# Patient Record
Sex: FEMALE | Race: BLACK OR AFRICAN AMERICAN | Employment: FULL TIME | URBAN - METROPOLITAN AREA
[De-identification: names, ages, dates, MRNs, and addresses within clinical notes are randomized per-mention and may not be internally consistent; named-entity substitution may affect disease eponyms.]

---

## 2017-01-16 ENCOUNTER — OFFICE VISIT (OUTPATIENT)
Dept: INTERNAL MEDICINE CLINIC | Age: 57
End: 2017-01-16

## 2017-01-16 ENCOUNTER — DOCUMENTATION ONLY (OUTPATIENT)
Dept: INTERNAL MEDICINE CLINIC | Age: 57
End: 2017-01-16

## 2017-01-16 VITALS
WEIGHT: 161.3 LBS | BODY MASS INDEX: 29.68 KG/M2 | DIASTOLIC BLOOD PRESSURE: 83 MMHG | RESPIRATION RATE: 14 BRPM | OXYGEN SATURATION: 97 % | HEIGHT: 62 IN | TEMPERATURE: 98.7 F | HEART RATE: 91 BPM | SYSTOLIC BLOOD PRESSURE: 140 MMHG

## 2017-01-16 DIAGNOSIS — I21.09: Chronic | ICD-10-CM

## 2017-01-16 DIAGNOSIS — R09.81 NASAL CONGESTION: Primary | ICD-10-CM

## 2017-01-16 DIAGNOSIS — K21.9 GASTROESOPHAGEAL REFLUX DISEASE, ESOPHAGITIS PRESENCE NOT SPECIFIED: ICD-10-CM

## 2017-01-16 DIAGNOSIS — G47.00 INSOMNIA, UNSPECIFIED TYPE: ICD-10-CM

## 2017-01-16 DIAGNOSIS — I51.7 CARDIOMEGALY: ICD-10-CM

## 2017-01-16 DIAGNOSIS — E78.00 HYPERCHOLESTEREMIA: Chronic | ICD-10-CM

## 2017-01-16 DIAGNOSIS — I10 ESSENTIAL HYPERTENSION: Chronic | ICD-10-CM

## 2017-01-16 DIAGNOSIS — R09.81 NASAL CONGESTION: ICD-10-CM

## 2017-01-16 RX ORDER — ZOLPIDEM TARTRATE 10 MG/1
10 TABLET ORAL
Qty: 30 TAB | Refills: 2 | Status: SHIPPED | OUTPATIENT
Start: 2017-01-16 | End: 2017-03-13 | Stop reason: SDUPTHER

## 2017-01-16 NOTE — PROGRESS NOTES
This writer has met with pt during scheduled ov post notice of pt being a high risk patient. Mrs. Jadon Clemens tells this writer she is doing well. She is in the office today with what she feels are allergy symptoms including productive cough. This writer last spoke with pt on 10/20/16 post admission for Syncope. Since that time the pt:  - denies any further syncopal episodes. -denies shortness of breath even with exercise; using inhalers as prescribed  - denies any further testing with other physicians   -last saw Dr. Selvin Orellana, cardiology, Nov 2016 at which time he changed her Bumex to PRN, next ov with him will be in Feb.   -has yet to see referred GI physician. Insurance has changed and needs to ensure he is in her new network, Pt will make the call to find out prior to scheduled Feb apt. Dr Colonel Quigley has created new referrals. (denies, constipation, blood in stool, diarrhea). -continues to check glucose levels at home reports a fasting average of less than 110. This writer will continue to monitor pt's status as she has not identified any need for case management at this time.

## 2017-01-16 NOTE — MR AVS SNAPSHOT
Visit Information Date & Time Provider Department Dept. Phone Encounter #  
 1/16/2017  3:45  [facility] [place], [place] [phone] [id number] Follow-up Instructions Return for 2 weeks review labs . Upcoming Health Maintenance Date Due Hepatitis C Screening 1960 DTaP/Tdap/Td series (1 - Tdap) 4/28/2009 BREAST CANCER SCRN MAMMOGRAM 4/26/2014 EYE EXAM RETINAL OR DILATED Q1 3/1/2015 FOOT EXAM Q1 10/2/2015 PAP AKA CERVICAL CYTOLOGY 3/26/2016 MICROALBUMIN Q1 11/16/2016 HEMOGLOBIN A1C Q6M 2/23/2017 LIPID PANEL Q1 10/19/2017 COLONOSCOPY 1/10/2019 Allergies as of 1/16/2017  Review Complete On: 1/16/2017 By: Betty Ragland LPN Severity Noted Reaction Type Reactions Ace Inhibitors  10/13/2011    Cough ?10/2011 Seafood [Shellfish Containing Products]  11/01/2010    Hives, Shortness of Breath, Nausea and Vomiting Current Immunizations  Reviewed on 10/19/2016 Name Date Influenza Vaccine 12/16/2014  4:31 PM, 1/10/2013 Influenza Vaccine (Quad) PF 10/19/2016  6:19 PM  
 Influenza Vaccine Intradermal PF 11/2/2015 Influenza Vaccine PF 10/7/2013 Influenza Vaccine Split 10/6/2010 Pneumococcal Polysaccharide (PPSV-23) 7/31/2015  4:49 PM  
 TD Vaccine 4/27/2009 Not reviewed this visit You Were Diagnosed With   
  
 Codes Comments Nasal congestion    -  Primary ICD-10-CM: R09.81 ICD-9-CM: 478.19 Essential hypertension     ICD-10-CM: I10 
ICD-9-CM: 401.9 Cardiomegaly     ICD-10-CM: I51.7 ICD-9-CM: 429.3 Insomnia, unspecified type     ICD-10-CM: G47.00 ICD-9-CM: 780.52 Myocardial infarction, anterior, acute, initial episode ([facility] [address]     ICD-10-CM: I21.09 
ICD-9-CM: 410.11 Gastroesophageal reflux disease, esophagitis presence not specified     ICD-10-CM: K21.9 ICD-9-CM: 530.81 Hypercholesteremia     ICD-10-CM: E78.00 ICD-9-CM: 272.0 Vitals BP Pulse Temp Resp Height(growth percentile) Weight(growth percentile) 140/83 (BP 1 Location: Right arm, BP Patient Position: At rest) 91 98.7 °F (37.1 °C) (Oral) 14 5' 2\" (1.575 m) 161 lb 4.8 oz (73.2 kg) LMP SpO2 BMI OB Status Smoking Status (Exact Date) 97% 29.5 kg/m2 Postmenopausal Former Smoker Vitals History BMI and BSA Data Body Mass Index Body Surface Area  
 29.5 kg/m 2 1.79 m 2 Preferred Pharmacy Pharmacy Name Phone [id number] Dimple Brown [address] [place] [address] [phone] Your Updated Medication List  
  
   
This list is accurate as of: 1/16/17  4:31 PM.  Always use your most recent med list.  
  
  
  
  
 albuterol 90 mcg/actuation inhaler Commonly known as:  PROAIR HFA Take 2 puffs by inhalation every six (6) hours as needed for Wheezing. atorvastatin 80 mg tablet Commonly known as:  LIPITOR Take 1 Tab by mouth nightly. bumetanide 0.5 mg tablet Commonly known as:  Garlon Salen Take 1 Tab by mouth two (2) times a day. Increase to 1-2 mg bid prn CHF  Indications: PERIPHERAL EDEMA DUE TO CHRONIC HEART FAILURE  
  
 carvedilol 25 mg tablet Commonly known as:  Gillermina Begun Take 25 mg by mouth two (2) times a day. cetirizine 10 mg tablet Commonly known as:  ZYRTEC Take 10 mg by mouth daily as needed for Allergies. cloNIDine HCl 0.3 mg tablet Commonly known as:  CATAPRES Take 0.3 mg by mouth two (2) times daily as needed. *IF BLOOD PRESSURE IS >170/90*  
  
 cyclobenzaprine 5 mg tablet Commonly known as:  FLEXERIL Take 1 Tab by mouth daily as needed for Muscle Spasm(s). Do not take with the Ambien Dexlansoprazole 60 mg Cpdb Commonly known as:  DEXILANT  
1 PO daily  
  
 diclofenac 1 % Gel Commonly known as:  VOLTAREN  
APPLY 2 G TO AFFECTED AREA FOUR (4) TIMES DAILY. EPINEPHrine 0.3 mg/0.3 mL Cmpk 1 Syringe by IntraMUSCular route once as needed for up to 1 dose. famotidine 40 mg tablet Commonly known as:  PEPCID Take 40 mg by mouth nightly. HYDROcodone-acetaminophen 5-325 mg per tablet Commonly known as:  Lenon Gauze Take 1 Tab by mouth every eight (8) hours as needed for Pain. Max Daily Amount: 3 Tabs. ipratropium 0.06 % nasal spray Commonly known as:  ATROVENT  
2 Sprays by Both Nostrils route four (4) times daily as needed for Rhinitis. Indications: RHINORRHEA * isosorbide mononitrate ER 60 mg CR tablet Commonly known as:  IMDUR  
  
 * isosorbide mononitrate ER 60 mg CR tablet Commonly known as:  IMDUR  
TAKE 1 TAB BY MOUTH EVERY MORNING. losartan 50 mg tablet Commonly known as:  COZAAR Take 1 Tab by mouth every evening. * metFORMIN 500 mg tablet Commonly known as:  GLUCOPHAGE  
TAKE 1 TABLET BY MOUTH TWICE A DAY (WITH MEALS) * metFORMIN 500 mg tablet Commonly known as:  GLUCOPHAGE  
TAKE 1 TABLET BY MOUTH TWICE A DAY (WITH MEALS)  
  
 montelukast 10 mg tablet Commonly known as:  SINGULAIR  
TAKE 1 TAB BY MOUTH DAILY. INDICATIONS: ALLERGIC RHINITIS  
  
 pantoprazole 40 mg tablet Commonly known as:  PROTONIX  
TAKE 1 TAB BY MOUTH DAILY. VITAMIN D 2,000 unit Cap capsule Generic drug:  Cholecalciferol (Vitamin D3) Take 4,000 Units by mouth daily. Takes 2 of the 2,000 unit tabs qd  
  
 zolpidem 10 mg tablet Commonly known as:  AMBIEN Take 1 Tab by mouth nightly as needed for Sleep. Max Daily Amount: 10 mg.  
  
 * Notice: This list has 4 medication(s) that are the same as other medications prescribed for you. Read the directions carefully, and ask your doctor or other care provider to review them with you. Prescriptions Printed Refills  
 zolpidem (AMBIEN) 10 mg tablet 2 Sig: Take 1 Tab by mouth nightly as needed for Sleep. Max Daily Amount: 10 mg.  
 Class: Print Route: Oral  
  
We Performed the Following METABOLIC PANEL, COMPREHENSIVE [51570 CPT(R)] GABRIEL LIPOPROFILE Z145833 CPT(R)] REFERRAL TO CARDIOLOGY [DRE90 Custom] Comments:  
 Please evaluate patient  PKTY REFERRAL TO GASTROENTEROLOGY [IHI90 Custom] Comments:  
 Please evaluate patient for  pkty TSH REFLEX TO T4 [[id number] Custom] Follow-up Instructions Return for 2 weeks review labs . To-Do List   
 01/16/2017 Lab:  HEMOGLOBIN A1C WITH EAG Referral Information Referral ID Referred By Referred To  
  
 [id number] Misha Obrien, [address] [facility] [address] [facility] [address]  [place], [address] [facility] Visits Status Start Date End Date 1 New Request 1/16/17 1/16/18 If your referral has a status of pending review or denied, additional information will be sent to support the outcome of this decision. Referral ID Referred By Referred To  
 [id number] Misha Obrien, [address] [facility] [address] [facility] [address] [place], [address] [facility] Visits Status Start Date End Date 1 New Request 1/16/17 1/16/18 If your referral has a status of pending review or denied, additional information will be sent to support the outcome of this decision. Patient Instructions Call with any concerns Introducing [facility]! Dear Adilene Ball: Thank you for requesting a nDreams account. Our records indicate that you already have an active nDreams account. You can access your account anytime at [email or web address] Did you know that you can access your hospital and ER discharge instructions at any time in nDreams? You can also review all of your test results from your hospital stay or ER visit. Additional Information If you have questions, please visit the Frequently Asked Questions section of the nDreams website at [email or web address] Remember, nDreams is NOT to be used for urgent needs. For medical emergencies, dial 911. Now available from your iPhone and Android! Please provide this summary of care documentation to your next provider. Your primary care clinician is listed as Adarsh Pacheco. If you have any questions after today's visit, please call [phone].

## 2017-01-16 NOTE — PROGRESS NOTES
Chief Complaint   Patient presents with    Insomnia     follow up on [facility] Referral Request     needs referral to cardiologist Dr. Amish Rodríguez       Subjective:   Monique Miner 49 y.o.  female with a  past medical history reviewed see below. comes in today for f/up   Ambien is working  well but when not used she is not sleep she has practiced sleep hygiene   She has had her sleep study already see old notes   Allergies started yesterday using inhaler no otc medications. Exposed to bronchitis from  no fever nochill bu tsome nasal goestion and mild cough     ROS: otherwise feeling generally well. All other systems reviewed and are negative      Current Outpatient Prescriptions   Medication Sig Dispense Refill    zolpidem (AMBIEN) 10 mg tablet Take 1 Tab by mouth nightly as needed for Sleep. Max Daily Amount: 10 mg. 30 Tab 2    bumetanide (BUMEX) 0.5 mg tablet Take 1 Tab by mouth two (2) times a day. Increase to 1-2 mg bid prn CHF  Indications: PERIPHERAL EDEMA DUE TO CHRONIC HEART FAILURE 180 Tab 1    losartan (COZAAR) 50 mg tablet Take 1 Tab by mouth every evening. 30 Tab 1    Dexlansoprazole (DEXILANT) 60 mg CpDB 1 PO daily 30 Cap 1    carvedilol (COREG) 25 mg tablet Take 25 mg by mouth two (2) times a day.  cetirizine (ZYRTEC) 10 mg tablet Take 10 mg by mouth daily as needed for Allergies.  cloNIDine HCl (CATAPRES) 0.3 mg tablet Take 0.3 mg by mouth two (2) times daily as needed. *IF BLOOD PRESSURE IS >170/90*      ipratropium (ATROVENT) 0.06 % nasal spray 2 Sprays by Both Nostrils route four (4) times daily as needed for Rhinitis. Indications: RHINORRHEA      famotidine (PEPCID) 40 mg tablet Take 40 mg by mouth nightly.  metFORMIN (GLUCOPHAGE) 500 mg tablet TAKE 1 TABLET BY MOUTH TWICE A DAY (WITH MEALS) 60 Tab 5    cyclobenzaprine (FLEXERIL) 5 mg tablet Take 1 Tab by mouth daily as needed for Muscle Spasm(s).  Do not take with the Ambien 10 Tab 0    HYDROcodone-acetaminophen (NORCO) 5-325 mg per tablet Take 1 Tab by mouth every eight (8) hours as needed for Pain. Max Daily Amount: 3 Tabs. 14 Tab 0    atorvastatin (LIPITOR) 80 mg tablet Take 1 Tab by mouth nightly. 30 Tab 5    montelukast (SINGULAIR) 10 mg tablet TAKE 1 TAB BY MOUTH DAILY. INDICATIONS: ALLERGIC RHINITIS 30 Tab 11    albuterol (PROAIR HFA) 90 mcg/actuation inhaler Take 2 puffs by inhalation every six (6) hours as needed for Wheezing. 1 Inhaler 1    Cholecalciferol, Vitamin D3, (VITAMIN D) 2,000 unit Cap Take 4,000 Units by mouth daily. Takes 2 of the 2,000 unit tabs qd      metFORMIN (GLUCOPHAGE) 500 mg tablet TAKE 1 TABLET BY MOUTH TWICE A DAY (WITH MEALS) 60 Tab 5    isosorbide mononitrate ER (IMDUR) 60 mg CR tablet TAKE 1 TAB BY MOUTH EVERY MORNING. 60 Tab 1    diclofenac (VOLTAREN) 1 % gel APPLY 2 G TO AFFECTED AREA FOUR (4) TIMES DAILY. 0    isosorbide mononitrate ER (IMDUR) 60 mg CR tablet       pantoprazole (PROTONIX) 40 mg tablet TAKE 1 TAB BY MOUTH DAILY. 30 Tab 0    Epinephrine 0.3 mg/0.3 mL cmpk 1 Syringe by IntraMUSCular route once as needed for up to 1 dose.  1 each 1     Allergies   Allergen Reactions    Ace Inhibitors Cough     ?10/2011    Seafood [Shellfish Containing Products] Hives, Shortness of Breath and Nausea and Vomiting     Past Medical History   Diagnosis Date    Acute Anterior Myocardial Infarction, s/p PTCA w/ stent 10/2010 10/31/2010    Asthma      mild    [facility] 8/24/2012    GERD (gastroesophageal reflux disease)      hiatal hernia    H/O recurrent Sinusitis 3/16/2011    Hiatal Hernia w/ GERD (gastroesophageal reflux disease) 3/16/2011    Hypercholesterolemia     Hypertension 3/16/2011    Ill-defined condition      pacemaker put in last July 2015 because of CHF    Impaired fasting glucose 4/18/2011    Iron deficiency anemia 3/16/2011    Multinodular thyroid 3/16/2011    Palpitations, PVC's 10/13/2011    Perennial allergic rhinitis 3/16/2011    Pityriasis rosea 2012    Post-menopausal      LMP 46y. o, no HRT    Postmenopause, LMP 40 yo, No HRT 3/16/2011    Reflux esophagitis 3/16/2011    Tobacco user     Vitamin D deficiency 10/13/2011     Past Surgical History   Procedure Laterality Date    Ptca w/ stent, initial  10/2010     Dr Darryl Issa Egd  2009     hiatal hernia, esophagitis; Dr Maru Gilliam Hx lithotripsy  2012     failed    Kidney stone (calculi) eval  2012     scope w/ kidney stone extractions, multiple; [facility]    Pr colonoscopy w/biopsy single/multiple  2009     benign polyp, recheck in 5 years, Dr Camden Grey    Endoscopy, colon, diagnostic  2009    Hx gyn       FNA bilateral benign     Pr cardiac surg procedure unlist       cardiac stent      Family History   Problem Relation Age of Onset    Hypertension Mother     High Cholesterol Mother     Thyroid Disease Mother     Heart Disease Mother      chf    Kidney Disease Mother     Headache Mother     Heart Disease Father     Heart Attack Father 36      MI age 36   [facility] High Cholesterol Father     Hypertension Sister     Thyroid Disease Sister     Hypertension Sister    [facility] Arthritis-rheumatoid Sister     Diabetes Sister     Thyroid Disease Sister     Heart Attack Brother      massive MI at age 46, survived    Thyroid Disease Other      niece, Neha Garcia    High Cholesterol Brother      Social History   Substance Use Topics    Smoking status: Former Smoker     Packs/day: 0.25     Years: 30.00     Types: Cigarettes     Quit date: 2013    Smokeless tobacco: Never Used      Comment: had quit 10/2010 then restarted ~ 3/2012;  about 5 cigarettes a day; on and off since age 26 yo x ~ 30 yrs    Alcohol use No      Comment: none          Objective:     Visit Vitals    /83 (BP 1 Location: Right arm, BP Patient Position: At rest)    Pulse 91    Temp 98.7 °F (37.1 °C) (Oral)    Resp 14    Ht 5' 2\" (1.575 m)  Wt 161 lb 4.8 oz (73.2 kg)    LMP  (Exact Date)    SpO2 97%    BMI 29.5 kg/m2     Gen: NAD, pleasant  HEENT: normal appearing head, nares patent, PERRLA, EOMI, oropharynx no erythema, no cervical lymphadenopathy neck supple nasal drainage nasal congestion turbinates boggy clr d/c noted   Cardio: RRR nl S1S2 no murmur  Lungs CTAB no wheeze no rales no rhonchi  ABD Soft non tender non distended + bowel sounds  Extremities: full ROM X 4 no clubbing no cyanosis  Neuro: no gross focal deficits noted, alert and orientated X 3  Psych.: well groomed no outward signs of depression. Assessment/Plan:   [facility] was seen today for insomnia and referral request.    Diagnoses and all orders for this visit:    Nasal congestion  -     HEMOGLOBIN A1C WITH EAG; Future  -     METABOLIC PANEL, COMPREHENSIVE  -     TSH REFLEX TO T4  -     NMR LIPOPROFILE    Essential hypertension  -     HEMOGLOBIN A1C WITH EAG; Future  -     METABOLIC PANEL, COMPREHENSIVE  -     TSH REFLEX TO T4  -     NMR LIPOPROFILE    Cardiomegaly  -     HEMOGLOBIN A1C WITH EAG; Future  -     METABOLIC PANEL, COMPREHENSIVE  -     TSH REFLEX TO T4  -     NMR LIPOPROFILE    Insomnia, unspecified type  -     zolpidem (AMBIEN) 10 mg tablet; Take 1 Tab by mouth nightly as needed for Sleep. Max Daily Amount: 10 mg.  -     HEMOGLOBIN A1C WITH EAG; Future  -     METABOLIC PANEL, COMPREHENSIVE  -     TSH REFLEX TO T4  -     NMR LIPOPROFILE    Acute Anterior Myocardial Infarction, s/p PTCA w/ stent 10/2010  -     REFERRAL TO CARDIOLOGY  -     HEMOGLOBIN A1C WITH EAG; Future  -     METABOLIC PANEL, COMPREHENSIVE  -     TSH REFLEX TO T4  -     NMR LIPOPROFILE    Gastroesophageal reflux disease, esophagitis presence not specified  -     REFERRAL TO GASTROENTEROLOGY  -     HEMOGLOBIN A1C WITH EAG; Future  -     METABOLIC PANEL, COMPREHENSIVE  -     TSH REFLEX TO T4  -     NMR LIPOPROFILE    Hypercholesteremia  -     HEMOGLOBIN A1C WITH EAG;  Future  -     METABOLIC PANEL, COMPREHENSIVE  -     TSH REFLEX TO T4  -     NMR LIPOPROFILE    Other orders  -     CVD REPORT      Follow-up Disposition:  Return for 2 weeks review labs . .  avs printed and given to the pt. .  netti pot recommended to help w/ allergies and avoid all perfumes/colonges   The patient voiced understanding of the above. Medication side effects were reviewed with the patient. Call with any concerns.

## 2017-01-16 NOTE — PROGRESS NOTES
1. Have you been to the ER, urgent care clinic since your last visit? Hospitalized since your last visit? No    2. Have you seen or consulted any other health care providers outside of the [address] [place] [address] since your last visit? Include any pap smears or colon screening.  No     Chief Complaint   Patient presents with    Insomnia     follow up on [facility] Referral Request     needs referral to cardiologist Dr. Akhil Naylor     Not fasting

## 2017-01-19 ENCOUNTER — LAB ONLY (OUTPATIENT)
Dept: INTERNAL MEDICINE CLINIC | Age: 57
End: 2017-01-19

## 2017-01-20 LAB
ALBUMIN SERPL-MCNC: 4.4 G/DL (ref 3.5–5.5)
ALBUMIN/GLOB SERPL: 1.8 {RATIO} (ref 1.1–2.5)
ALP SERPL-CCNC: 106 IU/L (ref 39–117)
ALT SERPL-CCNC: 10 IU/L (ref 0–32)
AST SERPL-CCNC: 19 IU/L (ref 0–40)
BILIRUB SERPL-MCNC: 0.3 MG/DL (ref 0–1.2)
BUN SERPL-MCNC: 16 MG/DL (ref 6–24)
BUN/CREAT SERPL: 19 (ref 9–23)
CALCIUM SERPL-MCNC: 10.1 MG/DL (ref 8.7–10.2)
CHLORIDE SERPL-SCNC: 105 MMOL/L (ref 96–106)
CHOLEST SERPL-MCNC: 132 MG/DL (ref 100–199)
CO2 SERPL-SCNC: 25 MMOL/L (ref 18–29)
CREAT SERPL-MCNC: 0.83 MG/DL (ref 0.57–1)
EST. AVERAGE GLUCOSE BLD GHB EST-MCNC: 146 MG/DL
GLOBULIN SER CALC-MCNC: 2.4 G/DL (ref 1.5–4.5)
GLUCOSE SERPL-MCNC: 109 MG/DL (ref 65–99)
HBA1C MFR BLD: 6.7 % (ref 4.8–5.6)
HDL SERPL-SCNC: 30.4 UMOL/L
HDLC SERPL-MCNC: 47 MG/DL
INTERPRETATION, 910389: NORMAL
LDL SERPL QN: 20.8 NM
LDL SERPL-SCNC: 1041 NMOL/L
LDL SMALL SERPL-SCNC: 632 NMOL/L
LDLC SERPL CALC-MCNC: 69 MG/DL (ref 0–99)
LP-IR SCORE SERPL: 49
POTASSIUM SERPL-SCNC: 4.3 MMOL/L (ref 3.5–5.2)
PROT SERPL-MCNC: 6.8 G/DL (ref 6–8.5)
SODIUM SERPL-SCNC: 144 MMOL/L (ref 134–144)
TRIGL SERPL-MCNC: 80 MG/DL (ref 0–149)
TSH SERPL DL<=0.005 MIU/L-ACNC: 0.67 UIU/ML (ref 0.45–4.5)

## 2017-01-30 ENCOUNTER — OFFICE VISIT (OUTPATIENT)
Dept: INTERNAL MEDICINE CLINIC | Age: 57
End: 2017-01-30

## 2017-01-30 VITALS
SYSTOLIC BLOOD PRESSURE: 159 MMHG | DIASTOLIC BLOOD PRESSURE: 77 MMHG | WEIGHT: 161 LBS | TEMPERATURE: 98.1 F | HEART RATE: 74 BPM | RESPIRATION RATE: 14 BRPM | BODY MASS INDEX: 29.63 KG/M2 | OXYGEN SATURATION: 95 % | HEIGHT: 62 IN

## 2017-01-30 DIAGNOSIS — E11.9 NON-INSULIN DEPENDENT TYPE 2 DIABETES MELLITUS (HCC): ICD-10-CM

## 2017-01-30 DIAGNOSIS — R09.02 HYPOXIA: ICD-10-CM

## 2017-01-30 DIAGNOSIS — Z71.2 ENCOUNTER TO DISCUSS TEST RESULTS: ICD-10-CM

## 2017-01-30 DIAGNOSIS — R06.2 WHEEZING ON AUSCULTATION: ICD-10-CM

## 2017-01-30 DIAGNOSIS — J45.20 MILD INTERMITTENT ASTHMA WITHOUT COMPLICATION: Primary | ICD-10-CM

## 2017-01-30 DIAGNOSIS — I10 BENIGN ESSENTIAL HTN: ICD-10-CM

## 2017-01-30 RX ORDER — IPRATROPIUM BROMIDE AND ALBUTEROL SULFATE 2.5; .5 MG/3ML; MG/3ML
3 SOLUTION RESPIRATORY (INHALATION)
Qty: 3 ML | Refills: 0
Start: 2017-01-30 | End: 2017-01-30

## 2017-01-30 RX ORDER — ALBUTEROL SULFATE 90 UG/1
2 AEROSOL, METERED RESPIRATORY (INHALATION)
Qty: 1 INHALER | Refills: 1 | Status: SHIPPED | OUTPATIENT
Start: 2017-01-30

## 2017-01-30 NOTE — PROGRESS NOTES
1. Have you been to the ER, urgent care clinic since your last visit? Hospitalized since your last visit? No    2. Have you seen or consulted any other health care providers outside of the [facility] since your last visit? Include any pap smears or colon screening.  No   Chief Complaint   Patient presents with    Results     follow up on lab results     Not fasting

## 2017-01-30 NOTE — MR AVS SNAPSHOT
Visit Information Date & Time Provider Department Dept. Phone Encounter #  
 1/30/2017  3:45  [facility], [address] [place] [address] [phone] [id number] Follow-up Instructions Return in about 6 weeks (around 3/13/2017), or if symptoms worsen or fail to improve, for recheck HTN 15 min . Upcoming Health Maintenance Date Due Hepatitis C Screening 1960 DTaP/Tdap/Td series (1 - Tdap) 4/28/2009 BREAST CANCER SCRN MAMMOGRAM 4/26/2014 EYE EXAM RETINAL OR DILATED Q1 3/1/2015 FOOT EXAM Q1 10/2/2015 PAP AKA CERVICAL CYTOLOGY 3/26/2016 MICROALBUMIN Q1 11/16/2016 HEMOGLOBIN A1C Q6M 7/19/2017 LIPID PANEL Q1 1/19/2018 COLONOSCOPY 1/10/2019 Allergies as of 1/30/2017  Review Complete On: 1/30/2017 By: Erika Gabriel LPN Severity Noted Reaction Type Reactions Ace Inhibitors  10/13/2011    Cough ?10/2011 Seafood [Shellfish Containing Products]  11/01/2010    Hives, Shortness of Breath, Nausea and Vomiting Current Immunizations  Reviewed on 10/19/2016 Name Date Influenza Vaccine 12/16/2014  4:31 PM, 1/10/2013 Influenza Vaccine (Quad) PF 10/19/2016  6:19 PM  
 Influenza Vaccine Intradermal PF 11/2/2015 Influenza Vaccine PF 10/7/2013 Influenza Vaccine Split 10/6/2010 Pneumococcal Polysaccharide (PPSV-23) 7/31/2015  4:49 PM  
 TD Vaccine 4/27/2009 Not reviewed this visit You Were Diagnosed With   
  
 Codes Comments Mild intermittent asthma without complication    -  Primary ICD-10-CM: J45.20 ICD-9-CM: 493.90 Wheezing on auscultation     ICD-10-CM: R06.2 ICD-9-CM: 786.07 Non-insulin dependent type 2 diabetes mellitus ([facility]     ICD-10-CM: E11.9 ICD-9-CM: 250.00 Benign essential HTN     ICD-10-CM: I10 
ICD-9-CM: 401.1 Hypoxia     ICD-10-CM: R09.02 
ICD-9-CM: 799.02 Encounter to discuss test results     ICD-10-CM: Z71.89 ICD-9-CM: V65.49 Vitals BP Pulse Temp Resp Height(growth percentile) Weight(growth percentile) 159/77 (BP 1 Location: Left arm, BP Patient Position: At rest) 74 98.1 °F (36.7 °C) (Oral) 14 5' 2\" (1.575 m) 161 lb (73 kg) LMP SpO2 PF BMI OB Status Smoking Status (Exact Date) 95% 400 L/min 29.45 kg/m2 Postmenopausal Former Smoker Vitals History BMI and BSA Data Body Mass Index Body Surface Area  
 29.45 kg/m 2 1.79 m 2 Preferred Pharmacy Pharmacy Name Phone [id number] Amber Evangelista, [address] [facility] [phone] Your Updated Medication List  
  
   
This list is accurate as of: 1/30/17  4:25 PM.  Always use your most recent med list.  
  
  
  
  
 albuterol 90 mcg/actuation inhaler Commonly known as:  PROAIR HFA Take 2 Puffs by inhalation every six (6) hours as needed for Wheezing. albuterol-ipratropium 2.5 mg-0.5 mg/3 ml Nebu Commonly known as:  DUO-NEB  
3 mL by Nebulization route now for 1 dose. atorvastatin 80 mg tablet Commonly known as:  LIPITOR Take 1 Tab by mouth nightly. bumetanide 0.5 mg tablet Commonly known as:  Raquel Oas Take 1 Tab by mouth two (2) times a day. Increase to 1-2 mg bid prn CHF  Indications: PERIPHERAL EDEMA DUE TO CHRONIC HEART FAILURE  
  
 carvedilol 25 mg tablet Commonly known as:  Sharolyn Hunter Take 25 mg by mouth two (2) times a day. cetirizine 10 mg tablet Commonly known as:  ZYRTEC Take 10 mg by mouth daily as needed for Allergies. cloNIDine HCl 0.3 mg tablet Commonly known as:  CATAPRES Take 0.3 mg by mouth two (2) times daily as needed. *IF BLOOD PRESSURE IS >170/90*  
  
 cyclobenzaprine 5 mg tablet Commonly known as:  FLEXERIL Take 1 Tab by mouth daily as needed for Muscle Spasm(s). Do not take with the Ambien Dexlansoprazole 60 mg Cpdb Commonly known as:  DEXILANT  
1 PO daily  
  
 diclofenac 1 % Gel Commonly known as:  VOLTAREN  
 APPLY 2 G TO AFFECTED AREA FOUR (4) TIMES DAILY. EPINEPHrine 0.3 mg/0.3 mL Cmpk 1 Syringe by IntraMUSCular route once as needed for up to 1 dose.  
  
 famotidine 40 mg tablet Commonly known as:  PEPCID Take 40 mg by mouth nightly. HYDROcodone-acetaminophen 5-325 mg per tablet Commonly known as:  Ulises Handler Take 1 Tab by mouth every eight (8) hours as needed for Pain. Max Daily Amount: 3 Tabs. ipratropium 0.06 % nasal spray Commonly known as:  ATROVENT  
2 Sprays by Both Nostrils route four (4) times daily as needed for Rhinitis. Indications: RHINORRHEA * isosorbide mononitrate ER 60 mg CR tablet Commonly known as:  IMDUR  
  
 * isosorbide mononitrate ER 60 mg CR tablet Commonly known as:  IMDUR  
TAKE 1 TAB BY MOUTH EVERY MORNING. losartan 50 mg tablet Commonly known as:  COZAAR Take 1 Tab by mouth every evening. * metFORMIN 500 mg tablet Commonly known as:  GLUCOPHAGE  
TAKE 1 TABLET BY MOUTH TWICE A DAY (WITH MEALS) * metFORMIN 500 mg tablet Commonly known as:  GLUCOPHAGE  
TAKE 1 TABLET BY MOUTH TWICE A DAY (WITH MEALS)  
  
 montelukast 10 mg tablet Commonly known as:  SINGULAIR  
TAKE 1 TAB BY MOUTH DAILY. INDICATIONS: ALLERGIC RHINITIS  
  
 pantoprazole 40 mg tablet Commonly known as:  PROTONIX  
TAKE 1 TAB BY MOUTH DAILY. VITAMIN D 2,000 unit Cap capsule Generic drug:  Cholecalciferol (Vitamin D3) Take 4,000 Units by mouth daily. Takes 2 of the 2,000 unit tabs qd  
  
 zolpidem 10 mg tablet Commonly known as:  AMBIEN Take 1 Tab by mouth nightly as needed for Sleep. Max Daily Amount: 10 mg.  
  
 * Notice: This list has 4 medication(s) that are the same as other medications prescribed for you. Read the directions carefully, and ask your doctor or other care provider to review them with you. Prescriptions Sent to Pharmacy  Refills  
 albuterol (PROAIR HFA) 90 mcg/actuation inhaler 1  
 Sig: Take 2 Puffs by inhalation every six (6) hours as needed for Wheezing. Class: Normal  
 Pharmacy: [id number] #: [phone] Route: Inhalation We Performed the Following ALBUTEROL IPRATROP NON-COMP [id number] [facility]] ND INHAL RX, AIRWAY OBST/DX SPUTUM INDUCT [id number] CPT(R)] Follow-up Instructions Return in about 6 weeks (around 3/13/2017), or if symptoms worsen or fail to improve, for recheck HTN 15 min . To-Do List   
 01/30/2017 Imaging:  XR CHEST PA LAT Introducing [facility] SERVICES! Dear Glenroy Brady: Thank you for requesting a [employer] account. Our records indicate that you already have an active [employer] account. You can access your account anytime at [email or web address] Did you know that you can access your hospital and ER discharge instructions at any time in [employer]? You can also review all of your test results from your hospital stay or ER visit. Additional Information If you have questions, please visit the Frequently Asked Questions section of the [employer] website at [email or web address] Remember, [employer] is NOT to be used for urgent needs. For medical emergencies, dial 911. Now available from your iPhone and Android! Please provide this summary of care documentation to your next provider. Your primary care clinician is listed as Lauren Anton. If you have any questions after today's visit, please call [phone].

## 2017-01-30 NOTE — PROGRESS NOTES
Chief Complaint   Patient presents with    Results     follow up on lab results           Subjective:   Mary Lou Rodríguez 49 y.o.  female with a  past medical history reviewed see below. comes in today for test results and for f/up cough  still wheezing   Using an inhaler per chart review last inhaler was written in  bottle  10/16   ROS: otherwise feeling generally well. All other systems reviewed and are negative      Current Outpatient Prescriptions   Medication Sig Dispense Refill    zolpidem (AMBIEN) 10 mg tablet Take 1 Tab by mouth nightly as needed for Sleep. Max Daily Amount: 10 mg. 30 Tab 2    metFORMIN (GLUCOPHAGE) 500 mg tablet TAKE 1 TABLET BY MOUTH TWICE A DAY (WITH MEALS) 60 Tab 5    isosorbide mononitrate ER (IMDUR) 60 mg CR tablet TAKE 1 TAB BY MOUTH EVERY MORNING. 60 Tab 1    bumetanide (BUMEX) 0.5 mg tablet Take 1 Tab by mouth two (2) times a day. Increase to 1-2 mg bid prn CHF  Indications: PERIPHERAL EDEMA DUE TO CHRONIC HEART FAILURE 180 Tab 1    losartan (COZAAR) 50 mg tablet Take 1 Tab by mouth every evening. 30 Tab 1    diclofenac (VOLTAREN) 1 % gel APPLY 2 G TO AFFECTED AREA FOUR (4) TIMES DAILY. 0    isosorbide mononitrate ER (IMDUR) 60 mg CR tablet       Dexlansoprazole (DEXILANT) 60 mg CpDB 1 PO daily 30 Cap 1    pantoprazole (PROTONIX) 40 mg tablet TAKE 1 TAB BY MOUTH DAILY. 30 Tab 0    carvedilol (COREG) 25 mg tablet Take 25 mg by mouth two (2) times a day.  cetirizine (ZYRTEC) 10 mg tablet Take 10 mg by mouth daily as needed for Allergies.  cloNIDine HCl (CATAPRES) 0.3 mg tablet Take 0.3 mg by mouth two (2) times daily as needed. *IF BLOOD PRESSURE IS >170/90*      ipratropium (ATROVENT) 0.06 % nasal spray 2 Sprays by Both Nostrils route four (4) times daily as needed for Rhinitis. Indications: RHINORRHEA      famotidine (PEPCID) 40 mg tablet Take 40 mg by mouth nightly.       metFORMIN (GLUCOPHAGE) 500 mg tablet TAKE 1 TABLET BY MOUTH TWICE A DAY (WITH MEALS) 60 Tab 5    cyclobenzaprine (FLEXERIL) 5 mg tablet Take 1 Tab by mouth daily as needed for Muscle Spasm(s). Do not take with the Ambien 10 Tab 0    atorvastatin (LIPITOR) 80 mg tablet Take 1 Tab by mouth nightly. 30 Tab 5    montelukast (SINGULAIR) 10 mg tablet TAKE 1 TAB BY MOUTH DAILY. INDICATIONS: ALLERGIC RHINITIS 30 Tab 11    Epinephrine 0.3 mg/0.3 mL cmpk 1 Syringe by IntraMUSCular route once as needed for up to 1 dose. 1 each 1    albuterol (PROAIR HFA) 90 mcg/actuation inhaler Take 2 puffs by inhalation every six (6) hours as needed for Wheezing. 1 Inhaler 1    Cholecalciferol, Vitamin D3, (VITAMIN D) 2,000 unit Cap Take 4,000 Units by mouth daily. Takes 2 of the 2,000 unit tabs qd      HYDROcodone-acetaminophen (NORCO) 5-325 mg per tablet Take 1 Tab by mouth every eight (8) hours as needed for Pain. Max Daily Amount: 3 Tabs. 14 Tab 0     Allergies   Allergen Reactions    Ace Inhibitors Cough     ?10/2011    Seafood [Shellfish Containing Products] Hives, Shortness of Breath and Nausea and Vomiting     Past Medical History   Diagnosis Date    Acute Anterior Myocardial Infarction, s/p PTCA w/ stent 10/2010 10/31/2010    Asthma      mild    [facility], [facility] 8/24/2012    GERD (gastroesophageal reflux disease)      hiatal hernia    H/O recurrent Sinusitis 3/16/2011    Hiatal Hernia w/ GERD (gastroesophageal reflux disease) 3/16/2011    Hypercholesterolemia     Hypertension 3/16/2011    Ill-defined condition      pacemaker put in last July 2015 because of CHF    Impaired fasting glucose 4/18/2011    Iron deficiency anemia 3/16/2011    Multinodular thyroid 3/16/2011    Palpitations, PVC's 10/13/2011    Perennial allergic rhinitis 3/16/2011    Pityriasis rosea 5/30/2012    Post-menopausal      LMP 46y. o, no HRT    Postmenopause, LMP 40 yo, No HRT 3/16/2011    Reflux esophagitis 3/16/2011    Tobacco user     Vitamin D deficiency 10/13/2011     Past Surgical History   Procedure Laterality Date    Ptca w/ stent, initial  10/2010     Dr Narinder Reynolds    Egd  2009     hiatal hernia, esophagitis; Dr Boom Murray Hx lithotripsy  2012     failed    Kidney stone (calculi) eval  2012     scope w/ kidney stone extractions, multiple; Dr Carrie Putnam, [facility]    Pr colonoscopy w/biopsy single/multiple  2009     benign polyp, recheck in 5 years, Dr Boom Murray Endoscopy, colon, diagnostic  2009    Hx gyn       FNA bilateral benign     Pr cardiac surg procedure unlist       cardiac stent      Family History   Problem Relation Age of Onset    Hypertension Mother     High Cholesterol Mother     Thyroid Disease Mother     Heart Disease Mother      chf    Kidney Disease Mother     Headache Mother     Heart Disease Father     Heart Attack Father 36      MI age 36   Velta Ganser High Cholesterol Father     Hypertension Sister     Thyroid Disease Sister     Hypertension Sister    Velta Ganser Arthritis-rheumatoid Sister     Diabetes Sister     Thyroid Disease Sister     Heart Attack Brother      massive MI at age 46, survived    Thyroid Disease Other      niece, Neha [place]    High Cholesterol Brother      Social History   Substance Use Topics    Smoking status: Former Smoker     Packs/day: 0.25     Years: 30.00     Types: Cigarettes     Quit date: 2013    Smokeless tobacco: Never Used      Comment: had quit 10/2010 then restarted ~ 3/2012;  about 5 cigarettes a day; on and off since age 26 yo x ~ 30 yrs    Alcohol use No      Comment: none          Objective:     Visit Vitals    /77 (BP 1 Location: Left arm, BP Patient Position: At rest)    Pulse 74    Temp 98.1 °F (36.7 °C) (Oral)    Resp 14    Ht 5' 2\" (1.575 m)    Wt 161 lb (73 kg)    LMP  (Exact Date)    SpO2 95%    BMI 29.45 kg/m2     Gen: NAD, pleasant  HEENT: normal appearing head, nares patent, PERRLA, EOMI, oropharynx no erythema, no cervical lymphadenopathy neck supple   Cardio: RRR nl S1S2 no murmur  Lungs + wheeze improved no rales no rhonchi  ABD Soft non tender non distended + bowel sounds  Extremities: full ROM X 4 no clubbing no cyanosis  Neuro: no gross focal deficits noted, alert and orientated X 3  Psych.: well groomed no outward signs of depression. Assessment/Plan:   Yanely Arceo was seen today for results. Diagnoses and all orders for this visit:    Mild intermittent asthma without complication  -     albuterol (PROAIR HFA) 90 mcg/actuation inhaler; Take 2 Puffs by inhalation every six (6) hours as needed for Wheezing.  -     ALBUTEROL IPRATROP NON-COMP  -     KS PRESSURIZED/NONPRESSURIZED INHALATION TREATMENT  -     albuterol-ipratropium (DUO-NEB) 2.5 mg-0.5 mg/3 ml nebu; 3 mL by Nebulization route now for 1 dose. -     XR CHEST PA LAT; Future    Wheezing on auscultation    Non-insulin dependent type 2 diabetes mellitus ([address]    Benign essential HTN    Hypoxia  -     XR CHEST PA LAT; Future    Encounter to discuss test results      Follow-up Disposition:  Return in about 6 weeks (around 3/13/2017), or if symptoms worsen or fail to improve, for recheck HTN 15 min . Abel Steiner avs printed and given to the pt. .    The patient voiced understanding of the above. Medication side effects were reviewed with the patient. Call with any concerns.

## 2017-02-02 ENCOUNTER — TELEPHONE (OUTPATIENT)
Dept: INTERNAL MEDICINE CLINIC | Age: 57
End: 2017-02-02

## 2017-02-02 NOTE — TELEPHONE ENCOUNTER
Patient is waiting on referral to see cardiologist. Please send referral information over to [facility] before appointment and contact patient once sent. [facility] Been number for patient is [phone].

## 2017-02-08 ENCOUNTER — HOSPITAL ENCOUNTER (OUTPATIENT)
Dept: GENERAL RADIOLOGY | Age: 57
Discharge: HOME | End: 2017-02-08
Payer: COMMERCIAL

## 2017-02-08 DIAGNOSIS — R09.02 HYPOXIA: ICD-10-CM

## 2017-02-08 DIAGNOSIS — J45.20 MILD INTERMITTENT ASTHMA WITHOUT COMPLICATION: ICD-10-CM

## 2017-02-08 PROCEDURE — 71020 XR CHEST PA LAT: CPT

## 2017-03-13 ENCOUNTER — OFFICE VISIT (OUTPATIENT)
Dept: INTERNAL MEDICINE CLINIC | Age: 57
End: 2017-03-13

## 2017-03-13 VITALS
HEIGHT: 62 IN | DIASTOLIC BLOOD PRESSURE: 72 MMHG | RESPIRATION RATE: 16 BRPM | WEIGHT: 159.7 LBS | OXYGEN SATURATION: 95 % | TEMPERATURE: 98.5 F | SYSTOLIC BLOOD PRESSURE: 129 MMHG | HEART RATE: 73 BPM | BODY MASS INDEX: 29.39 KG/M2

## 2017-03-13 DIAGNOSIS — M25.512 CHRONIC LEFT SHOULDER PAIN: ICD-10-CM

## 2017-03-13 DIAGNOSIS — G89.29 CHRONIC LEFT SHOULDER PAIN: ICD-10-CM

## 2017-03-13 DIAGNOSIS — G47.00 INSOMNIA, UNSPECIFIED TYPE: ICD-10-CM

## 2017-03-13 DIAGNOSIS — I10 HTN (HYPERTENSION), BENIGN: Primary | ICD-10-CM

## 2017-03-13 DIAGNOSIS — R14.2 BELCHINGS: ICD-10-CM

## 2017-03-13 RX ORDER — ZOLPIDEM TARTRATE 10 MG/1
10 TABLET ORAL
Qty: 30 TAB | Refills: 2 | Status: SHIPPED | OUTPATIENT
Start: 2017-03-13 | End: 2017-07-17 | Stop reason: SDUPTHER

## 2017-03-13 RX ORDER — CYCLOBENZAPRINE HCL 5 MG
5 TABLET ORAL
Qty: 10 TAB | Refills: 0 | Status: SHIPPED | OUTPATIENT
Start: 2017-03-13 | End: 2017-04-13 | Stop reason: SDUPTHER

## 2017-03-13 RX ORDER — DICLOFENAC SODIUM 10 MG/G
2 GEL TOPICAL EVERY 6 HOURS
Qty: 100 G | Refills: 0 | Status: SHIPPED | OUTPATIENT
Start: 2017-03-13 | End: 2017-04-13 | Stop reason: SDUPTHER

## 2017-03-13 RX ORDER — HYDROCODONE BITARTRATE AND ACETAMINOPHEN 5; 325 MG/1; MG/1
1 TABLET ORAL
Qty: 14 TAB | Refills: 0 | Status: ON HOLD | OUTPATIENT
Start: 2017-03-13 | End: 2017-04-19

## 2017-03-13 NOTE — PROGRESS NOTES
Chief Complaint   Patient presents with    Hypertension     6 WEEK f/u     Cold Symptoms     Bronchitis           Subjective:   Redatiya Mcbridef 49 y.o.  female with a  past medical history reviewed see below. comes in today c/o: Left shoulder pain started aout three weeks ago and radiates to the neck and worsen as the day goes on no CP th epain is agrragavted with movement and feels sore 8/10 meds tried none    Pain present however > 6 months     ROS: otherwise feeling generally well. All other systems reviewed and are negative      Current Outpatient Prescriptions   Medication Sig Dispense Refill    albuterol (PROAIR HFA) 90 mcg/actuation inhaler Take 2 Puffs by inhalation every six (6) hours as needed for Wheezing. 1 Inhaler 1    zolpidem (AMBIEN) 10 mg tablet Take 1 Tab by mouth nightly as needed for Sleep. Max Daily Amount: 10 mg. 30 Tab 2    bumetanide (BUMEX) 0.5 mg tablet Take 1 Tab by mouth two (2) times a day. Increase to 1-2 mg bid prn CHF  Indications: PERIPHERAL EDEMA DUE TO CHRONIC HEART FAILURE 180 Tab 1    losartan (COZAAR) 50 mg tablet Take 1 Tab by mouth every evening. 30 Tab 1    carvedilol (COREG) 25 mg tablet Take 25 mg by mouth two (2) times a day.  cetirizine (ZYRTEC) 10 mg tablet Take 10 mg by mouth daily as needed for Allergies.  cloNIDine HCl (CATAPRES) 0.3 mg tablet Take 0.3 mg by mouth two (2) times daily as needed. *IF BLOOD PRESSURE IS >170/90*      famotidine (PEPCID) 40 mg tablet Take 40 mg by mouth nightly.  metFORMIN (GLUCOPHAGE) 500 mg tablet TAKE 1 TABLET BY MOUTH TWICE A DAY (WITH MEALS) 60 Tab 5    atorvastatin (LIPITOR) 80 mg tablet Take 1 Tab by mouth nightly. 30 Tab 5    montelukast (SINGULAIR) 10 mg tablet TAKE 1 TAB BY MOUTH DAILY. INDICATIONS: ALLERGIC RHINITIS 30 Tab 11    Cholecalciferol, Vitamin D3, (VITAMIN D) 2,000 unit Cap Take 4,000 Units by mouth daily.  Takes 2 of the 2,000 unit tabs qd      metFORMIN (GLUCOPHAGE) 500 mg tablet TAKE 1 TABLET BY MOUTH TWICE A DAY (WITH MEALS) 60 Tab 5    isosorbide mononitrate ER (IMDUR) 60 mg CR tablet TAKE 1 TAB BY MOUTH EVERY MORNING. 60 Tab 1    diclofenac (VOLTAREN) 1 % gel APPLY 2 G TO AFFECTED AREA FOUR (4) TIMES DAILY. 0    isosorbide mononitrate ER (IMDUR) 60 mg CR tablet       Dexlansoprazole (DEXILANT) 60 mg CpDB 1 PO daily 30 Cap 1    pantoprazole (PROTONIX) 40 mg tablet TAKE 1 TAB BY MOUTH DAILY. 30 Tab 0    ipratropium (ATROVENT) 0.06 % nasal spray 2 Sprays by Both Nostrils route four (4) times daily as needed for Rhinitis. Indications: RHINORRHEA      cyclobenzaprine (FLEXERIL) 5 mg tablet Take 1 Tab by mouth daily as needed for Muscle Spasm(s). Do not take with the Ambien 10 Tab 0    HYDROcodone-acetaminophen (NORCO) 5-325 mg per tablet Take 1 Tab by mouth every eight (8) hours as needed for Pain. Max Daily Amount: 3 Tabs. 14 Tab 0    Epinephrine 0.3 mg/0.3 mL cmpk 1 Syringe by IntraMUSCular route once as needed for up to 1 dose. 1 each 1     Allergies   Allergen Reactions    Ace Inhibitors Cough     ?10/2011    Seafood [Shellfish Containing Products] Hives, Shortness of Breath and Nausea and Vomiting     Past Medical History:   Diagnosis Date    Acute Anterior Myocardial Infarction, s/p PTCA w/ stent 10/2010 10/31/2010    Asthma     mild    [facility], [facility] 8/24/2012    GERD (gastroesophageal reflux disease)     hiatal hernia    H/O recurrent Sinusitis 3/16/2011    Hiatal Hernia w/ GERD (gastroesophageal reflux disease) 3/16/2011    Hypercholesterolemia     Hypertension 3/16/2011    Ill-defined condition     pacemaker put in last July 2015 because of CHF    Impaired fasting glucose 4/18/2011    Iron deficiency anemia 3/16/2011    Multinodular thyroid 3/16/2011    Palpitations, PVC's 10/13/2011    Perennial allergic rhinitis 3/16/2011    Pityriasis rosea 5/30/2012    Post-menopausal     LMP 46y. o, no HRT    Postmenopause, LMP 40 yo, No HRT 3/16/2011    Reflux esophagitis 3/16/2011    Tobacco user     Vitamin D deficiency 10/13/2011     Past Surgical History:   Procedure Laterality Date    CARDIAC SURG PROCEDURE UNLIST      cardiac stent     EGD  2009    hiatal hernia, esophagitis; Dr Marilin Rendon, COLON, DIAGNOSTIC  2009    HX GYN      FNA bilateral benign     HX LITHOTRIPSY  2012    failed    KIDNEY STONE (CALCULI) EVAL  2012    scope w/ kidney stone extractions, multiple; Dr Kiah Camacho, [facility]    [place] COLONOSCOPY W/BIOPSY SINGLE/MULTIPLE  2009    benign polyp, recheck in 5 years, Dr Valarie Sahni    PTCA W/ STENT, INITIAL  10/2010    Dr Ligia Tidwell     Family History   Problem Relation Age of Onset    Hypertension Mother     High Cholesterol Mother     Thyroid Disease Mother     Heart Disease Mother      chf    Kidney Disease Mother     Headache Mother     Heart Disease Father     Heart Attack Father 36      MI age 36   [employer] High Cholesterol Father     Hypertension Sister     Thyroid Disease Sister     Hypertension Sister    [employer] Arthritis-rheumatoid Sister     Diabetes Sister     Thyroid Disease Sister     Heart Attack Brother      massive MI at age 46, survived    Thyroid Disease Other      niece, Neha [place]    High Cholesterol Brother      Social History   Substance Use Topics    Smoking status: Former Smoker     Packs/day: 0.25     Years: 30.00     Types: Cigarettes     Quit date: 2013    Smokeless tobacco: Never Used      Comment: had quit 10/2010 then restarted ~ 3/2012;  about 5 cigarettes a day; on and off since age 28 yo x ~ 30 yrs    Alcohol use No      Comment: none          Objective:     Visit Vitals    /72 (BP 1 Location: Left arm, BP Patient Position: Sitting)    Pulse 73    Temp 98.5 °F (36.9 °C) (Oral)    Resp 16    Ht 5' 2\" (1.575 m)    Wt 159 lb 11.2 oz (72.4 kg)    LMP  (Exact Date)    SpO2 95%    BMI 29.21 kg/m2     Gen: NAD, pleasant  HEENT: normal appearing head, nares patent, PERRLA, EOMI, oropharynx no erythema, no cervical lymphadenopathy neck supple   Cardio: RRR nl S1S2 no murmur  Lungs CTAB no wheeze no rales no rhonchi  ABD Soft non tender non distended + bowel sounds  Extremities: full ROM X 4 no clubbing no cyanosisleft shoulder rom decreased due to poain   Neuro: no gross focal deficits noted, alert and orientated X 3  Psych.: well groomed no outward signs of depression. Assessment/Plan:   Severo Spaniel was seen today for hypertension and cold symptoms. Diagnoses and all orders for this visit:    HTN (hypertension), benign    Chronic left shoulder pain  -     US EXT NONVAS LT LTD; Future    [facility]  -     REFERRAL TO GASTROENTEROLOGY    Insomnia, unspecified type  -     zolpidem (AMBIEN) 10 mg tablet; Take 1 Tab by mouth nightly as needed for Sleep. Max Daily Amount: 10 mg. Other orders  -     diclofenac (VOLTAREN) 1 % gel; Apply 2 g to affected area every six (6) hours. -     cyclobenzaprine (FLEXERIL) 5 mg tablet; Take 1 Tab by mouth daily as needed for Muscle Spasm(s). Do not take with the Ambien  -     HYDROcodone-acetaminophen (NORCO) 5-325 mg per tablet; Take 1 Tab by mouth every eight (8) hours as needed for Pain. Max Daily Amount: 3 Tabs. Follow-up Disposition:  Return in about 1 month (around 4/13/2017) for review U/S recheck left shoulder 15 min . Arnol myers printed and given to the pt. .  Suspect rotator cuff injury   The patient voiced understanding of the above. Medication side effects were reviewed with the patient. Call with any concerns.

## 2017-03-13 NOTE — PATIENT INSTRUCTIONS
Shoulder Stretches: Exercises  Your Care Instructions  Here are some examples of exercises for your shoulder. Start each exercise slowly. Ease off the exercise if you start to have pain. Your doctor or physical therapist will tell you when you can start these exercises and which ones will work best for you. How to do the exercises  Note: These exercises should cause you to feel a gentle stretch, but no pain. Shoulder stretch    1.  a doorway and place one arm against the door frame. Your elbow should be a little higher than your shoulder. 2. Relax your shoulders as you lean forward, allowing your chest and shoulder muscles to stretch. You can also turn your body slightly away from your arm to stretch the muscles even more. 3. Hold for 15 to 30 seconds. 4. Repeat 2 to 4 times with each arm. Shoulder and chest stretch    Shoulder and chest stretch  1. While sitting, relax your upper body so you slump slightly in your chair. 2. As you breathe in, straighten your back and open your arms out to the sides. 3. Gently pull your shoulder blades back and downward. 4. Hold for 15 to 30 seconds as your breathe normally. 5. Repeat 2 to 4 times. Overhead stretch    1. Reach up over your head with both arms. 2. Hold for 15 to 30 seconds. 3. Repeat 2 to 4 times. Follow-up care is a key part of your treatment and safety. Be sure to make and go to all appointments, and call your doctor if you are having problems. It's also a good idea to know your test results and keep a list of the medicines you take. Where can you learn more? Go to http://colin-christiano.info/. Enter S254 in the search box to learn more about \"Shoulder Stretches: Exercises. \"  Current as of: May 23, 2016  Content Version: 11.1  © 7847-4687 [employer]. Care instructions adapted under license by [employer] (which disclaims liability or warranty for this information).  If you have questions about a medical condition or this instruction, always ask your healthcare professional. [place] [address] any warranty or liability for your use of this information. Call [phone] to set up your ultrasound.

## 2017-03-13 NOTE — MR AVS SNAPSHOT
Visit Information Date & Time Provider Department Dept. Phone Encounter #  
 3/13/2017  3:45  [facility] [place] [place] [phone] [id number] Follow-up Instructions Return in about 1 month (around 4/13/2017) for review U/S recheck left shoulder 15 min . Upcoming Health Maintenance Date Due Hepatitis C Screening 1960 DTaP/Tdap/Td series (1 - Tdap) 4/28/2009 BREAST CANCER SCRN MAMMOGRAM 4/26/2014 EYE EXAM RETINAL OR DILATED Q1 3/1/2015 FOOT EXAM Q1 10/2/2015 PAP AKA CERVICAL CYTOLOGY 3/26/2016 MICROALBUMIN Q1 11/16/2016 HEMOGLOBIN A1C Q6M 7/19/2017 LIPID PANEL Q1 1/19/2018 COLONOSCOPY 1/10/2019 Allergies as of 3/13/2017  Review Complete On: 3/13/2017 By: Micheline Adams Severity Noted Reaction Type Reactions Ace Inhibitors  10/13/2011    Cough ?10/2011 Seafood [Shellfish Containing Products]  11/01/2010    Hives, Shortness of Breath, Nausea and Vomiting Current Immunizations  Reviewed on 10/19/2016 Name Date Influenza Vaccine 12/16/2014  4:31 PM, 1/10/2013 Influenza Vaccine (Quad) PF 10/19/2016  6:19 PM  
 Influenza Vaccine Intradermal PF 11/2/2015 Influenza Vaccine PF 10/7/2013 Influenza Vaccine Split 10/6/2010 Pneumococcal Polysaccharide (PPSV-23) 7/31/2015  4:49 PM  
 TD Vaccine 4/27/2009 Not reviewed this visit You Were Diagnosed With   
  
 Codes Comments HTN (hypertension), benign    -  Primary ICD-10-CM: I10 
ICD-9-CM: 997. 1 Chronic left shoulder pain     ICD-10-CM: M25.512, G89.29 ICD-9-CM: 719.41, 338.29 Belchings     ICD-10-CM: R14.2 ICD-9-CM: 787.3 Insomnia, unspecified type     ICD-10-CM: G47.00 ICD-9-CM: 780.52 Vitals BP Pulse Temp Resp Height(growth percentile) Weight(growth percentile)  129/72 (BP 1 Location: Left arm, BP Patient Position: Sitting) 73 98.5 °F (36.9 °C) (Oral) 16 5' 2\" (1.575 m) 159 lb 11.2 oz (72.4 kg) LMP SpO2 BMI OB Status Smoking Status (Exact Date) 95% 29.21 kg/m2 Postmenopausal Former Smoker BMI and BSA Data Body Mass Index Body Surface Area  
 29.21 kg/m 2 1.78 m 2 Preferred Pharmacy Pharmacy Name Phone [id number] Isela Das [facility] [phone] Your Updated Medication List  
  
   
This list is accurate as of: 3/13/17  4:42 PM.  Always use your most recent med list.  
  
  
  
  
 albuterol 90 mcg/actuation inhaler Commonly known as:  PROAIR HFA Take 2 Puffs by inhalation every six (6) hours as needed for Wheezing. atorvastatin 80 mg tablet Commonly known as:  LIPITOR Take 1 Tab by mouth nightly. bumetanide 0.5 mg tablet Commonly known as:  Corinn Kiang Take 1 Tab by mouth two (2) times a day. Increase to 1-2 mg bid prn CHF  Indications: PERIPHERAL EDEMA DUE TO CHRONIC HEART FAILURE  
  
 carvedilol 25 mg tablet Commonly known as:  Flor Buster Take 25 mg by mouth two (2) times a day. cetirizine 10 mg tablet Commonly known as:  ZYRTEC Take 10 mg by mouth daily as needed for Allergies. cloNIDine HCl 0.3 mg tablet Commonly known as:  CATAPRES Take 0.3 mg by mouth two (2) times daily as needed. *IF BLOOD PRESSURE IS >170/90*  
  
 cyclobenzaprine 5 mg tablet Commonly known as:  FLEXERIL Take 1 Tab by mouth daily as needed for Muscle Spasm(s). Do not take with the Ambien Dexlansoprazole 60 mg Cpdb Commonly known as:  DEXILANT  
1 PO daily  
  
 diclofenac 1 % Gel Commonly known as:  VOLTAREN Apply 2 g to affected area every six (6) hours. EPINEPHrine 0.3 mg/0.3 mL Cmpk 1 Syringe by IntraMUSCular route once as needed for up to 1 dose.  
  
 famotidine 40 mg tablet Commonly known as:  PEPCID Take 40 mg by mouth nightly. HYDROcodone-acetaminophen 5-325 mg per tablet Commonly known as:  Bibi Bain  
 Take 1 Tab by mouth every eight (8) hours as needed for Pain. Max Daily Amount: 3 Tabs. ipratropium 0.06 % nasal spray Commonly known as:  ATROVENT  
2 Sprays by Both Nostrils route four (4) times daily as needed for Rhinitis. Indications: RHINORRHEA * isosorbide mononitrate ER 60 mg CR tablet Commonly known as:  IMDUR  
  
 * isosorbide mononitrate ER 60 mg CR tablet Commonly known as:  IMDUR  
TAKE 1 TAB BY MOUTH EVERY MORNING. losartan 50 mg tablet Commonly known as:  COZAAR Take 1 Tab by mouth every evening. * metFORMIN 500 mg tablet Commonly known as:  GLUCOPHAGE  
TAKE 1 TABLET BY MOUTH TWICE A DAY (WITH MEALS) * metFORMIN 500 mg tablet Commonly known as:  GLUCOPHAGE  
TAKE 1 TABLET BY MOUTH TWICE A DAY (WITH MEALS)  
  
 montelukast 10 mg tablet Commonly known as:  SINGULAIR  
TAKE 1 TAB BY MOUTH DAILY. INDICATIONS: ALLERGIC RHINITIS  
  
 pantoprazole 40 mg tablet Commonly known as:  PROTONIX  
TAKE 1 TAB BY MOUTH DAILY. VITAMIN D 2,000 unit Cap capsule Generic drug:  Cholecalciferol (Vitamin D3) Take 4,000 Units by mouth daily. Takes 2 of the 2,000 unit tabs qd  
  
 zolpidem 10 mg tablet Commonly known as:  AMBIEN Take 1 Tab by mouth nightly as needed for Sleep. Max Daily Amount: 10 mg.  
  
 * Notice: This list has 4 medication(s) that are the same as other medications prescribed for you. Read the directions carefully, and ask your doctor or other care provider to review them with you. Prescriptions Printed Refills  
 zolpidem (AMBIEN) 10 mg tablet 2 Sig: Take 1 Tab by mouth nightly as needed for Sleep. Max Daily Amount: 10 mg.  
 Class: Print Route: Oral  
 HYDROcodone-acetaminophen (NORCO) 5-325 mg per tablet 0 Sig: Take 1 Tab by mouth every eight (8) hours as needed for Pain. Max Daily Amount: 3 Tabs. Class: Print Route: Oral  
  
Prescriptions Sent to Pharmacy Refills diclofenac (VOLTAREN) 1 % gel 0 Sig: Apply 2 g to affected area every six (6) hours. Class: Normal  
 Pharmacy: [address] [place], [place] [address] Ph #: [phone] Route: Topical  
 cyclobenzaprine (FLEXERIL) 5 mg tablet 0 Sig: Take 1 Tab by mouth daily as needed for Muscle Spasm(s). Do not take with the Ambien Class: Normal  
 Pharmacy: [address] [place], [place] [address] Ph #: [phone] Route: Oral  
  
We Performed the Following REFERRAL TO GASTROENTEROLOGY [MMQ47 Custom] Comments:  
 Please evaluate patient for Gas symptoms Follow-up Instructions Return in about 1 month (around 4/13/2017) for review U/S recheck left shoulder 15 min . To-Do List   
 03/13/2017 Imaging:  US EXT NONVAS LT LTD Referral Information Referral ID Referred By Referred To  
  
 [id number] AMANDA Castañeda [facility] AT [facility] [address], [facility] [address] Phone: [phone] Fax: [phone] Visits Status Start Date End Date 1 New Request 3/13/17 3/13/18 If your referral has a status of pending review or denied, additional information will be sent to support the outcome of this decision. Patient Instructions Shoulder Stretches: Exercises Your Care Instructions Here are some examples of exercises for your shoulder. Start each exercise slowly. Ease off the exercise if you start to have pain. Your doctor or physical therapist will tell you when you can start these exercises and which ones will work best for you. How to do the exercises Note: These exercises should cause you to feel a gentle stretch, but no pain. Shoulder stretch 1.  a doorway and place one arm against the door frame. Your elbow should be a little higher than your shoulder.  
2. Relax your shoulders as you lean forward, allowing your chest and shoulder muscles to stretch. You can also turn your body slightly away from your arm to stretch the muscles even more. 3. Hold for 15 to 30 seconds. 4. Repeat 2 to 4 times with each arm. Shoulder and chest stretch Shoulder and chest stretch 1. While sitting, relax your upper body so you slump slightly in your chair. 2. As you breathe in, straighten your back and open your arms out to the sides. 3. Gently pull your shoulder blades back and downward. 4. Hold for 15 to 30 seconds as your breathe normally. 5. Repeat 2 to 4 times. Overhead stretch 1. Reach up over your head with both arms. 2. Hold for 15 to 30 seconds. 3. Repeat 2 to 4 times. Follow-up care is a key part of your treatment and safety. Be sure to make and go to all appointments, and call your doctor if you are having problems. It's also a good idea to know your test results and keep a list of the medicines you take. Where can you learn more? Go to http://colin-christiano.info/. Enter S254 in the search box to learn more about \"Shoulder Stretches: Exercises. \" Current as of: May 23, 2016 Content Version: 11.1 © 8916-7986 [employer]. Care instructions adapted under license by [employer] (which disclaims liability or warranty for this information). If you have questions about a medical condition or this instruction, always ask your healthcare professional. [place] [address] any warranty or liability for your use of this information. Call [phone] to set up your ultrasound. Introducing [facility]! Dear Yanely Arceo: Thank you for requesting a [employer] account. Our records indicate that you already have an active [employer] account. You can access your account anytime at [email or web address]. [employer] Did you know that you can access your hospital and ER discharge instructions at any time in [employer]?   You can also review all of your test results from your hospital stay or ER visit. Additional Information If you have questions, please visit the Frequently Asked Questions section of the [employer] website at [email or web address] Remember, [employer] is NOT to be used for urgent needs. For medical emergencies, dial 911. Now available from your iPhone and Android! Please provide this summary of care documentation to your next provider. Your primary care clinician is listed as Arnoldo Garcia. If you have any questions after today's visit, please call [phone].

## 2017-03-13 NOTE — PROGRESS NOTES
Chief Complaint   Patient presents with    Hypertension     6 WEEK f/u     Cold Symptoms     Bronchitis

## 2017-03-20 ENCOUNTER — HOSPITAL ENCOUNTER (OUTPATIENT)
Dept: ULTRASOUND IMAGING | Age: 57
Discharge: HOME | End: 2017-03-20
Attending: FAMILY MEDICINE
Payer: COMMERCIAL

## 2017-03-20 DIAGNOSIS — G89.29 CHRONIC LEFT SHOULDER PAIN: ICD-10-CM

## 2017-03-20 DIAGNOSIS — M25.512 CHRONIC LEFT SHOULDER PAIN: ICD-10-CM

## 2017-03-20 PROCEDURE — 76882 US LMTD JT/FCL EVL NVASC XTR: CPT

## 2017-03-26 RX ORDER — MONTELUKAST SODIUM 10 MG/1
TABLET ORAL
Qty: 30 TAB | Refills: 10 | Status: CANCELLED | OUTPATIENT
Start: 2017-03-26

## 2017-03-30 RX ORDER — CYCLOBENZAPRINE HCL 5 MG
TABLET ORAL
Qty: 10 TAB | Refills: 0 | Status: CANCELLED | OUTPATIENT
Start: 2017-03-30

## 2017-04-01 DIAGNOSIS — I10 ESSENTIAL HYPERTENSION: Chronic | ICD-10-CM

## 2017-04-01 RX ORDER — LOSARTAN POTASSIUM 50 MG/1
TABLET ORAL
Qty: 30 TAB | Refills: 1 | Status: CANCELLED | OUTPATIENT
Start: 2017-04-01

## 2017-04-13 ENCOUNTER — OFFICE VISIT (OUTPATIENT)
Dept: INTERNAL MEDICINE CLINIC | Age: 57
End: 2017-04-13

## 2017-04-13 VITALS
DIASTOLIC BLOOD PRESSURE: 69 MMHG | RESPIRATION RATE: 16 BRPM | OXYGEN SATURATION: 98 % | HEIGHT: 62 IN | BODY MASS INDEX: 29.21 KG/M2 | HEART RATE: 84 BPM | TEMPERATURE: 98.3 F | SYSTOLIC BLOOD PRESSURE: 125 MMHG | WEIGHT: 158.7 LBS

## 2017-04-13 DIAGNOSIS — S46.002S UNSPECIFIED INJURY OF MUSCLE(S) AND TENDON(S) OF THE ROTATOR CUFF OF LEFT SHOULDER, SEQUELA: Primary | ICD-10-CM

## 2017-04-13 DIAGNOSIS — I10 ESSENTIAL HYPERTENSION: Chronic | ICD-10-CM

## 2017-04-13 DIAGNOSIS — M75.92 SUPRASPINATUS TENDONITIS, LEFT: ICD-10-CM

## 2017-04-13 DIAGNOSIS — M25.512 LEFT SHOULDER PAIN, UNSPECIFIED CHRONICITY: ICD-10-CM

## 2017-04-13 DIAGNOSIS — Z91.013 SHELLFISH ALLERGY: ICD-10-CM

## 2017-04-13 RX ORDER — DICLOFENAC SODIUM 10 MG/G
2 GEL TOPICAL EVERY 6 HOURS
Qty: 100 G | Refills: 0 | Status: SHIPPED | OUTPATIENT
Start: 2017-04-13 | End: 2022-08-04

## 2017-04-13 RX ORDER — CYCLOBENZAPRINE HCL 5 MG
5 TABLET ORAL
Qty: 10 TAB | Refills: 0 | Status: SHIPPED | OUTPATIENT
Start: 2017-04-13 | End: 2017-12-01 | Stop reason: ALTCHOICE

## 2017-04-13 RX ORDER — LOSARTAN POTASSIUM 50 MG/1
50 TABLET ORAL EVERY EVENING
Qty: 30 TAB | Refills: 11 | Status: SHIPPED | OUTPATIENT
Start: 2017-04-13 | End: 2017-07-17 | Stop reason: SDUPTHER

## 2017-04-13 RX ORDER — MONTELUKAST SODIUM 10 MG/1
TABLET ORAL
Qty: 30 TAB | Refills: 11 | Status: SHIPPED | OUTPATIENT
Start: 2017-04-13 | End: 2022-08-04 | Stop reason: SDUPTHER

## 2017-04-13 RX ORDER — CARVEDILOL 25 MG/1
25 TABLET ORAL 2 TIMES DAILY
Qty: 60 TAB | Refills: 11 | Status: SHIPPED | OUTPATIENT
Start: 2017-04-13

## 2017-04-13 RX ORDER — EPINEPHRINE 0.3 MG/.3ML
0.3 INJECTION SUBCUTANEOUS
Qty: 2 SYRINGE | Refills: 1 | Status: SHIPPED | OUTPATIENT
Start: 2017-04-13 | End: 2017-12-01

## 2017-04-13 NOTE — PROGRESS NOTES
Chief Complaint   Patient presents with    Shoulder Pain     Lt Shoulder    Head Pain    Medication Refill           Subjective:   Luc De La Fuente 49 y.o.  female with a  past medical history reviewed see below. here for test results of left shoulde rrotator cuff   Still having left shoulder pain and it sill hurts she has been working on th computer and the pain really hurts more its very tight and achy   egd and colonoscopy next week w/ Dr Rosaura Snow. At [address]: otherwise feeling generally well. All other systems reviewed and are negative      Current Outpatient Prescriptions   Medication Sig Dispense Refill    diclofenac (VOLTAREN) 1 % gel Apply 2 g to affected area every six (6) hours. 100 g 0    zolpidem (AMBIEN) 10 mg tablet Take 1 Tab by mouth nightly as needed for Sleep. Max Daily Amount: 10 mg. 30 Tab 2    cyclobenzaprine (FLEXERIL) 5 mg tablet Take 1 Tab by mouth daily as needed for Muscle Spasm(s). Do not take with the Ambien 10 Tab 0    HYDROcodone-acetaminophen (NORCO) 5-325 mg per tablet Take 1 Tab by mouth every eight (8) hours as needed for Pain. Max Daily Amount: 3 Tabs. 14 Tab 0    albuterol (PROAIR HFA) 90 mcg/actuation inhaler Take 2 Puffs by inhalation every six (6) hours as needed for Wheezing. 1 Inhaler 1    metFORMIN (GLUCOPHAGE) 500 mg tablet TAKE 1 TABLET BY MOUTH TWICE A DAY (WITH MEALS) 60 Tab 5    bumetanide (BUMEX) 0.5 mg tablet Take 1 Tab by mouth two (2) times a day. Increase to 1-2 mg bid prn CHF  Indications: PERIPHERAL EDEMA DUE TO CHRONIC HEART FAILURE 180 Tab 1    losartan (COZAAR) 50 mg tablet Take 1 Tab by mouth every evening. 30 Tab 1    carvedilol (COREG) 25 mg tablet Take 25 mg by mouth two (2) times a day.  cetirizine (ZYRTEC) 10 mg tablet Take 10 mg by mouth daily as needed for Allergies.  ipratropium (ATROVENT) 0.06 % nasal spray 2 Sprays by Both Nostrils route four (4) times daily as needed for Rhinitis.  Indications: RHINORRHEA      famotidine (PEPCID) 40 mg tablet Take 40 mg by mouth nightly.  metFORMIN (GLUCOPHAGE) 500 mg tablet TAKE 1 TABLET BY MOUTH TWICE A DAY (WITH MEALS) 60 Tab 5    atorvastatin (LIPITOR) 80 mg tablet Take 1 Tab by mouth nightly. 30 Tab 5    montelukast (SINGULAIR) 10 mg tablet TAKE 1 TAB BY MOUTH DAILY. INDICATIONS: ALLERGIC RHINITIS 30 Tab 11    Cholecalciferol, Vitamin D3, (VITAMIN D) 2,000 unit Cap Take 4,000 Units by mouth daily. Takes 2 of the 2,000 unit tabs qd      isosorbide mononitrate ER (IMDUR) 60 mg CR tablet TAKE 1 TAB BY MOUTH EVERY MORNING. 60 Tab 1    Dexlansoprazole (DEXILANT) 60 mg CpDB 1 PO daily 30 Cap 1    pantoprazole (PROTONIX) 40 mg tablet TAKE 1 TAB BY MOUTH DAILY. 30 Tab 0    cloNIDine HCl (CATAPRES) 0.3 mg tablet Take 0.3 mg by mouth two (2) times daily as needed. *IF BLOOD PRESSURE IS >170/90*      Epinephrine 0.3 mg/0.3 mL cmpk 1 Syringe by IntraMUSCular route once as needed for up to 1 dose. 1 each 1     Allergies   Allergen Reactions    Ace Inhibitors Cough     ?10/2011    Seafood [Shellfish Containing Products] Hives, Shortness of Breath and Nausea and Vomiting     Past Medical History:   Diagnosis Date    Acute Anterior Myocardial Infarction, s/p PTCA w/ stent 10/2010 10/31/2010    Asthma     mild    [facility] 8/24/2012    GERD (gastroesophageal reflux disease)     hiatal hernia    H/O recurrent Sinusitis 3/16/2011    Hiatal Hernia w/ GERD (gastroesophageal reflux disease) 3/16/2011    Hypercholesterolemia     Hypertension 3/16/2011    Ill-defined condition     pacemaker put in last July 2015 because of CHF    Impaired fasting glucose 4/18/2011    Iron deficiency anemia 3/16/2011    Multinodular thyroid 3/16/2011    Palpitations, PVC's 10/13/2011    Perennial allergic rhinitis 3/16/2011    Pityriasis rosea 5/30/2012    Post-menopausal     LMP 46y. o, no HRT    Postmenopause, LMP 40 yo, No HRT 3/16/2011    Reflux esophagitis 3/16/2011  Tobacco user     Vitamin D deficiency 10/13/2011     Past Surgical History:   Procedure Laterality Date    CARDIAC SURG PROCEDURE UNLIST      cardiac stent     EGD  2009    hiatal hernia, esophagitis; Dr Colleen Bowie, COLON, DIAGNOSTIC  2009    HX GYN      FNA bilateral benign     HX LITHOTRIPSY  2012    failed    KIDNEY STONE (CALCULI) EVAL  2012    scope w/ kidney stone extractions, multiple; Dr Tanner Wilson, [facility]    NE COLONOSCOPY W/BIOPSY SINGLE/MULTIPLE  2009    benign polyp, recheck in 5 years, Dr Aren Pedersen    PTCA W/ STENT, INITIAL  10/2010    Dr Tammie Lozoya     Family History   Problem Relation Age of Onset    Hypertension Mother     High Cholesterol Mother     Thyroid Disease Mother     Heart Disease Mother      chf    Kidney Disease Mother     Headache Mother     Heart Disease Father     Heart Attack Father 36      MI age 36   Marilu Reddish High Cholesterol Father     Hypertension Sister     Thyroid Disease Sister     Hypertension Sister    Marilu Reddish Arthritis-rheumatoid Sister     Diabetes Sister     Thyroid Disease Sister     Heart Attack Brother      massive MI at age 46, survived    Thyroid Disease Other      niece, Neha Jose    High Cholesterol Brother      Social History   Substance Use Topics    Smoking status: Former Smoker     Packs/day: 0.25     Years: 30.00     Types: Cigarettes     Quit date: 2013    Smokeless tobacco: Never Used      Comment: had quit 10/2010 then restarted ~ 3/2012;  about 5 cigarettes a day; on and off since age 26 yo x ~ 30 yrs    Alcohol use No      Comment: none          Objective:     Visit Vitals    /69 (BP 1 Location: Left arm, BP Patient Position: Sitting)    Pulse 84    Temp 98.3 °F (36.8 °C) (Oral)    Resp 16    Ht 5' 2\" (1.575 m)    Wt 158 lb 11.2 oz (72 kg)    LMP  (Exact Date)    SpO2 98%    BMI 29.03 kg/m2     Gen: NAD, pleasant  HEENT: normal appearing head, nares patent, PERRLA, EOMI, oropharynx no erythema, no cervical lymphadenopathy neck supple   Cardio: RRR nl S1S2 no murmur  Lungs CTAB no wheeze no rales no rhonchi  ABD Soft non tender non distended + bowel sounds  Extremities: full ROM X 4 no clubbing no cyanosis  Neuro: no gross focal deficits noted, alert and orientated X 3  Psych.: well groomed no outward signs of depression. Assessment/Plan:   Federico Ramirez was seen today for shoulder pain, head pain and medication refill. Diagnoses and all orders for this visit:    Unspecified injury of muscle(s) and tendon(s) of the rotator cuff of left shoulder, sequela  -     REFERRAL TO PHYSICAL THERAPY  -     REFERRAL TO PHYSICAL THERAPY    Shellfish allergy    Supraspinatus tendonitis, left  -     REFERRAL TO PHYSICAL THERAPY    Left shoulder pain, unspecified chronicity  -     REFERRAL TO PHYSICAL THERAPY    Essential hypertension  -     losartan (COZAAR) 50 mg tablet; Take 1 Tab by mouth every evening. Other orders  -     EPINEPHrine (EPIPEN) 0.3 mg/0.3 mL injection; 0.3 mL by IntraMUSCular route once as needed for up to 1 dose. -     montelukast (SINGULAIR) 10 mg tablet; TAKE 1 TAB BY MOUTH DAILY. INDICATIONS: ALLERGIC RHINITIS  -     carvedilol (COREG) 25 mg tablet; Take 1 Tab by mouth two (2) times a day. -     cyclobenzaprine (FLEXERIL) 5 mg tablet; Take 1 Tab by mouth daily as needed for Muscle Spasm(s). Do not take with the Ambien  -     diclofenac (VOLTAREN) 1 % gel; Apply 2 g to affected area every six (6) hours. Follow-up Disposition:  Return in about 3 months (around 7/13/2017) for f/up PT and recheck bp 15 min . Timoteo myers printed and given to the pt. .  Partial thickness tear with tenodoniitis   The patient voiced understanding of the above. Medication side effects were reviewed with the patient. Call with any concerns.

## 2017-04-13 NOTE — MR AVS SNAPSHOT
Visit Information Date & Time Provider Department Dept. Phone Encounter #  
 4/13/2017  3:45  [facility] [place], [facility] [phone] [id number] Follow-up Instructions Return in about 3 months (around 7/13/2017) for f/up PT and recheck bp 15 min . Upcoming Health Maintenance Date Due Hepatitis C Screening 1960 DTaP/Tdap/Td series (1 - Tdap) 4/28/2009 BREAST CANCER SCRN MAMMOGRAM 4/26/2014 EYE EXAM RETINAL OR DILATED Q1 3/1/2015 FOOT EXAM Q1 10/2/2015 PAP AKA CERVICAL CYTOLOGY 3/26/2016 MICROALBUMIN Q1 11/16/2016 HEMOGLOBIN A1C Q6M 7/19/2017 LIPID PANEL Q1 1/19/2018 COLONOSCOPY 1/10/2019 Allergies as of 4/13/2017  Review Complete On: 3/27/2017 By: [address] [facility] [place], MD  
  
 Severity Noted Reaction Type Reactions Ace Inhibitors  10/13/2011    Cough ?10/2011 Seafood [Shellfish Containing Products]  11/01/2010    Hives, Shortness of Breath, Nausea and Vomiting Current Immunizations  Reviewed on 10/19/2016 Name Date Influenza Vaccine 12/16/2014  4:31 PM, 1/10/2013 Influenza Vaccine (Quad) PF 10/19/2016  6:19 PM  
 Influenza Vaccine Intradermal PF 11/2/2015 Influenza Vaccine PF 10/7/2013 Influenza Vaccine Split 10/6/2010 Pneumococcal Polysaccharide (PPSV-23) 7/31/2015  4:49 PM  
 TD Vaccine 4/27/2009 Not reviewed this visit You Were Diagnosed With   
  
 Codes Comments Unspecified injury of muscle(s) and tendon(s) of the rotator cuff of left shoulder, sequela    -  Primary ICD-10-CM: X97.628G ICD-9-CM: 635. 9 Shellfish allergy     ICD-10-CM: Z91.013 
ICD-9-CM: V15.04 Supraspinatus tendonitis, left     ICD-10-CM: M75.92 
ICD-9-CM: 726.10 Left shoulder pain, unspecified chronicity     ICD-10-CM: M25.512 ICD-9-CM: 719.41 Essential hypertension     ICD-10-CM: I10 
ICD-9-CM: 401.9 Vitals BP Pulse Temp Resp Height(growth percentile) Weight(growth percentile) 125/69 (BP 1 Location: Left arm, BP Patient Position: Sitting) 84 98.3 °F (36.8 °C) (Oral) 16 5' 2\" (1.575 m) 158 lb 11.2 oz (72 kg) LMP SpO2 BMI OB Status Smoking Status (Exact Date) 98% 29.03 kg/m2 Postmenopausal Former Smoker Vitals History BMI and BSA Data Body Mass Index Body Surface Area  
 29.03 kg/m 2 1.77 m 2 Preferred Pharmacy Pharmacy Name Phone [id number] Sully Singh [address] [facility] [phone] Your Updated Medication List  
  
   
This list is accurate as of: 4/13/17  4:25 PM.  Always use your most recent med list.  
  
  
  
  
 albuterol 90 mcg/actuation inhaler Commonly known as:  PROAIR HFA Take 2 Puffs by inhalation every six (6) hours as needed for Wheezing. atorvastatin 80 mg tablet Commonly known as:  LIPITOR Take 1 Tab by mouth nightly. bumetanide 0.5 mg tablet Commonly known as:  Alejandro Finders Take 1 Tab by mouth two (2) times a day. Increase to 1-2 mg bid prn CHF  Indications: PERIPHERAL EDEMA DUE TO CHRONIC HEART FAILURE  
  
 carvedilol 25 mg tablet Commonly known as:  Hortencia Spinner Take 1 Tab by mouth two (2) times a day. cetirizine 10 mg tablet Commonly known as:  ZYRTEC Take 10 mg by mouth daily as needed for Allergies. cloNIDine HCl 0.3 mg tablet Commonly known as:  CATAPRES Take 0.3 mg by mouth two (2) times daily as needed. *IF BLOOD PRESSURE IS >170/90*  
  
 cyclobenzaprine 5 mg tablet Commonly known as:  FLEXERIL Take 1 Tab by mouth daily as needed for Muscle Spasm(s). Do not take with the Ambien Dexlansoprazole 60 mg Cpdb Commonly known as:  DEXILANT  
1 PO daily  
  
 diclofenac 1 % Gel Commonly known as:  VOLTAREN Apply 2 g to affected area every six (6) hours. EPINEPHrine 0.3 mg/0.3 mL injection Commonly known as:  Eulogio Bound  
 0.3 mL by IntraMUSCular route once as needed for up to 1 dose.  
  
 famotidine 40 mg tablet Commonly known as:  PEPCID Take 40 mg by mouth nightly. HYDROcodone-acetaminophen 5-325 mg per tablet Commonly known as:  Korina Cruise Take 1 Tab by mouth every eight (8) hours as needed for Pain. Max Daily Amount: 3 Tabs. ipratropium 0.06 % nasal spray Commonly known as:  ATROVENT  
2 Sprays by Both Nostrils route four (4) times daily as needed for Rhinitis. Indications: RHINORRHEA  
  
 losartan 50 mg tablet Commonly known as:  COZAAR Take 1 Tab by mouth every evening. * metFORMIN 500 mg tablet Commonly known as:  GLUCOPHAGE  
TAKE 1 TABLET BY MOUTH TWICE A DAY (WITH MEALS) * metFORMIN 500 mg tablet Commonly known as:  GLUCOPHAGE  
TAKE 1 TABLET BY MOUTH TWICE A DAY (WITH MEALS)  
  
 montelukast 10 mg tablet Commonly known as:  SINGULAIR  
TAKE 1 TAB BY MOUTH DAILY. INDICATIONS: ALLERGIC RHINITIS  
  
 pantoprazole 40 mg tablet Commonly known as:  PROTONIX  
TAKE 1 TAB BY MOUTH DAILY. VITAMIN D 2,000 unit Cap capsule Generic drug:  Cholecalciferol (Vitamin D3) Take 4,000 Units by mouth daily. Takes 2 of the 2,000 unit tabs qd  
  
 zolpidem 10 mg tablet Commonly known as:  AMBIEN Take 1 Tab by mouth nightly as needed for Sleep. Max Daily Amount: 10 mg.  
  
 * Notice: This list has 2 medication(s) that are the same as other medications prescribed for you. Read the directions carefully, and ask your doctor or other care provider to review them with you. Prescriptions Printed Refills EPINEPHrine (EPIPEN) 0.3 mg/0.3 mL injection 1 Si.3 mL by IntraMUSCular route once as needed for up to 1 dose. Class: Print Route: IntraMUSCular Prescriptions Sent to Pharmacy Refills  
 montelukast (SINGULAIR) 10 mg tablet 11 Sig: TAKE 1 TAB BY MOUTH DAILY. INDICATIONS: ALLERGIC RHINITIS  Class: Normal  
 Pharmacy: [address] [place] [address] Ph #: [phone]  
 losartan (COZAAR) 50 mg tablet 11 Sig: Take 1 Tab by mouth every evening. Class: Normal  
 Pharmacy: [address] [place] [address] Ph #: [phone] Route: Oral  
 carvedilol (COREG) 25 mg tablet 11 Sig: Take 1 Tab by mouth two (2) times a day. Class: Normal  
 Pharmacy: [address] [place] [address] Ph #: [phone] Route: Oral  
 cyclobenzaprine (FLEXERIL) 5 mg tablet 0 Sig: Take 1 Tab by mouth daily as needed for Muscle Spasm(s). Do not take with the Ambien Class: Normal  
 Pharmacy: [address] [place] [address] Ph #: [phone] Route: Oral  
 diclofenac (VOLTAREN) 1 % gel 0 Sig: Apply 2 g to affected area every six (6) hours. Class: Normal  
 Pharmacy: [address] [place] [address] Ph #: [phone] Route: Topical  
  
We Performed the Following REFERRAL TO PHYSICAL THERAPY [AFP12 Custom] Comments:  
 Please evaluate patient for left shoulder pain Please schedule and authorize patient for services 1-2 times  Week for 2-12 weeks REFERRAL TO PHYSICAL THERAPY [DPY09 Custom] Comments:  
 Please evaluate patient for left shoulder pain supraspinatus partial thickness tear and tendosynovitis Please schedule and authorize patient for services 1-2 times a week for 2-12 weeks Follow-up Instructions Return in about 3 months (around 7/13/2017) for f/up PT and recheck bp 15 min . Referral Information Referral ID Referred By Referred To  
  
 [id number] Hang Crespo, [facility], PT, DPT   
   [address] [place] [address] H .[address] C/Awais Good Final Phone: [phone] Fax: [phone] Visits Status Start Date End Date 1 New Request 4/13/17 4/13/18 If your referral has a status of pending review or denied, additional information will be sent to support the outcome of this decision. Referral ID Referred By Referred To  
 [id number] Maxine Willis, [address] [facility], PT, DPT  
   [address] [place] [address] H .1 MARILYN/Awais Good Final Phone: [phone] Fax: [phone] Visits Status Start Date End Date 1 New Request 4/13/17 4/13/18 If your referral has a status of pending review or denied, additional information will be sent to support the outcome of this decision. Patient Instructions Call with any concerns Introducing [facility]! Dear Shara Felix: Thank you for requesting a LurnQ account. Our records indicate that you already have an active LurnQ account. You can access your account anytime at [email or web address] Did you know that you can access your hospital and ER discharge instructions at any time in LurnQ? You can also review all of your test results from your hospital stay or ER visit. Additional Information If you have questions, please visit the Frequently Asked Questions section of the LurnQ website at [email or web address] Remember, LurnQ is NOT to be used for urgent needs. For medical emergencies, dial 911. Now available from your iPhone and Android! Please provide this summary of care documentation to your next provider. Your primary care clinician is listed as Homero Coronel. If you have any questions after today's visit, please call [phone].

## 2017-04-13 NOTE — PROGRESS NOTES
Chief Complaint   Patient presents with    Shoulder Pain     Lt Shoulder    Head Pain    Medication Refill

## 2017-04-19 ENCOUNTER — ANESTHESIA EVENT (OUTPATIENT)
Dept: ENDOSCOPY | Age: 57
End: 2017-04-19
Payer: COMMERCIAL

## 2017-04-19 ENCOUNTER — ANESTHESIA (OUTPATIENT)
Dept: ENDOSCOPY | Age: 57
End: 2017-04-19
Payer: COMMERCIAL

## 2017-04-19 ENCOUNTER — HOSPITAL ENCOUNTER (OUTPATIENT)
Age: 57
Setting detail: OUTPATIENT SURGERY
Discharge: HOME | End: 2017-04-19
Attending: INTERNAL MEDICINE | Admitting: INTERNAL MEDICINE
Payer: COMMERCIAL

## 2017-04-19 VITALS
HEIGHT: 62 IN | DIASTOLIC BLOOD PRESSURE: 77 MMHG | TEMPERATURE: 97.9 F | HEART RATE: 68 BPM | SYSTOLIC BLOOD PRESSURE: 147 MMHG | RESPIRATION RATE: 14 BRPM | WEIGHT: 154 LBS | OXYGEN SATURATION: 97 % | BODY MASS INDEX: 28.34 KG/M2

## 2017-04-19 LAB
H PYLORI FROM TISSUE: NEGATIVE
KIT LOT NO., HCLOLOT: NORMAL
NEGATIVE CONTROL: NEGATIVE
POSITIVE CONTROL: POSITIVE

## 2017-04-19 PROCEDURE — 74011000250 HC RX REV CODE- 250

## 2017-04-19 PROCEDURE — 77030027957 HC TBNG IRR ENDOGTR BUSS -B: Performed by: INTERNAL MEDICINE

## 2017-04-19 PROCEDURE — 76040000019: Performed by: INTERNAL MEDICINE

## 2017-04-19 PROCEDURE — 77030009426 HC FCPS BIOP ENDOSC BSC -B: Performed by: INTERNAL MEDICINE

## 2017-04-19 PROCEDURE — 77030013992 HC SNR POLYP ENDOSC BSC -B: Performed by: INTERNAL MEDICINE

## 2017-04-19 PROCEDURE — 74011250636 HC RX REV CODE- 250/636

## 2017-04-19 PROCEDURE — 87077 CULTURE AEROBIC IDENTIFY: CPT | Performed by: INTERNAL MEDICINE

## 2017-04-19 PROCEDURE — 76060000031 HC ANESTHESIA FIRST 0.5 HR: Performed by: INTERNAL MEDICINE

## 2017-04-19 PROCEDURE — 88305 TISSUE EXAM BY PATHOLOGIST: CPT | Performed by: INTERNAL MEDICINE

## 2017-04-19 RX ORDER — PROPOFOL 10 MG/ML
INJECTION, EMULSION INTRAVENOUS AS NEEDED
Status: DISCONTINUED | OUTPATIENT
Start: 2017-04-19 | End: 2017-04-19 | Stop reason: HOSPADM

## 2017-04-19 RX ORDER — SODIUM CHLORIDE 9 MG/ML
50 INJECTION, SOLUTION INTRAVENOUS CONTINUOUS
Status: DISCONTINUED | OUTPATIENT
Start: 2017-04-19 | End: 2017-04-19 | Stop reason: HOSPADM

## 2017-04-19 RX ORDER — SODIUM CHLORIDE 0.9 % (FLUSH) 0.9 %
5-10 SYRINGE (ML) INJECTION EVERY 8 HOURS
Status: DISCONTINUED | OUTPATIENT
Start: 2017-04-19 | End: 2017-04-19 | Stop reason: HOSPADM

## 2017-04-19 RX ORDER — EPINEPHRINE 0.1 MG/ML
1 INJECTION INTRACARDIAC; INTRAVENOUS
Status: DISCONTINUED | OUTPATIENT
Start: 2017-04-19 | End: 2017-04-19 | Stop reason: HOSPADM

## 2017-04-19 RX ORDER — SODIUM CHLORIDE 0.9 % (FLUSH) 0.9 %
5-10 SYRINGE (ML) INJECTION AS NEEDED
Status: DISCONTINUED | OUTPATIENT
Start: 2017-04-19 | End: 2017-04-19 | Stop reason: HOSPADM

## 2017-04-19 RX ORDER — FENTANYL CITRATE 50 UG/ML
100 INJECTION, SOLUTION INTRAMUSCULAR; INTRAVENOUS
Status: DISCONTINUED | OUTPATIENT
Start: 2017-04-19 | End: 2017-04-19 | Stop reason: HOSPADM

## 2017-04-19 RX ORDER — NALOXONE HYDROCHLORIDE 0.4 MG/ML
0.4 INJECTION, SOLUTION INTRAMUSCULAR; INTRAVENOUS; SUBCUTANEOUS
Status: DISCONTINUED | OUTPATIENT
Start: 2017-04-19 | End: 2017-04-19 | Stop reason: HOSPADM

## 2017-04-19 RX ORDER — SODIUM CHLORIDE 9 MG/ML
INJECTION, SOLUTION INTRAVENOUS
Status: DISCONTINUED | OUTPATIENT
Start: 2017-04-19 | End: 2017-04-19 | Stop reason: HOSPADM

## 2017-04-19 RX ORDER — ATROPINE SULFATE 0.1 MG/ML
0.5 INJECTION INTRAVENOUS
Status: DISCONTINUED | OUTPATIENT
Start: 2017-04-19 | End: 2017-04-19 | Stop reason: HOSPADM

## 2017-04-19 RX ORDER — FLUMAZENIL 0.1 MG/ML
0.2 INJECTION INTRAVENOUS
Status: DISCONTINUED | OUTPATIENT
Start: 2017-04-19 | End: 2017-04-19 | Stop reason: HOSPADM

## 2017-04-19 RX ORDER — DEXTROMETHORPHAN/PSEUDOEPHED 2.5-7.5/.8
1.2 DROPS ORAL
Status: DISCONTINUED | OUTPATIENT
Start: 2017-04-19 | End: 2017-04-19 | Stop reason: HOSPADM

## 2017-04-19 RX ORDER — LIDOCAINE HYDROCHLORIDE 20 MG/ML
INJECTION, SOLUTION EPIDURAL; INFILTRATION; INTRACAUDAL; PERINEURAL AS NEEDED
Status: DISCONTINUED | OUTPATIENT
Start: 2017-04-19 | End: 2017-04-19 | Stop reason: HOSPADM

## 2017-04-19 RX ORDER — MIDAZOLAM HYDROCHLORIDE 1 MG/ML
.25-5 INJECTION, SOLUTION INTRAMUSCULAR; INTRAVENOUS
Status: DISCONTINUED | OUTPATIENT
Start: 2017-04-19 | End: 2017-04-19 | Stop reason: HOSPADM

## 2017-04-19 RX ADMIN — PROPOFOL 100 MG: 10 INJECTION, EMULSION INTRAVENOUS at 09:25

## 2017-04-19 RX ADMIN — SODIUM CHLORIDE: 9 INJECTION, SOLUTION INTRAVENOUS at 09:10

## 2017-04-19 RX ADMIN — PROPOFOL 50 MG: 10 INJECTION, EMULSION INTRAVENOUS at 09:32

## 2017-04-19 RX ADMIN — PROPOFOL 100 MG: 10 INJECTION, EMULSION INTRAVENOUS at 09:15

## 2017-04-19 RX ADMIN — LIDOCAINE HYDROCHLORIDE 25 MG: 20 INJECTION, SOLUTION EPIDURAL; INFILTRATION; INTRACAUDAL; PERINEURAL at 09:15

## 2017-04-19 RX ADMIN — PROPOFOL 100 MG: 10 INJECTION, EMULSION INTRAVENOUS at 09:22

## 2017-04-19 NOTE — H&P
[address]  [address], [address] [facility]                 Sandra Squires is a  49 y.o.  female who presents with reflux and for screening colonoscopy. .        Past Medical History:   Diagnosis Date    Acute Anterior Myocardial Infarction, s/p PTCA w/ stent 10/2010 10/31/2010    Asthma     mild    Diabetes mellitus, type 2 ([facility] 8/24/2012    GERD (gastroesophageal reflux disease)     hiatal hernia    H/O recurrent Sinusitis 3/16/2011    [facility]     Hiatal Hernia w/ GERD (gastroesophageal reflux disease) 3/16/2011    Hypercholesterolemia     Hypertension 3/16/2011    Ill-defined condition     pacemaker put in last July 2015 because of CHF    Impaired fasting glucose 4/18/2011    Iron deficiency anemia 3/16/2011    Multinodular thyroid 3/16/2011    Palpitations, PVC's 10/13/2011    Perennial allergic rhinitis 3/16/2011    Pityriasis rosea 5/30/2012    Post-menopausal     LMP 46y. o, no HRT    Postmenopause, LMP 38 yo, No HRT 3/16/2011    Reflux esophagitis 3/16/2011    Tobacco user     Vitamin D deficiency 10/13/2011     Past Surgical History:   Procedure Laterality Date    CARDIAC SURG PROCEDURE UNLIST      cardiac stent     EGD  4/2009    hiatal hernia, esophagitis; Dr Kemp Pae, COLON, DIAGNOSTIC  4/2009    HX GYN      FNA bilateral benign     HX LITHOTRIPSY  4/2012    failed    KIDNEY STONE (CALCULI) EVAL  5/2012    scope w/ kidney stone extractions, multiple; Dr Cristi Brock, [facility]    ND COLONOSCOPY W/BIOPSY SINGLE/MULTIPLE  4/2009    benign polyp, recheck in 5 years, Dr Saritha Singh, INITIAL  10/2010    Dr Thomas Zhou     Allergies   Allergen Reactions    Ace Inhibitors Cough     ?10/2011    Seafood [Shellfish Containing Products] Hives, Shortness of Breath and Nausea and Vomiting     Current Facility-Administered Medications   Medication Dose Route Frequency Provider Last Rate Last Dose    0.9% sodium chloride infusion  50 mL/hr IntraVENous CONTINUOUS Miles Pino MD        sodium chloride (NS) flush 5-10 mL  5-10 mL IntraVENous Q8H Miles Pino MD        sodium chloride (NS) flush 5-10 mL  5-10 mL IntraVENous PRN Miles Pino MD        midazolam (VERSED) injection 0.25-5 mg  0.25-5 mg IntraVENous Gelacio Pino MD        fentaNYL citrate (PF) injection 100 mcg  100 mcg IntraVENous Gelacio Pino MD        naloxone [facility]) injection 0.4 mg  0.4 mg IntraVENous Gelacio Pino MD        flumazenil (ROMAZICON) 0.1 mg/mL injection 0.2 mg  0.2 mg IntraVENous Multiple Miles Pino MD        [facility]) 26MU/4.5GW oral drops 80 mg  1.2 mL Oral Gelacio Pino MD        atropine injection 0.5 mg  0.5 mg IntraVENous ONCE PRN Miles Pino MD        EPINEPHrine (ADRENALIN) 0.1 mg/mL syringe 1 mg  1 mg Endoscopically ONCE PRN Miles Pino MD           Visit Vitals    BP (!) 157/108    Pulse 68    Temp 98.1 °F (36.7 °C)    Resp 24    Ht 5' 2\" (1.575 m)    Wt 69.9 kg (154 lb)    SpO2 99%    BMI 28.17 kg/m2           PHYSICAL EXAM:  General: WD, WN. Alert, cooperative, no acute distress    HEENT: NC, Atraumatic. PERRLA, EOMI. Anicteric sclerae. Mallampati score 2  Lungs:  CTA Bilaterally. No Wheezing/Rhonchi/Rales. Heart:  Regular  rhythm,  No murmur (), No Rubs, No Gallops  Abdomen: Soft, Non distended, Non tender.  +Bowel sounds, no HSM  Extremities: No c/c/e  Neurologic:  CN 2-12 gi, Alert and oriented X 3. No acute neurological distress   Psych:   Good insight. Not anxious nor agitated. Plan:   Endoscopic procedure with MACPantera Pino MD  4/19/2017  9:14 AM

## 2017-04-19 NOTE — ANESTHESIA PREPROCEDURE EVALUATION
Anesthetic History               Review of Systems / Medical History  Patient summary reviewed, nursing notes reviewed and pertinent labs reviewed    Pulmonary            Asthma        Neuro/Psych              Cardiovascular    Hypertension      CHF: NYHA Classification I    CAD and cardiac stents    Exercise tolerance: >4 METS  Comments: AICD   2016--Left ventricle: The ventricle was mildly dilated. Systolic function was  moderately reduced by visual assessment. Ejection fraction was estimated  in the range of 35 % to 40 %. There was moderate diffuse hypokinesis. Doppler parameters were consistent with abnormal left ventricular  relaxation (grade 1 diastolic dysfunction).       Stent 2010       GI/Hepatic/Renal     GERD          Comments: Acid reflux/ screening Endo/Other    Diabetes  Hypothyroidism       Other Findings            Physical Exam    Airway  Mallampati: I  TM Distance: > 6 cm  Neck ROM: normal range of motion   Mouth opening: Normal     Cardiovascular    Rhythm: regular  Rate: normal         Dental         Pulmonary  Breath sounds clear to auscultation               Abdominal  Abdominal exam normal       Other Findings            Anesthetic Plan    ASA: 3  Anesthesia type: MAC          Induction: Intravenous  Anesthetic plan and risks discussed with: Patient

## 2017-04-19 NOTE — PROGRESS NOTES
Assumed care of the patient at the time of this note from Raoul Bose CRNA. Patient transported to recovery by Endoscopy Procedure RN. SBAR report given to recovery RN.

## 2017-04-19 NOTE — DISCHARGE INSTRUCTIONS
[address] [facility] [address], [address] [facility]                 Lety Arroyo  [id number]  1960    COLON / EGD DISCHARGE INSTRUCTIONS  Discomfort:  Sore throat- throat lozenges or warm salt water gargle  Redness at IV site- apply warm compress to area; if redness or soreness persist- contact your physician  There may be a slight amount of blood passed from the rectum  Gaseous discomfort- walking, belching will help relieve any discomfort  You may not operate a vehicle for 12 hours  You may not engage in an occupation involving machinery or appliances for rest of today  You may not drink alcoholic beverages for at least 12 hours  Avoid making any critical decisions for at least 24 hour  DIET:   High fiber diet. - however -  remember your colon is empty and a heavy meal will produce gas. Avoid these foods:  vegetables, fried / greasy foods, carbonated drinks for today     ACTIVITY:  You may  resume your normal daily activities it is recommended that you spend the remainder of the day resting -  avoid any strenuous activity. CALL M.D. ANY SIGN OF:   Increasing pain, nausea, vomiting  Abdominal distension (swelling)  New increased bleeding (oral or rectal)  Fever (chills)  Pain in chest area  Bloody discharge from nose or mouth  Shortness of breath    Follow-up Instructions:   Call Dr. Pamela Santillan for any questions or problems. Telephone # [phone]  Biopsy results will be available in  5 to 7 days              Should have a repeat colonoscopy in 10 years      Impression:  1. Mild Gastritis  2. Mild Esophageal Reflux  3.  Rectal polyps x 2        Lety Arroyo  [id number]  1960        DISCHARGE SUMMARY from Nurse    The following personal items collected during your admission are returned to you:   Dental Appliance: Dental Appliances: None  Vision: Visual Aid: None  Hearing Aid:    Jewelry:    Clothing:    Other Valuables:    Valuables sent to safe: PATIENT INSTRUCTIONS:

## 2017-04-19 NOTE — PROCEDURES
[address] [facility], [address] [facility]                  :  Nicole Velasquez MD    Referring Provider: [address] [place] [address] Salima Marie MD    Sedation:  MAC anesthesia Propofol      Prior to the procedure its objectives, risks, consequences and alternatives were discussed with the patient who then elected to proceed. The patient had the opportunity to ask questions and those questions were answered. A physical exam was performed. The heart, lungs, and mental status were examined prior to the procedure and found to be satisfactory for conscious sedation and for the procedure. Conscious sedation was initiated by the physician. Continuous pulse oximetry and blood pressure monitoring were used throughout the procedure. After appropriate pharyngeal anesthesia, the endoscope was passed into the esophagus without difficulty. The proximal esophagus is normal. In the distal esophagus there is grade 1 reflux esophagitis. The scope was then passed into the stomach without difficulty. The fundus is normal.  In the body and antrum there is mild gastritis. The pylorus, bulb and postbulbar area are unremarkable. A biopsy was obtained for Pyloritek from the gastric antrum and body. On slow withdrawal of the scope, retroflexion confirmed a normal cardia and fundus. She tolerated the procedure without complications and will be discharged later today. Specimen:  Biopsy for Pyloritek was obtained    Complications: None. EBL:  None.     Nicole Velasquez MD  4/19/2017 9:39 AM

## 2017-04-19 NOTE — ANESTHESIA POSTPROCEDURE EVALUATION
Post-Anesthesia Evaluation and Assessment    Patient: Leonora Flores MRN: [id number]  SSN: [id number]    YOB: 1960  Age: 49 y.o. Sex: female       Cardiovascular Function/Vital Signs  Visit Vitals    /76    Pulse 78    Temp 36.6 °C (97.9 °F)    Resp 30    Ht 5' 2\" (1.575 m)    Wt 69.9 kg (154 lb)    SpO2 97%    BMI 28.17 kg/m2       Patient is status post MAC anesthesia for Procedure(s):  ESOPHAGOGASTRODUODENOSCOPY (EGD)  COLONOSCOPY  ESOPHAGOGASTRODUODENAL (EGD) BIOPSY  ENDOSCOPIC POLYPECTOMY. Nausea/Vomiting: None    Postoperative hydration reviewed and adequate. Pain:  Pain Scale 1: Numeric (0 - 10) (04/19/17 0949)  Pain Intensity 1: 0 (04/19/17 0949)   Managed    Neurological Status: At baseline    Mental Status and Level of Consciousness: Arousable    Pulmonary Status:   O2 Device: Room air (04/19/17 0949)   Adequate oxygenation and airway patent    Complications related to anesthesia: None    Post-anesthesia assessment completed.  No concerns    Signed By: Valeriy Anderson MD     April 19, 2017

## 2017-04-19 NOTE — IP AVS SNAPSHOT
[address] 
[phone] Patient: Qasim Herron MRN: [id number] :1960 You are allergic to the following Allergen Reactions Ace Inhibitors Cough ?10/2011 Seafood (Shellfish Containing Products) Hives Shortness of Breath Nausea and Vomiting Recent Documentation Height Weight BMI OB Status Smoking Status 1.575 m 69.9 kg 28.17 kg/m2 Postmenopausal Former Smoker Emergency Contacts Name Discharge Info Relation Home Work Mobile [facility] DISCHARGE CAREGIVER [3] Spouse [3] [phone] About your hospitalization You were admitted on:  2017 You last received care in the:  [facility] ENDOSCOPY You were discharged on:  2017 Unit phone number:  [phone] Why you were hospitalized Your primary diagnosis was:  Not on File Providers Seen During Your Hospitalizations Provider Role Specialty Primary office phone Mani Chavira MD Attending Provider Gastroenterology [phone] Your Primary Care Physician (PCP) Primary Care Physician Office Phone Office Fax Martha Palacio [phone] Follow-up Information None Current Discharge Medication List  
  
CONTINUE these medications which have NOT CHANGED Dose & Instructions Dispensing Information Comments Morning Noon Evening Bedtime  
 albuterol 90 mcg/actuation inhaler Commonly known as:  PROAIR HFA Your last dose was: Your next dose is:    
   
   
 Dose:  2 Puff Take 2 Puffs by inhalation every six (6) hours as needed for Wheezing. Quantity:  1 Inhaler Refills:  1  
     
   
   
   
  
 atorvastatin 80 mg tablet Commonly known as:  LIPITOR Your last dose was: Your next dose is:    
   
   
 Dose:  80 mg Take 1 Tab by mouth nightly. Quantity:  30 Tab Refills:  5  
     
   
   
   
  
 bumetanide 0.5 mg tablet Commonly known as:  Roseann Preciado Your last dose was: Your next dose is:    
   
   
 Dose:  0.5 mg Take 1 Tab by mouth two (2) times a day. Increase to 1-2 mg bid prn CHF  Indications: PERIPHERAL EDEMA DUE TO CHRONIC HEART FAILURE Quantity:  180 Tab Refills:  1  
     
   
   
   
  
 carvedilol 25 mg tablet Commonly known as:  Juan Romero Your last dose was: Your next dose is:    
   
   
 Dose:  25 mg Take 1 Tab by mouth two (2) times a day. Quantity:  60 Tab Refills:  11  
     
   
   
   
  
 cetirizine 10 mg tablet Commonly known as:  ZYRTEC Your last dose was: Your next dose is:    
   
   
 Dose:  10 mg Take 10 mg by mouth daily as needed for Allergies. Refills:  0  
     
   
   
   
  
 cloNIDine HCl 0.3 mg tablet Commonly known as:  CATAPRES Your last dose was: Your next dose is:    
   
   
 Dose:  0.3 mg Take 0.3 mg by mouth two (2) times daily as needed. *IF BLOOD PRESSURE IS >170/90* Refills:  0  
     
   
   
   
  
 cyclobenzaprine 5 mg tablet Commonly known as:  FLEXERIL Your last dose was: Your next dose is:    
   
   
 Dose:  5 mg Take 1 Tab by mouth daily as needed for Muscle Spasm(s). Do not take with the Ambien Quantity:  10 Tab Refills:  0  
     
   
   
   
  
 diclofenac 1 % Gel Commonly known as:  VOLTAREN Your last dose was: Your next dose is:    
   
   
 Dose:  2 g Apply 2 g to affected area every six (6) hours. Quantity:  100 g Refills:  0 EPINEPHrine 0.3 mg/0.3 mL injection Commonly known as:  Елена Zavaleta Your last dose was: Your next dose is:    
   
   
 Dose:  0.3 mg  
0.3 mL by IntraMUSCular route once as needed for up to 1 dose. Quantity:  2 Syringe Refills:  1  
     
   
   
   
  
 famotidine 40 mg tablet Commonly known as:  PEPCID  
 Your last dose was: Your next dose is:    
   
   
 Dose:  40 mg Take 40 mg by mouth nightly. Refills:  0  
     
   
   
   
  
 ipratropium 0.06 % nasal spray Commonly known as:  ATROVENT Your last dose was: Your next dose is:    
   
   
 Dose:  2 Spray 2 Sprays by Both Nostrils route four (4) times daily as needed for Rhinitis. Indications: RHINORRHEA Refills:  0  
     
   
   
   
  
 losartan 50 mg tablet Commonly known as:  COZAAR Your last dose was: Your next dose is:    
   
   
 Dose:  50 mg Take 1 Tab by mouth every evening. Quantity:  30 Tab Refills:  11  
     
   
   
   
  
 * metFORMIN 500 mg tablet Commonly known as:  GLUCOPHAGE Your last dose was: Your next dose is: TAKE 1 TABLET BY MOUTH TWICE A DAY (WITH MEALS) Quantity:  60 Tab Refills:  5  
     
   
   
   
  
 * metFORMIN 500 mg tablet Commonly known as:  GLUCOPHAGE Your last dose was: Your next dose is: TAKE 1 TABLET BY MOUTH TWICE A DAY (WITH MEALS) Quantity:  60 Tab Refills:  5  
     
   
   
   
  
 montelukast 10 mg tablet Commonly known as:  SINGULAIR Your last dose was: Your next dose is: TAKE 1 TAB BY MOUTH DAILY. INDICATIONS: ALLERGIC RHINITIS Quantity:  30 Tab Refills:  11 VITAMIN D 2,000 unit Cap capsule Generic drug:  Cholecalciferol (Vitamin D3) Your last dose was: Your next dose is:    
   
   
 Dose:  4000 Units Take 4,000 Units by mouth daily. Takes 2 of the 2,000 unit tabs qd Refills:  0  
     
   
   
   
  
 zolpidem 10 mg tablet Commonly known as:  AMBIEN Your last dose was: Your next dose is:    
   
   
 Dose:  10 mg Take 1 Tab by mouth nightly as needed for Sleep. Max Daily Amount: 10 mg.  
 Quantity:  30 Tab Refills:  2 * Notice: This list has 2 medication(s) that are the same as other medications prescribed for you. Read the directions carefully, and ask your doctor or other care provider to review them with you. Discharge Instructions [address] 
[address] [place] [id number] 
1960 COLON / EGD DISCHARGE INSTRUCTIONS Discomfort: 
Sore throat- throat lozenges or warm salt water gargle Redness at IV site- apply warm compress to area; if redness or soreness persist- contact your physician There may be a slight amount of blood passed from the rectum Gaseous discomfort- walking, belching will help relieve any discomfort You may not operate a vehicle for 12 hours You may not engage in an occupation involving machinery or appliances for rest of today You may not drink alcoholic beverages for at least 12 hours Avoid making any critical decisions for at least 24 hour DIET: 
 High fiber diet.  however -  remember your colon is empty and a heavy meal will produce gas. Avoid these foods:  vegetables, fried / greasy foods, carbonated drinks for today ACTIVITY: 
You may  resume your normal daily activities it is recommended that you spend the remainder of the day resting -  avoid any strenuous activity. CALL M.D. ANY SIGN OF: Increasing pain, nausea, vomiting Abdominal distension (swelling) New increased bleeding (oral or rectal) Fever (chills) Pain in chest area Bloody discharge from nose or mouth Shortness of breath Follow-up Instructions: 
 Call Dr. Claudetta Flax for any questions or problems. Telephone # [phone] Biopsy results will be available in  5 to 7 days Should have a repeat colonoscopy in 10 years Impression:  1. Mild Gastritis 2. Mild Esophageal Reflux 3. Rectal polyps x 2 [employer] [id number] 
1960 DISCHARGE SUMMARY from Nurse The following personal items collected during your admission are returned to you:  
Dental Appliance: Dental Appliances: None Vision: Visual Aid: None Hearing Aid:   
Jewelry:   
Clothing:   
Other Valuables:   
Valuables sent to safe:   
 
PATIENT INSTRUCTIONS: 
 
 
 
 
 
Discharge Orders None Introducing [facility]! Dear Eder Fink: Thank you for requesting a [email or web address] account. Our records indicate that you already have an active [email or web address] account. You can access your account anytime at [email or web address] Did you know that you can access your hospital and ER discharge instructions at any time in [email or web address]? You can also review all of your test results from your hospital stay or ER visit. Additional Information If you have questions, please visit the Frequently Asked Questions section of the [email or web address] website at [email or web address] Remember, [email or web address] is NOT to be used for urgent needs. For medical emergencies, dial 911. Now available from your iPhone and Android! General Information Please provide this summary of care documentation to your next provider. Patient Signature:  ____________________________________________________________ Date:  ____________________________________________________________  
  
[facility] Provider Signature:  ____________________________________________________________ Date:  ____________________________________________________________

## 2017-04-19 NOTE — ROUTINE PROCESS
Es Moore  1960  [id number]    Situation:  Verbal report received from: Mack Ziamalka  Procedure: Procedure(s):  ESOPHAGOGASTRODUODENOSCOPY (EGD)  COLONOSCOPY  ESOPHAGOGASTRODUODENAL (EGD) BIOPSY  ENDOSCOPIC POLYPECTOMY    Background:    Preoperative diagnosis: ACID REFLUX, SCRENNING  Postoperative diagnosis: 1. Mild Gastritis  2. Mild Esophageal Reflux  3. Rectal polyps x 2    :  Dr. Meme Reyes  Assistant(s): Endoscopy Technician-1: Les May  Endoscopy RN-1: Katalina Moore RN    Specimens:   ID Type Source Tests Collected by Time Destination   1 : Rectum Polyps x 2 Preservative   Suzetteyong Rodriguez MD 4/19/2017 3290 Pathology     H. Pylori  yes    Assessment:  Intra-procedure medications   Anesthesia gave intra-procedure sedation and medications, see anesthesia flow sheet yes    Intravenous fluids: NS@ KVO     Vital signs stable     Abdominal assessment: round and soft     Recommendation:  Discharge patient per MD order.   Family or Friend Spouse  Permission to share finding with family or friend yes

## 2017-04-19 NOTE — PROCEDURES
[address]              :  Maria De Jesus Zuniga MD    Referring Provider: Nicolasa Jones MD    Sedation:  MAC anesthesia Propofol        Prior to the procedure its objectives, risks, consequences and alternatives were discussed with the patient who then elected to proceed. The patient had the opportunity to ask questions and those questions were answered. A physical exam was performed. The heart, lungs, and mental status were examined prior to the procedure and found to be satisfactory for conscious sedation and for the procedure. Conscious sedation was initiated by the physician. Continuous pulse oximetry and blood pressure monitoring were used throughout the procedure. After appropriate analgesia and rectal exam, the colonoscope was passed into the anus and passed to the cecum without difficulty. The prep was fair. On slow withdrawal of the scope, the cecum, ascending colon, hepatic flexure, transverse colon, splenic flexure, descending colon, sigmoid were normal. In the rectum, there were 2 small polyps removed and retrieved with the cold snare. Retroflexion exam of the rectum was normal. She tolerated the procedure without complication and I recommend a repeat colonoscopy in 5 years. Specimen Rectal polyp x 2    Complications: None. EBL:  None.     Maria De Jesus Zuniga MD  4/19/2017  9:41 AM

## 2017-05-02 ENCOUNTER — HOSPITAL ENCOUNTER (OUTPATIENT)
Dept: PHYSICAL THERAPY | Age: 57
Discharge: HOME | End: 2017-05-02
Payer: COMMERCIAL

## 2017-05-02 PROCEDURE — G8984 CARRY CURRENT STATUS: HCPCS | Performed by: PHYSICAL THERAPIST

## 2017-05-02 PROCEDURE — 97110 THERAPEUTIC EXERCISES: CPT | Performed by: PHYSICAL THERAPIST

## 2017-05-02 PROCEDURE — 97161 PT EVAL LOW COMPLEX 20 MIN: CPT | Performed by: PHYSICAL THERAPIST

## 2017-05-02 PROCEDURE — 97140 MANUAL THERAPY 1/> REGIONS: CPT | Performed by: PHYSICAL THERAPIST

## 2017-05-02 PROCEDURE — G8985 CARRY GOAL STATUS: HCPCS | Performed by: PHYSICAL THERAPIST

## 2017-05-02 NOTE — PROGRESS NOTES
[facility] Physical Therapy  [address] [address], [address]  Phone: [phone]  Fax: [phone]      Plan of Care/Statement of Necessity for Physical Therapy Services  2-15    Patient name: Cameron Gil  : 1960  Provider#: [id number]  [facility]      Medical/Treatment Diagnosis: Left shoulder pain [M25.512]     Prior Hospitalization: see medical history     Comorbidities: Heart Disease, Diabetes, Pacemaker, HTN, Asthma  Prior Level of Function: working full time, exercising with tape   Medications: Verified on Patient Summary List  Start of Care:17      Onset Date: 2017  The [address] [place] [address] and following information is based on the information from the initial evaluation. Assessment/ key information: The pt is a a 49 y.o. Female referred for the evaluation and treatment of left shoulder rotator cuff partial thickness tear by Dr. Clayton Mendoza. The pt presents with s/s consistent with referring dx with impairments including decreased strength in her left shoulder, decreased ROM, poor postural control with forward head and rounded shoulder and pain and tightness to palpation along UT, levator scapulae,  and parascapular musculature. The pt would benefit from skilled physical therapy in order to address these impairments and to return her to maximal level of function pain free.         Evaluation Complexity History HIGH Complexity :3+ comorbidities / personal factors will impact the outcome/ POC ; Examination HIGH Complexity : 4+ Standardized tests and measures addressing body structure, function, activity limitation and / or participation in recreation  ;Presentation LOW Complexity : Stable, uncomplicated  ;Clinical Decision Making MEDIUM Complexity : FOTO score of 26-74  Overall Complexity Rating: LOW     Problem List: pain affecting function, decrease ROM, decrease strength, edema affecting function, decrease ADL/ functional abilitiies and decrease activity tolerance   Treatment Plan may include any combination of the following: Therapeutic exercise, Neuromuscular re-education, Physical agent/modality, Gait/balance training, Manual therapy and Patient education  Patient / Family readiness to learn indicated by: asking questions, trying to perform skills and interest  Persons(s) to be included in education: patient (P)  Barriers to Learning/Limitations: None  Patient Goal (s): To get rid of pain and muscle tension  Patient Self Reported Health Status: fair  Rehabilitation Potential: good    Short Term Goals: To be accomplished in 3-4 weeks: 1. Pt will be independent with HEP  2. Patient will have full passive shoulder ROM   Long Term Goals: To be accomplished in 6-8 weeks:  1.)Pt will be able to Reach above shoulder level without increase of pain  2) Pt will be able to work on the computer without increased shoulder pain. 3) Pt will have increased FOTO score to 72. Frequency / Duration: Patient to be seen 2 times per week for 6 weeks. Patient/ Caregiver education and instruction: activity modification and exercises    [x]  Plan of care has been reviewed with CALEB    G-Margaret (GP)    Carry   Current  CK= 40-59%    Goal  CJ= 20-39%      The severity rating is based on clinical judgment and the FOTO Score  And clinical judgement. Certification Period: 5/2/17 - 6/2/17  Michelle Mitchell 5/2/2017 4:20 PM    ________________________________________________________________________    I certify that the above Therapy Services are being furnished while the patient is under my care. I agree with the treatment plan and certify that this therapy is necessary.     [de-identified] Signature:____________________  Date:____________Time: _________

## 2017-05-05 ENCOUNTER — APPOINTMENT (OUTPATIENT)
Dept: PHYSICAL THERAPY | Age: 57
End: 2017-05-05
Payer: COMMERCIAL

## 2017-05-09 ENCOUNTER — HOSPITAL ENCOUNTER (OUTPATIENT)
Dept: PHYSICAL THERAPY | Age: 57
Discharge: HOME | End: 2017-05-09
Payer: COMMERCIAL

## 2017-05-09 PROCEDURE — 97140 MANUAL THERAPY 1/> REGIONS: CPT

## 2017-05-09 PROCEDURE — 97110 THERAPEUTIC EXERCISES: CPT

## 2017-05-09 RX ORDER — LOSARTAN POTASSIUM 100 MG/1
TABLET ORAL
Qty: 30 TAB | Refills: 5 | OUTPATIENT
Start: 2017-05-09

## 2017-05-09 RX ORDER — DEXLANSOPRAZOLE 60 MG/1
CAPSULE, DELAYED RELEASE ORAL
Qty: 30 CAP | Refills: 1 | OUTPATIENT
Start: 2017-05-09

## 2017-05-09 NOTE — PROGRESS NOTES
PT DAILY TREATMENT NOTE - [facility] 2-15    Patient Name: Melissa Sepulveda  Date:2017  : 1960  [x]  Patient  Verified  Payor: Narinder Cooley / Plan: Ricardo Valadez LEAP HPN ABILIO / Product Type: ABILIO /    In time:2:30P  Out time:3:30P  Total Treatment Time (min): 60  Total Timed Codes (min): 60  1:1 Treatment Time ( only): --   Visit #: 2     Treatment Area: Left shoulder pain [M25.512]    SUBJECTIVE  Pain Level (0-10 scale): 5/10  Any medication changes, allergies to medications, adverse drug reactions, diagnosis change, or new procedure performed?: [x] No    [] Yes (see summary sheet for update)  Subjective functional status/changes:   [] No changes reported  \"I was sore after I left last time. It mostly bothers me at the end of the day after being at my desk all day. \"    OBJECTIVE    35 min Therapeutic Exercise:  [x] See flow sheet :   Rationale: increase ROM, increase strength and improve coordination to improve the patients ability to increase cervical stability with activity    25 min Manual Therapy: STM L UT, LS cervical paraspinals, SOR on L side. Rationale: decrease pain, increase ROM, increase tissue extensibility and decrease trigger points to improve the patients ability to increase cervical mobility    With   [] TE   [] TA   [] neuro   [] other: Patient Education: [x] Review HEP    [] Progressed/Changed HEP based on:   [] positioning   [] body mechanics   [] transfers   [] heat/ice application    [] other:      Other Objective/Functional Measures: --     Pain Level (0-10 scale) post treatment: 4/10    ASSESSMENT/Changes in Function:   Pt reported a decrease in pain following manual therapy. Pt stated she she felt sore following the added strengthening exercises but no increase in pain. Instructed pt on ergonomic desk set up and hand out provided. Noted pt's report of decrease in pt's uncontrollable burping during manual today. Continue to progress as tolerated.    Patient will continue to benefit from skilled PT services to modify and progress therapeutic interventions, address functional mobility deficits, address ROM deficits, address strength deficits, analyze and address soft tissue restrictions and analyze and cue movement patterns to attain remaining goals. [x]  See Plan of Care  []  See progress note/recertification  []  See Discharge Summary         Progress towards goals / Updated goals:  Short Term Goals: To be accomplished in 3-4 weeks: 1. Pt will be independent with HEP  2. Patient will have full passive shoulder ROM   Long Term Goals: To be accomplished in 6-8 weeks:  1.)Pt will be able to Reach above shoulder level without increase of pain  2) Pt will be able to work on the computer without increased shoulder pain. 3) Pt will have increased FOTO score to 72.      PLAN  [x]  Upgrade activities as tolerated     [x]  Continue plan of care  []  Update interventions per flow sheet       []  Discharge due to:_  []  Other:_      Juvenal Shetty, PTA 5/9/2017  2:26 PM

## 2017-05-12 ENCOUNTER — HOSPITAL ENCOUNTER (OUTPATIENT)
Dept: PHYSICAL THERAPY | Age: 57
Discharge: HOME | End: 2017-05-12
Payer: COMMERCIAL

## 2017-05-12 PROCEDURE — 97140 MANUAL THERAPY 1/> REGIONS: CPT | Performed by: PHYSICAL THERAPIST

## 2017-05-12 PROCEDURE — 97110 THERAPEUTIC EXERCISES: CPT | Performed by: PHYSICAL THERAPIST

## 2017-05-12 NOTE — PROGRESS NOTES
PT DAILY TREATMENT NOTE - [facility] 2-15    Patient Name: Joana Barrera  Date:2017  : 1960  [x]  Patient  Verified  Payor: Gus Like / Plan: Hector Blanks LEAP HPN ABILIO / Product Type: ABILIO /    In time: 9:30 Out time:10:30  Total Treatment Time (min): 60  Total Timed Codes (min): 60  1:1 Treatment Time ( [address] only): -  Visit #:3    Treatment Area: Left shoulder pain [M25.512]    SUBJECTIVE  Pain Level (0-10 scale):2/10  Any medication changes, allergies to medications, adverse drug reactions, diagnosis change, or new procedure performed?: [x] No    [] Yes (see summary sheet for update)  Subjective functional status/changes:   [] No changes reported  Doing better, but have been on prednisone bc of respiratory infection    OBJECTIVE    Modality rationale: decrease inflammation, decrease pain and increase tissue extensibility to improve the patients ability    Min Type Additional Details    [] Estim: []Att   []Unatt        []TENS instruct                  []IFC  []Premod   []NMES                     []Other:  []w/US   []w/ice   []w/heat  Position:  Location:    []  Traction: [] Cervical       []Lumbar                       [] Prone          []Supine                       []Intermittent   []Continuous Lbs:  [] before manual  [] after manual  []w/heat    []  Ultrasound: []Continuous   [] Pulsed at:                           []1MHz   []3MHz Location:  W/cm2:    [] Paraffin         Location:   []w/heat   10 []  Ice     [x]  Heat  []  Ice massage Position:supine  Location:left shoulder    []  Laser  []  Other: Position:  Location:      []  Vasopneumatic Device Pressure:       [] lo [] med [] hi   Temperature:      [x] Skin assessment post-treatment:  [x]intact []redness- no adverse reaction    []redness - adverse reaction:   OBJECTIVE     30 min Therapeutic Exercise: [x] See flow sheet :   Rationale: increase ROM, increase strength and improve coordination to improve the patients ability to increase cervical stability with activity     20 min Manual Therapy: STM L UT, LS cervical paraspinals, SOR on L side. Rationale: decrease pain, increase ROM, increase tissue extensibility and decrease trigger points to improve the patients ability to increase cervical mobility     With  [] TE  [] TA  [] neuro  [] other: Patient Education: [x] Review HEP   [] Progressed/Changed HEP based on:   [] positioning [] body mechanics [] transfers [] heat/ice application   [] other:       Other Objective/Functional Measures: --      Pain Level (0-10 scale) post treatment: 4/10     ASSESSMENT/Changes in Function:   Pt reported a her shoulder is feeling better, but she is on prednisone for a  Respiratory infection. Improved turogr along UT. Patient will continue to benefit from skilled PT services to modify and progress therapeutic interventions, address functional mobility deficits, address ROM deficits, address strength deficits, analyze and address soft tissue restrictions and analyze and cue movement patterns to attain remaining goals.      [x] See Plan of Care  [] See progress note/recertification  [] See Discharge Summary      Progress towards goals / Updated goals:    Short Term Goals: To be accomplished in 3-4 weeks: 1. Pt will be independent with HEP  2. Patient will have full passive shoulder ROM     Long Term Goals:  To be accomplished in 6-8 weeks:  1.)Pt will be able to Reach above shoulder level without increase of pain  2) Pt will be able to work on the computer without increased shoulder pain  3) Pt will have increased FOTO score to 72.      PLAN  [x] Upgrade activities as tolerated [x] Continue plan of care  [] Update interventions per flow sheet   [] Discharge due to:_  [] Other:_     Akila Burgess 5/12/2017  9:34 AM

## 2017-05-21 DIAGNOSIS — I10 ESSENTIAL HYPERTENSION: Chronic | ICD-10-CM

## 2017-05-30 ENCOUNTER — HOSPITAL ENCOUNTER (OUTPATIENT)
Dept: PHYSICAL THERAPY | Age: 57
Discharge: HOME | End: 2017-05-30
Payer: COMMERCIAL

## 2017-05-30 PROCEDURE — 97110 THERAPEUTIC EXERCISES: CPT | Performed by: PHYSICAL THERAPIST

## 2017-05-30 PROCEDURE — 97140 MANUAL THERAPY 1/> REGIONS: CPT | Performed by: PHYSICAL THERAPIST

## 2017-05-30 NOTE — PROGRESS NOTES
PT DAILY TREATMENT NOTE 2-15    Patient Name: Hargis Harada  Date:2017  : 1960  [x]  Patient  Verified  [facility]or: [facility] / [address]: [address] / Product Type: ABILIO /    In time: 12:30p Out time:1:30p  Total Treatment Time (min):60  Visit #: 4    Treatment Area: Left shoulder pain [M25.512]    SUBJECTIVE  Pain Level (0-10 scale):0/10  Any medication changes, allergies to medications, adverse drug reactions, diagnosis change, or new procedure performed?: [x] No    [] Yes (see summary sheet for update)  Subjective functional status/changes:   [x] No changes reported  Doing better, only pain is in her shoulder at night sometimes. Her neck and UT is improving. Trying to get up throughout the day more and take postural breaks/walking breaks. OBJECTIVE           Modality rationale: decrease inflammation, decrease pain and increase tissue extensibility to improve the patients ability    Min Type Additional Details     [] Estim: []Att []Unatt []TENS instruct  []IFC []Premod []NMES   []Other:  []w/US []w/ice []w/heat  Position:  Location:     [] Traction: [] Cervical []Lumbar  [] Prone []Supine  []Intermittent []Continuous Lbs:  [] before manual  [] after manual  []w/heat     [] Ultrasound: []Continuous [] Pulsed at:  []1MHz []3MHz Location:  W/cm2:     [] Paraffin      Location:   []w/heat   10 [] Ice [x] Heat  [] Ice massage Position:supine  Location:left shoulder     [] Laser  [] Other: Position:  Location:     [] Vasopneumatic Device Pressure: [] lo [] med [] hi   Temperature:       [x] Skin assessment post-treatment: [x]intact []redness- no adverse reaction  []redness - adverse reaction:   OBJECTIVE      30 min Therapeutic Exercise: [x] See flow sheet :   Rationale: increase ROM, increase strength and improve coordination to improve the patients ability to increase cervical stability with activity      20 min Manual Therapy: STM L UT, LS cervical paraspinals, SOR on L side. Rationale: decrease pain, increase ROM, increase tissue extensibility and decrease trigger points to improve the patients ability to increase cervical mobility      With  [] TE  [] TA  [] neuro  [] other: Patient Education: [x] Review HEP   [] Progressed/Changed HEP based on:   [] positioning [] body mechanics [] transfers [] heat/ice application   [] other:        Other Objective/Functional Measures: --       Pain Level (0-10 scale) post treatment: 0/10      ASSESSMENT/Changes in Function:   Pt reported a her shoulder is doing better with work related activities having less agggrevation. She still notes increased pain and tightness at the end of the day. Tolerated exercises well and noted improvement with ROM of shoulder today. Patient will continue to benefit from skilled PT services to modify and progress therapeutic interventions, address functional mobility deficits, address ROM deficits, address strength deficits, analyze and address soft tissue restrictions and analyze and cue movement patterns to attain remaining goals.      [x] See Plan of Care  [] See progress note/recertification  [] See Discharge Summary      Progress towards goals / Updated goals:     Short Term Goals: To be accomplished in 3-4 weeks: 1. Pt will be independent with HEP  2. Patient will have full passive shoulder ROM      Long Term Goals:  To be accomplished in 6-8 weeks:  1.)Pt will be able to Reach above shoulder level without increase of pain  2) Pt will be able to work on the computer without increased shoulder pain  3) Pt will have increased FOTO score to 72.       PLAN  [x] Upgrade activities as tolerated [x] Continue plan of care  [] Update interventions per flow sheet   [] Discharge due to:_  [] Other:_     Gabrielle Sanders 5/30/2017  12:35 PM

## 2017-05-31 RX ORDER — BUMETANIDE 0.5 MG/1
TABLET ORAL
Qty: 180 TAB | Refills: 1 | Status: SHIPPED | OUTPATIENT
Start: 2017-05-31 | End: 2017-07-17 | Stop reason: SDUPTHER

## 2017-05-31 RX ORDER — LOSARTAN POTASSIUM 100 MG/1
TABLET ORAL
Qty: 30 TAB | Refills: 5 | OUTPATIENT
Start: 2017-05-31

## 2017-06-02 ENCOUNTER — HOSPITAL ENCOUNTER (OUTPATIENT)
Dept: PHYSICAL THERAPY | Age: 57
Discharge: HOME | End: 2017-06-02
Payer: COMMERCIAL

## 2017-06-02 DIAGNOSIS — I10 ESSENTIAL HYPERTENSION: Chronic | ICD-10-CM

## 2017-06-02 PROCEDURE — 97140 MANUAL THERAPY 1/> REGIONS: CPT

## 2017-06-02 PROCEDURE — 97110 THERAPEUTIC EXERCISES: CPT

## 2017-06-02 NOTE — PROGRESS NOTES
PT DAILY TREATMENT NOTE - [facility] 2-15    Patient Name: Elissa Dominguez  Date:2017  : 1960  [x]  Patient  Verified  Payor: Luis Reyna / Plan: [facility] ABILIO / Product Type: ABILIO /    In time:12:00P  Out time:12:40P  Total Treatment Time (min): 40  Total Timed Codes (min): 40  1:1 Treatment Time ( [address] only): --   Visit #: 5     Treatment Area: Left shoulder pain [M25.512]    SUBJECTIVE  Pain Level (0-10 scale): 0/10  Any medication changes, allergies to medications, adverse drug reactions, diagnosis change, or new procedure performed?: [x] No    [] Yes (see summary sheet for update)  Subjective functional status/changes:   [] No changes reported  \"I have had some tightness and pain in my neck especially as the day comes to an end. \"    OBJECTIVE    25 min Therapeutic Exercise:  [x] See flow sheet :   Rationale: increase ROM, increase strength and improve coordination to improve the patients ability to increase cervical stability with activity    15 min Manual Therapy: STM L UT, LS cervical paraspinals, SOR on L side. Rationale: decrease pain, increase ROM, increase tissue extensibility and decrease trigger points to improve the patients ability to increase cervical mobility          With   [] TE   [] TA   [] neuro   [] other: Patient Education: [x] Review HEP    [] Progressed/Changed HEP based on:   [] positioning   [] body mechanics   [] transfers   [] heat/ice application    [] other:      Other Objective/Functional Measures: --     Pain Level (0-10 scale) post treatment: 0/10    ASSESSMENT/Changes in Function:   Pt tolerated increase in scapular strengthening without increase in pain or discomfort.    Patient will continue to benefit from skilled PT services to modify and progress therapeutic interventions, address functional mobility deficits, address ROM deficits, address strength deficits, analyze and address soft tissue restrictions, analyze and cue movement patterns and analyze and modify body mechanics/ergonomics to attain remaining goals. [x]  See Plan of Care  []  See progress note/recertification  []  See Discharge Summary         Progress towards goals / Updated goals:  Short Term Goals: To be accomplished in 3-4 weeks: 1. Pt will be independent with HEP  2. Patient will have full passive shoulder ROM       Long Term Goals: To be accomplished in 6-8 weeks:  1.)Pt will be able to Reach above shoulder level without increase of pain  2) Pt will be able to work on the computer without increased shoulder pain  3) Pt will have increased FOTO score to 72.      PLAN  [x]  Upgrade activities as tolerated     [x]  Continue plan of care  []  Update interventions per flow sheet       []  Discharge due to:_  []  Other:_      Obie Rivas PTA 6/2/2017  12:06 PM

## 2017-06-07 ENCOUNTER — HOSPITAL ENCOUNTER (OUTPATIENT)
Dept: PHYSICAL THERAPY | Age: 57
Discharge: HOME | End: 2017-06-07
Payer: COMMERCIAL

## 2017-06-09 RX ORDER — METFORMIN HYDROCHLORIDE 500 MG/1
TABLET ORAL
Qty: 60 TAB | Refills: 4 | Status: SHIPPED | OUTPATIENT
Start: 2017-06-09 | End: 2017-08-22 | Stop reason: DRUGHIGH

## 2017-07-17 ENCOUNTER — OFFICE VISIT (OUTPATIENT)
Dept: INTERNAL MEDICINE CLINIC | Age: 57
End: 2017-07-17

## 2017-07-17 VITALS
RESPIRATION RATE: 17 BRPM | WEIGHT: 160.1 LBS | HEIGHT: 62 IN | SYSTOLIC BLOOD PRESSURE: 136 MMHG | HEART RATE: 72 BPM | DIASTOLIC BLOOD PRESSURE: 76 MMHG | OXYGEN SATURATION: 98 % | TEMPERATURE: 98.2 F | BODY MASS INDEX: 29.46 KG/M2

## 2017-07-17 DIAGNOSIS — I10 ESSENTIAL HYPERTENSION: Chronic | ICD-10-CM

## 2017-07-17 DIAGNOSIS — J32.4 CHRONIC PANSINUSITIS: Primary | ICD-10-CM

## 2017-07-17 DIAGNOSIS — G47.00 INSOMNIA, UNSPECIFIED TYPE: ICD-10-CM

## 2017-07-17 DIAGNOSIS — E11.9 ENCOUNTER FOR DIABETIC FOOT EXAM (HCC): ICD-10-CM

## 2017-07-17 DIAGNOSIS — M79.671 RIGHT FOOT PAIN: ICD-10-CM

## 2017-07-17 DIAGNOSIS — Z76.0 MEDICATION REFILL: ICD-10-CM

## 2017-07-17 DIAGNOSIS — E11.9 NON-INSULIN DEPENDENT TYPE 2 DIABETES MELLITUS (HCC): ICD-10-CM

## 2017-07-17 LAB — MICROALBUMIN UR TEST STR-MCNC: 10 MG/L

## 2017-07-17 RX ORDER — AMLODIPINE BESYLATE 5 MG/1
5 TABLET ORAL DAILY
Status: CANCELLED | OUTPATIENT
Start: 2017-07-17

## 2017-07-17 RX ORDER — BUMETANIDE 0.5 MG/1
TABLET ORAL
Qty: 180 TAB | Refills: 1 | Status: SHIPPED | OUTPATIENT
Start: 2017-07-17 | End: 2022-05-04

## 2017-07-17 RX ORDER — DOXYCYCLINE HYCLATE 100 MG
100 TABLET ORAL 2 TIMES DAILY
Qty: 56 TAB | Refills: 0 | Status: SHIPPED | OUTPATIENT
Start: 2017-07-17 | End: 2017-08-14

## 2017-07-17 RX ORDER — ZOLPIDEM TARTRATE 10 MG/1
10 TABLET ORAL
Qty: 30 TAB | Refills: 0 | Status: SHIPPED | OUTPATIENT
Start: 2017-07-17 | End: 2017-08-22 | Stop reason: SDUPTHER

## 2017-07-17 RX ORDER — FLUCONAZOLE 150 MG/1
150 TABLET ORAL DAILY
Qty: 1 TAB | Refills: 0 | Status: SHIPPED | OUTPATIENT
Start: 2017-07-17 | End: 2017-07-18

## 2017-07-17 RX ORDER — LOSARTAN POTASSIUM 100 MG/1
100 TABLET ORAL EVERY EVENING
Qty: 90 TAB | Refills: 3 | Status: ON HOLD | OUTPATIENT
Start: 2017-07-17 | End: 2020-11-09

## 2017-07-17 NOTE — MR AVS SNAPSHOT
Visit Information Date & Time Provider Department Dept. Phone Encounter #  
 7/17/2017  3:15  [facility] [place], [place] [phone] [id number] Follow-up Instructions Return in about 1 month (around 8/17/2017) for f/up on referral to ent and recheck sinus infection 15 min . Upcoming Health Maintenance Date Due Hepatitis C Screening 1960 DTaP/Tdap/Td series (1 - Tdap) 4/28/2009 EYE EXAM RETINAL OR DILATED Q1 3/1/2015 FOOT EXAM Q1 10/2/2015 PAP AKA CERVICAL CYTOLOGY 3/26/2016 MICROALBUMIN Q1 11/16/2016 HEMOGLOBIN A1C Q6M 7/19/2017 INFLUENZA AGE 9 TO ADULT 8/1/2017 LIPID PANEL Q1 1/19/2018 BREAST CANCER SCRN MAMMOGRAM 5/31/2019 COLONOSCOPY 4/19/2027 Allergies as of 7/17/2017  Review Complete On: 7/17/2017 By: Daquan Youssef Severity Noted Reaction Type Reactions Ace Inhibitors  10/13/2011    Cough ?10/2011 Seafood [Shellfish Containing Products]  11/01/2010    Hives, Shortness of Breath, Nausea and Vomiting Current Immunizations  Reviewed on 10/19/2016 Name Date Influenza Vaccine 12/16/2014  4:31 PM, 1/10/2013 Influenza Vaccine (Quad) PF 10/19/2016  6:19 PM  
 Influenza Vaccine Intradermal PF 11/2/2015 Influenza Vaccine PF 10/7/2013 Influenza Vaccine Split 10/6/2010 Pneumococcal Polysaccharide (PPSV-23) 7/31/2015  4:49 PM  
 TD Vaccine 4/27/2009 Not reviewed this visit You Were Diagnosed With   
  
 Codes Comments Chronic pansinusitis    -  Primary ICD-10-CM: J32.4 ICD-9-CM: 473.8 Essential hypertension     ICD-10-CM: I10 
ICD-9-CM: 401.9 Insomnia, unspecified type     ICD-10-CM: G47.00 ICD-9-CM: 780.52 Medication refill     ICD-10-CM: Z76.0 ICD-9-CM: V68.1 Right foot pain     ICD-10-CM: M79.671 ICD-9-CM: 729.5 [facility]     ICD-10-CM: E11.9 ICD-9-CM: 250.00 Non-insulin dependent type 2 diabetes mellitus ([facility] 75.)     ICD-10-CM: E11.9 ICD-9-CM: 250.00 Vitals BP Pulse Temp Resp Height(growth percentile) Weight(growth percentile) 136/76 (BP 1 Location: Right arm, BP Patient Position: Sitting) 72 98.2 °F (36.8 °C) (Oral) 17 5' 2\" (1.575 m) 160 lb 1.6 oz (72.6 kg) LMP SpO2 BMI OB Status Smoking Status (Exact Date) 98% 29.28 kg/m2 Postmenopausal Former Smoker BMI and BSA Data Body Mass Index Body Surface Area  
 29.28 kg/m 2 1.78 m 2 Preferred Pharmacy Pharmacy Name Phone [facility][id number] [place] [address] [phone] Your Updated Medication List  
  
   
This list is accurate as of: 7/17/17  4:52 PM.  Always use your most recent med list.  
  
  
  
  
 albuterol 90 mcg/actuation inhaler Commonly known as:  PROAIR HFA Take 2 Puffs by inhalation every six (6) hours as needed for Wheezing. atorvastatin 80 mg tablet Commonly known as:  LIPITOR Take 1 Tab by mouth nightly. bumetanide 0.5 mg tablet Commonly known as:  Shirlean Carolina TAKE 1 TABLET BY MOUTH TWICE A DAY AS NEEDED  
  
 carvedilol 25 mg tablet Commonly known as:  Myron Sly Take 1 Tab by mouth two (2) times a day. cetirizine 10 mg tablet Commonly known as:  ZYRTEC Take 10 mg by mouth daily as needed for Allergies. cloNIDine HCl 0.3 mg tablet Commonly known as:  CATAPRES Take 0.3 mg by mouth two (2) times daily as needed. *IF BLOOD PRESSURE IS >170/90*  
  
 cyclobenzaprine 5 mg tablet Commonly known as:  FLEXERIL Take 1 Tab by mouth daily as needed for Muscle Spasm(s). Do not take with the Ambien  
  
 diclofenac 1 % Gel Commonly known as:  VOLTAREN Apply 2 g to affected area every six (6) hours. doxycycline 100 mg tablet Commonly known as:  VIBRA-TABS Take 1 Tab by mouth two (2) times a day for 28 days. EPINEPHrine 0.3 mg/0.3 mL injection Commonly known as:  Dale Donovan  
 0.3 mL by IntraMUSCular route once as needed for up to 1 dose.  
  
 famotidine 40 mg tablet Commonly known as:  PEPCID Take 40 mg by mouth nightly. fluconazole 150 mg tablet Commonly known as:  DIFLUCAN Take 1 Tab by mouth daily for 1 day. FDA advises cautious prescribing of oral fluconazole in pregnancy. ipratropium 0.06 % nasal spray Commonly known as:  ATROVENT  
2 Sprays by Both Nostrils route four (4) times daily as needed for Rhinitis. Indications: RHINORRHEA  
  
 losartan 100 mg tablet Commonly known as:  COZAAR Take 1 Tab by mouth every evening. * metFORMIN 500 mg tablet Commonly known as:  GLUCOPHAGE  
TAKE 1 TABLET BY MOUTH TWICE A DAY (WITH MEALS) * metFORMIN 500 mg tablet Commonly known as:  GLUCOPHAGE  
TAKE 1 TABLET BY MOUTH TWICE A DAY (WITH MEALS)  
  
 montelukast 10 mg tablet Commonly known as:  SINGULAIR  
TAKE 1 TAB BY MOUTH DAILY. INDICATIONS: ALLERGIC RHINITIS  
  
 PROBIOTIC 15 billion cell Cap Generic drug:  L. acidophilus-L. rhamnosus Take  by mouth. VITAMIN D 2,000 unit Cap capsule Generic drug:  Cholecalciferol (Vitamin D3) Take 4,000 Units by mouth daily. Takes 2 of the 2,000 unit tabs qd  
  
 zolpidem 10 mg tablet Commonly known as:  AMBIEN Take 1 Tab by mouth nightly as needed for Sleep. Max Daily Amount: 10 mg.  
  
 * Notice: This list has 2 medication(s) that are the same as other medications prescribed for you. Read the directions carefully, and ask your doctor or other care provider to review them with you. Prescriptions Printed Refills  
 zolpidem (AMBIEN) 10 mg tablet 0 Sig: Take 1 Tab by mouth nightly as needed for Sleep. Max Daily Amount: 10 mg.  
 Class: Print Route: Oral  
  
Prescriptions Sent to Pharmacy Refills  
 bumetanide (BUMEX) 0.5 mg tablet 1 Sig: TAKE 1 TABLET BY MOUTH TWICE A DAY AS NEEDED  Class: Normal  
 Pharmacy: [address] [place] [address] Ph #: [phone]  
 losartan (COZAAR) 100 mg tablet 3 Sig: Take 1 Tab by mouth every evening. Class: Normal  
 Pharmacy: [address] [place] [address] Ph #: [phone] Route: Oral  
 doxycycline (VIBRA-TABS) 100 mg tablet 0 Sig: Take 1 Tab by mouth two (2) times a day for 28 days. Class: Normal  
 Pharmacy: [address] [place] [address] Ph #: [phone] Route: Oral  
 fluconazole (DIFLUCAN) 150 mg tablet 0 Sig: Take 1 Tab by mouth daily for 1 day. FDA advises cautious prescribing of oral fluconazole in pregnancy. Class: Normal  
 Pharmacy: [address] [place] [address] Ph #: [phone] Route: Oral  
  
We Performed the Following AMB POC URINE, MICROALBUMIN, SEMIQUANT (1 RESULT) [12440 CPT(R)]  DIABETES FOOT EXAM [HM7 Custom] REFERRAL TO ENT-OTOLARYNGOLOGY [TGW05 Custom] Comments:  
 Please evaluate patient for chroonic/freq sinus infections REFERRAL TO OPHTHALMOLOGY [REF57 Custom] Comments:  
 Please evaluate patient for *diabetic eye exam  
 REFERRAL TO PODIATRY [REF90 Custom] Comments:  
 Please evaluate patient for foot pain and diabetic foot exam . Follow-up Instructions Return in about 1 month (around 8/17/2017) for f/up on referral to ent and recheck sinus infection 15 min . Referral Information Referral ID Referred By Referred To  
  
 [id number] Christina Singleton, [address] [facility] [address] [address] Elena Craig Visits Status Start Date End Date 1 New Request 7/17/17 7/17/18 If your referral has a status of pending review or denied, additional information will be sent to support the outcome of this decision. Referral ID Referred By Referred To [id number] [address] [place], [address] Phone: [phone] Fax: [phone] Visits Status Start Date End Date 1 New Request 7/17/17 7/17/18 If your referral has a status of pending review or denied, additional information will be sent to support the outcome of this decision. Referral ID Referred By Referred To  
 [id number] Tawny Castle, [address], [address] Visits Status Start Date End Date 1 New Request 7/17/17 7/17/18 If your referral has a status of pending review or denied, additional information will be sent to support the outcome of this decision. Patient Instructions pLease do not use Dr Mcmanus Xavi look at [employer]. [employer] or [employer]. com Check bs in the am and 2 hrs after  (do not test 4 times a day) eating bring in a blood sugar log to the next appt. fasting blood sugar first thing in the am __________   __________  ___________ 
 
2 hrs after break fast _______     ___________    ___________ 
 
2 hrs after lunch _______  ________  __________ 
 
2 hrs after dinner ________  _________ _________ Random one around 10 pm  _________ ___________    _______ Diabetes Foot Health: Care Instructions Your Care Instructions When you have diabetes, your feet need extra care and attention. Diabetes can damage the nerve endings and blood vessels in your feet, making you less likely to notice when your feet are injured. Diabetes also limits your body's ability to fight infection and get blood to areas that need it. If you get a minor foot injury, it could become an ulcer or a serious infection. With good foot care, you can prevent most of these problems. Caring for your feet can be quick and easy. Most of the care can be done when you are bathing or getting ready for bed. Follow-up care is a key part of your treatment and safety. Be sure to make and go to all appointments, and call your doctor if you are having problems. Its also a good idea to know your test results and keep a list of the medicines you take. How can you care for yourself at home? · Keep your blood sugar close to normal by watching what and how much you eat, monitoring blood sugar, taking medicines if prescribed, and getting regular exercise. · Do not smoke. Smoking affects blood flow and can make foot problems worse. If you need help quitting, talk to your doctor about stop-smoking programs and medicines. These can increase your chances of quitting for good. · Eat a diet that is low in fats. High fat intake can cause fat to build up in your blood vessels and decrease blood flow. · Inspect your feet daily for blisters, cuts, cracks, or sores. If you cannot see well, use a mirror or have someone help you. · Take care of your feet: 
[facility] your feet every day. Use warm (not hot) water. Check the water temperature with your wrists or other part of your body, not your feet. ¨ Dry your feet well. Pat them dry. Do not rub the skin on your feet too hard. Dry well between your toes. If the skin on your feet stays moist, bacteria or a fungus can grow, which can lead to infection. ¨ Keep your skin soft. Use moisturizing skin cream to keep the skin on your feet soft and prevent calluses and cracks. But do not put the cream between your toes, and stop using any cream that causes a rash. ¨ Clean underneath your toenails carefully. Do not use a sharp object to clean underneath your toenails. Use the blunt end of a nail file or other rounded tool. ¨ Trim and file your toenails straight across to prevent ingrown toenails. Use a nail clipper, not scissors. Use an emery board to smooth the edges. · Change socks daily.  Socks without seams are best, because seams often rub the feet. You can find socks for people with diabetes from specialty catalogs. · Look inside your shoes every day for things like gravel or torn linings, which could cause blisters or sores. · Buy shoes that fit well: 
¨ Look for shoes that have plenty of space around the toes. This helps prevent bunions and blisters. ¨ Try on shoes while wearing the kind of socks you will usually wear with the shoes. ¨ Avoid plastic shoes. They may rub your feet and cause blisters. Good shoes should be made of materials that are flexible and breathable, such as leather or cloth. ¨ Break in new shoes slowly by wearing them for no more than an hour a day for several days. Take extra time to check your feet for red areas, blisters, or other problems after you wear new shoes. · Do not go barefoot. Do not wear sandals, and do not wear shoes with very thin soles. Thin soles are easy to puncture. They also do not protect your feet from hot pavement or cold weather. · Have your doctor check your feet during each visit. If you have a foot problem, see your doctor. Do not try to treat an early foot problem at home. Home remedies or treatments that you can buy without a prescription (such as corn removers) can be harmful. · Always get early treatment for foot problems. A minor irritation can lead to a major problem if not properly cared for early. When should you call for help? Call your doctor now or seek immediate medical care if: 
· You have a foot sore, an ulcer or break in the skin that is not healing after 4 days, bleeding corns or calluses, or an ingrown toenail. · You have blue or black areas, which can mean bruising or blood flow problems. · You have peeling skin or tiny blisters between your toes or cracking or oozing of the skin. · You have a fever for more than 24 hours and a foot sore. · You have new numbness or tingling in your feet that does not go away after you move your feet or change positions. · You have unexplained or unusual swelling of the foot or ankle. Watch closely for changes in your health, and be sure to contact your doctor if: 
· You cannot do proper foot care. Where can you learn more? Go to http://colin-christiano.info/. Enter A739 in the search box to learn more about \"Diabetes Foot Health: Care Instructions. \" Current as of: March 13, 2017 Content Version: 11.3 © 9630-4808 [employer]. Care instructions adapted under license by [employer] (which disclaims liability or warranty for this information). If you have questions about a medical condition or this instruction, always ask your healthcare professional. [address] any warranty or liability for your use of this information. Introducing [facility]! Dear Brit Acosta: Thank you for requesting a [employer] account. Our records indicate that you already have an active [employer] account. You can access your account anytime at [email or web address] Did you know that you can access your hospital and ER discharge instructions at any time in [employer]? You can also review all of your test results from your hospital stay or ER visit. Additional Information If you have questions, please visit the Frequently Asked Questions section of the [employer] website at [email or web address] Remember, [employer] is NOT to be used for urgent needs. For medical emergencies, dial 911. Now available from your iPhone and Android! Please provide this summary of care documentation to your next provider. Your primary care clinician is listed as Leola Maya. If you have any questions after today's visit, please call [phone].

## 2017-07-17 NOTE — PROGRESS NOTES
Chief Complaint   Patient presents with    Cold Symptoms     (RM 5)    Hypertension    Medication Refill     100mg Losartan           Subjective:   Bryan Hernandez 49 y.o.  female with a  past medical history reviewed see below. comes in today for recheck of htn and c/o cold symptoms   She went to PT and feels much better. And has finsihed PT Using bp meds as directed no side effects Two sinus infection since last ov she went to pt first in may and had another one in June and another now in July started to have pressure in face and check and temples and yesterday a cough restarted and has been having  some PND she has been using her nasal spray she was given a rx for flonase but it did not work as well as the atrovent nasal She notes that sometimes when she cleans and after her  exterminated she felt worse. she was treated with amoxicillin and the second time she was treated with ceftin and it did not really help   She notes the metformin \"tears her stomach\" and its worse when she eats starches and worse when eatinga lot of fruit   She has been checking her blood sugar and fbs around 110 and highest was 115  2 hr ppbs none          ROS: otherwise feeling generally well. All other systems reviewed and are negative      Current Outpatient Prescriptions   Medication Sig Dispense Refill    bumetanide (BUMEX) 0.5 mg tablet TAKE 1 TABLET BY MOUTH TWICE A DAY AS NEEDED 180 Tab 1    zolpidem (AMBIEN) 10 mg tablet Take 1 Tab by mouth nightly as needed for Sleep. Max Daily Amount: 10 mg. 30 Tab 0    losartan (COZAAR) 100 mg tablet Take 1 Tab by mouth every evening. 90 Tab 3    doxycycline (VIBRA-TABS) 100 mg tablet Take 1 Tab by mouth two (2) times a day for 28 days. 56 Tab 0    L. acidophilus-L. rhamnosus (PROBIOTIC) 15 billion cell cap Take  by mouth.       metFORMIN (GLUCOPHAGE) 500 mg tablet TAKE 1 TABLET BY MOUTH TWICE A DAY (WITH MEALS) 60 Tab 4    montelukast (SINGULAIR) 10 mg tablet TAKE 1 TAB BY MOUTH DAILY. INDICATIONS: ALLERGIC RHINITIS 30 Tab 11    carvedilol (COREG) 25 mg tablet Take 1 Tab by mouth two (2) times a day. 60 Tab 11    diclofenac (VOLTAREN) 1 % gel Apply 2 g to affected area every six (6) hours. 100 g 0    albuterol (PROAIR HFA) 90 mcg/actuation inhaler Take 2 Puffs by inhalation every six (6) hours as needed for Wheezing. 1 Inhaler 1    cetirizine (ZYRTEC) 10 mg tablet Take 10 mg by mouth daily as needed for Allergies.  famotidine (PEPCID) 40 mg tablet Take 40 mg by mouth nightly.  metFORMIN (GLUCOPHAGE) 500 mg tablet TAKE 1 TABLET BY MOUTH TWICE A DAY (WITH MEALS) 60 Tab 5    atorvastatin (LIPITOR) 80 mg tablet Take 1 Tab by mouth nightly. 30 Tab 5    Cholecalciferol, Vitamin D3, (VITAMIN D) 2,000 unit Cap Take 4,000 Units by mouth daily. Takes 2 of the 2,000 unit tabs qd      sucralfate (CARAFATE) 1 gram tablet Take 1 Tab by mouth four (4) times daily. 120 Tab 1    EPINEPHrine (EPIPEN) 0.3 mg/0.3 mL injection 0.3 mL by IntraMUSCular route once as needed for up to 1 dose. 2 Syringe 1    cyclobenzaprine (FLEXERIL) 5 mg tablet Take 1 Tab by mouth daily as needed for Muscle Spasm(s). Do not take with the Ambien 10 Tab 0    cloNIDine HCl (CATAPRES) 0.3 mg tablet Take 0.3 mg by mouth two (2) times daily as needed. *IF BLOOD PRESSURE IS >170/90*      ipratropium (ATROVENT) 0.06 % nasal spray 2 Sprays by Both Nostrils route four (4) times daily as needed for Rhinitis.  Indications: RHINORRHEA       Allergies   Allergen Reactions    Ace Inhibitors Cough     ?10/2011    Seafood [Shellfish Containing Products] Hives, Shortness of Breath and Nausea and Vomiting     Past Medical History:   Diagnosis Date    Acute Anterior Myocardial Infarction, s/p PTCA w/ stent 10/2010 10/31/2010    Asthma     mild    Diabetes mellitus, type 2 ([facility] [address] 8/24/2012    GERD (gastroesophageal reflux disease)     hiatal hernia    H/O recurrent Sinusitis 3/16/2011    Heart failure ([facility] [address]     Hiatal Hernia w/ GERD (gastroesophageal reflux disease) 3/16/2011    Hypercholesterolemia     Hypertension 3/16/2011    Ill-defined condition     pacemaker put in last 2015 because of CHF    Impaired fasting glucose 2011    Iron deficiency anemia 3/16/2011    Multinodular thyroid 3/16/2011    Palpitations, PVC's 10/13/2011    Perennial allergic rhinitis 3/16/2011    Pityriasis rosea 2012    Post-menopausal     LMP 46y. o, no HRT    Postmenopause, LMP 40 yo, No HRT 3/16/2011    Reflux esophagitis 3/16/2011    Tobacco user     Vitamin D deficiency 10/13/2011     Past Surgical History:   Procedure Laterality Date    CARDIAC SURG PROCEDURE UNLIST      cardiac stent     COLONOSCOPY N/A 2017    COLONOSCOPY performed by Zan Caballero MD at [address] EGD  2009    hiatal hernia, esophagitis; Dr Melissa Chambers, COLON, DIAGNOSTIC  2009    HX GYN      FNA bilateral benign     HX LITHOTRIPSY  2012    failed    KIDNEY STONE (CALCULI) EVAL  2012    scope w/ kidney stone extractions, multiple; Dr Sukh Hu, [facility]    [place] COLONOSCOPY W/BIOPSY SINGLE/MULTIPLE  2009    benign polyp, recheck in 5 years, Dr Loida Tobin PTCA W/ STENT, INITIAL  10/2010    Dr Benito Martin     Family History   Problem Relation Age of Onset    Hypertension Mother     High Cholesterol Mother     Thyroid Disease Mother     Heart Disease Mother      chf    Kidney Disease Mother     Headache Mother     Heart Disease Father     Heart Attack Father 36      MI age 45    High Cholesterol Father     Hypertension Sister     Thyroid Disease Sister     Hypertension Sister    Petra He Arthritis-rheumatoid Sister     Diabetes Sister     Thyroid Disease Sister     Heart Attack Brother      massive MI at age 46, survived    Thyroid Disease Other      niece, Neha Garcia    High Cholesterol Brother      Social History   Substance Use Topics    Smoking status: Former Smoker     Packs/day: 0.25     Years: 30.00 Types: Cigarettes     Quit date: 7/24/2013    Smokeless tobacco: Never Used      Comment: had quit 10/2010 then restarted ~ 3/2012;  about 5 cigarettes a day; on and off since age 28 yo x ~ 30 yrs    Alcohol use No      Comment: none          Objective:     Visit Vitals    /76 (BP 1 Location: Right arm, BP Patient Position: Sitting)    Pulse 72    Temp 98.2 °F (36.8 °C) (Oral)    Resp 17    Ht 5' 2\" (1.575 m)    Wt 160 lb 1.6 oz (72.6 kg)    LMP  (Exact Date)    SpO2 98%    BMI 29.28 kg/m2     Gen: NAD, pleasant  HEENT: normal appearing head, nares patent turbinates boggy, PERRLA, EOMI, oropharynx no erythema, no cervical lymphadenopathy neck supple +[pansinusitis  Tenderness with exam    Cardio: RRR nl S1S2 no murmur  Lungs CTAB no wheeze no rales no rhonchi  ABD Soft non tender non distended + bowel sounds  Extremities: full ROM X 4 no clubbing no cyanosis  Neuro: no gross focal deficits noted, alert and orientated X 3  Psych.: well groomed no outward signs of depression. Right foot pain small mass under skin no erythema snd  rom full   diabetic   Foot exam mild vibratory sensation decreased    Right breast mole: > 6 mm no abnl masses no nipple retraction lpn presnet for exam   Assessment/Plan:   Diagnoses and all orders for this visit:    1. Chronic pansinusitis  -     REFERRAL TO ENT-OTOLARYNGOLOGY    2. Essential hypertension  -     bumetanide (BUMEX) 0.5 mg tablet; TAKE 1 TABLET BY MOUTH TWICE A DAY AS NEEDED  -     zolpidem (AMBIEN) 10 mg tablet; Take 1 Tab by mouth nightly as needed for Sleep. Max Daily Amount: 10 mg.  -     losartan (COZAAR) 100 mg tablet; Take 1 Tab by mouth every evening. 3. Insomnia, unspecified type  -     bumetanide (BUMEX) 0.5 mg tablet; TAKE 1 TABLET BY MOUTH TWICE A DAY AS NEEDED  -     zolpidem (AMBIEN) 10 mg tablet; Take 1 Tab by mouth nightly as needed for Sleep. Max Daily Amount: 10 mg.    4. Medication refill    5.  Right foot pain  -     REFERRAL TO PODIATRY    6. Encounter for diabetic foot exam ([facility] [address]    7. Non-insulin dependent type 2 diabetes mellitus (HCC)  -     AMB POC URINE, MICROALBUMIN, SEMIQUANT (1 RESULT)  -      DIABETES FOOT EXAM  -     REFERRAL TO OPHTHALMOLOGY    Other orders  -     doxycycline (VIBRA-TABS) 100 mg tablet; Take 1 Tab by mouth two (2) times a day for 28 days. -     fluconazole (DIFLUCAN) 150 mg tablet; Take 1 Tab by mouth daily for 1 day. FDA advises cautious prescribing of oral fluconazole in pregnancy. Follow-up Disposition:  Return in about 1 month (around 8/17/2017) for f/up on referral to ent and recheck sinus infection 15 min . Germán Hidalgo avs printed and given to the pt. .  Advised pt to see her cardiologist if she is going to stop the coreg advised to not stop this medication and wear a mask when cleaning    The patient voiced understanding of the above. Medication side effects were reviewed with the patient. Call with any concerns.

## 2017-07-17 NOTE — PATIENT INSTRUCTIONS
pLease do not use Dr Pro Number look at [employer]. org or [employer]. com       Check bs in the am and 2 hrs after  (do not test 4 times a day) eating bring in a blood sugar log to the next appt. fasting blood sugar first thing in the am __________   __________  ___________    2 hrs after break fast _______     ___________    ___________    2 hrs after lunch _______  ________  __________    2 hrs after dinner ________  _________ _________    Random one around 10 pm  _________ ___________    _______             Diabetes Foot Health: Care Instructions  Your Care Instructions    When you have diabetes, your feet need extra care and attention. Diabetes can damage the nerve endings and blood vessels in your feet, making you less likely to notice when your feet are injured. Diabetes also limits your body's ability to fight infection and get blood to areas that need it. If you get a minor foot injury, it could become an ulcer or a serious infection. With good foot care, you can prevent most of these problems. Caring for your feet can be quick and easy. Most of the care can be done when you are bathing or getting ready for bed. Follow-up care is a key part of your treatment and safety. Be sure to make and go to all appointments, and call your doctor if you are having problems. Its also a good idea to know your test results and keep a list of the medicines you take. How can you care for yourself at home? · Keep your blood sugar close to normal by watching what and how much you eat, monitoring blood sugar, taking medicines if prescribed, and getting regular exercise. · Do not smoke. Smoking affects blood flow and can make foot problems worse. If you need help quitting, talk to your doctor about stop-smoking programs and medicines. These can increase your chances of quitting for good. · Eat a diet that is low in fats. High fat intake can cause fat to build up in your blood vessels and decrease blood flow.   · Inspect your feet daily for blisters, cuts, cracks, or sores. If you cannot see well, use a mirror or have someone help you. · Take care of your feet:  [facility] your feet every day. Use warm (not hot) water. Check the water temperature with your wrists or other part of your body, not your feet. ¨ Dry your feet well. Pat them dry. Do not rub the skin on your feet too hard. Dry well between your toes. If the skin on your feet stays moist, bacteria or a fungus can grow, which can lead to infection. ¨ Keep your skin soft. Use moisturizing skin cream to keep the skin on your feet soft and prevent calluses and cracks. But do not put the cream between your toes, and stop using any cream that causes a rash. ¨ Clean underneath your toenails carefully. Do not use a sharp object to clean underneath your toenails. Use the blunt end of a nail file or other rounded tool. ¨ Trim and file your toenails straight across to prevent ingrown toenails. Use a nail clipper, not scissors. Use an emery board to smooth the edges. · Change socks daily. Socks without seams are best, because seams often rub the feet. You can find socks for people with diabetes from specialty catalogs. · Look inside your shoes every day for things like gravel or torn linings, which could cause blisters or sores. · Buy shoes that fit well:  ¨ Look for shoes that have plenty of space around the toes. This helps prevent bunions and blisters. ¨ Try on shoes while wearing the kind of socks you will usually wear with the shoes. ¨ Avoid plastic shoes. They may rub your feet and cause blisters. Good shoes should be made of materials that are flexible and breathable, such as leather or cloth. ¨ Break in new shoes slowly by wearing them for no more than an hour a day for several days. Take extra time to check your feet for red areas, blisters, or other problems after you wear new shoes. · Do not go barefoot.  Do not wear sandals, and do not wear shoes with very thin soles. Thin soles are easy to puncture. They also do not protect your feet from hot pavement or cold weather. · Have your doctor check your feet during each visit. If you have a foot problem, see your doctor. Do not try to treat an early foot problem at home. Home remedies or treatments that you can buy without a prescription (such as corn removers) can be harmful. · Always get early treatment for foot problems. A minor irritation can lead to a major problem if not properly cared for early. When should you call for help? Call your doctor now or seek immediate medical care if:  · You have a foot sore, an ulcer or break in the skin that is not healing after 4 days, bleeding corns or calluses, or an ingrown toenail. · You have blue or black areas, which can mean bruising or blood flow problems. · You have peeling skin or tiny blisters between your toes or cracking or oozing of the skin. · You have a fever for more than 24 hours and a foot sore. · You have new numbness or tingling in your feet that does not go away after you move your feet or change positions. · You have unexplained or unusual swelling of the foot or ankle. Watch closely for changes in your health, and be sure to contact your doctor if:  · You cannot do proper foot care. Where can you learn more? Go to http://colin-christiano.info/. Enter A739 in the search box to learn more about \"Diabetes Foot Health: Care Instructions. \"  Current as of: March 13, 2017  Content Version: 11.3  © 8238-1037 [employer]. Care instructions adapted under license by [employer] (which disclaims liability or warranty for this information). If you have questions about a medical condition or this instruction, always ask your healthcare professional. [place] [address] any warranty or liability for your use of this information.

## 2017-07-18 ENCOUNTER — TELEPHONE (OUTPATIENT)
Dept: INTERNAL MEDICINE CLINIC | Age: 57
End: 2017-07-18

## 2017-07-18 NOTE — TELEPHONE ENCOUNTER
Call from [facility] pharmacy to verify Rx for Ambien for pt. Rx sent to pharmacy but no signature. Confirmed Rx.

## 2017-07-28 ENCOUNTER — HOSPITAL ENCOUNTER (OUTPATIENT)
Age: 57
Setting detail: OBSERVATION
Discharge: HOME | End: 2017-07-29
Attending: EMERGENCY MEDICINE | Admitting: INTERNAL MEDICINE
Payer: COMMERCIAL

## 2017-07-28 ENCOUNTER — APPOINTMENT (OUTPATIENT)
Dept: GENERAL RADIOLOGY | Age: 57
End: 2017-07-28
Attending: EMERGENCY MEDICINE
Payer: COMMERCIAL

## 2017-07-28 DIAGNOSIS — R07.89 OTHER CHEST PAIN: Primary | ICD-10-CM

## 2017-07-28 LAB
BASOPHILS # BLD AUTO: 0 K/UL (ref 0–0.1)
BASOPHILS # BLD: 0 % (ref 0–1)
EOSINOPHIL # BLD: 0.1 K/UL (ref 0–0.4)
EOSINOPHIL NFR BLD: 2 % (ref 0–7)
ERYTHROCYTE [DISTWIDTH] IN BLOOD BY AUTOMATED COUNT: 15.3 % (ref 11.5–14.5)
HCT VFR BLD AUTO: 35.2 % (ref 35–47)
HGB BLD-MCNC: 10.9 G/DL (ref 11.5–16)
LYMPHOCYTES # BLD AUTO: 39 % (ref 12–49)
LYMPHOCYTES # BLD: 3.6 K/UL (ref 0.8–3.5)
MCH RBC QN AUTO: 27.5 PG (ref 26–34)
MCHC RBC AUTO-ENTMCNC: 31 G/DL (ref 30–36.5)
MCV RBC AUTO: 88.7 FL (ref 80–99)
MONOCYTES # BLD: 0.6 K/UL (ref 0–1)
MONOCYTES NFR BLD AUTO: 6 % (ref 5–13)
NEUTS SEG # BLD: 4.9 K/UL (ref 1.8–8)
NEUTS SEG NFR BLD AUTO: 53 % (ref 32–75)
PLATELET # BLD AUTO: 259 K/UL (ref 150–400)
RBC # BLD AUTO: 3.97 M/UL (ref 3.8–5.2)
WBC # BLD AUTO: 9.3 K/UL (ref 3.6–11)

## 2017-07-28 PROCEDURE — 93005 ELECTROCARDIOGRAM TRACING: CPT

## 2017-07-28 PROCEDURE — 85025 COMPLETE CBC W/AUTO DIFF WBC: CPT | Performed by: EMERGENCY MEDICINE

## 2017-07-28 PROCEDURE — 84484 ASSAY OF TROPONIN QUANT: CPT | Performed by: EMERGENCY MEDICINE

## 2017-07-28 PROCEDURE — 36415 COLL VENOUS BLD VENIPUNCTURE: CPT | Performed by: EMERGENCY MEDICINE

## 2017-07-28 PROCEDURE — 71020 XR CHEST PA LAT: CPT

## 2017-07-28 PROCEDURE — 99284 EMERGENCY DEPT VISIT MOD MDM: CPT

## 2017-07-28 PROCEDURE — 80053 COMPREHEN METABOLIC PANEL: CPT | Performed by: EMERGENCY MEDICINE

## 2017-07-28 NOTE — IP AVS SNAPSHOT
[address] [place] [address] 
[phone] Patient: Ana Lilia Smith MRN: [id number] :1960 Current Discharge Medication List  
  
START taking these medications Dose & Instructions Dispensing Information Comments Morning Noon Evening Bedtime  
 sucralfate 1 gram tablet Commonly known as:  Marcell Still Your last dose was: Your next dose is:    
   
   
 Dose:  1 g Take 1 Tab by mouth four (4) times daily. Quantity:  120 Tab Refills:  1 CONTINUE these medications which have NOT CHANGED Dose & Instructions Dispensing Information Comments Morning Noon Evening Bedtime  
 albuterol 90 mcg/actuation inhaler Commonly known as:  PROAIR HFA Your last dose was: Your next dose is:    
   
   
 Dose:  2 Puff Take 2 Puffs by inhalation every six (6) hours as needed for Wheezing. Quantity:  1 Inhaler Refills:  1  
     
   
   
   
  
 atorvastatin 80 mg tablet Commonly known as:  LIPITOR Your last dose was: Your next dose is:    
   
   
 Dose:  80 mg Take 1 Tab by mouth nightly. Quantity:  30 Tab Refills:  5  
     
   
   
   
  
 bumetanide 0.5 mg tablet Commonly known as:  Ira Naylor Your last dose was: Your next dose is: TAKE 1 TABLET BY MOUTH TWICE A DAY AS NEEDED Quantity:  180 Tab Refills:  1  
     
   
   
   
  
 carvedilol 25 mg tablet Commonly known as:  Minnie Parrot Your last dose was: Your next dose is:    
   
   
 Dose:  25 mg Take 1 Tab by mouth two (2) times a day. Quantity:  60 Tab Refills:  11  
     
   
   
   
  
 cetirizine 10 mg tablet Commonly known as:  ZYRTEC Your last dose was: Your next dose is:    
   
   
 Dose:  10 mg Take 10 mg by mouth daily as needed for Allergies. Refills:  0  
     
   
   
   
  
 cloNIDine HCl 0.3 mg tablet Commonly known as:  CATAPRES Your last dose was: Your next dose is:    
   
   
 Dose:  0.3 mg Take 0.3 mg by mouth two (2) times daily as needed. *IF BLOOD PRESSURE IS >170/90* Refills:  0  
     
   
   
   
  
 cyclobenzaprine 5 mg tablet Commonly known as:  FLEXERIL Your last dose was: Your next dose is:    
   
   
 Dose:  5 mg Take 1 Tab by mouth daily as needed for Muscle Spasm(s). Do not take with the Ambien Quantity:  10 Tab Refills:  0  
     
   
   
   
  
 diclofenac 1 % Gel Commonly known as:  VOLTAREN Your last dose was: Your next dose is:    
   
   
 Dose:  2 g Apply 2 g to affected area every six (6) hours. Quantity:  100 g Refills:  0  
     
   
   
   
  
 doxycycline 100 mg tablet Commonly known as:  VIBRA-TABS Your last dose was: Your next dose is:    
   
   
 Dose:  100 mg Take 1 Tab by mouth two (2) times a day for 28 days. Quantity:  56 Tab Refills:  0 EPINEPHrine 0.3 mg/0.3 mL injection Commonly known as:  Crow Brennen Your last dose was: Your next dose is:    
   
   
 Dose:  0.3 mg  
0.3 mL by IntraMUSCular route once as needed for up to 1 dose. Quantity:  2 Syringe Refills:  1  
     
   
   
   
  
 famotidine 40 mg tablet Commonly known as:  PEPCID Your last dose was: Your next dose is:    
   
   
 Dose:  40 mg Take 40 mg by mouth nightly. Refills:  0  
     
   
   
   
  
 ipratropium 0.06 % nasal spray Commonly known as:  ATROVENT Your last dose was: Your next dose is:    
   
   
 Dose:  2 Spray 2 Sprays by Both Nostrils route four (4) times daily as needed for Rhinitis. Indications: RHINORRHEA Refills:  0  
     
   
   
   
  
 losartan 100 mg tablet Commonly known as:  COZAAR Your last dose was: Your next dose is:    
   
   
 Dose:  100 mg Take 1 Tab by mouth every evening. Quantity:  90 Tab Refills:  3 Please cancel all 50 mg -rx changed * metFORMIN 500 mg tablet Commonly known as:  GLUCOPHAGE Your last dose was: Your next dose is: TAKE 1 TABLET BY MOUTH TWICE A DAY (WITH MEALS) Quantity:  60 Tab Refills:  5  
     
   
   
   
  
 * metFORMIN 500 mg tablet Commonly known as:  GLUCOPHAGE Your last dose was: Your next dose is: TAKE 1 TABLET BY MOUTH TWICE A DAY (WITH MEALS) Quantity:  60 Tab Refills:  4  
     
   
   
   
  
 montelukast 10 mg tablet Commonly known as:  SINGULAIR Your last dose was: Your next dose is: TAKE 1 TAB BY MOUTH DAILY. INDICATIONS: ALLERGIC RHINITIS Quantity:  30 Tab Refills:  11 PROBIOTIC 15 billion cell Cap Generic drug:  L. acidophilus-L. rhamnosus Your last dose was: Your next dose is: Take  by mouth. Refills:  0  
     
   
   
   
  
 VITAMIN D 2,000 unit Cap capsule Generic drug:  Cholecalciferol (Vitamin D3) Your last dose was: Your next dose is:    
   
   
 Dose:  4000 Units Take 4,000 Units by mouth daily. Takes 2 of the 2,000 unit tabs qd Refills:  0  
     
   
   
   
  
 zolpidem 10 mg tablet Commonly known as:  AMBIEN Your last dose was: Your next dose is:    
   
   
 Dose:  10 mg Take 1 Tab by mouth nightly as needed for Sleep. Max Daily Amount: 10 mg.  
 Quantity:  30 Tab Refills:  0  
     
   
   
   
  
 * Notice: This list has 2 medication(s) that are the same as other medications prescribed for you. Read the directions carefully, and ask your doctor or other care provider to review them with you. Where to Get Your Medications These medications were sent to [address] [place] [address], [address] [place] [address] Phone:  [phone] sucralfate 1 gram tablet

## 2017-07-28 NOTE — IP AVS SNAPSHOT
[address] 
[phone] Patient: Bonny Mendez MRN: [id number] :1960 You are allergic to the following Allergen Reactions Ace Inhibitors Cough ?10/2011 Seafood (Shellfish Containing Products) Hives Shortness of Breath Nausea and Vomiting Recent Documentation Height Weight BMI OB Status Smoking Status 1.575 m 72.2 kg 29.11 kg/m2 Postmenopausal Former Smoker Emergency Contacts Name Discharge Info Relation Home Work Mobile [facility] DISCHARGE CAREGIVER [3] Spouse [3] [phone] About your hospitalization You were admitted on:  2017 You last received care in the:  [facility] 4 CV SERVICES UNIT You were discharged on:  2017 Unit phone number:  [phone] Why you were hospitalized Your primary diagnosis was:  Chest Pain Providers Seen During Your Hospitalizations Provider Role Specialty Primary office phone Nitin Posey MD Attending Provider Emergency Medicine [phone] Dayday Wells MD Attending Provider Internal Medicine [phone] Obdulio Bacon MD Attending Provider Internal Medicine [phone] Your Primary Care Physician (PCP) Primary Care Physician Office Phone Office Fax Martha Miller [phone] Follow-up Information Follow up With Details Comments Contact Info [address] [place], [address] [facility] 57 
[phone] Your Appointments 2017  3:00 PM EDT ROUTINE CARE with [address] [place], [place]  
1404 [facility] ([address]) [facility],[address] Floor [place] [id number] [phone] Current Discharge Medication List  
  
START taking these medications Dose & Instructions Dispensing Information Comments Morning Noon Evening Bedtime  
 sucralfate 1 gram tablet Commonly known as:  Graciela Carrel Your last dose was: Your next dose is:    
   
   
 Dose:  1 g Take 1 Tab by mouth four (4) times daily. Quantity:  120 Tab Refills:  1 CONTINUE these medications which have NOT CHANGED Dose & Instructions Dispensing Information Comments Morning Noon Evening Bedtime  
 albuterol 90 mcg/actuation inhaler Commonly known as:  PROAIR HFA Your last dose was: Your next dose is:    
   
   
 Dose:  2 Puff Take 2 Puffs by inhalation every six (6) hours as needed for Wheezing. Quantity:  1 Inhaler Refills:  1  
     
   
   
   
  
 atorvastatin 80 mg tablet Commonly known as:  LIPITOR Your last dose was: Your next dose is:    
   
   
 Dose:  80 mg Take 1 Tab by mouth nightly. Quantity:  30 Tab Refills:  5  
     
   
   
   
  
 bumetanide 0.5 mg tablet Commonly known as:  Madison Hubbard Your last dose was: Your next dose is: TAKE 1 TABLET BY MOUTH TWICE A DAY AS NEEDED Quantity:  180 Tab Refills:  1  
     
   
   
   
  
 carvedilol 25 mg tablet Commonly known as:  Maria De Jesusmahamed Townsend Your last dose was: Your next dose is:    
   
   
 Dose:  25 mg Take 1 Tab by mouth two (2) times a day. Quantity:  60 Tab Refills:  11  
     
   
   
   
  
 cetirizine 10 mg tablet Commonly known as:  ZYRTEC Your last dose was: Your next dose is:    
   
   
 Dose:  10 mg Take 10 mg by mouth daily as needed for Allergies. Refills:  0  
     
   
   
   
  
 cloNIDine HCl 0.3 mg tablet Commonly known as:  CATAPRES Your last dose was: Your next dose is:    
   
   
 Dose:  0.3 mg Take 0.3 mg by mouth two (2) times daily as needed. *IF BLOOD PRESSURE IS >170/90* Refills:  0 cyclobenzaprine 5 mg tablet Commonly known as:  FLEXERIL Your last dose was: Your next dose is:    
   
   
 Dose:  5 mg Take 1 Tab by mouth daily as needed for Muscle Spasm(s). Do not take with the Ambien Quantity:  10 Tab Refills:  0  
     
   
   
   
  
 diclofenac 1 % Gel Commonly known as:  VOLTAREN Your last dose was: Your next dose is:    
   
   
 Dose:  2 g Apply 2 g to affected area every six (6) hours. Quantity:  100 g Refills:  0  
     
   
   
   
  
 doxycycline 100 mg tablet Commonly known as:  VIBRA-TABS Your last dose was: Your next dose is:    
   
   
 Dose:  100 mg Take 1 Tab by mouth two (2) times a day for 28 days. Quantity:  56 Tab Refills:  0 EPINEPHrine 0.3 mg/0.3 mL injection Commonly known as:  Pinky Angela Your last dose was: Your next dose is:    
   
   
 Dose:  0.3 mg  
0.3 mL by IntraMUSCular route once as needed for up to 1 dose. Quantity:  2 Syringe Refills:  1  
     
   
   
   
  
 famotidine 40 mg tablet Commonly known as:  PEPCID Your last dose was: Your next dose is:    
   
   
 Dose:  40 mg Take 40 mg by mouth nightly. Refills:  0  
     
   
   
   
  
 ipratropium 0.06 % nasal spray Commonly known as:  ATROVENT Your last dose was: Your next dose is:    
   
   
 Dose:  2 Spray 2 Sprays by Both Nostrils route four (4) times daily as needed for Rhinitis. Indications: RHINORRHEA Refills:  0  
     
   
   
   
  
 losartan 100 mg tablet Commonly known as:  COZAAR Your last dose was: Your next dose is:    
   
   
 Dose:  100 mg Take 1 Tab by mouth every evening. Quantity:  90 Tab Refills:  3 Please cancel all 50 mg -rx changed * metFORMIN 500 mg tablet Commonly known as:  GLUCOPHAGE Your last dose was: Your next dose is: TAKE 1 TABLET BY MOUTH TWICE A DAY (WITH MEALS) Quantity:  60 Tab Refills:  5  
     
   
   
   
  
 * metFORMIN 500 mg tablet Commonly known as:  GLUCOPHAGE Your last dose was: Your next dose is: TAKE 1 TABLET BY MOUTH TWICE A DAY (WITH MEALS) Quantity:  60 Tab Refills:  4  
     
   
   
   
  
 montelukast 10 mg tablet Commonly known as:  SINGULAIR Your last dose was: Your next dose is: TAKE 1 TAB BY MOUTH DAILY. INDICATIONS: ALLERGIC RHINITIS Quantity:  30 Tab Refills:  11 PROBIOTIC 15 billion cell Cap Generic drug:  L. acidophilus-L. rhamnosus Your last dose was: Your next dose is: Take  by mouth. Refills:  0  
     
   
   
   
  
 VITAMIN D 2,000 unit Cap capsule Generic drug:  Cholecalciferol (Vitamin D3) Your last dose was: Your next dose is:    
   
   
 Dose:  4000 Units Take 4,000 Units by mouth daily. Takes 2 of the 2,000 unit tabs qd Refills:  0  
     
   
   
   
  
 zolpidem 10 mg tablet Commonly known as:  AMBIEN Your last dose was: Your next dose is:    
   
   
 Dose:  10 mg Take 1 Tab by mouth nightly as needed for Sleep. Max Daily Amount: 10 mg.  
 Quantity:  30 Tab Refills:  0  
     
   
   
   
  
 * Notice: This list has 2 medication(s) that are the same as other medications prescribed for you. Read the directions carefully, and ask your doctor or other care provider to review them with you. Where to Get Your Medications These medications were sent to [address] [place] [address] Phone:  [phone]  
  sucralfate 1 gram tablet Discharge Instructions Chest Pain: Care Instructions Your Care Instructions There are many things that can cause chest pain.  Some are not serious and will get better on their own in a few days. But some kinds of chest pain need more testing and treatment. Your doctor may have recommended a follow-up visit in the next 8 to 12 hours. If you are not getting better, you may need more tests or treatment. Even though your doctor has released you, you still need to watch for any problems. The doctor carefully checked you, but sometimes problems can develop later. If you have new symptoms or if your symptoms do not get better, get medical care right away. If you have worse or different chest pain or pressure that lasts more than 5 minutes or you passed out (lost consciousness), call 911 or seek other emergency help right away. A medical visit is only one step in your treatment. Even if you feel better, you still need to do what your doctor recommends, such as going to all suggested follow-up appointments and taking medicines exactly as directed. This will help you recover and help prevent future problems. How can you care for yourself at home? · Rest until you feel better. · Take your medicine exactly as prescribed. Call your doctor if you think you are having a problem with your medicine. · Do not drive after taking a prescription pain medicine. When should you call for help? Call 911 if: 
· You passed out (lost consciousness). · You have severe difficulty breathing. · You have symptoms of a heart attack. These may include: ¨ Chest pain or pressure, or a strange feeling in your chest. 
¨ Sweating. ¨ Shortness of breath. ¨ Nausea or vomiting. ¨ Pain, pressure, or a strange feeling in your back, neck, jaw, or upper belly or in one or both shoulders or arms. ¨ Lightheadedness or sudden weakness. ¨ A fast or irregular heartbeat. After you call 911, the  may tell you to chew 1 adult-strength or 2 to 4 low-dose aspirin. Wait for an ambulance. Do not try to drive yourself. Call your doctor today if: 
· You have any trouble breathing. · Your chest pain gets worse. · You are dizzy or lightheaded, or you feel like you may faint. · You are not getting better as expected. · You are having new or different chest pain. Where can you learn more? Go to http://colin-christiano.info/. Enter A120 in the search box to learn more about \"Chest Pain: Care Instructions. \" Current as of: March 20, 2017 Content Version: 11.3 © 2213-9614 [employer]. Care instructions adapted under license by [employer] (which disclaims liability or warranty for this information). If you have questions about a medical condition or this instruction, always ask your healthcare professional. [address] any warranty or liability for your use of this information. Musculoskeletal Chest Pain: Care Instructions Your Care Instructions Chest pain is not always a sign that something is wrong with your heart or that you have another serious problem. The doctor thinks your chest pain is caused by strained muscles or ligaments, inflamed chest cartilage, or another problem in your chest, rather than by your heart. You may need more tests to find the cause of your chest pain. Follow-up care is a key part of your treatment and safety. Be sure to make and go to all appointments, and call your doctor if you are having problems. Its also a good idea to know your test results and keep a list of the medicines you take. How can you care for yourself at home? · Take pain medicines exactly as directed. ¨ If the doctor gave you a prescription medicine for pain, take it as prescribed. ¨ If you are not taking a prescription pain medicine, ask your doctor if you can take an over-the-counter medicine. · Rest and protect the sore area. · Stop, change, or take a break from any activity that may be causing your pain or soreness. · Put ice or a cold pack on the sore area for 10 to 20 minutes at a time. Try to do this every 1 to 2 hours for the next 3 days (when you are awake) or until the swelling goes down. Put a thin cloth between the ice and your skin. · After 2 or 3 days, apply a heating pad set on low or a warm cloth to the area that hurts. Some doctors suggest that you go back and forth between hot and cold. · Do not wrap or tape your ribs for support. This may cause you to take smaller breaths, which could increase your risk of lung problems. · Mentholated creams such as Bengay or Icy Hot may soothe sore muscles. Follow the instructions on the package. · Follow your doctor's instructions for exercising. · Gentle stretching and massage may help you get better faster. Stretch slowly to the point just before pain begins, and hold the stretch for at least 15 to 30 seconds. Do this 3 or 4 times a day. Stretch just after you have applied heat. · As your pain gets better, slowly return to your normal activities. Any increased pain may be a sign that you need to rest a while longer. When should you call for help? Call 911 anytime you think you may need emergency care. For example, call if: 
· You have chest pain or pressure. This may occur with: ¨ Sweating. ¨ Shortness of breath. ¨ Nausea or vomiting. ¨ Pain that spreads from the chest to the neck, jaw, or one or both shoulders or arms. ¨ Dizziness or lightheadedness. ¨ A fast or uneven pulse. After calling 911, chew 1 adult-strength aspirin. Wait for an ambulance. Do not try to drive yourself. · You have sudden chest pain and shortness of breath, or you cough up blood. Call your doctor now or seek immediate medical care if: 
· You have any trouble breathing. · Your chest pain gets worse. · Your chest pain occurs consistently with exercise and is relieved by rest. 
Watch closely for changes in your health, and be sure to contact your doctor if: 
· Your chest pain does not get better after 1 week. Where can you learn more? Go to http://colin-christiano.info/. Enter V293 in the search box to learn more about \"Musculoskeletal Chest Pain: Care Instructions. \" Current as of: March 20, 2017 Content Version: 11.3 © 3733-9988 [employer]. Care instructions adapted under license by [employer] (which disclaims liability or warranty for this information). If you have questions about a medical condition or this instruction, always ask your healthcare professional. [place] [address] any warranty or liability for your use of this information. Discharge Instructions PATIENT ID: Martínez Churchill MRN: [id number] YOB: 1960 DATE OF ADMISSION: 7/28/2017 11:17 PM   
DATE OF DISCHARGE: 7/29/2017 PRIMARY CARE PROVIDER: Ulisses Mina MD  
 
ATTENDING PHYSICIAN: Diane Melara MD 
DISCHARGING PROVIDER: Galo Landin NP To contact this individual call [phone] and ask the  to page. If unavailable ask to be transferred the Adult Hospitalist Department. DISCHARGE DIAGNOSES chest pain CONSULTATIONS: IP CONSULT TO CARDIOLOGY PROCEDURES/SURGERIES: * No surgery found * PENDING TEST RESULTS:  
At the time of discharge the following test results are still pending: none FOLLOW UP APPOINTMENTS:  
  
 
ADDITIONAL CARE RECOMMENDATIONS:  
Please take medications as directed Please follow up with your DIET: {diet:78986} Oral Nutritional Supplements: {RDSUPPLEMENTLIST:20094} {RDSUPPFREQUENCY:20080} ACTIVITY: {discharge activity:38029} WOUND CARE: *** EQUIPMENT needed: *** DISCHARGE MEDICATIONS: 
 See Medication Reconciliation Form · It is important that you take the medication exactly as they are prescribed. · Keep your medication in the bottles provided by the pharmacist and keep a list of the medication names, dosages, and times to be taken in your wallet. · Do not take other medications without consulting your doctor. NOTIFY YOUR PHYSICIAN FOR ANY OF THE FOLLOWING:  
Fever over 101 degrees for 24 hours. Chest pain, shortness of breath, fever, chills, nausea, vomiting, diarrhea, change in mentation, falling, weakness, bleeding. Severe pain or pain not relieved by medications. Or, any other signs or symptoms that you may have questions about. DISPOSITION: 
  Home With: 
 OT  PT  [place]  RN  
  
 SNF/Inpatient Rehab/LTAC Independent/assisted living Hospice Other: CDMP Checked:  
Yes x PROBLEM LIST Updated: 
Yes x Signed:  
Andrew Eldridge NP 
7/29/2017 
8:56 PM 
 
 
Discharge Orders None Introducing hospitals & HEALTH SERVICES! Dear Christina Day: Thank you for requesting a cinvolve account. Our records indicate that you already have an active cinvolve account. You can access your account anytime at [email or web address] Did you know that you can access your hospital and ER discharge instructions at any time in cinvolve? You can also review all of your test results from your hospital stay or ER visit. Additional Information If you have questions, please visit the Frequently Asked Questions section of the cinvolve website at [email or web address] Remember, cinvolve is NOT to be used for urgent needs. For medical emergencies, dial 911. Now available from your iPhone and Android! General Information Please provide this summary of care documentation to your next provider. Patient Signature:  ____________________________________________________________ Date:  ____________________________________________________________  
  
Becka Castro Provider Signature:  ____________________________________________________________ Date:  ____________________________________________________________

## 2017-07-28 NOTE — IP AVS SNAPSHOT
Summary of Care Report The Summary of Care report has been created to help improve care coordination. Users with access to [employer] or [address] (Web-based application) may access additional patient information including the Discharge Summary. If you are not currently a [address] user and need more information, please call the number listed below in the [place] [address] section and ask to be connected with Medical Records. Facility Information Name Address Phone [address] [place] [id number] [phone] Patient Information Patient Name Sex MITCHELL Quintero ([id number]) Female 1960 Discharge Information Admitting Provider Service Area Unit Pramod Yin MD / [id number] [facility] Unit / [phone] Discharge Provider Discharge Date/Time Discharge Disposition Destination (none) 2017 (Pending) AHR (none) Patient Language Language ENGLISH [13] Hospital Problems as of 2017  Reviewed: 2017  2:32 AM by Pramod Yin MD  
  
  
  
 Class Noted - Resolved Last Modified POA Active Problems * (Principal)Chest pain  2017 - Present 2017 by Pramod Yin MD Yes Entered by Pramod Yin MD  
  
Non-Hospital Problems as of 2017  Reviewed: 2017  2:32 AM by Pramod Yin MD  
  
  
  
 Class Noted - Resolved Last Modified Active Problems Acute Anterior Myocardial Infarction, s/p PTCA w/ stent 10/2010 (Chronic)  10/31/2010 - Present 10/29/2013 by Shauna Fatima Entered by Oli Jones MD  
  Overview Signed 3/16/2011 10:23 AM by MD Dr Vandana Jones at [facility] Hypercholesteremia (Chronic)  10/31/2010 - Present 10/29/2013 by Shauna Fatima   Entered by Oli Jones MD  
 Asthma, Mild, Intermittent (Chronic)  10/31/2010 - Present 3/16/2011 by Apoorva Rojo MD  
  Entered by Eugene Howard MD  
  Postmenopause, LMP 38 yo, No HRT (Chronic)  3/16/2011 - Present 10/29/2013 by Stephan Castro Entered by Apoorva Rojo MD  
  Reflux esophagitis (Chronic)  3/16/2011 - Present 10/29/2013 by Stephan Castro Entered by Apoorva Rojo MD  
  Overview Signed 3/16/2011 10:25 AM by Apoorva Rojo MD  
   EGD 4/2009; Dr Anastasia Monroy Hiatal Hernia w/ GERD (gastroesophageal reflux disease) (Chronic)  3/16/2011 - Present 10/29/2013 by Stephan Castro Entered by Apoorva Rojo MD  
  Iron deficiency anemia (Chronic)  3/16/2011 - Present 7/26/2015 by Barby Mcbride MD  
  Entered by Apoorva Rojo MD  
  Overview Signed 3/16/2011 10:33 AM by Apoorva Rojo MD  
   Since childhood and adulthood;on iron since ~1999 Perennial allergic rhinitis (Chronic)  3/16/2011 - Present 10/29/2013 by Stephan Castro Entered by Apoorva Rojo MD  
  H/O recurrent Sinusitis  3/16/2011 - Present 3/16/2011 by Apoorva Rojo MD  
  Entered by Apoorva Rojo MD  
  Multinodular thyroid (Chronic)  3/16/2011 - Present 10/29/2013 by Stephan Castro Entered by Apoorva Rojo MD  
  Overview Addendum 10/13/2011  2:47 PM by Apoorva Rojo MD  
   Prev Endo Dr Po Kan 5/2009, now sees his partner, Dr Dockery Later; small nodules, f/u scans qyr only for now Palpitations, PVC's  10/13/2011 - Present 10/13/2011 by Apoorva Rojo MD  
  Entered by Apoorva Rojo MD  
  Overview Signed 10/13/2011  2:40 PM by Apoorva Rojo MD  
   2011, Cardio Dr Roxana Crespo   Pityriasis rosea  5/30/2012 - Present 5/30/2012 by Apoorva Rojo MD  
  Entered by Apoorva Rojo MD  
  Overview Signed 5/30/2012 12:34 PM by Apoorva Rojo MD  
 ~2003, Derm Dr Teodora Sepulveda Nephrolithiasis (Chronic)  1/10/2013 - Present 10/29/2013 by Ted Terrell Entered by Lexy Rodriguez MD  
  Overview Signed 1/10/2013  3:35 PM by eLxy Rodriguez MD  
   Urethral stents placed left Non-insulin dependent type 2 diabetes mellitus ([facility] [address]  1/10/2013 - Present 1/10/2013 by Lexy Rodriguez MD  
  Entered by Lexy Rodriguez MD  
  Asthma exacerbation  7/26/2015 - Present 7/29/2015 by Lexy Rodriguez MD  
  Entered by Derrick Rivera MD  
  Cardiomegaly  7/26/2015 - Present 7/29/2015 by Lexy Rodriguez MD  
  Entered by Lexy Rodriguez MD  
  Pulmonary edema  7/26/2015 - Present 7/29/2015 by Lexy Rodriguez MD  
  Entered by Lexy Rodriguez MD  
  Acute exacerbation of CHF (congestive heart failure) ([facility] [address]  7/26/2015 - Present 7/29/2015 by Lexy Rodriguez MD  
  Entered by Lexy Rodriguez MD  
  Pleural effusion, right  7/26/2015 - Present 7/29/2015 by Lexy Rodriguez MD  
  Entered by Lexy Rodriguez MD  
  Hypokalemia  7/29/2015 - Present 7/29/2015 by Lexy Rodriguez MD  
  Entered by Lexy Rodriguez MD  
  Essential hypertension (Chronic)  12/22/2015 - Present 12/22/2015 by Lexy Rodriguez MD  
  Entered by Lexy Rodriguez MD  
  Advance care planning  12/22/2015 - Present 12/22/2015 by Lexy Rodriguez MD  
  Entered by Lexy Rodriguez MD  
  Overview Signed 12/22/2015  6:20 PM by Lexy Rodriguez MD  
   A.D. Completed form in chart Mild intermittent asthma without complication  7/47/4370 - Present 1/30/2017 by Lexy Rodriguez MD  
  Entered by Lexy Rodriguez MD  
  
You are allergic to the following Allergen Reactions Ace Inhibitors Cough ?10/2011 Seafood (Shellfish Containing Products) Hives Shortness of Breath Nausea and Vomiting Current Discharge Medication List  
  
 START taking these medications Dose & Instructions Dispensing Information Comments  
 sucralfate 1 gram tablet Commonly known as:  Yakelin Muir Dose:  1 g Take 1 Tab by mouth four (4) times daily. Quantity:  120 Tab Refills:  1 CONTINUE these medications which have NOT CHANGED Dose & Instructions Dispensing Information Comments  
 albuterol 90 mcg/actuation inhaler Commonly known as:  PROAIR HFA Dose:  2 Puff Take 2 Puffs by inhalation every six (6) hours as needed for Wheezing. Quantity:  1 Inhaler Refills:  1  
   
 atorvastatin 80 mg tablet Commonly known as:  LIPITOR Dose:  80 mg Take 1 Tab by mouth nightly. Quantity:  30 Tab Refills:  5  
   
 bumetanide 0.5 mg tablet Commonly known as:  Bruce Hurmargret TAKE 1 TABLET BY MOUTH TWICE A DAY AS NEEDED Quantity:  180 Tab Refills:  1  
   
 carvedilol 25 mg tablet Commonly known as:  Rafy Hamper Dose:  25 mg Take 1 Tab by mouth two (2) times a day. Quantity:  60 Tab Refills:  11  
   
 cetirizine 10 mg tablet Commonly known as:  ZYRTEC Dose:  10 mg Take 10 mg by mouth daily as needed for Allergies. Refills:  0  
   
 cloNIDine HCl 0.3 mg tablet Commonly known as:  CATAPRES Dose:  0.3 mg Take 0.3 mg by mouth two (2) times daily as needed. *IF BLOOD PRESSURE IS >170/90* Refills:  0  
   
 cyclobenzaprine 5 mg tablet Commonly known as:  FLEXERIL Dose:  5 mg Take 1 Tab by mouth daily as needed for Muscle Spasm(s). Do not take with the Ambien Quantity:  10 Tab Refills:  0  
   
 diclofenac 1 % Gel Commonly known as:  VOLTAREN Dose:  2 g Apply 2 g to affected area every six (6) hours. Quantity:  100 g Refills:  0  
   
 doxycycline 100 mg tablet Commonly known as:  VIBRA-TABS Dose:  100 mg Take 1 Tab by mouth two (2) times a day for 28 days. Quantity:  56 Tab Refills:  0 EPINEPHrine 0.3 mg/0.3 mL injection Commonly known as:  Marleny Valencia  
 Dose:  0.3 mg  
0.3 mL by IntraMUSCular route once as needed for up to 1 dose. Quantity:  2 Syringe Refills:  1  
   
 famotidine 40 mg tablet Commonly known as:  PEPCID Dose:  40 mg Take 40 mg by mouth nightly. Refills:  0  
   
 ipratropium 0.06 % nasal spray Commonly known as:  ATROVENT Dose:  2 Spray 2 Sprays by Both Nostrils route four (4) times daily as needed for Rhinitis. Indications: RHINORRHEA Refills:  0  
   
 losartan 100 mg tablet Commonly known as:  COZAAR Dose:  100 mg Take 1 Tab by mouth every evening. Quantity:  90 Tab Refills:  3 Please cancel all 50 mg -rx changed * metFORMIN 500 mg tablet Commonly known as:  GLUCOPHAGE  
 TAKE 1 TABLET BY MOUTH TWICE A DAY (WITH MEALS) Quantity:  60 Tab Refills:  5  
   
 * metFORMIN 500 mg tablet Commonly known as:  GLUCOPHAGE  
 TAKE 1 TABLET BY MOUTH TWICE A DAY (WITH MEALS) Quantity:  60 Tab Refills:  4  
   
 montelukast 10 mg tablet Commonly known as:  SINGULAIR  
 TAKE 1 TAB BY MOUTH DAILY. INDICATIONS: ALLERGIC RHINITIS Quantity:  30 Tab Refills:  11 PROBIOTIC 15 billion cell Cap Generic drug:  L. acidophilus-L. rhamnosus Take  by mouth. Refills:  0  
   
 VITAMIN D 2,000 unit Cap capsule Generic drug:  Cholecalciferol (Vitamin D3) Dose:  4000 Units Take 4,000 Units by mouth daily. Takes 2 of the 2,000 unit tabs qd Refills:  0  
   
 zolpidem 10 mg tablet Commonly known as:  AMBIEN Dose:  10 mg Take 1 Tab by mouth nightly as needed for Sleep. Max Daily Amount: 10 mg.  
 Quantity:  30 Tab Refills:  0  
   
 * Notice: This list has 2 medication(s) that are the same as other medications prescribed for you. Read the directions carefully, and ask your doctor or other care provider to review them with you. Current Immunizations Name Date Influenza Vaccine 12/16/2014, 1/10/2013 Influenza Vaccine (Quad) PF 10/19/2016 Influenza Vaccine Intradermal PF 11/2/2015 Influenza Vaccine PF 10/7/2013 Influenza Vaccine Split 10/6/2010 Pneumococcal Polysaccharide (PPSV-23) 7/31/2015 TD Vaccine 4/27/2009 Follow-up Information Follow up With Details Comments Contact [address] [place], [address] 
[phone] Discharge Instructions Chest Pain: Care Instructions Your Care Instructions There are many things that can cause chest pain. Some are not serious and will get better on their own in a few days. But some kinds of chest pain need more testing and treatment. Your doctor may have recommended a follow-up visit in the next 8 to 12 hours. If you are not getting better, you may need more tests or treatment. Even though your doctor has released you, you still need to watch for any problems. The doctor carefully checked you, but sometimes problems can develop later. If you have new symptoms or if your symptoms do not get better, get medical care right away. If you have worse or different chest pain or pressure that lasts more than 5 minutes or you passed out (lost consciousness), call 911 or seek other emergency help right away. A medical visit is only one step in your treatment. Even if you feel better, you still need to do what your doctor recommends, such as going to all suggested follow-up appointments and taking medicines exactly as directed. This will help you recover and help prevent future problems. How can you care for yourself at home? · Rest until you feel better. · Take your medicine exactly as prescribed. Call your doctor if you think you are having a problem with your medicine. · Do not drive after taking a prescription pain medicine. When should you call for help? Call 911 if: 
· You passed out (lost consciousness). · You have severe difficulty breathing. · You have symptoms of a heart attack. These may include: ¨ Chest pain or pressure, or a strange feeling in your chest. 
¨ Sweating. ¨ Shortness of breath. ¨ Nausea or vomiting. ¨ Pain, pressure, or a strange feeling in your back, neck, jaw, or upper belly or in one or both shoulders or arms. ¨ Lightheadedness or sudden weakness. ¨ A fast or irregular heartbeat. After you call 911, the  may tell you to chew 1 adult-strength or 2 to 4 low-dose aspirin. Wait for an ambulance. Do not try to drive yourself. Call your doctor today if: 
· You have any trouble breathing. · Your chest pain gets worse. · You are dizzy or lightheaded, or you feel like you may faint. · You are not getting better as expected. · You are having new or different chest pain. Where can you learn more? Go to http://colin-christiano.info/. Enter A120 in the search box to learn more about \"Chest Pain: Care Instructions. \" Current as of: March 20, 2017 Content Version: 11.3 © 8897-0712 [employer]. Care instructions adapted under license by [employer] (which disclaims liability or warranty for this information). If you have questions about a medical condition or this instruction, always ask your healthcare professional. [address] any warranty or liability for your use of this information. Musculoskeletal Chest Pain: Care Instructions Your Care Instructions Chest pain is not always a sign that something is wrong with your heart or that you have another serious problem. The doctor thinks your chest pain is caused by strained muscles or ligaments, inflamed chest cartilage, or another problem in your chest, rather than by your heart. You may need more tests to find the cause of your chest pain. Follow-up care is a key part of your treatment and safety. Be sure to make and go to all appointments, and call your doctor if you are having problems.  Its also a good idea to know your test results and keep a list of the medicines you take. How can you care for yourself at home? · Take pain medicines exactly as directed. ¨ If the doctor gave you a prescription medicine for pain, take it as prescribed. ¨ If you are not taking a prescription pain medicine, ask your doctor if you can take an over-the-counter medicine. · Rest and protect the sore area. · Stop, change, or take a break from any activity that may be causing your pain or soreness. · Put ice or a cold pack on the sore area for 10 to 20 minutes at a time. Try to do this every 1 to 2 hours for the next 3 days (when you are awake) or until the swelling goes down. Put a thin cloth between the ice and your skin. · After 2 or 3 days, apply a heating pad set on low or a warm cloth to the area that hurts. Some doctors suggest that you go back and forth between hot and cold. · Do not wrap or tape your ribs for support. This may cause you to take smaller breaths, which could increase your risk of lung problems. · Mentholated creams such as Bengay or Icy Hot may soothe sore muscles. Follow the instructions on the package. · Follow your doctor's instructions for exercising. · Gentle stretching and massage may help you get better faster. Stretch slowly to the point just before pain begins, and hold the stretch for at least 15 to 30 seconds. Do this 3 or 4 times a day. Stretch just after you have applied heat. · As your pain gets better, slowly return to your normal activities. Any increased pain may be a sign that you need to rest a while longer. When should you call for help? Call 911 anytime you think you may need emergency care. For example, call if: 
· You have chest pain or pressure. This may occur with: ¨ Sweating. ¨ Shortness of breath. ¨ Nausea or vomiting. ¨ Pain that spreads from the chest to the neck, jaw, or one or both shoulders or arms. ¨ Dizziness or lightheadedness. ¨ A fast or uneven pulse. After calling 911, chew 1 adult-strength aspirin. Wait for an ambulance. Do not try to drive yourself. · You have sudden chest pain and shortness of breath, or you cough up blood. Call your doctor now or seek immediate medical care if: 
· You have any trouble breathing. · Your chest pain gets worse. · Your chest pain occurs consistently with exercise and is relieved by rest. 
Watch closely for changes in your health, and be sure to contact your doctor if: 
· Your chest pain does not get better after 1 week. Where can you learn more? Go to http://colin-christiano.info/. Enter V293 in the search box to learn more about \"Musculoskeletal Chest Pain: Care Instructions. \" Current as of: March 20, 2017 Content Version: 11.3 © 4140-2721 [employer]. Care instructions adapted under license by [employer] (which disclaims liability or warranty for this information). If you have questions about a medical condition or this instruction, always ask your healthcare professional. [place] [address] any warranty or liability for your use of this information. Discharge Instructions PATIENT ID: Otoniel Shelton MRN: [id number] YOB: 1960 DATE OF ADMISSION: 7/28/2017 11:17 PM   
DATE OF DISCHARGE: 7/29/2017 PRIMARY CARE PROVIDER: Cameron Marc MD  
 
ATTENDING PHYSICIAN: Giorgi Skelton MD 
DISCHARGING PROVIDER: Eloisa Hammond NP To contact this individual call [phone] and ask the  to page. If unavailable ask to be transferred the Adult Hospitalist Department. DISCHARGE DIAGNOSES *** 
 
CONSULTATIONS: IP CONSULT TO CARDIOLOGY PROCEDURES/SURGERIES: * No surgery found * PENDING TEST RESULTS:  
At the time of discharge the following test results are still pending: *** FOLLOW UP APPOINTMENTS:  
  
 
ADDITIONAL CARE RECOMMENDATIONS: *** DIET: {diet:88622} Oral Nutritional Supplements: {RDSUPPLEMENTLIST:45761} {RDSUPPFREQUENCY:27960} ACTIVITY: {discharge activity:84751} WOUND CARE: *** EQUIPMENT needed: *** DISCHARGE MEDICATIONS: 
 See Medication Reconciliation Form · It is important that you take the medication exactly as they are prescribed. · Keep your medication in the bottles provided by the pharmacist and keep a list of the medication names, dosages, and times to be taken in your wallet. · Do not take other medications without consulting your doctor. NOTIFY YOUR PHYSICIAN FOR ANY OF THE FOLLOWING:  
Fever over 101 degrees for 24 hours. Chest pain, shortness of breath, fever, chills, nausea, vomiting, diarrhea, change in mentation, falling, weakness, bleeding. Severe pain or pain not relieved by medications. Or, any other signs or symptoms that you may have questions about. DISPOSITION: 
  Home With: 
 OT  PT  [facility]  RN  
  
 SNF/Inpatient Rehab/LTAC Independent/assisted living Hospice Other: CDMP Checked:  
Yes x PROBLEM LIST Updated: 
Yes x Signed:  
Eva Serra NP 
7/29/2017 
8:56 PM 
 
 
Chart Review Routing History Recipient Method Report Sent By Yordy Blake MD  
Phone: [phone] In [employer] Routed Notes Shelley Harrell MD [[id number]] 7/26/2015  2:41 AM 07/26/2015 Jeffry Blake MD  
Phone: [phone] In [employer] Routed Notes Shelley Harrell MD [[id number]] 7/26/2015  2:49 AM 07/26/2015 Jeffry Blake MD  
Phone: [phone] In [facility] IP Auto Routed Notes MD Milagro Giron 12/11/2015 12:18 PM 12/11/2015 Carrillo Herrera MD  
Phone: [phone] In [employer] Routed [employer] [[id number]] 5/6/2016  5:29 AM 05/06/2016 Jeffry Blake MD  
Phone: [phone] In [employer] Routed [employer] [[id number]] 5/6/2016  5:29 AM 05/06/2016  Jeffry Blake MD  
 Phone: [phone] In Basket IP Auto Routed Trans MD Marielena Ross 10/18/2016  4:58 PM 10/18/2016 Luci Watson MD  
Phone: [phone] In Basket IP Auto Routed Notes MD Marielena Ross 10/19/2016  5:53 PM 10/19/2016 Luci Watson MD  
Phone: [phone] In Basket IP Auto Routed Notes Mitra Bah MD [[id number]] 7/29/2017  2:31 AM 07/29/2017 Luci Watson MD  
Phone: [phone] In Basket IP Auto Routed Elsa Stacy MD [[id number]] 7/29/2017  9:20 PM 07/29/2017

## 2017-07-29 VITALS
WEIGHT: 159.17 LBS | HEIGHT: 62 IN | SYSTOLIC BLOOD PRESSURE: 135 MMHG | TEMPERATURE: 98.5 F | HEART RATE: 70 BPM | BODY MASS INDEX: 29.29 KG/M2 | OXYGEN SATURATION: 96 % | RESPIRATION RATE: 16 BRPM | DIASTOLIC BLOOD PRESSURE: 60 MMHG

## 2017-07-29 PROBLEM — R07.9 CHEST PAIN: Status: ACTIVE | Noted: 2017-07-29

## 2017-07-29 LAB
ALBUMIN SERPL BCP-MCNC: 3.6 G/DL (ref 3.5–5)
ALBUMIN SERPL BCP-MCNC: 3.7 G/DL (ref 3.5–5)
ALBUMIN/GLOB SERPL: 0.9 {RATIO} (ref 1.1–2.2)
ALBUMIN/GLOB SERPL: 1.1 {RATIO} (ref 1.1–2.2)
ALP SERPL-CCNC: 109 U/L (ref 45–117)
ALP SERPL-CCNC: 99 U/L (ref 45–117)
ALT SERPL-CCNC: 18 U/L (ref 12–78)
ALT SERPL-CCNC: 19 U/L (ref 12–78)
AMPHET UR QL SCN: NEGATIVE
AMYLASE SERPL-CCNC: 90 U/L (ref 25–115)
ANION GAP BLD CALC-SCNC: 8 MMOL/L (ref 5–15)
ANION GAP BLD CALC-SCNC: 9 MMOL/L (ref 5–15)
AST SERPL W P-5'-P-CCNC: 17 U/L (ref 15–37)
AST SERPL W P-5'-P-CCNC: 19 U/L (ref 15–37)
BARBITURATES UR QL SCN: NEGATIVE
BASOPHILS # BLD AUTO: 0 K/UL (ref 0–0.1)
BASOPHILS # BLD: 0 % (ref 0–1)
BENZODIAZ UR QL: NEGATIVE
BILIRUB SERPL-MCNC: 0.4 MG/DL (ref 0.2–1)
BILIRUB SERPL-MCNC: 0.5 MG/DL (ref 0.2–1)
BNP SERPL-MCNC: 23 PG/ML (ref 0–100)
BUN SERPL-MCNC: 19 MG/DL (ref 6–20)
BUN SERPL-MCNC: 21 MG/DL (ref 6–20)
BUN/CREAT SERPL: 15 (ref 12–20)
BUN/CREAT SERPL: 19 (ref 12–20)
CALCIUM SERPL-MCNC: 9.3 MG/DL (ref 8.5–10.1)
CALCIUM SERPL-MCNC: 9.6 MG/DL (ref 8.5–10.1)
CANNABINOIDS UR QL SCN: NEGATIVE
CHLORIDE SERPL-SCNC: 107 MMOL/L (ref 97–108)
CHLORIDE SERPL-SCNC: 109 MMOL/L (ref 97–108)
CHOLEST SERPL-MCNC: 109 MG/DL
CK MB CFR SERPL CALC: NORMAL % (ref 0–2.5)
CK MB SERPL-MCNC: <1 NG/ML (ref 5–25)
CK SERPL-CCNC: 132 U/L (ref 26–192)
CO2 SERPL-SCNC: 24 MMOL/L (ref 21–32)
CO2 SERPL-SCNC: 25 MMOL/L (ref 21–32)
COCAINE UR QL SCN: NEGATIVE
CREAT SERPL-MCNC: 1.11 MG/DL (ref 0.55–1.02)
CREAT SERPL-MCNC: 1.3 MG/DL (ref 0.55–1.02)
D DIMER PPP FEU-MCNC: 0.6 MG/L FEU (ref 0–0.65)
DRUG SCRN COMMENT,DRGCM: NORMAL
EOSINOPHIL # BLD: 0.1 K/UL (ref 0–0.4)
EOSINOPHIL NFR BLD: 2 % (ref 0–7)
ERYTHROCYTE [DISTWIDTH] IN BLOOD BY AUTOMATED COUNT: 15.3 % (ref 11.5–14.5)
EST. AVERAGE GLUCOSE BLD GHB EST-MCNC: 157 MG/DL
GLOBULIN SER CALC-MCNC: 3.3 G/DL (ref 2–4)
GLOBULIN SER CALC-MCNC: 3.9 G/DL (ref 2–4)
GLUCOSE BLD STRIP.AUTO-MCNC: 102 MG/DL (ref 65–100)
GLUCOSE BLD STRIP.AUTO-MCNC: 106 MG/DL (ref 65–100)
GLUCOSE BLD STRIP.AUTO-MCNC: 147 MG/DL (ref 65–100)
GLUCOSE SERPL-MCNC: 138 MG/DL (ref 65–100)
GLUCOSE SERPL-MCNC: 92 MG/DL (ref 65–100)
HBA1C MFR BLD: 7.1 % (ref 4.2–6.3)
HCT VFR BLD AUTO: 32.9 % (ref 35–47)
HDLC SERPL-MCNC: 48 MG/DL
HDLC SERPL: 2.3 {RATIO} (ref 0–5)
HGB BLD-MCNC: 10.3 G/DL (ref 11.5–16)
LDLC SERPL CALC-MCNC: 46.6 MG/DL (ref 0–100)
LIPASE SERPL-CCNC: 110 U/L (ref 73–393)
LIPID PROFILE,FLP: NORMAL
LYMPHOCYTES # BLD AUTO: 45 % (ref 12–49)
LYMPHOCYTES # BLD: 4 K/UL (ref 0.8–3.5)
MAGNESIUM SERPL-MCNC: 1.6 MG/DL (ref 1.6–2.4)
MCH RBC QN AUTO: 27.4 PG (ref 26–34)
MCHC RBC AUTO-ENTMCNC: 31.3 G/DL (ref 30–36.5)
MCV RBC AUTO: 87.5 FL (ref 80–99)
METHADONE UR QL: NEGATIVE
MONOCYTES # BLD: 0.6 K/UL (ref 0–1)
MONOCYTES NFR BLD AUTO: 7 % (ref 5–13)
NEUTS SEG # BLD: 4 K/UL (ref 1.8–8)
NEUTS SEG NFR BLD AUTO: 46 % (ref 32–75)
OPIATES UR QL: NEGATIVE
PCP UR QL: NEGATIVE
PHOSPHATE SERPL-MCNC: 3.7 MG/DL (ref 2.6–4.7)
PLATELET # BLD AUTO: 226 K/UL (ref 150–400)
POTASSIUM SERPL-SCNC: 3 MMOL/L (ref 3.5–5.1)
POTASSIUM SERPL-SCNC: 3.1 MMOL/L (ref 3.5–5.1)
PROT SERPL-MCNC: 6.9 G/DL (ref 6.4–8.2)
PROT SERPL-MCNC: 7.6 G/DL (ref 6.4–8.2)
RBC # BLD AUTO: 3.76 M/UL (ref 3.8–5.2)
SERVICE CMNT-IMP: ABNORMAL
SODIUM SERPL-SCNC: 140 MMOL/L (ref 136–145)
SODIUM SERPL-SCNC: 142 MMOL/L (ref 136–145)
TRIGL SERPL-MCNC: 72 MG/DL (ref ?–150)
TROPONIN I SERPL-MCNC: <0.04 NG/ML
TSH SERPL DL<=0.05 MIU/L-ACNC: 1.14 UIU/ML (ref 0.36–3.74)
VLDLC SERPL CALC-MCNC: 14.4 MG/DL
WBC # BLD AUTO: 8.8 K/UL (ref 3.6–11)

## 2017-07-29 PROCEDURE — 83880 ASSAY OF NATRIURETIC PEPTIDE: CPT | Performed by: INTERNAL MEDICINE

## 2017-07-29 PROCEDURE — 80061 LIPID PANEL: CPT | Performed by: INTERNAL MEDICINE

## 2017-07-29 PROCEDURE — 96372 THER/PROPH/DIAG INJ SC/IM: CPT

## 2017-07-29 PROCEDURE — 85379 FIBRIN DEGRADATION QUANT: CPT | Performed by: INTERNAL MEDICINE

## 2017-07-29 PROCEDURE — 74011250636 HC RX REV CODE- 250/636: Performed by: INTERNAL MEDICINE

## 2017-07-29 PROCEDURE — 83690 ASSAY OF LIPASE: CPT | Performed by: INTERNAL MEDICINE

## 2017-07-29 PROCEDURE — 74011250637 HC RX REV CODE- 250/637: Performed by: INTERNAL MEDICINE

## 2017-07-29 PROCEDURE — 84100 ASSAY OF PHOSPHORUS: CPT | Performed by: INTERNAL MEDICINE

## 2017-07-29 PROCEDURE — 99218 HC RM OBSERVATION: CPT

## 2017-07-29 PROCEDURE — 85025 COMPLETE CBC W/AUTO DIFF WBC: CPT | Performed by: INTERNAL MEDICINE

## 2017-07-29 PROCEDURE — 84443 ASSAY THYROID STIM HORMONE: CPT | Performed by: INTERNAL MEDICINE

## 2017-07-29 PROCEDURE — 74011250637 HC RX REV CODE- 250/637: Performed by: EMERGENCY MEDICINE

## 2017-07-29 PROCEDURE — 84484 ASSAY OF TROPONIN QUANT: CPT | Performed by: INTERNAL MEDICINE

## 2017-07-29 PROCEDURE — 93005 ELECTROCARDIOGRAM TRACING: CPT

## 2017-07-29 PROCEDURE — 82150 ASSAY OF AMYLASE: CPT | Performed by: INTERNAL MEDICINE

## 2017-07-29 PROCEDURE — 93306 TTE W/DOPPLER COMPLETE: CPT

## 2017-07-29 PROCEDURE — 83036 HEMOGLOBIN GLYCOSYLATED A1C: CPT | Performed by: INTERNAL MEDICINE

## 2017-07-29 PROCEDURE — 82962 GLUCOSE BLOOD TEST: CPT

## 2017-07-29 PROCEDURE — 80307 DRUG TEST PRSMV CHEM ANLYZR: CPT | Performed by: INTERNAL MEDICINE

## 2017-07-29 PROCEDURE — 82550 ASSAY OF CK (CPK): CPT | Performed by: INTERNAL MEDICINE

## 2017-07-29 PROCEDURE — 83735 ASSAY OF MAGNESIUM: CPT | Performed by: INTERNAL MEDICINE

## 2017-07-29 PROCEDURE — 80053 COMPREHEN METABOLIC PANEL: CPT | Performed by: INTERNAL MEDICINE

## 2017-07-29 PROCEDURE — 36415 COLL VENOUS BLD VENIPUNCTURE: CPT | Performed by: INTERNAL MEDICINE

## 2017-07-29 RX ORDER — ZOLPIDEM TARTRATE 5 MG/1
5 TABLET ORAL
Status: DISCONTINUED | OUTPATIENT
Start: 2017-07-29 | End: 2017-07-30 | Stop reason: HOSPADM

## 2017-07-29 RX ORDER — FAMOTIDINE 20 MG/1
40 TABLET, FILM COATED ORAL
Status: DISCONTINUED | OUTPATIENT
Start: 2017-07-29 | End: 2017-07-29

## 2017-07-29 RX ORDER — MAGNESIUM SULFATE 100 %
4 CRYSTALS MISCELLANEOUS AS NEEDED
Status: DISCONTINUED | OUTPATIENT
Start: 2017-07-29 | End: 2017-07-30 | Stop reason: HOSPADM

## 2017-07-29 RX ORDER — ENOXAPARIN SODIUM 100 MG/ML
40 INJECTION SUBCUTANEOUS EVERY 24 HOURS
Status: DISCONTINUED | OUTPATIENT
Start: 2017-07-29 | End: 2017-07-30 | Stop reason: HOSPADM

## 2017-07-29 RX ORDER — CETIRIZINE HCL 10 MG
10 TABLET ORAL
Status: DISCONTINUED | OUTPATIENT
Start: 2017-07-29 | End: 2017-07-30 | Stop reason: HOSPADM

## 2017-07-29 RX ORDER — SODIUM CHLORIDE 0.9 % (FLUSH) 0.9 %
5-10 SYRINGE (ML) INJECTION AS NEEDED
Status: DISCONTINUED | OUTPATIENT
Start: 2017-07-29 | End: 2017-07-30 | Stop reason: HOSPADM

## 2017-07-29 RX ORDER — BUMETANIDE 1 MG/1
0.5 TABLET ORAL
Status: DISCONTINUED | OUTPATIENT
Start: 2017-07-29 | End: 2017-07-30 | Stop reason: HOSPADM

## 2017-07-29 RX ORDER — MORPHINE SULFATE 2 MG/ML
2 INJECTION, SOLUTION INTRAMUSCULAR; INTRAVENOUS
Status: DISCONTINUED | OUTPATIENT
Start: 2017-07-29 | End: 2017-07-30 | Stop reason: HOSPADM

## 2017-07-29 RX ORDER — ATORVASTATIN CALCIUM 40 MG/1
80 TABLET, FILM COATED ORAL
Status: DISCONTINUED | OUTPATIENT
Start: 2017-07-29 | End: 2017-07-30 | Stop reason: HOSPADM

## 2017-07-29 RX ORDER — POTASSIUM CHLORIDE 750 MG/1
40 TABLET, FILM COATED, EXTENDED RELEASE ORAL EVERY 6 HOURS
Status: COMPLETED | OUTPATIENT
Start: 2017-07-29 | End: 2017-07-29

## 2017-07-29 RX ORDER — DOXYCYCLINE HYCLATE 100 MG
100 TABLET ORAL 2 TIMES DAILY
Status: DISCONTINUED | OUTPATIENT
Start: 2017-07-29 | End: 2017-07-30 | Stop reason: HOSPADM

## 2017-07-29 RX ORDER — INSULIN LISPRO 100 [IU]/ML
INJECTION, SOLUTION INTRAVENOUS; SUBCUTANEOUS
Status: DISCONTINUED | OUTPATIENT
Start: 2017-07-29 | End: 2017-07-30 | Stop reason: HOSPADM

## 2017-07-29 RX ORDER — ALBUTEROL SULFATE 0.83 MG/ML
2.5 SOLUTION RESPIRATORY (INHALATION)
Status: DISCONTINUED | OUTPATIENT
Start: 2017-07-29 | End: 2017-07-30 | Stop reason: HOSPADM

## 2017-07-29 RX ORDER — EPINEPHRINE 1 MG/ML
0.3 INJECTION, SOLUTION, CONCENTRATE INTRAVENOUS AS NEEDED
Status: DISCONTINUED | OUTPATIENT
Start: 2017-07-29 | End: 2017-07-30 | Stop reason: HOSPADM

## 2017-07-29 RX ORDER — ACETAMINOPHEN 325 MG/1
650 TABLET ORAL
Status: DISCONTINUED | OUTPATIENT
Start: 2017-07-29 | End: 2017-07-30 | Stop reason: HOSPADM

## 2017-07-29 RX ORDER — DOCUSATE SODIUM 100 MG/1
100 CAPSULE, LIQUID FILLED ORAL 2 TIMES DAILY
Status: DISCONTINUED | OUTPATIENT
Start: 2017-07-29 | End: 2017-07-30 | Stop reason: HOSPADM

## 2017-07-29 RX ORDER — POTASSIUM CHLORIDE 750 MG/1
40 TABLET, FILM COATED, EXTENDED RELEASE ORAL
Status: COMPLETED | OUTPATIENT
Start: 2017-07-29 | End: 2017-07-29

## 2017-07-29 RX ORDER — SUCRALFATE 1 G/1
1 TABLET ORAL 4 TIMES DAILY
Qty: 120 TAB | Refills: 1 | OUTPATIENT
Start: 2017-07-29 | End: 2020-10-27

## 2017-07-29 RX ORDER — MONTELUKAST SODIUM 10 MG/1
10 TABLET ORAL
Status: DISCONTINUED | OUTPATIENT
Start: 2017-07-29 | End: 2017-07-30 | Stop reason: HOSPADM

## 2017-07-29 RX ORDER — PANTOPRAZOLE SODIUM 40 MG/1
40 TABLET, DELAYED RELEASE ORAL
Status: DISCONTINUED | OUTPATIENT
Start: 2017-07-29 | End: 2017-07-30 | Stop reason: HOSPADM

## 2017-07-29 RX ORDER — HYDROCODONE BITARTRATE AND ACETAMINOPHEN 5; 325 MG/1; MG/1
1 TABLET ORAL
Status: DISCONTINUED | OUTPATIENT
Start: 2017-07-29 | End: 2017-07-30 | Stop reason: HOSPADM

## 2017-07-29 RX ORDER — IPRATROPIUM BROMIDE 42 UG/1
2 SPRAY, METERED NASAL
Status: DISCONTINUED | OUTPATIENT
Start: 2017-07-29 | End: 2017-07-30 | Stop reason: HOSPADM

## 2017-07-29 RX ORDER — LOSARTAN POTASSIUM 50 MG/1
100 TABLET ORAL EVERY EVENING
Status: DISCONTINUED | OUTPATIENT
Start: 2017-07-29 | End: 2017-07-30 | Stop reason: HOSPADM

## 2017-07-29 RX ORDER — ONDANSETRON 2 MG/ML
4 INJECTION INTRAMUSCULAR; INTRAVENOUS
Status: DISCONTINUED | OUTPATIENT
Start: 2017-07-29 | End: 2017-07-30 | Stop reason: HOSPADM

## 2017-07-29 RX ORDER — CYCLOBENZAPRINE HCL 10 MG
5 TABLET ORAL
Status: DISCONTINUED | OUTPATIENT
Start: 2017-07-29 | End: 2017-07-30 | Stop reason: HOSPADM

## 2017-07-29 RX ORDER — SODIUM CHLORIDE 0.9 % (FLUSH) 0.9 %
5-10 SYRINGE (ML) INJECTION EVERY 8 HOURS
Status: DISCONTINUED | OUTPATIENT
Start: 2017-07-29 | End: 2017-07-30 | Stop reason: HOSPADM

## 2017-07-29 RX ORDER — CARVEDILOL 12.5 MG/1
25 TABLET ORAL 2 TIMES DAILY
Status: DISCONTINUED | OUTPATIENT
Start: 2017-07-29 | End: 2017-07-30 | Stop reason: HOSPADM

## 2017-07-29 RX ORDER — SUCRALFATE 1 G/10ML
1 SUSPENSION ORAL
Status: DISCONTINUED | OUTPATIENT
Start: 2017-07-29 | End: 2017-07-30 | Stop reason: HOSPADM

## 2017-07-29 RX ADMIN — POTASSIUM CHLORIDE 40 MEQ: 750 TABLET, FILM COATED, EXTENDED RELEASE ORAL at 17:07

## 2017-07-29 RX ADMIN — DOXYCYCLINE HYCLATE 100 MG: 100 TABLET ORAL at 21:00

## 2017-07-29 RX ADMIN — POTASSIUM CHLORIDE 40 MEQ: 750 TABLET, FILM COATED, EXTENDED RELEASE ORAL at 12:09

## 2017-07-29 RX ADMIN — SUCRALFATE 1 G: 1 SUSPENSION ORAL at 09:52

## 2017-07-29 RX ADMIN — ENOXAPARIN SODIUM 40 MG: 40 INJECTION SUBCUTANEOUS at 04:49

## 2017-07-29 RX ADMIN — CARVEDILOL 25 MG: 12.5 TABLET, FILM COATED ORAL at 08:48

## 2017-07-29 RX ADMIN — POTASSIUM CHLORIDE 40 MEQ: 750 TABLET, FILM COATED, EXTENDED RELEASE ORAL at 04:46

## 2017-07-29 RX ADMIN — SUCRALFATE 1 G: 1 SUSPENSION ORAL at 12:09

## 2017-07-29 RX ADMIN — SUCRALFATE 1 G: 1 SUSPENSION ORAL at 21:00

## 2017-07-29 RX ADMIN — Medication 10 ML: at 04:51

## 2017-07-29 RX ADMIN — ATORVASTATIN CALCIUM 80 MG: 40 TABLET, FILM COATED ORAL at 21:00

## 2017-07-29 RX ADMIN — Medication 10 ML: at 14:51

## 2017-07-29 RX ADMIN — DOXYCYCLINE HYCLATE 100 MG: 100 TABLET ORAL at 08:48

## 2017-07-29 RX ADMIN — Medication 1 CAPSULE: at 08:48

## 2017-07-29 RX ADMIN — NITROGLYCERIN 0.5 INCH: 20 OINTMENT TOPICAL at 00:58

## 2017-07-29 RX ADMIN — LOSARTAN POTASSIUM 100 MG: 50 TABLET ORAL at 17:07

## 2017-07-29 RX ADMIN — SUCRALFATE 1 G: 1 SUSPENSION ORAL at 17:07

## 2017-07-29 RX ADMIN — PANTOPRAZOLE SODIUM 40 MG: 40 TABLET, DELAYED RELEASE ORAL at 17:07

## 2017-07-29 RX ADMIN — MONTELUKAST SODIUM 10 MG: 10 TABLET, FILM COATED ORAL at 09:52

## 2017-07-29 RX ADMIN — CARVEDILOL 25 MG: 12.5 TABLET, FILM COATED ORAL at 17:07

## 2017-07-29 NOTE — PROGRESS NOTES
1525: Bedside shift change report given to Vikas Singer (oncoming nurse) by Pamela Baez (offgoing nurse). Report included the following information SBAR, Kardex, ED Summary, Procedure Summary, Intake/Output, MAR, Recent Results, Med Rec Status and Cardiac Rhythm NSR.     1930: Bedside shift change report given to [id number] (oncoming nurse) by Qiun Slade (offgoing nurse). Report included the following information SBAR, Kardex, ED Summary, Procedure Summary, Intake/Output, MAR, Recent Results, Med Rec Status and Cardiac Rhythm NSR. Jaciel Edmondson

## 2017-07-29 NOTE — CONSULTS
Consult Note    Date of  Admission: 7/28/2017 11:17 PM     Letty Grayson is a 49 y.o. female admitted for Chest pain. Consult requested by Miles Fink MD    Assessment  · Atypical chest pain at rest. Cardiac enzymes are normal and EKG's are nondiagnostic  · Chronic systolic HF, s/p ICD 7421: clinically class II NYHA; never has had any ICD shocks; most recent EF 35-40%  · Long history of GERD  · CAD: LAD PCI 2010; cath 2015 LAD remained widely patent  · Review of echo done here in Oct 2016 showed a small pericardial effusion: no friction rub on today's exam and her cardiac silhouette is unremarkable    Recommendations  1. Empiric treatment of GERD with carafate  2. Echo to assess LV function and pericardium  3. If symptoms improve with reflux meds the patient can be discharged and can follow up with Dr Yesica Prieto   4.  If the patient's symptoms don't improve then would keep over the weekend for stress testing  Thank you for this referral  Maikel Catalan MD  Subjective:  Chest pain on and off since yesterday, at rest with very brief duration (seconds)    Patient Active Problem List    Diagnosis Date Noted    Chest pain 07/29/2017    Mild intermittent asthma without complication 48/04/9395    Essential hypertension 12/22/2015    Advance care planning 12/22/2015    Hypokalemia 07/29/2015    Asthma exacerbation 07/26/2015    Cardiomegaly 07/26/2015    Pulmonary edema 07/26/2015    Acute exacerbation of CHF (congestive heart failure) ([facility] [address] 07/26/2015    Pleural effusion, right 07/26/2015    Nephrolithiasis 01/10/2013    Non-insulin dependent type 2 diabetes mellitus ([facility] [address] 01/10/2013    Pityriasis rosea 05/30/2012    Palpitations, PVC's 10/13/2011    Postmenopause, LMP 40 yo, No HRT 03/16/2011    Reflux esophagitis 03/16/2011    Hiatal Hernia w/ GERD (gastroesophageal reflux disease) 03/16/2011    Iron deficiency anemia 03/16/2011    Perennial allergic rhinitis 03/16/2011    H/O recurrent Sinusitis 03/16/2011    Multinodular thyroid 03/16/2011    Acute Anterior Myocardial Infarction, s/p PTCA w/ stent 10/2010 10/31/2010    Hypercholesteremia 10/31/2010    Asthma, Mild, Intermittent 10/31/2010      Past Medical History:   Diagnosis Date    Acute Anterior Myocardial Infarction, s/p PTCA w/ stent 10/2010 10/31/2010    Asthma     mild    Diabetes mellitus, type 2 ([facility] [address] 8/24/2012    GERD (gastroesophageal reflux disease)     hiatal hernia    H/O recurrent Sinusitis 3/16/2011    Heart failure ([facility] [address]     Hiatal Hernia w/ GERD (gastroesophageal reflux disease) 3/16/2011    Hypercholesterolemia     Hypertension 3/16/2011    Ill-defined condition     pacemaker put in last July 2015 because of CHF    Impaired fasting glucose 4/18/2011    Iron deficiency anemia 3/16/2011    Multinodular thyroid 3/16/2011    Palpitations, PVC's 10/13/2011    Perennial allergic rhinitis 3/16/2011    Pityriasis rosea 5/30/2012    Post-menopausal     LMP 46y. o, no HRT    Postmenopause, LMP 40 yo, No HRT 3/16/2011    Reflux esophagitis 3/16/2011    Tobacco user     Vitamin D deficiency 10/13/2011      Past Surgical History:   Procedure Laterality Date    CARDIAC SURG PROCEDURE UNLIST      cardiac stent     COLONOSCOPY N/A 4/19/2017    COL[facility] at [facility]    EGD  4/2009    hiatal hernia, esophagitis; Dr Selina Mitchell, COLON, DIAGNOSTIC  4/2009    HX GYN      FNA bilateral benign     HX LITHOTRIPSY  4/2012    failed    KIDNEY STONE ([place]) EVAL  5/2012    scope w/ kidney stone extractions, multiple; Dr Robbi Lesch, [facility]    [place] COLONOSCOPY W/BIOPSY SINGLE/MULTIPLE  4/2009    benign polyp, recheck in 5 years, Dr Francine Chahal, INITIAL  10/2010    Dr Nicky Lam     Allergies   Allergen Reactions    Ace Inhibitors Cough     ?10/2011    Seafood [Shellfish Containing Products] Hives, Shortness of Breath and Nausea and Vomiting             Review of Symptoms:  Constitutional: negative for fevers, chills, sweats, fatigue, malaise and weight loss  Cardiac: stable dyspnea on exertion, chest pain described as fleeting in duration (seconds) with some radiation to her back, orthopnea, ; pertinent negatives - palpitations, irregular heart beats, near-syncope, syncope, fatigue, claudication, tachypnea, dizziness  Respiratory: positive for asthma, negative for cough, hemoptysis or pleurisy/chest pain  Gastrointestinal: positive for reflux symptoms, negative for dysphagia, odynophagia, melena, abdominal pain and jaundice  Musculoskeletal:negative for myalgias, arthralgias, stiff joints, neck pain, back pain and muscle weakness  Neurological: negative for headaches, dizziness, vertigo, seizures, speech problems, paresthesia, coordination problems, gait problems, tremor and weakness  Other systems reviewed and negative except as above. Physical Exam    Visit Vitals    /74    Pulse 76    Temp 98.4 °F (36.9 °C)    Resp 20    Ht 5' 2\" (1.575 m)    Wt 72.2 kg (159 lb 2.8 oz)    LMP  (Exact Date)    SpO2 100%    BMI 29.11 kg/m2     Neck: supple, symmetrical, trachea midline, no adenopathy, thyroid: not enlarged, symmetric, no tenderness/mass/nodules, no carotid bruit and no JVD  Heart: regular rate and rhythm, S1, S2 normal, S4 present  Lungs: clear to auscultation bilaterally, normal percussion bilaterally  Abdomen: soft, non-tender.  Bowel sounds normal. No masses,  no organomegaly  Extremities: extremities normal, atraumatic, no cyanosis or edema  Pulses: 2+ and symmetric  Neurologic: Grossly normal    Cardiographics    Telemetry: normal sinus rhythm  ECG: unchanged from previous tracings, normal sinus rhythm, lateral T wave inversion similar to several prior EKGs dating back to July 26/2015 in   Echocardiogram: Not done    Recent radiology, intake/output and wt reviewed    Lab Results  Component Value Date/Time   WBC 8.8 07/29/2017 04:57 AM   HGB 10.3 07/29/2017 04:57 AM   Hemoglobin (POC) 13.6 10/31/2010 10:36 AM   HCT 32.9 07/29/2017 04:57 AM   Hematocrit (POC) 40 10/31/2010 10:36 AM   PLATELET 905 55/10/3897 04:57 AM   MCV 87.5 07/29/2017 04:57 AM     Lab Results   Component Value Date/Time     07/29/2017 04:57 AM    CK - MB <1.0 07/29/2017 04:57 AM    CK-MB Index Cannot be calulated 07/29/2017 04:57 AM    Troponin-I, Qt. <0.04 07/29/2017 04:57 AM    B-type Natriuretic Peptide 69.6 08/29/2016 04:01 PM      Lab Results   Component Value Date/Time    B-type Natriuretic Peptide 69.6 08/29/2016 04:01 PM    B-type Natriuretic Peptide 67.7 02/09/2016 12:47 PM    B-type Natriuretic Peptide 384.0 12/08/2015 11:08 AM    B-type Natriuretic Peptide 210.5 11/16/2015 01:58 PM    B-type Natriuretic Peptide 322.9 10/06/2015 03:08 PM    NT pro-BNP 96313 07/26/2015 04:09 AM      Lab Results   Component Value Date/Time    Sodium 142 07/29/2017 04:57 AM    Potassium 3.0 07/29/2017 04:57 AM    Chloride 109 07/29/2017 04:57 AM    CO2 25 07/29/2017 04:57 AM    Anion gap 8 07/29/2017 04:57 AM    Glucose 92 07/29/2017 04:57 AM    BUN 21 07/29/2017 04:57 AM    Creatinine 1.11 07/29/2017 04:57 AM    BUN/Creatinine ratio 19 07/29/2017 04:57 AM    GFR est AA >60 07/29/2017 04:57 AM    GFR est non-AA 51 07/29/2017 04:57 AM    Calcium 9.3 07/29/2017 04:57 AM    Bilirubin, total 0.5 07/29/2017 04:57 AM    ALT (SGPT) 18 07/29/2017 04:57 AM    AST (SGOT) 17 07/29/2017 04:57 AM    Alk.  phosphatase 99 07/29/2017 04:57 AM    Protein, total 6.9 07/29/2017 04:57 AM    Albumin 3.6 07/29/2017 04:57 AM    Globulin 3.3 07/29/2017 04:57 AM    A-G Ratio 1.1 07/29/2017 04:57 AM

## 2017-07-29 NOTE — ROUTINE PROCESS
TRANSFER - OUT REPORT:    Verbal report given to Farhana Balderrama RN on [employer]  being transferred to [address] for routine progression of care       Report consisted of patients Situation, Background, Assessment and   Recommendations(SBAR). Information from the following report(s) SBAR, ED Summary, [facility] and Recent Results was reviewed with the receiving nurse. Lines:   Peripheral IV 04/19/17 (Active)       Peripheral IV 07/28/17 Right Wrist (Active)   Site Assessment Clean, dry, & intact 7/28/2017 11:33 PM   Phlebitis Assessment 0 7/28/2017 11:33 PM   Infiltration Assessment 0 7/28/2017 11:33 PM   Dressing Status Clean, dry, & intact 7/28/2017 11:33 PM   Dressing Type Tape;Topical skin adhesive;Transparent 7/28/2017 11:33 PM   Hub Color/Line Status Pink;Flushed; Infiltrated 7/28/2017 11:33 PM   Action Taken Blood drawn 7/28/2017 11:33 PM        Opportunity for questions and clarification was provided.       Patient transported with:   [facility]

## 2017-07-29 NOTE — PROGRESS NOTES
Mrs Danisha Becker is feeling better this evening. The Carafate slurry  helped a lot. Her echo today shows an EF of 45% and a small trivial pericardial effusion both have improved since her 10/18/2016 echo. The patient can be discharged. She already has a follow up appointment with Dr Sanju Valverde. Please give her a prescription for Carafate slurry. Thank you.

## 2017-07-29 NOTE — H&P
[address]   [place], [address]   HISTORY AND PHYSICAL       Name:  Lakeshia Gilman   MR#:  [id number]   :  1960   Account #:  [de-identified]        Date of Adm:  2017       PRIMARY CARE PHYSICIAN: Dr. Maricarmen Weinstein. SOURCE OF INFORMATION: The patient. CHIEF COMPLAINT: Chest pain. HISTORY OF PRESENT ILLNESS: This is a 17-year-old woman with   a past medical history significant for coronary artery disease, status   post stent placement, congestive heart failure, asthma, hypertension,   type 2 diabetes, dyslipidemia, was in her usual state of health until a   few days ago when the patient started experiencing chest pain. The   chest pain is located at the center of the chest with radiation to the   neck. The patient described the chest pain as reflux, but the symptoms   are similar to when she was diagnosed with myocardial infarction in   . The pain is 7/10 in severity with no known aggravating   or relieving factors. The chest pain is associated with shortness of   breath, nausea, no diaphoresis. The patient was brought to the   emergency room for further evaluation. Also for about a month, the   patient has been having symptoms of upper respiratory tract infection   for which the patient has been treated with antibiotics. She is presently   on doxycycline for the suspected upper respiratory tract infection. When the patient arrived at the emergency room, her first set of   troponins were negative, but the patient has established coronary   artery disease. She was referred to the hospitalist service for   evaluation for admission. The patient is due to be seen by her   cardiologist in a couple of days. The patient has no fever, no rigors,   and no chills.     PAST MEDICAL HISTORY: Coronary artery disease, status post stent   placement, congestive heart failure, asthma, hypertension, type 2   diabetes, dyslipidemia, status post pacemaker insertion. ALLERGIES:     1. ACE INHIBITOR. 2. SEAFOOD. MEDICATIONS:     1. Albuterol 90 mcg, 2 puffs by inhalation every 6 hours as needed for   wheezing. 2. Lipitor 80 mg daily. 3. Bumex 0.5 mg twice daily as needed. 4. Coreg 25 mg twice daily. 5. Zyrtec 10 mg daily as needed. 6. Clonidine 0.3 mg twice daily as needed. 7. Flexeril 5 mg daily as needed for muscle spasm. 8. Doxycycline 100 mg twice daily. 9. Pepcid 40 mg daily. 10. Losartan 100 mg daily in the evening. 11. Glucophage 500 mg twice daily. 12. Singular 10 mg daily. 13. Ambien 10 mg daily as needed for sleep. FAMILY HISTORY: This was reviewed. Her mother had hypertension,   dyslipidemia, thyroid disease and heart disease. Her father had heart   disease and dyslipidemia. PAST SURGICAL HISTORY: This is significant for lithotripsy and   cardiac stent placement. SOCIAL HISTORY: The patient quit tobacco abuse in July 2013. No   history of alcohol abuse. REVIEW OF SYSTEMS   HEAD, EYES, EARS, NOSE AND THROAT: No headache, no   dizziness, no blurring of vision, no photophobia. RESPIRATORY SYSTEM: This is positive for shortness of breath and   cough. No hemoptysis. CARDIOVASCULAR SYSTEM: This is positive for chest pain, no   orthopnea, no palpitation. GASTROINTESTINAL: This is positive for nausea and vomiting. No   diarrhea, no constipation. GENITOURINARY: No dysuria, no urgency, and no frequency. All   other systems are reviewed and they are negative. PHYSICAL EXAMINATION:    GENERAL APPEARANCE: The patient appeared ill, in moderate   distress. VITAL SIGNS: On arrival at the emergency room, temperature 98.6,   pulse 88, respiratory rate 16. Blood pressure 146/90, oxygen   saturation 98% on room air. HEAD: Normocephalic, atraumatic. EYES: Normal eye movement. No redness, no drainage, no discharge. EARS: Normal external ears with no obvious drainage. No deformity,   no drainage.      MOUTH AND THROAT: No visible oral lesions. NECK: Supple. No JVD. No thyromegaly. CHEST: Clear breath sounds. No wheezing, no crackles. HEART: Normal S1 and S2, regular. No clinically appreciable murmur. ABDOMEN: Soft, nontender, normal bowel sounds. CENTRAL NERVOUS SYSTEM: Alert, oriented x3. No gross focal   neurological deficits. EXTREMITIES: No edema. Pulses 2+ bilaterally. MUSCULOSKELETAL: No obvious joint deformity or swelling. SKIN: No active skin lesions seen in the exposed parts of the body. PSYCHIATRIC: Normal mood and affect. LYMPHATIC: No cervical cardiopathy. DIAGNOSTIC DATA: CHEST X-RAY: No acute pathology. EKG shows   normal sinus rhythm, left ventricular hypertrophy and a nonspecific ST   and T-wave abnormality. LABORATORY DATA: Chemistry: Sodium 140, potassium 3.1,   chloride 107, CO2 24, glucose 138, BUN 19, creatinine 1.30, calcium   9.6, bilirubin total 0.4, ALT 19, AST 19, alkaline phosphatase 109, total   protein 7.6, albumin level 3.7, globulin at 3.9. Cardiac profile: Troponin   less than 0.04. Hematology: WBC 9.3, hemoglobin 10.9, hematocrit   35.2, platelet at 382. ASSESSMENT:   1. Chest pain. 2. Hypertension. 3. Dyslipidemia. 4. Type 2 diabetes. 5. Asthma. 6. Status post pacemaker insertion. 7. Hypokalemia. PLAN:   1. Chest pain. We will place the patient on observation. We will obtain   serial cardiac markers to rule out acute myocardial infarction. We will   continue with cardiac medications in this patient with established   coronary artery disease with status post stent placement. Cardiology   will be consulted to assist in further evaluation and treatment of chest   pain. The patient will also be evaluated for other atypical causes of her   chest pain by checking amylase and lipase level. Also check D-dimer   to evaluate the patient for thromboembolism as a possible cause of her   chest pain. 2. Hypertension.  We will resume home medications and monitor the   patient's blood pressure closely. 3. Dyslipidemia. We will continue with the preadmission medication. We will check lipid panel. 4. Type 2 diabetes. We will place the patient on sliding scale with   insulin coverage. We will check hemoglobin A1c level. We will hold oral   agent at the time of discharge from the hospital.   5. Asthma. We will continue with preadmission medication. 6. Status post pacemaker insertion. We will monitor. The patient may   require interrogation by the cardiologist.   7. Hypokalemia. We will replace potassium. We will repeat potassium   level. We will also check magnesium level. 8. We will place the patient on Lovenox for DVT prophylaxis. CODE STATUS: FULL CODE.               MD HARRY Alejo / Mariam Burkitt   D:  07/29/2017   02:30   T:  07/29/2017   09:32   Job #:  [id number]

## 2017-07-29 NOTE — ED PROVIDER NOTES
Patient is a 49 y.o. female presenting with shortness of breath. Shortness of Breath   The problem has not changed since onset. Associated symptoms include chest pain, vomiting and leg swelling. Pertinent negatives include no fever, no headaches, no abdominal pain and no rash. 49year old female with cad- stent in 2011, CHF in 2015, asthma, htn, dm, presents with shortness of breath for the past few days, worse today. She reports \"acid reflux\" symptoms since last night. States when she had the MI in 2011, symptoms were similar in that she thought it was reflux. She has been hiavng some associated diaphoresis and SOB with the burnign pain. Pain only lasts a few seconds but has been pretty steady all day. Feels like her ankles'/feet are getting tight. Has been having sinus congestion for a month, is on her third round of antibiotics. Cough is non productive. No fevers. Past Medical History:   Diagnosis Date    Acute Anterior Myocardial Infarction, s/p PTCA w/ stent 10/2010 10/31/2010    Asthma     mild    Diabetes mellitus, type 2 ([facility] [address] 8/24/2012    GERD (gastroesophageal reflux disease)     hiatal hernia    H/O recurrent Sinusitis 3/16/2011    Heart failure ([facility] [address]     Hiatal Hernia w/ GERD (gastroesophageal reflux disease) 3/16/2011    Hypercholesterolemia     Hypertension 3/16/2011    Ill-defined condition     pacemaker put in last July 2015 because of CHF    Impaired fasting glucose 4/18/2011    Iron deficiency anemia 3/16/2011    Multinodular thyroid 3/16/2011    Palpitations, PVC's 10/13/2011    Perennial allergic rhinitis 3/16/2011    Pityriasis rosea 5/30/2012    Post-menopausal     LMP 46y. o, no HRT    Postmenopause, LMP 40 yo, No HRT 3/16/2011    Reflux esophagitis 3/16/2011    Tobacco user     Vitamin D deficiency 10/13/2011       Past Surgical History:   Procedure Laterality Date    CARDIAC SURG PROCEDURE UNLIST      cardiac stent     COLONOSCOPY N/A 4/19/2017 COLONOSCOPY performed by Holly Raines MD at [address] EGD  2009    hiatal hernia, esophagitis; Dr Abundio De La Cruz, COLON, DIAGNOSTIC  2009    HX GYN      FNA bilateral benign     HX LITHOTRIPSY  2012    failed   [address] ([place]) EVAL  2012    scope w/ kidney stone extractions, multiple; Dr Brando Anders, [facility]    [place] COLONOSCOPY W/BIOPSY SINGLE/MULTIPLE  2009    benign polyp, recheck in 5 years, Dr Anthony Maxwell    PTCA W/ STENT, INITIAL  10/2010    Dr Lola Mckeon         Family History:   Problem Relation Age of Onset    Hypertension Mother     High Cholesterol Mother     Thyroid Disease Mother     Heart Disease Mother      chf    Kidney Disease Mother     Headache Mother     Heart Disease Father     Heart Attack Father 36      MI age 36   [facility] High Cholesterol Father     Hypertension Sister     Thyroid Disease Sister     Hypertension Sister     Arthritis-rheumatoid Sister     Diabetes Sister     Thyroid Disease Sister     Heart Attack Brother      massive MI at age 46, survived    Thyroid Disease Other      niece, Neha Garcia    High Cholesterol Brother        Social History     Social History    Marital status:      Spouse name: vijay shea to give infor     Number of children: 0    Years of education: N/A     Occupational History    ; 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           Self Employed     Social History Main Topics    Smoking status: Former Smoker     Packs/day: 0.25     Years: 30.00     Types: Cigarettes     Quit date: 2013    Smokeless tobacco: Never Used      Comment: had quit 10/2010 then restarted ~ 3/2012;  about 5 cigarettes a day; on and off since age 26 yo x ~ 30 yrs    Alcohol use No      Comment: none    Drug use: No    Sexual activity: Yes     Partners: Male     Birth control/ protection: None      Comment:  [place] [employer]      Other Topics Concern    Caffeine Concern No     down to 1 Coke a day    Weight Concern Yes     stable now, had gained some wt over the past yr; was in low 150's previously; wants to lose 10 lbs    Special Diet Yes     heart healthy diet (white meats, salads, veggies, fruits)    Exercise Yes     exercise walking tape x 45 minutes qod     Social History Narrative    New to [facility] as of 4/2009, referred by moose Barton; pervious PCP Dr Alberto Burgos: Ace inhibitors and Seafood [shellfish containing products]    Review of Systems   Constitutional: Negative for fever. HENT: Positive for congestion and sinus pressure. Eyes: Negative for visual disturbance. Respiratory: Positive for shortness of breath. Cardiovascular: Positive for chest pain and leg swelling. Negative for palpitations. Gastrointestinal: Positive for nausea and vomiting. Negative for abdominal pain. Endocrine: Negative for polyuria. Genitourinary: Negative for dysuria. Musculoskeletal: Negative for gait problem. Skin: Negative for rash. Neurological: Negative for headaches. Psychiatric/Behavioral: Negative for dysphoric mood. Vitals:    07/28/17 2315   BP: 146/90   Pulse: 88   Resp: 16   Temp: 98.6 °F (37 °C)   SpO2: 98%   Weight: 71.8 kg (158 lb 3.2 oz)   Height: 5' 2\" (1.575 m)            Physical Exam   Constitutional: She is oriented to person, place, and time. She appears well-developed and well-nourished. No distress. HENT:   Head: Normocephalic and atraumatic. Mouth/Throat: Oropharynx is clear and moist. No oropharyngeal exudate. Eyes: Conjunctivae and EOM are normal. Pupils are equal, round, and reactive to light. Right eye exhibits no discharge. Left eye exhibits no discharge. No scleral icterus. Neck: Normal range of motion. Neck supple. No JVD present. Cardiovascular: Normal rate, regular rhythm, normal heart sounds and intact distal pulses. Exam reveals no gallop and no friction rub. No murmur heard.   Pulmonary/Chest: Effort normal and breath sounds normal. No stridor. No respiratory distress. She has no wheezes. She has no rales. She exhibits no tenderness. Abdominal: Soft. Bowel sounds are normal. She exhibits no distension and no mass. There is no tenderness. There is no rebound and no guarding. Musculoskeletal: Normal range of motion. She exhibits no edema or tenderness. Neurological: She is alert and oriented to person, place, and time. She has normal reflexes. No cranial nerve deficit. She exhibits normal muscle tone. Coordination normal.   Skin: Skin is warm and dry. No rash noted. No erythema. Psychiatric: She has a normal mood and affect. Her behavior is normal. Judgment and thought content normal.        [facility]  ED Course       Procedures    ED EKG interpretation:  Rhythm: normal sinus rhythm; and regular . Rate (approx.): 85; Axis: left axis deviation; P wave: normal; QRS interval: normal ; ST/T wave: non-specific changes; This EKG was interpreted by Bertram Hay MD,ED Provider. Given symptoms similar to previous MI, no stress test/eval since 2015, will admit for further work up.

## 2017-07-29 NOTE — ED TRIAGE NOTES
Patient arriving c/o increased chest pain which she is unsure if it is related to her CHF or the congestion in her chest. +productive cough. Patient reports it is hard to take a deep breath. 98% on Room Air.

## 2017-07-29 NOTE — PROGRESS NOTES
Admitted by Dr Sydnie Kellogg this am     Seen and examined patient , cards on board   Needs stress test on Monday or outpatient and course of chest pain today will decide

## 2017-07-29 NOTE — PROGRESS NOTES
Pt reported during shift change having intermittent mid-sternal chest pain that radiates to neck. No complaints at this time but rates it 8/10 when it occurs. Cardiology consult called and EKG done.

## 2017-07-30 NOTE — PROGRESS NOTES
2000: Spoke with Dr. Leslye shea for patient to be discharge tonight. Will need to paged hospitalist as well. 2015; Spoke with Eva Serra NP regarding patient being able to be discharge tonight. Will review chart and place discharge orders in.  2219: Patient sign paper work and place in patient's chart. Unable to get patient to sign via e-sign. I have reviewed discharge instructions with the patient. The patient verbalized understanding.

## 2017-07-30 NOTE — DISCHARGE SUMMARY
Discharge Summary       PATIENT ID: Tawana Soulier  MRN: [id number]   YOB: 1960    DATE OF ADMISSION: 7/28/2017 11:17 PM    DATE OF DISCHARGE: 7/29/2017    PRIMARY CARE PROVIDER: [address] [place], MD     ATTENDING PHYSICIAN:  Dr. Richrd Olszewski  DISCHARGING PROVIDER: Eileen Heredia NP    To contact this individual call [phone] and ask the  to page. If unavailable ask to be transferred the Adult Hospitalist Department. CONSULTATIONS: IP CONSULT TO CARDIOLOGY    PROCEDURES/SURGERIES: * No surgery found *    [id number] [facility] COURSE:   This is a 53-year-old woman with a past medical history significant for coronary artery disease, status post stent placement, congestive heart failure, asthma, hypertension,   type 2 diabetes, dyslipidemia, was in her usual state of health until a few days ago when the patient started experiencing chest pain. The chest pain is located at the center of the chest with radiation to the neck. The patient described the chest pain as reflux, but the symptoms   are similar to when she was diagnosed with myocardial infarction in 2011. The pain is 7/10 in severity with no known aggravating or relieving factors. The chest pain is associated with shortness of breath, nausea, no diaphoresis. The patient was brought to the   emergency room for further evaluation. Also for about a month, the patient has been having symptoms of upper respiratory tract infection for which the patient has been treated with antibiotics. She is presently on doxycycline for the suspected upper respiratory tract infection. When the patient arrived at the emergency room, her first set of troponins were negative, but the patient has established coronary artery disease. She was referred to the hospitalist service for evaluation for admission. The patient is due to be seen by her cardiologist in a couple of days.  The patient has no fever, no rigors, and no chills.           DISCHARGE DIAGNOSES / PLAN:      . Chest pain. We will place the patient on observation. We will obtain   serial cardiac markers to rule out acute myocardial infarction. We will   continue with cardiac medications in this patient with established   coronary artery disease with status post stent placement.   -Cardiology was  consulted to assist in further evaluation and treatment of chest   pain. - recommended discharge after negative testing follow up out patient  2. Hypertension. -stable  We will resume home medications   3. Dyslipidemia chronic  continue with the preadmission medication. We will check lipid panel no change   4. Type 2 diabetes. Chronic stable . - check hemoglobin A1c level. 7.1 7/28/17  - resume home medications  5. Asthma.-stable  -continue with preadmission medication. 6. Status post pacemaker insertion. stable  - interrogation by the cardiologist as outpatinet  7. Hypokalemia. Stable   - replaced potassium. l. PENDING TEST RESULTS:   At the time of discharge the following test results are still pending: none    FOLLOW UP APPOINTMENTS:    Follow-up Information     Follow up With Details Comments Contact [address] MD Frank   [address] [place] [address]      Carol Gan MD In 1 week  [address] [place] [address]  [phone]             ADDITIONAL CARE RECOMMENDATIONS: none    DIET: Cardiac Diet and Diabetic Diet  Oral Nutritional Supplements: No Oral Supplement prescribednone    ACTIVITY: Activity as tolerated    WOUND CARE: none    EQUIPMENT needed: none      DISCHARGE MEDICATIONS:  Current Discharge Medication List      START taking these medications    Details   sucralfate (CARAFATE) 1 gram tablet Take 1 Tab by mouth four (4) times daily.   Qty: 120 Tab, Refills: 1         CONTINUE these medications which have NOT CHANGED    Details   atorvastatin (LIPITOR) 80 mg tablet Take 1 Tab by mouth nightly. Qty: 30 Tab, Refills: 5    Associated Diagnoses: Hypercholesteremia      bumetanide (BUMEX) 0.5 mg tablet TAKE 1 TABLET BY MOUTH TWICE A DAY AS NEEDED  Qty: 180 Tab, Refills: 1    Associated Diagnoses: Essential hypertension; Insomnia, unspecified type      zolpidem (AMBIEN) 10 mg tablet Take 1 Tab by mouth nightly as needed for Sleep. Max Daily Amount: 10 mg.  Qty: 30 Tab, Refills: 0    Associated Diagnoses: Insomnia, unspecified type; Essential hypertension      losartan (COZAAR) 100 mg tablet Take 1 Tab by mouth every evening. Qty: 90 Tab, Refills: 3    Comments: Please cancel all 50 mg -rx changed  Associated Diagnoses: Essential hypertension      doxycycline (VIBRA-TABS) 100 mg tablet Take 1 Tab by mouth two (2) times a day for 28 days. Qty: 56 Tab, Refills: 0      L. acidophilus-L. rhamnosus (PROBIOTIC) 15 billion cell cap Take  by mouth. !! metFORMIN (GLUCOPHAGE) 500 mg tablet TAKE 1 TABLET BY MOUTH TWICE A DAY (WITH MEALS)  Qty: 60 Tab, Refills: 4      EPINEPHrine (EPIPEN) 0.3 mg/0.3 mL injection 0.3 mL by IntraMUSCular route once as needed for up to 1 dose. Qty: 2 Syringe, Refills: 1      montelukast (SINGULAIR) 10 mg tablet TAKE 1 TAB BY MOUTH DAILY. INDICATIONS: ALLERGIC RHINITIS  Qty: 30 Tab, Refills: 11      carvedilol (COREG) 25 mg tablet Take 1 Tab by mouth two (2) times a day. Qty: 60 Tab, Refills: 11      cyclobenzaprine (FLEXERIL) 5 mg tablet Take 1 Tab by mouth daily as needed for Muscle Spasm(s). Do not take with the Ambien  Qty: 10 Tab, Refills: 0      diclofenac (VOLTAREN) 1 % gel Apply 2 g to affected area every six (6) hours. Qty: 100 g, Refills: 0      albuterol (PROAIR HFA) 90 mcg/actuation inhaler Take 2 Puffs by inhalation every six (6) hours as needed for Wheezing. Qty: 1 Inhaler, Refills: 1    Associated Diagnoses: Mild intermittent asthma without complication      cetirizine (ZYRTEC) 10 mg tablet Take 10 mg by mouth daily as needed for Allergies.       cloNIDine HCl (CATAPRES) 0.3 mg tablet Take 0.3 mg by mouth two (2) times daily as needed. *IF BLOOD PRESSURE IS >170/90*      ipratropium (ATROVENT) 0.06 % nasal spray 2 Sprays by Both Nostrils route four (4) times daily as needed for Rhinitis. Indications: RHINORRHEA      famotidine (PEPCID) 40 mg tablet Take 40 mg by mouth nightly. !! metFORMIN (GLUCOPHAGE) 500 mg tablet TAKE 1 TABLET BY MOUTH TWICE A DAY (WITH MEALS)  Qty: 60 Tab, Refills: 5      Cholecalciferol, Vitamin D3, (VITAMIN D) 2,000 unit Cap Take 4,000 Units by mouth daily. Takes 2 of the 2,000 unit tabs qd       !! - Potential duplicate medications found. Please discuss with provider. NOTIFY YOUR PHYSICIAN FOR ANY OF THE FOLLOWING:   Fever over 101 degrees for 24 hours. Chest pain, shortness of breath, fever, chills, nausea, vomiting, diarrhea, change in mentation, falling, weakness, bleeding. Severe pain or pain not relieved by medications. Or, any other signs or symptoms that you may have questions about. DISPOSITION:   x Home With:   OT  PT  HH  RN       Long term SNF/Inpatient Rehab    Independent/assisted living    Hospice    Other:       PATIENT CONDITION AT DISCHARGE:     Functional status    Poor     Deconditioned    x Independent      Cognition   x  Lucid     Forgetful     Dementia      Catheters/lines (plus indication)    Loza     PICC     PEG    x None      Code status    x Full code     DNR      PHYSICAL EXAMINATION AT DISCHARGE:   Refer to Progress Note  Blood pressure 135/60, pulse 70, temperature 98.5 °F (36.9 °C), resp. rate 16, height 5' 2\" (1.575 m), weight 72.2 kg (159 lb 2.8 oz), SpO2 96 %.           CHRONIC MEDICAL DIAGNOSES:  Problem List as of 7/29/2017  Date Reviewed: 7/29/2017          Codes Class Noted - Resolved    * (Principal)Chest pain ICD-10-CM: R07.9  ICD-9-CM: 786.50  7/29/2017 - Present        Mild intermittent asthma without complication HDV-95-ON: H55.22  ICD-9-CM: 493.90  1/30/2017 - Present Essential hypertension (Chronic) ICD-10-CM: I10  ICD-9-CM: 401.9  12/22/2015 - Present        Advance care planning ICD-10-CM: Z71.89  ICD-9-CM: V65.49  12/22/2015 - Present    Overview Signed 12/22/2015  6:20 PM by Shankar Hicks MD     A.D.  Completed form in chart             Hypokalemia ICD-10-CM: E87.6  ICD-9-CM: 276.8  7/29/2015 - Present        Asthma exacerbation ICD-10-CM: J45.901  ICD-9-CM: 493.92  7/26/2015 - Present        Cardiomegaly ICD-10-CM: I51.7  ICD-9-CM: 429.3  7/26/2015 - Present        Pulmonary edema ICD-10-CM: J81.1  ICD-9-CM: 271  7/26/2015 - Present        Acute exacerbation of CHF (congestive heart failure) (HCC) ICD-10-CM: I50.9  ICD-9-CM: 428.0  7/26/2015 - Present        Pleural effusion, right ICD-10-CM: J90  ICD-9-CM: 511.9  7/26/2015 - Present        Nephrolithiasis (Chronic) ICD-10-CM: N20.0  ICD-9-CM: 592.0  1/10/2013 - Present    Overview Signed 1/10/2013  3:35 PM by Shankar Hicks MD     Urethral stents placed left              Non-insulin dependent type 2 diabetes mellitus ([facility] ICD-10-CM: E11.9  ICD-9-CM: 250.00  1/10/2013 - Present        Pityriasis rosea ICD-10-CM: L42  ICD-9-CM: 696.3  5/30/2012 - Present    Overview Signed 5/30/2012 12:34 PM by Simón Cox MD     ~2003, Derm Dr David Workman             Palpitations, PVC's ICD-10-CM: R00.2  ICD-9-CM: 785.1  10/13/2011 - Present    Overview Signed 10/13/2011  2:40 PM by Simón Cox MD     2011, Cardio Dr Milagros Ryan, LMP 38 yo, No HRT (Chronic) ICD-10-CM: Z78.0  ICD-9-CM: V49.81  3/16/2011 - Present        Reflux esophagitis (Chronic) ICD-10-CM: K21.0  ICD-9-CM: 530.11  3/16/2011 - Present    Overview Signed 3/16/2011 10:25 AM by Simón Cox MD     EGD 4/2009; Dr Cesar Enriquez             Hiatal Hernia w/ GERD (gastroesophageal reflux disease) (Chronic) ICD-10-CM: K21.9  ICD-9-CM: 530.81  3/16/2011 - Present        Iron deficiency anemia (Chronic) ICD-10-CM: D50.9  ICD-9-CM: 280.9  3/16/2011 - Present    Overview Signed 3/16/2011 10:33 AM by Raoul Jon MD     Since childhood and adulthood;on iron since ~1999             Perennial allergic rhinitis (Chronic) ICD-10-CM: J30.89  ICD-9-CM: 477.8  3/16/2011 - Present        H/O recurrent Sinusitis ICD-10-CM: J32.9  ICD-9-CM: 473.9  3/16/2011 - Present        Multinodular thyroid (Chronic) ICD-10-CM: W99.5  ICD-9-CM: 241.1  3/16/2011 - Present    Overview Addendum 10/13/2011  2:47 PM by Raoul Jon MD     Prev Endo Dr Jovi Rico 5/2009, now sees his partner, Dr Joe Mensah; small nodules, f/u scans qyr only for now             Acute Anterior Myocardial Infarction, s/p PTCA w/ stent 10/2010 (Chronic) ICD-10-CM: I21.09  ICD-9-CM: 410.11  10/31/2010 - Present    Overview Signed 3/16/2011 10:23 AM by MD Dr Raoul Jacobs at [facility]             Hypercholesteremia (Chronic) ICD-10-CM: E78.00  ICD-9-CM: 272.0  10/31/2010 - Present        Asthma, Mild, Intermittent (Chronic) ICD-10-CM: J45.909  ICD-9-CM: 493.90  10/31/2010 - Present        RESOLVED: Tobacco abuse ICD-10-CM: Z72.0  ICD-9-CM: 305.1  1/10/2013 - 10/7/2013        RESOLVED: Diabetes mellitus, type 2 ([address] (Chronic) ICD-10-CM: E11.9  ICD-9-CM: 250.00  8/24/2012 - 11/6/2014        RESOLVED: Vitamin D deficiency ICD-10-CM: E55.9  ICD-9-CM: 268.9  10/13/2011 - 11/6/2014    Overview Addendum 8/21/2012 11:30 AM by Raoul Jon MD     Per Endo summer 2011, 4,000 units daily, NOT rx (coronary calcium precipitation)             RESOLVED: Type II or unspecified type diabetes mellitus without mention of complication, not stated as uncontrolled ICD-10-CM: E11.9  ICD-9-CM: 250.00  4/18/2011 - 10/29/2013    Overview Signed 4/18/2011 10:25 PM by Raoul Jon MD     Mild [id number]             RESOLVED: Hypertension (Chronic) ICD-10-CM: I10  ICD-9-CM: 401.9  3/16/2011 - 12/22/2015              Greater than 30 minutes were spent with the patient on counseling and coordination of care    Signed:   Shweta Reynolds NP  7/29/2017  10:29 PM

## 2017-07-30 NOTE — PROGRESS NOTES
Problem: Unstable angina/NSTEMI: Day of Admission/Day 1  Goal: Nutrition/Diet  Outcome: Resolved/Met Date Met:  07/29/17  Patient prescribe antiacids and carafarte   Goal: Psychosocial  Outcome: Resolved/Met Date Met:  07/29/17  Patient is calm and cooperative with nursing staff. Goal: *Hemodynamically stable  Outcome: Progressing Towards Goal  Vital signs are stable and most recent HGB of 10.3  Goal: *Optimal pain control at patients stated goal  Outcome: Resolved/Met Date Met:  07/29/17  Patient denies any pain at this time. Goal: *Lungs clear or at baseline  Outcome: Resolved/Met Date Met:  07/29/17  Patient lungs are clear and patient on room air with SpO2 of 96%.

## 2017-07-30 NOTE — DISCHARGE INSTRUCTIONS
Chest Pain: Care Instructions  Your Care Instructions  There are many things that can cause chest pain. Some are not serious and will get better on their own in a few days. But some kinds of chest pain need more testing and treatment. Your doctor may have recommended a follow-up visit in the next 8 to 12 hours. If you are not getting better, you may need more tests or treatment. Even though your doctor has released you, you still need to watch for any problems. The doctor carefully checked you, but sometimes problems can develop later. If you have new symptoms or if your symptoms do not get better, get medical care right away. If you have worse or different chest pain or pressure that lasts more than 5 minutes or you passed out (lost consciousness), call 911 or seek other emergency help right away. A medical visit is only one step in your treatment. Even if you feel better, you still need to do what your doctor recommends, such as going to all suggested follow-up appointments and taking medicines exactly as directed. This will help you recover and help prevent future problems. How can you care for yourself at home? · Rest until you feel better. · Take your medicine exactly as prescribed. Call your doctor if you think you are having a problem with your medicine. · Do not drive after taking a prescription pain medicine. When should you call for help? Call 911 if:  · You passed out (lost consciousness). · You have severe difficulty breathing. · You have symptoms of a heart attack. These may include:  ¨ Chest pain or pressure, or a strange feeling in your chest.  ¨ Sweating. ¨ Shortness of breath. ¨ Nausea or vomiting. ¨ Pain, pressure, or a strange feeling in your back, neck, jaw, or upper belly or in one or both shoulders or arms. ¨ Lightheadedness or sudden weakness. ¨ A fast or irregular heartbeat.   After you call 911, the  may tell you to chew 1 adult-strength or 2 to 4 low-dose aspirin. Wait for an ambulance. Do not try to drive yourself. Call your doctor today if:  · You have any trouble breathing. · Your chest pain gets worse. · You are dizzy or lightheaded, or you feel like you may faint. · You are not getting better as expected. · You are having new or different chest pain. Where can you learn more? Go to http://colin-christiano.info/. Enter A120 in the search box to learn more about \"Chest Pain: Care Instructions. \"  Current as of: March 20, 2017  Content Version: 11.3  © 1449-8639 [employer]. Care instructions adapted under license by [employer] (which disclaims liability or warranty for this information). If you have questions about a medical condition or this instruction, always ask your healthcare professional. [address] any warranty or liability for your use of this information. Musculoskeletal Chest Pain: Care Instructions  Your Care Instructions  Chest pain is not always a sign that something is wrong with your heart or that you have another serious problem. The doctor thinks your chest pain is caused by strained muscles or ligaments, inflamed chest cartilage, or another problem in your chest, rather than by your heart. You may need more tests to find the cause of your chest pain. Follow-up care is a key part of your treatment and safety. Be sure to make and go to all appointments, and call your doctor if you are having problems. Its also a good idea to know your test results and keep a list of the medicines you take. How can you care for yourself at home? · Take pain medicines exactly as directed. ¨ If the doctor gave you a prescription medicine for pain, take it as prescribed. ¨ If you are not taking a prescription pain medicine, ask your doctor if you can take an over-the-counter medicine. · Rest and protect the sore area.   · Stop, change, or take a break from any activity that may be causing your pain or soreness. · Put ice or a cold pack on the sore area for 10 to 20 minutes at a time. Try to do this every 1 to 2 hours for the next 3 days (when you are awake) or until the swelling goes down. Put a thin cloth between the ice and your skin. · After 2 or 3 days, apply a heating pad set on low or a warm cloth to the area that hurts. Some doctors suggest that you go back and forth between hot and cold. · Do not wrap or tape your ribs for support. This may cause you to take smaller breaths, which could increase your risk of lung problems. · Mentholated creams such as Bengay or Icy Hot may soothe sore muscles. Follow the instructions on the package. · Follow your doctor's instructions for exercising. · Gentle stretching and massage may help you get better faster. Stretch slowly to the point just before pain begins, and hold the stretch for at least 15 to 30 seconds. Do this 3 or 4 times a day. Stretch just after you have applied heat. · As your pain gets better, slowly return to your normal activities. Any increased pain may be a sign that you need to rest a while longer. When should you call for help? Call 911 anytime you think you may need emergency care. For example, call if:  · You have chest pain or pressure. This may occur with:  ¨ Sweating. ¨ Shortness of breath. ¨ Nausea or vomiting. ¨ Pain that spreads from the chest to the neck, jaw, or one or both shoulders or arms. ¨ Dizziness or lightheadedness. ¨ A fast or uneven pulse. After calling 911, chew 1 adult-strength aspirin. Wait for an ambulance. Do not try to drive yourself. · You have sudden chest pain and shortness of breath, or you cough up blood. Call your doctor now or seek immediate medical care if:  · You have any trouble breathing. · Your chest pain gets worse.   · Your chest pain occurs consistently with exercise and is relieved by rest.  Watch closely for changes in your health, and be sure to contact your doctor if:  · Your chest pain does not get better after 1 week. Where can you learn more? Go to http://colin-christiano.info/. Enter V293 in the search box to learn more about \"Musculoskeletal Chest Pain: Care Instructions. \"  Current as of: March 20, 2017  Content Version: 11.3  © 4374-5722 [employer]. Care instructions adapted under license by [employer] (which disclaims liability or warranty for this information). If you have questions about a medical condition or this instruction, always ask your healthcare professional. [place] [address] any warranty or liability for your use of this information. Discharge Instructions       PATIENT ID: Tricia Jolly  MRN: [id number]   YOB: 1960    DATE OF ADMISSION: 7/28/2017 11:17 PM    DATE OF DISCHARGE: 7/29/2017    PRIMARY CARE PROVIDER: Romain Rodríguez MD     ATTENDING PHYSICIAN: John Mena MD  DISCHARGING PROVIDER: Brendon Dent NP    To contact this individual call [phone] and ask the  to page. If unavailable ask to be transferred the Adult Hospitalist Department. DISCHARGE DIAGNOSES chest pain     CONSULTATIONS: IP CONSULT TO CARDIOLOGY    PROCEDURES/SURGERIES: * No surgery found *    PENDING TEST RESULTS:   At the time of discharge the following test results are still pending: none    FOLLOW UP APPOINTMENTS:        ADDITIONAL CARE RECOMMENDATIONS:   Please take medications as directed  Please follow up with your     DIET: Cardiac Diet  Oral Nutritional Supplements: No Oral Supplement prescribed none     ACTIVITY: Activity as tolerated    WOUND CARE: none    EQUIPMENT needed: none      DISCHARGE MEDICATIONS:   See Medication Reconciliation Form    · It is important that you take the medication exactly as they are prescribed.    · Keep your medication in the bottles provided by the pharmacist and keep a list of the medication names, dosages, and times to be taken in your wallet. · Do not take other medications without consulting your doctor. NOTIFY YOUR PHYSICIAN FOR ANY OF THE FOLLOWING:   Fever over 101 degrees for 24 hours. Chest pain, shortness of breath, fever, chills, nausea, vomiting, diarrhea, change in mentation, falling, weakness, bleeding. Severe pain or pain not relieved by medications. Or, any other signs or symptoms that you may have questions about.       DISPOSITION:  x  Home With:   OT  PT  HH  RN       SNF/Inpatient Rehab/LTAC    Independent/assisted living    Hospice    Other:     CDMP Checked:   Yes x     PROBLEM LIST Updated:  Yes x       Signed:   Esperanza Arvizu NP  7/29/2017  8:56 PM

## 2017-07-31 LAB
ATRIAL RATE: 70 BPM
ATRIAL RATE: 85 BPM
CALCULATED P AXIS, ECG09: 13 DEGREES
CALCULATED P AXIS, ECG09: 47 DEGREES
CALCULATED R AXIS, ECG10: -14 DEGREES
CALCULATED R AXIS, ECG10: 5 DEGREES
CALCULATED T AXIS, ECG11: 60 DEGREES
CALCULATED T AXIS, ECG11: 86 DEGREES
DIAGNOSIS, 93000: NORMAL
DIAGNOSIS, 93000: NORMAL
P-R INTERVAL, ECG05: 174 MS
P-R INTERVAL, ECG05: 176 MS
Q-T INTERVAL, ECG07: 372 MS
Q-T INTERVAL, ECG07: 416 MS
QRS DURATION, ECG06: 80 MS
QRS DURATION, ECG06: 84 MS
QTC CALCULATION (BEZET), ECG08: 442 MS
QTC CALCULATION (BEZET), ECG08: 449 MS
VENTRICULAR RATE, ECG03: 70 BPM
VENTRICULAR RATE, ECG03: 85 BPM

## 2017-08-03 RX ORDER — PANTOPRAZOLE SODIUM 40 MG/1
40 TABLET, DELAYED RELEASE ORAL DAILY
COMMUNITY
End: 2017-08-03 | Stop reason: SDUPTHER

## 2017-08-03 NOTE — TELEPHONE ENCOUNTER
----- Message from [facility] sent at 8/2/2017  8:14 PM EDT -----  Regarding: Prescription Question  Contact: [phone]  Dr. Adam Mcfarland,    The pharmacist is trying to get an renewal for the pantoprazole sod dr 40 mg tab. It has been removed from the list as I have taken it in awhile. I am having a terrible acid reflux expisode. I had switched to dexilant but the insurance will not authorize. I need your office to contact the pharmacy, [address], to renew the prescription. Thanks.     [facility]

## 2017-08-14 RX ORDER — PANTOPRAZOLE SODIUM 40 MG/1
40 TABLET, DELAYED RELEASE ORAL DAILY
Qty: 30 TAB | Refills: 3 | Status: SHIPPED | OUTPATIENT
Start: 2017-08-14 | End: 2022-05-04

## 2017-08-22 ENCOUNTER — OFFICE VISIT (OUTPATIENT)
Dept: INTERNAL MEDICINE CLINIC | Age: 57
End: 2017-08-22

## 2017-08-22 VITALS
HEART RATE: 77 BPM | RESPIRATION RATE: 16 BRPM | BODY MASS INDEX: 29.08 KG/M2 | OXYGEN SATURATION: 97 % | TEMPERATURE: 97.8 F | HEIGHT: 62 IN | WEIGHT: 158 LBS | DIASTOLIC BLOOD PRESSURE: 60 MMHG | SYSTOLIC BLOOD PRESSURE: 111 MMHG

## 2017-08-22 DIAGNOSIS — J32.9 RECURRENT SINUSITIS: ICD-10-CM

## 2017-08-22 DIAGNOSIS — G47.00 INSOMNIA, UNSPECIFIED TYPE: ICD-10-CM

## 2017-08-22 DIAGNOSIS — I10 ESSENTIAL HYPERTENSION: Chronic | ICD-10-CM

## 2017-08-22 DIAGNOSIS — K21.9 CHRONIC GERD: Primary | ICD-10-CM

## 2017-08-22 RX ORDER — METFORMIN HYDROCHLORIDE 500 MG/1
TABLET ORAL
Qty: 60 TAB | Refills: 0
Start: 2017-08-22 | End: 2022-08-04

## 2017-08-22 RX ORDER — ZOLPIDEM TARTRATE 10 MG/1
10 TABLET ORAL
Qty: 30 TAB | Refills: 0 | Status: SHIPPED | OUTPATIENT
Start: 2017-08-22 | End: 2022-06-09 | Stop reason: SDUPTHER

## 2017-08-22 NOTE — PROGRESS NOTES
Chief Complaint   Patient presents with    Sinus Infection           Subjective:   Minor Gallina 49 y.o.  female with a  past medical history reviewed see below. comes in today for f/up   Admitted for observation for chest pain felt like a MI she was told it was gerd and was started on Protonix as well as sucralfate and pain is better. Notes from [facility] reviewed labs as well neg cardiac work up she has been to [facility] [address] earlier this yr as well. Sinus surgery sept 5th breathing has been fine   She has stopped the metformin with the Abx Augmentin from chart review. bs have been good fbs 89 90 75 and 2 hr ppbs 95 129 108 120 86   ROS: otherwise feeling generally well. All other systems reviewed and are negative      Current Outpatient Prescriptions   Medication Sig Dispense Refill    zolpidem (AMBIEN) 10 mg tablet Take 1 Tab by mouth nightly as needed for Sleep. Max Daily Amount: 10 mg. 30 Tab 0    metFORMIN (GLUCOPHAGE) 500 mg tablet Decrease to 250mg BID and monitor blood sugars 60 Tab 0    pantoprazole (PROTONIX) 40 mg tablet Take 1 Tab by mouth daily. 30 Tab 3    sucralfate (CARAFATE) 1 gram tablet Take 1 Tab by mouth four (4) times daily. 120 Tab 1    bumetanide (BUMEX) 0.5 mg tablet TAKE 1 TABLET BY MOUTH TWICE A DAY AS NEEDED 180 Tab 1    losartan (COZAAR) 100 mg tablet Take 1 Tab by mouth every evening. 90 Tab 3    EPINEPHrine (EPIPEN) 0.3 mg/0.3 mL injection 0.3 mL by IntraMUSCular route once as needed for up to 1 dose. 2 Syringe 1    montelukast (SINGULAIR) 10 mg tablet TAKE 1 TAB BY MOUTH DAILY. INDICATIONS: ALLERGIC RHINITIS 30 Tab 11    carvedilol (COREG) 25 mg tablet Take 1 Tab by mouth two (2) times a day. 60 Tab 11    diclofenac (VOLTAREN) 1 % gel Apply 2 g to affected area every six (6) hours. 100 g 0    albuterol (PROAIR HFA) 90 mcg/actuation inhaler Take 2 Puffs by inhalation every six (6) hours as needed for Wheezing.  1 Inhaler 1    cetirizine (ZYRTEC) 10 mg tablet Take 10 mg by mouth daily as needed for Allergies.  cloNIDine HCl (CATAPRES) 0.3 mg tablet Take 0.3 mg by mouth two (2) times daily as needed. *IF BLOOD PRESSURE IS >170/90*      ipratropium (ATROVENT) 0.06 % nasal spray 2 Sprays by Both Nostrils route four (4) times daily as needed for Rhinitis. Indications: RHINORRHEA      atorvastatin (LIPITOR) 80 mg tablet Take 1 Tab by mouth nightly. 30 Tab 5    Cholecalciferol, Vitamin D3, (VITAMIN D) 2,000 unit Cap Take 4,000 Units by mouth daily. Takes 2 of the 2,000 unit tabs qd      L. acidophilus-L. rhamnosus (PROBIOTIC) 15 billion cell cap Take  by mouth.  cyclobenzaprine (FLEXERIL) 5 mg tablet Take 1 Tab by mouth daily as needed for Muscle Spasm(s). Do not take with the Ambien 10 Tab 0    famotidine (PEPCID) 40 mg tablet Take 40 mg by mouth nightly. Allergies   Allergen Reactions    Ace Inhibitors Cough     ?10/2011    Seafood [Shellfish Containing Products] Hives, Shortness of Breath and Nausea and Vomiting     Past Medical History:   Diagnosis Date    Acute Anterior Myocardial Infarction, s/p PTCA w/ stent 10/2010 10/31/2010    Asthma     mild    Diabetes mellitus, type 2 ([facility] [address] 8/24/2012    GERD (gastroesophageal reflux disease)     hiatal hernia    H/O recurrent Sinusitis 3/16/2011    Heart failure ([facility] [address]     Hiatal Hernia w/ GERD (gastroesophageal reflux disease) 3/16/2011    Hypercholesterolemia     Hypertension 3/16/2011    Ill-defined condition     pacemaker put in last July 2015 because of CHF    Impaired fasting glucose 4/18/2011    Iron deficiency anemia 3/16/2011    Multinodular thyroid 3/16/2011    Palpitations, PVC's 10/13/2011    Perennial allergic rhinitis 3/16/2011    Pityriasis rosea 5/30/2012    Post-menopausal     LMP 46y. o, no HRT    Postmenopause, LMP 40 yo, No HRT 3/16/2011    Reflux esophagitis 3/16/2011    Tobacco user     Vitamin D deficiency 10/13/2011     Past Surgical History:   Procedure Laterality Date    CARDIAC SURG PROCEDURE UNLIST      cardiac stent     COLONOSCOPY N/A 2017    COLONOSCOPY performed by Sena Agudelo MD at [address] EGD  2009    hiatal hernia, esophagitis; Dr Selina Mitchell, COLON, DIAGNOSTIC  2009    HX GYN      FNA bilateral benign     HX LITHOTRIPSY  2012    failed   [address] ([place]) EVAL  2012    scope w/ kidney stone extractions, multiple; Dr Robbi Lesch, [facility]    ND COLONOSCOPY W/BIOPSY SINGLE/MULTIPLE  2009    benign polyp, recheck in 5 years, Dr Hanh Watson    PTCA W/ STENT, INITIAL  10/2010    Dr Nicky Lam     Family History   Problem Relation Age of Onset    Hypertension Mother     High Cholesterol Mother     Thyroid Disease Mother     Heart Disease Mother      chf    Kidney Disease Mother     Headache Mother     Heart Disease Father     Heart Attack Father 36      MI age 36   Elyssa Federico High Cholesterol Father     Hypertension Sister     Thyroid Disease Sister     Hypertension Sister    Elyssa Hayden Arthritis-rheumatoid Sister     Diabetes Sister     Thyroid Disease Sister     Heart Attack Brother      massive MI at age 46, survived    Thyroid Disease Other      niece, Neha Millheim    High Cholesterol Brother      Social History   Substance Use Topics    Smoking status: Former Smoker     Packs/day: 0.25     Years: 30.00     Types: Cigarettes     Quit date: 2013    Smokeless tobacco: Never Used      Comment: had quit 10/2010 then restarted ~ 3/2012;  about 5 cigarettes a day; on and off since age 28 yo x ~ 30 yrs    Alcohol use No      Comment: none          Objective:     Visit Vitals    /60 (BP 1 Location: Left arm, BP Patient Position: Sitting)    Pulse 77    Temp 97.8 °F (36.6 °C) (Oral)    Resp 16    Ht 5' 2\" (1.575 m)    Wt 158 lb (71.7 kg)    LMP  (Exact Date)    SpO2 97%    BMI 28.9 kg/m2     Gen: NAD, pleasant  HEENT: normal appearing head, nares patent, PERRLA, EOMI, oropharynx no erythema, no cervical lymphadenopathy neck supple   Cardio: RRR nl S1S2 no murmur  Lungs CTAB no wheeze no rales no rhonchi  ABD Soft non tender non distended + bowel sounds  Extremities: full ROM X 4 no clubbing no cyanosis  Neuro: no gross focal deficits noted, alert and orientated X 3  Psych.: well groomed no outward signs of depression. Assessment/Plan:   Diagnoses and all orders for this visit:    1. Chronic GERD  -     H PYLORI AG, STOOL    2. Insomnia, unspecified type  -     zolpidem (AMBIEN) 10 mg tablet; Take 1 Tab by mouth nightly as needed for Sleep. Max Daily Amount: 10 mg.    3. Essential hypertension  -     zolpidem (AMBIEN) 10 mg tablet; Take 1 Tab by mouth nightly as needed for Sleep. Max Daily Amount: 10 mg.    4. Recurrent sinusitis  -     REFERRAL TO ENT-OTOLARYNGOLOGY    Other orders  -     metFORMIN (GLUCOPHAGE) 500 mg tablet; Decrease to 250mg BID and monitor blood sugars      Follow-up Disposition:  Return if symptoms worsen or fail to improve. Nasima myers printed and given to the pt. .  Will work up for h pylori,([facility] doctor ) continue netti pot and continue all abx decrease metformin to 500mg daily (250 bid) and continue to monitor bs's   The patient voiced understanding of the above. Medication side effects were reviewed with the patient. Call with any concerns.

## 2017-08-22 NOTE — PROGRESS NOTES
Chief Complaint   Patient presents with    Sinus Infection     1. Have you been to the ER, urgent care clinic since your last visit? Hospitalized since your last visit? Yes When: 7/28/17    2. Have you seen or consulted any other health care providers outside of the [address] [place] [address] since your last visit? Include any pap smears or colon screening.  No

## 2017-08-22 NOTE — MR AVS SNAPSHOT
Visit Information Date & Time Provider Department Dept. Phone Encounter #  
 8/22/2017  3:00  [facility], [address]  [place] [id number] Follow-up Instructions Return if symptoms worsen or fail to improve. Upcoming Health Maintenance Date Due Hepatitis C Screening 1960 DTaP/Tdap/Td series (1 - Tdap) 4/28/2009 EYE EXAM RETINAL OR DILATED Q1 3/1/2015 PAP AKA CERVICAL CYTOLOGY 3/26/2016 MICROALBUMIN Q1 11/16/2016 INFLUENZA AGE 9 TO ADULT 8/1/2017 HEMOGLOBIN A1C Q6M 1/29/2018 FOOT EXAM Q1 7/17/2018 LIPID PANEL Q1 7/29/2018 BREAST CANCER SCRN MAMMOGRAM 5/31/2019 COLONOSCOPY 4/19/2027 Allergies as of 8/22/2017  Review Complete On: 8/22/2017 By: Diony Pemberton Severity Noted Reaction Type Reactions Ace Inhibitors  10/13/2011    Cough ?10/2011 Seafood [Shellfish Containing Products]  11/01/2010    Hives, Shortness of Breath, Nausea and Vomiting Current Immunizations  Reviewed on 10/19/2016 Name Date Influenza Vaccine 12/16/2014  4:31 PM, 1/10/2013 Influenza Vaccine (Quad) PF 10/19/2016  6:19 PM  
 Influenza Vaccine Intradermal PF 11/2/2015 Influenza Vaccine PF 10/7/2013 Influenza Vaccine Split 10/6/2010 Pneumococcal Polysaccharide (PPSV-23) 7/31/2015  4:49 PM  
 TD Vaccine 4/27/2009 Not reviewed this visit You Were Diagnosed With   
  
 Codes Comments Chronic GERD    -  Primary ICD-10-CM: K21.9 ICD-9-CM: 530.81 Insomnia, unspecified type     ICD-10-CM: G47.00 ICD-9-CM: 780.52 Essential hypertension     ICD-10-CM: I10 
ICD-9-CM: 401.9 Recurrent sinusitis     ICD-10-CM: J32.9 ICD-9-CM: 473.9 Vitals BP Pulse Temp Resp Height(growth percentile) Weight(growth percentile) 111/60 (BP 1 Location: Left arm, BP Patient Position: Sitting) 77 97.8 °F (36.6 °C) (Oral) 16 5' 2\" (1.575 m) 158 lb (71.7 kg) LMP SpO2 BMI OB Status Smoking Status (Exact Date) 97% 28.9 kg/m2 Postmenopausal Former Smoker Vitals History BMI and BSA Data Body Mass Index Body Surface Area  
 28.9 kg/m 2 1.77 m 2 Preferred Pharmacy Pharmacy Name Phone [id number] Nehal Arias, [address] [facility] [phone] Your Updated Medication List  
  
   
This list is accurate as of: 8/22/17  3:36 PM.  Always use your most recent med list.  
  
  
  
  
 albuterol 90 mcg/actuation inhaler Commonly known as:  PROAIR HFA Take 2 Puffs by inhalation every six (6) hours as needed for Wheezing. atorvastatin 80 mg tablet Commonly known as:  LIPITOR Take 1 Tab by mouth nightly. bumetanide 0.5 mg tablet Commonly known as:  Janet Noun TAKE 1 TABLET BY MOUTH TWICE A DAY AS NEEDED  
  
 carvedilol 25 mg tablet Commonly known as:  Pickering German Take 1 Tab by mouth two (2) times a day. cetirizine 10 mg tablet Commonly known as:  ZYRTEC Take 10 mg by mouth daily as needed for Allergies. cloNIDine HCl 0.3 mg tablet Commonly known as:  CATAPRES Take 0.3 mg by mouth two (2) times daily as needed. *IF BLOOD PRESSURE IS >170/90*  
  
 cyclobenzaprine 5 mg tablet Commonly known as:  FLEXERIL Take 1 Tab by mouth daily as needed for Muscle Spasm(s). Do not take with the Ambien  
  
 diclofenac 1 % Gel Commonly known as:  VOLTAREN Apply 2 g to affected area every six (6) hours. EPINEPHrine 0.3 mg/0.3 mL injection Commonly known as:  EPIPEN  
0.3 mL by IntraMUSCular route once as needed for up to 1 dose.  
  
 famotidine 40 mg tablet Commonly known as:  PEPCID Take 40 mg by mouth nightly. ipratropium 0.06 % nasal spray Commonly known as:  ATROVENT  
2 Sprays by Both Nostrils route four (4) times daily as needed for Rhinitis. Indications: RHINORRHEA  
  
 losartan 100 mg tablet Commonly known as:  COZAAR Take 1 Tab by mouth every evening. metFORMIN 500 mg tablet Commonly known as:  GLUCOPHAGE Decrease to 250mg BID and monitor blood sugars  
  
 montelukast 10 mg tablet Commonly known as:  SINGULAIR  
TAKE 1 TAB BY MOUTH DAILY. INDICATIONS: ALLERGIC RHINITIS  
  
 pantoprazole 40 mg tablet Commonly known as:  PROTONIX Take 1 Tab by mouth daily. PROBIOTIC 15 billion cell Cap Generic drug:  L. acidophilus-L. rhamnosus Take  by mouth.  
  
 sucralfate 1 gram tablet Commonly known as:  Donzell Hanly Take 1 Tab by mouth four (4) times daily. VITAMIN D 2,000 unit Cap capsule Generic drug:  Cholecalciferol (Vitamin D3) Take 4,000 Units by mouth daily. Takes 2 of the 2,000 unit tabs qd  
  
 zolpidem 10 mg tablet Commonly known as:  AMBIEN Take 1 Tab by mouth nightly as needed for Sleep. Max Daily Amount: 10 mg.  
  
  
  
  
Prescriptions Printed Refills  
 zolpidem (AMBIEN) 10 mg tablet 0 Sig: Take 1 Tab by mouth nightly as needed for Sleep. Max Daily Amount: 10 mg.  
 Class: Print Route: Oral  
  
We Performed the Following [place], [place] [[address] CPT(R)] REFERRAL TO ENT-OTOLARYNGOLOGY [JTK43 Custom] Comments:  
 Please evaluate patient for *surgery and follow up - please authorize 99 visit and good for one yr. Follow-up Instructions Return if symptoms worsen or fail to improve. Referral Information Referral ID Referred By Referred To  
  
 [id number] Any Jolley, [address] [facility] [address] [place] Visits Status Start Date End Date 1 New Request 8/22/17 8/22/18 If your referral has a status of pending review or denied, additional information will be sent to support the outcome of this decision. Introducing [facility]! Dear Claritza Kraus: Thank you for requesting a [employer] account. Our records indicate that you already have an active [employer] account.   You can access your account anytime at [email or web address] Did you know that you can access your hospital and ER discharge instructions at any time in [employer]? You can also review all of your test results from your hospital stay or ER visit. Additional Information If you have questions, please visit the Frequently Asked Questions section of the [employer] website at [email or web address] Remember, [employer] is NOT to be used for urgent needs. For medical emergencies, dial 911. Now available from your iPhone and Android! Please provide this summary of care documentation to your next provider. Your primary care clinician is listed as Calixto Mendez. If you have any questions after today's visit, please call [phone].

## 2017-08-30 LAB — H PYLORI AG STL QL IA: NEGATIVE

## 2017-10-31 RX ORDER — AMOXICILLIN AND CLAVULANATE POTASSIUM 875; 125 MG/1; MG/1
TABLET, FILM COATED ORAL
Refills: 0 | Status: ON HOLD | COMMUNITY
Start: 2017-08-03 | End: 2020-11-09

## 2017-12-01 ENCOUNTER — OFFICE VISIT (OUTPATIENT)
Dept: FAMILY MEDICINE CLINIC | Age: 57
End: 2017-12-01

## 2017-12-01 VITALS
SYSTOLIC BLOOD PRESSURE: 143 MMHG | BODY MASS INDEX: 30.29 KG/M2 | OXYGEN SATURATION: 97 % | TEMPERATURE: 97.2 F | HEART RATE: 84 BPM | HEIGHT: 62 IN | DIASTOLIC BLOOD PRESSURE: 73 MMHG | RESPIRATION RATE: 19 BRPM | WEIGHT: 164.6 LBS

## 2017-12-01 DIAGNOSIS — J45.909 ASTHMA, UNSPECIFIED ASTHMA SEVERITY, UNSPECIFIED WHETHER COMPLICATED, UNSPECIFIED WHETHER PERSISTENT: Chronic | ICD-10-CM

## 2017-12-01 DIAGNOSIS — Z00.00 WELL ADULT EXAM: ICD-10-CM

## 2017-12-01 DIAGNOSIS — I21.09: Chronic | ICD-10-CM

## 2017-12-01 DIAGNOSIS — Z23 ENCOUNTER FOR IMMUNIZATION: Primary | ICD-10-CM

## 2017-12-01 DIAGNOSIS — M54.31 SCIATICA OF RIGHT SIDE WITHOUT BACK PAIN: ICD-10-CM

## 2017-12-01 DIAGNOSIS — I50.9 CONGESTIVE HEART FAILURE, UNSPECIFIED CONGESTIVE HEART FAILURE CHRONICITY, UNSPECIFIED CONGESTIVE HEART FAILURE TYPE: ICD-10-CM

## 2017-12-01 DIAGNOSIS — K21.00 GASTROESOPHAGEAL REFLUX DISEASE WITH ESOPHAGITIS: ICD-10-CM

## 2017-12-01 RX ORDER — CYCLOBENZAPRINE HCL 5 MG
5 TABLET ORAL
Qty: 20 TAB | Refills: 0 | Status: ON HOLD | OUTPATIENT
Start: 2017-12-01 | End: 2020-11-09

## 2017-12-01 RX ORDER — EPINEPHRINE 0.3 MG/.3ML
0.3 INJECTION SUBCUTANEOUS
Qty: 2 SYRINGE | Refills: 1 | Status: SHIPPED | OUTPATIENT
Start: 2017-12-01

## 2017-12-01 NOTE — PROGRESS NOTES
S: Jennifer Ramos is a 56 y.o. female who presents for     Assessment/Plan:    1. Well adult exam  -discussed healthy diet and daily exercise  -labs UTD  -Tdap and Prevnar 13 today  -rx for Shingrex given    3. Sciatica of right side without back pain  -flexeril 5 mg prn  - referral Dr. Janeth Cohen for cortisone injection    4. Asthma,   Sarcoidosis of skin (no respiratory sx)  -well controlled on current therapy: prn albuterol and singular daily    5. Gastroesophageal reflux disease with esophagitis  -well controlled on current therapy - sulcrafate qid    6. Acute Anterior Myocardial Infarction, s/p PTCA w/ stent 10/2010  Congestive heart failure, unspecified congestive heart failure chronicity, unspecified congestive heart failure type ([facility] [address]  -followed by Dr. Shanika Ochoa, cardiology - does lab work   -well controlled on current therapy: Coreg 25mg bid; lipitor 80mg, bumex 0.5mg prn, clonidine 0.3mg prn     RTC 1 year     HPI:  MI 2010 - stent placed  2015 - ICD placed   Shanika Ochoa cardiology - 7/2017 - EF 42%  HTN  Clonidine 0.3mg prn - hasn't needed for a year   bumex 0.5mg prn - last 3-4 weeks   Coreg 25mg bid   lipitor 80mg    Respiratory  Albuterol  Singular  atrovent - doesn't use it  abx for sinus infection - 2 more days left    GI - GERD  July 2017 - had a ulcerated esophagus found during ED   sulcrafate 4x day     DM  Metformin 500mg - stomach issues so can't do 2g    Social history:   Nutrition: overall healthy diet; fruit and veggies irritated  Breakfast: 1/2 orange, 1/2 banana 2 crackers with cheese, water or coke  Lunch: skips or on the go   Dinner: chicken, beef, pork , veggies   Drink: water - 32-64 oz - balanced due to CHF  Physical: 30 min walking tape  Social: live    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  Patient is postmenopausal.  38 yo when she went through menopause. Review of Systems:  - Constitutional Symptoms: no fevers, chills, weight loss  - Eyes: no blurry vision or double vision  - Cardiovascular: no chest pain or palpitations  - Respiratory: no cough or shortness of breath (only if URI, climbing stairs)   - Gastrointestinal: no dysphagia or abdominal pain  - Musculoskeletal: no joint pains or weakness  - Integumentary: no rashes  - : no vaginal discharge, itching, dyspareunia, or recent changes in cycle. No excessive cramping, bloating, moodiness or breast tenderness.  - Neurological: no numbness, tingling, or headaches  - Psychiatric: no depression or anxiety  - Endocrine:  no heat or cold intolerance, no polyuria or polydipsia    PHQ over the last two weeks 12/1/2017   Little interest or pleasure in doing things Not at all   Feeling down, depressed or hopeless Not at all   Total Score PHQ 2 0       I reviewed the following:  Past Medical History:   Diagnosis Date    Acute Anterior Myocardial Infarction, s/p PTCA w/ stent 10/2010 10/31/2010    Asthma     mild    Diabetes mellitus, type 2 ([facility] [address] 8/24/2012    GERD (gastroesophageal reflux disease)     hiatal hernia    H/O recurrent Sinusitis 3/16/2011    Heart failure ([facility] [address]     Hiatal Hernia w/ GERD (gastroesophageal reflux disease) 3/16/2011    Hypercholesterolemia     Hypertension 3/16/2011    Ill-defined condition     pacemaker put in last July 2015 because of CHF    Impaired fasting glucose 4/18/2011    Iron deficiency anemia 3/16/2011    Multinodular thyroid 3/16/2011    Palpitations, PVC's 10/13/2011    Perennial allergic rhinitis 3/16/2011    Pityriasis rosea 5/30/2012    Post-menopausal     LMP 46y. o, no HRT    Postmenopause, LMP 38 yo, No HRT 3/16/2011    Reflux esophagitis 3/16/2011    Tobacco user     Vitamin D deficiency 10/13/2011       Current Outpatient Prescriptions   Medication Sig Dispense Refill    amoxicillin-clavulanate (AUGMENTIN) 875-125 mg per tablet TAKE 1 TABLET BY MOUTH TWICE A DAY  0    zolpidem (AMBIEN) 10 mg tablet Take 1 Tab by mouth nightly as needed for Sleep. Max Daily Amount: 10 mg. 30 Tab 0    metFORMIN (GLUCOPHAGE) 500 mg tablet Decrease to 250mg BID and monitor blood sugars 60 Tab 0    pantoprazole (PROTONIX) 40 mg tablet Take 1 Tab by mouth daily. 30 Tab 3    sucralfate (CARAFATE) 1 gram tablet Take 1 Tab by mouth four (4) times daily. 120 Tab 1    bumetanide (BUMEX) 0.5 mg tablet TAKE 1 TABLET BY MOUTH TWICE A DAY AS NEEDED 180 Tab 1    losartan (COZAAR) 100 mg tablet Take 1 Tab by mouth every evening. 90 Tab 3    montelukast (SINGULAIR) 10 mg tablet TAKE 1 TAB BY MOUTH DAILY. INDICATIONS: ALLERGIC RHINITIS 30 Tab 11    carvedilol (COREG) 25 mg tablet Take 1 Tab by mouth two (2) times a day. 60 Tab 11    diclofenac (VOLTAREN) 1 % gel Apply 2 g to affected area every six (6) hours. 100 g 0    albuterol (PROAIR HFA) 90 mcg/actuation inhaler Take 2 Puffs by inhalation every six (6) hours as needed for Wheezing. 1 Inhaler 1    cetirizine (ZYRTEC) 10 mg tablet Take 10 mg by mouth daily as needed for Allergies.  cloNIDine HCl (CATAPRES) 0.3 mg tablet Take 0.3 mg by mouth two (2) times daily as needed. *IF BLOOD PRESSURE IS >170/90*      ipratropium (ATROVENT) 0.06 % nasal spray 2 Sprays by Both Nostrils route four (4) times daily as needed for Rhinitis. Indications: RHINORRHEA      famotidine (PEPCID) 40 mg tablet Take 40 mg by mouth nightly.  atorvastatin (LIPITOR) 80 mg tablet Take 1 Tab by mouth nightly. 30 Tab 5    Cholecalciferol, Vitamin D3, (VITAMIN D) 2,000 unit Cap Take 4,000 Units by mouth daily. Takes 2 of the 2,000 unit tabs qd      L. acidophilus-L. rhamnosus (PROBIOTIC) 15 billion cell cap Take  by mouth.  EPINEPHrine (EPIPEN) 0.3 mg/0.3 mL injection 0.3 mL by IntraMUSCular route once as needed for up to 1 dose.  2 Syringe 1    cyclobenzaprine (FLEXERIL) 5 mg tablet Take 1 Tab by mouth daily as needed for Muscle Spasm(s). Do not take with the Ambien 10 Tab 0       Allergies   Allergen Reactions    Ace Inhibitors Cough     ?10/2011    Seafood [Shellfish Containing Products] Hives, Shortness of Breath and Nausea and Vomiting      Health Maintenance:  Mammogram: 2017  PAP: 2017   Colonoscopy: 2017 - 5-10y recall   Eye exam: 10/2017  ACP:   Tdap: today   Flu: UTD  Shingles: discussed - rx given  Pneumo 23:   Prevnar 13: today     O: VS:   Visit Vitals    /73 (BP 1 Location: Right arm, BP Patient Position: Sitting)    Pulse 84    Temp 97.2 °F (36.2 °C) (Oral)    Resp 19    Ht 5' 2\" (1.575 m)    Wt 164 lb 9.6 oz (74.7 kg)    LMP  (Exact Date)    SpO2 97%    BMI 30.11 kg/m2     Data Reviewed:  17  TC: 109; T; HDL: 48; LDL: 46  TSH: 1.14  A1c: 7.1%    GENERAL: Redge Riff is in no acute distress. Non-toxic. Well nourished. Well developed. Appropriately groomed. HEAD:  Normocephalic. Atraumatic. Non tender sinuses x 4. EYE: PERRLA. EOMs intact. Sclera anicteric without injection. No drainage or discharge. EARS: Hearing intact bilaterally. External ear canals normal without evidence of blood or swelling. Bilateral TM's intact, pearly grey with landmarks visible. No erythema or effusion. NOSE: Patent. Nasal turbinates pink. No polyps noted. No erythema. No discharge. MOUTH: mucous membranes pink and moist. Posterior pharynx normal with cobblestone appearance. No erythema, white exudate or obstruction. NECK: supple. Midline trachea. No carotid bruits noted bilaterally. No thyromegaly noted. RESP: Breath sounds are symmetrical bilaterally. Unlabored without SOB. Speaking in full sentences. Clear to auscultation bilaterally anteriorly and posteriorly. No wheezes. No rales or rhonchi. CV: normal rate. Regular rhythm. S1, S2 audible. No murmur noted. No rubs, clicks or gallops noted. ABDOMEN: Flat without bulges or pulsations. Soft and nondistended. No tenderness on palpation. No masses or organomegaly. No rebound, rigidity or guarding. Bowel sounds normal x 4 quadrants. BACK: No visible deformities or curvature. Full ROM. No pain on palpation of the spinous processes in the cervical, thoracic, lumbar, sacral regions. No CVA tenderness. : deferred  NEURO:  awake, alert and oriented to person, place, and time and event. Cranial nerves II through XII intact. Clear speech. Muscle strength is +5/5 x 4 extremities. Sensation is intact to light touch bilaterally. Steady gait. MUSC:  Intact x 4 extremities. Full ROM x 4 extremities. Pain with movement of right leg. Right LE: no edema, erythema noted. Cap refill 2+  HEME/LYMPH: peripheral pulses palpable 2+ x 4 extremities. No peripheral edema is noted. No cervical adenopathy noted. SKIN: Skin is warm and dry. Turgor is normal. No petechiae, no purpura, no rash. No cyanosis. No mottling, jaundice or pallor. PSYCH: appropriate behavior, dress and thought processes. Good eye contact. Clear and coherent speech. Full affect. Good insight.   ______________________________________________________________________  Patient education was done. Advised on nutrition, physical activity, weight management, tobacco, alcohol and safety. Counseling included discussion of diagnosis, differentials, treatment options, prescribed treatment, warning signs and follow up. Medication risks/benefits,interactions and alternatives discussed with patient.      Patient verbalized understanding and agreed to plan of care. Patient was given an after visit summary which included current diagnoses, medications and vital signs. Follow up as directed.

## 2017-12-01 NOTE — ACP (ADVANCE CARE PLANNING)
Patient states the 2015 [facility] [address] [facility] is current. No need for revisions.

## 2017-12-01 NOTE — MR AVS SNAPSHOT
Visit Information Date & Time Provider Department Dept. Phone Encounter #  
 12/1/2017  2:00 PM Greer Duane, NP [facility] [phone] [id number] Upcoming Health Maintenance Date Due Hepatitis C Screening 1960 DTaP/Tdap/Td series (1 - Tdap) 4/28/2009 EYE EXAM RETINAL OR DILATED Q1 3/1/2015 PAP AKA CERVICAL CYTOLOGY 3/26/2016 Influenza Age 5 to Adult 8/1/2017 HEMOGLOBIN A1C Q6M 1/29/2018 FOOT EXAM Q1 7/17/2018 MICROALBUMIN Q1 7/17/2018 LIPID PANEL Q1 7/29/2018 BREAST CANCER SCRN MAMMOGRAM 5/31/2019 COLONOSCOPY 4/19/2027 Allergies as of 12/1/2017  Review Complete On: 12/1/2017 By: Greer Duane, NP Severity Noted Reaction Type Reactions Ace Inhibitors  10/13/2011    Cough ?10/2011 Seafood [Shellfish Containing Products]  11/01/2010    Hives, Shortness of Breath, Nausea and Vomiting Current Immunizations  Reviewed on 10/19/2016 Name Date Influenza Vaccine 12/16/2014  4:31 PM, 1/10/2013 Influenza Vaccine (Quad) PF 10/19/2016  6:19 PM  
 Influenza Vaccine Intradermal PF 11/2/2015 Influenza Vaccine PF 10/7/2013 Influenza Vaccine Split 10/6/2010 Pneumococcal Conjugate (PCV-13)  Incomplete Pneumococcal Polysaccharide (PPSV-23) 7/31/2015  4:49 PM  
 TD Vaccine 4/27/2009 Tdap  Incomplete Not reviewed this visit You Were Diagnosed With   
  
 Codes Comments Encounter for immunization    -  Primary ICD-10-CM: X42 ICD-9-CM: V03.89 Well adult exam     ICD-10-CM: Z00.00 ICD-9-CM: V70.0 Sciatica of right side without back pain     ICD-10-CM: M54.31 
ICD-9-CM: 724.3 Asthma, unspecified asthma severity, unspecified whether complicated, unspecified whether persistent     ICD-10-CM: J45.909 ICD-9-CM: 493.90 Gastroesophageal reflux disease with esophagitis     ICD-10-CM: K21.0 ICD-9-CM: 530.11  Myocardial infarction, anterior, acute, initial episode ([facility] [address] ICD-10-CM: I21.09 
ICD-9-CM: 410.11 Congestive heart failure, unspecified congestive heart failure chronicity, unspecified congestive heart failure type ([facility].)     ICD-10-CM: I50.9 ICD-9-CM: 428.0 Vitals BP Pulse Temp Resp Height(growth percentile) Weight(growth percentile) 143/73 (BP 1 Location: Right arm, BP Patient Position: Sitting) 84 97.2 °F (36.2 °C) (Oral) 19 5' 2\" (1.575 m) 164 lb 9.6 oz (74.7 kg) LMP SpO2 BMI OB Status Smoking Status (Exact Date) 97% 30.11 kg/m2 Postmenopausal Former Smoker BMI and BSA Data Body Mass Index Body Surface Area  
 30.11 kg/m 2 1.81 m 2 Preferred Pharmacy Pharmacy Name Phone [id number] Luci Newman [address] [place] [address] [phone] Your Updated Medication List  
  
   
This list is accurate as of: 12/1/17  2:55 PM.  Always use your most recent med list.  
  
  
  
  
 albuterol 90 mcg/actuation inhaler Commonly known as:  PROAIR HFA Take 2 Puffs by inhalation every six (6) hours as needed for Wheezing. amoxicillin-clavulanate 875-125 mg per tablet Commonly known as:  AUGMENTIN  
TAKE 1 TABLET BY MOUTH TWICE A DAY  
  
 atorvastatin 80 mg tablet Commonly known as:  LIPITOR Take 1 Tab by mouth nightly. bumetanide 0.5 mg tablet Commonly known as:  Bora Lainey TAKE 1 TABLET BY MOUTH TWICE A DAY AS NEEDED  
  
 carvedilol 25 mg tablet Commonly known as:  Kathleene Nancy Take 1 Tab by mouth two (2) times a day. cetirizine 10 mg tablet Commonly known as:  ZYRTEC Take 10 mg by mouth daily as needed for Allergies. cloNIDine HCl 0.3 mg tablet Commonly known as:  CATAPRES Take 0.3 mg by mouth two (2) times daily as needed. *IF BLOOD PRESSURE IS >170/90*  
  
 cyclobenzaprine 5 mg tablet Commonly known as:  FLEXERIL Take 1 Tab by mouth daily as needed for Muscle Spasm(s). Do not take with the Ambien  
  
 diclofenac 1 % Gel Commonly known as:  VOLTAREN  
 Apply 2 g to affected area every six (6) hours. famotidine 40 mg tablet Commonly known as:  PEPCID Take 40 mg by mouth nightly. ipratropium 0.06 % nasal spray Commonly known as:  ATROVENT  
2 Sprays by Both Nostrils route four (4) times daily as needed for Rhinitis. Indications: RHINORRHEA  
  
 losartan 100 mg tablet Commonly known as:  COZAAR Take 1 Tab by mouth every evening. metFORMIN 500 mg tablet Commonly known as:  GLUCOPHAGE Decrease to 250mg BID and monitor blood sugars  
  
 montelukast 10 mg tablet Commonly known as:  SINGULAIR  
TAKE 1 TAB BY MOUTH DAILY. INDICATIONS: ALLERGIC RHINITIS  
  
 pantoprazole 40 mg tablet Commonly known as:  PROTONIX Take 1 Tab by mouth daily. sucralfate 1 gram tablet Commonly known as:  Alphia Guadeloupe Take 1 Tab by mouth four (4) times daily. varicella zoster vaccine live 19,400 unit/0.65 mL Susr injection Commonly known as:  ZOSTAVAX  
1 Vial by SubCUTAneous route once for 1 dose. VITAMIN D 2,000 unit Cap capsule Generic drug:  Cholecalciferol (Vitamin D3) Take 4,000 Units by mouth daily. Takes 2 of the 2,000 unit tabs qd  
  
 zolpidem 10 mg tablet Commonly known as:  AMBIEN Take 1 Tab by mouth nightly as needed for Sleep. Max Daily Amount: 10 mg.  
  
  
  
  
Prescriptions Printed Refills  
 varicella zoster vaccine live (ZOSTAVAX) 19,400 unit/0.65 mL susr injection 0 Si Vial by SubCUTAneous route once for 1 dose. Class: Print Route: SubCUTAneous Prescriptions Sent to Pharmacy Refills  
 cyclobenzaprine (FLEXERIL) 5 mg tablet 0 Sig: Take 1 Tab by mouth daily as needed for Muscle Spasm(s). Do not take with the Ambien Class: Normal  
 Pharmacy: [address] [place] [address] #: [phone] Route: Oral  
  
We Performed the Following PNEUMOCOCCAL CONJ VACCINE 13 VALENT IM [id number] CPT(R)] OH IMMUNIZ ADMIN,1 SINGLE/COMB VAC/TOXOID [id number] CPT(R)] REFERRAL TO SPORTS [facility] [JHU037 Custom] TETANUS, DIPHTHERIA TOXOIDS AND ACELLULAR PERTUSSIS VACCINE (TDAP), IN INDIVIDS. >=7, IM [id number] CPT(R)] Referral Information Referral ID Referred By Referred To  
  
 [id number] [facility] [place] [facility]   
   [address] [place] [address], [facility] S Ekaterina Burdick Phone: [phone] Visits Status Start Date End Date 1 New Request 12/1/17 12/1/18 If your referral has a status of pending review or denied, additional information will be sent to support the outcome of this decision. Patient Instructions 1) 1. For sciatica - intermittent application of ice (frozen peas make good ice packs) and moist heat (no dry heat packs) to affected area 10 min on, 10 min off. Can take 20 minute warm, epsom salt bath ([employer] carries a good one called Dr. Tereza Negron for about $6), if you are safe getting in and out of tub. 
 
2. Relative rest.  Avoid strenuous lifting, pushing, pulling. Avoid twisting and bending. 3. Back stretching and strengthening exercises. Please do the following exercises at least twice a day: 
 
Windmill stretch: Lie flat on back, arms extended from shoulders, bend one leg at knee, put bent knee over body, look opposite direction. Hold stretch for 10 seconds. Repeat on opposite side. Do this in morning - either while still in bed or when you first get up, on floor and again at night, before going to sleep. Lie on the back with both feet flat on the floor and both knees bent. Pull the right knee up to the chest, grasp the knee with the left hand and pull it towards the left shoulder and hold the stretch. Repeat for each side. Lie on the back with both feet flat on the floor and both knees bent. Rest the ankle of the right leg over the knee of the left leg. Pull the left thigh toward the chest and hold the stretch. Repeat for each side. 4. Flexeril 5 mg (a muscle relaxer) has been prescribed. Please take 1 tab up to every 6 hours for pain or once a day before bedtime. This medication may cause drowsiness. 2) Healthy Weight Body Mass Index is a noninvasive way to screen for weight and body fat. This is a mathematical calculation based on your height and weight. A healthy BMI is between 20% -24.9%. Your BMI is 30%. There is a relationship between high BMI and various healthy problems, such as osteoarthritis, muscle pain, increased risk of cancer, diabetes, heart disease, stroke, hypertension, high cholesterol, sleep apnea, breathing problems, depression, which is why weight loss is so important. In terms of weight loss, a 5-10% reduction in body weight over 3-6 months is a reasonable goal.  There would likely be improvements in risk for disease such as diabetes and heart disease, with this amount of weight loss. In order to lose weight, try reducing your daily intake of calories by decreasing portion size of food and increase exercise to help reduce weight. Eat 3-5 small meals per day instead of 3 large meals. Choose lean meats for protein source which include chicken, pork, and turkey. The recommended serving size for protein is a 2-3 oz serving (the size of your fist), and 1-1.5 oz of carbohydrate per meal (about 1 cup). Increase servings of fruits and vegetables. Limit processed carbohydrates, (i.e. most breads, crackers, pasta, chips, rice, breaded or battered food, etc). If you choose to eat carbohydrates, whole wheat, (instead of white) is a healthier option for bread, rice, and crackers. Avoid fried foods. Limit sugar and do not drink alcohol, juice, sodas or sweet teas. Drink plenty of water (at least 64 oz/day). Daily exercise will also help with weight loss and overall health.   A minimum of 150 minutes a week of moderate exercise is recommended (30 minutes per day). Make exercise a routine part of your day - for example, park in spaces far away from [facility], take stairs instead of elevator, if sitting for long periods, get up from chair and walk every hour. Recruit a friend or relative to exercise with you and keep you on schedule. It is much easier to exercise with a ehsan who will make sure you work out each day! Meal planning smart phone applications like [facility] [employer] can help plan healthy meals. Get 7-8 hours of sleep each night. You may wish to consider seeing the nutritionist at [facility] ([phone]), [facility] ([phone]) or [facility] ([phone]). For reliable dietary information, go to www. EATRIGHT.org. 
 
Free smart phone application to help manage weight loss: MyFitnessPal = tracks food intake, exercise and weight. Daily nutritional summary. Meal  3) Tdap and Prevnar 13 today; Rx for Shingrix given Vaccine Information Statement Pneumococcal Conjugate Vaccine (PCV13): What You Need to Know Many Vaccine Information Statements are available in Stateless and other languages. See www.immunize.org/vis. Hojas de información Sobre Vacunas están disponibles en español y en muchos otros idiomas. Visite www.immunize.org/vis. 1. Why get vaccinated? Vaccination can protect both children and adults from pneumococcal disease. Pneumococcal disease is caused by bacteria that can spread from person to person through close contact. It can cause ear infections, and it can also lead to more serious infections of the: 
 Lungs (pneumonia),  Blood (bacteremia), and 
 Covering of the brain and spinal cord (meningitis). Pneumococcal pneumonia is most common among adults. Pneumococcal meningitis can cause deafness and brain damage, and it kills about 1 child in 10 who get it.  
 
Anyone can get pneumococcal disease, but children under 3years of age and adults 72 years and older, people with certain medical conditions, and cigarette smokers are at the highest risk. Before there was a vaccine, the [facility] saw: 
 more than 700 cases of meningitis, 
 about 13,000 blood infections, 
 about 5 million ear infections, and 
 about 200 deaths 
in children under 5 each year from pneumococcal disease. Since vaccine became available, severe pneumococcal disease in these children has fallen by 88%. About 18,000 older adults die of pneumococcal disease each year in the [place]. Treatment of pneumococcal infections with penicillin and other drugs is not as effective as it used to be, because some strains of the disease have become resistant to these drugs. This makes prevention of the disease, through vaccination, even more important. 2. PCV13 vaccine Pneumococcal conjugate vaccine (called PCV13) protects against 13 types of pneumococcal bacteria. PCV13 is routinely given to children at 2, 4, 6, and 1515 months of age. It is also recommended for children and adults 3to 59years of age with certain health conditions, and for all adults 72years of age and older. Your doctor can give you details. 3. Some people should not get this vaccine Anyone who has ever had a life-threatening allergic reaction to a dose of this vaccine, to an earlier pneumococcal vaccine called PCV7, or to any vaccine containing diphtheria toxoid (for example, DTaP), should not get PCV13. Anyone with a severe allergy to any component of PCV13 should not get the vaccine. Tell your doctor if the person being vaccinated has any severe allergies. If the person scheduled for vaccination is not feeling well, your healthcare provider might decide to reschedule the shot on another day. 4. Risks of a vaccine reaction With any medicine, including vaccines, there is a chance of reactions.  These are usually mild and go away on their own, but serious reactions are also possible. Problems reported following PCV13 varied by age and dose in the series. The most common problems reported among children were:  About half became drowsy after the shot, had a temporary loss of appetite, or had redness or tenderness where the shot was given.  About 1 out of 3 had swelling where the shot was given.  About 1 out of 3 had a mild fever, and about 1 in 20 had a fever over 102.2°F. 
 Up to about 8 out of 10 became fussy or irritable. Adults have reported pain, redness, and swelling where the shot was given; also mild fever, fatigue, headache, chills, or muscle pain. Melissa Olmstead children who get PCV13 along with inactivated flu vaccine at the same time may be at increased risk for seizures caused by fever. Ask your doctor for more information. Problems that could happen after any vaccine:  People sometimes faint after a medical procedure, including vaccination. Sitting or lying down for about 15 minutes can help prevent fainting, and injuries caused by a fall. Tell your doctor if you feel dizzy, or have vision changes or ringing in the ears.  Some older children and adults get severe pain in the shoulder and have difficulty moving the arm where a shot was given. This happens very rarely.  Any medication can cause a severe allergic reaction. Such reactions from a vaccine are very rare, estimated at about 1 in a million doses, and would happen within a few minutes to a few hours after the vaccination. As with any medicine, there is a very small chance of a vaccine causing a serious injury or death. The safety of vaccines is always being monitored. For more information, visit: www.cdc.gov/vaccinesafety/  
 
5. What if there is a serious reaction? What should I look for?  Look for anything that concerns you, such as signs of a severe allergic reaction, very high fever, or unusual behavior. Signs of a severe allergic reaction can include hives, swelling of the face and throat, difficulty breathing, a fast heartbeat, dizziness, and weakness  usually within a few minutes to a few hours after the vaccination. What should I do?  If you think it is a severe allergic reaction or other emergency that cant wait, call 9-1-1 or get the person to the nearest hospital. Otherwise, call your doctor. Reactions should be reported to the Vaccine Adverse Event Reporting System (VAERS). Your doctor should file this report, or you can do it yourself through the VAERS web site at www.vaers. [facility].gov, or by calling 0-935.724.1902. VAERS does not give medical advice. 6. The National Vaccine Injury Compensation Program 
 
The [facility] Vaccine Injury Compensation Program (VICP) is a federal program that was created to compensate people who may have been injured by certain vaccines. Persons who believe they may have been injured by a vaccine can learn about the program and about filing a claim by calling 6-864.507.8582 or visiting the [employer] website at www.[facility].gov/vaccinecompensation. There is a time limit to file a claim for compensation. 7. How can I learn more?  Ask your healthcare provider. He or she can give you the vaccine package insert or suggest other sources of information.  Call your local or state health department.  Contact the Centers for Disease Control and Prevention (CDC): 
- Call 3-616.452.4364 (1-800-CDC-INFO) or 
- Visit CDCs website at www.cdc.gov/vaccines Vaccine Information Statement PCV13 Vaccine 11/5/2015  
42 U. Ledell Flaming [id number] Department of Health and kalidea Centers for Disease Control and Prevention Office Use Only Vaccine Information Statement Tdap (Tetanus, Diphtheria, Pertussis) Vaccine: What You Need to Know Many Vaccine Information Statements are available in Hebrew and other languages. See www.immunize.org/vis. Hojas de Información Sobre Vacunas están disponibles en español y en muchos otros idiomas. Visite [employer].si 1. Why get vaccinated? Tetanus, diphtheria, and pertussis are very serious diseases. Tdap vaccine can protect us from these diseases. And, Tdap vaccine given to pregnant women can protect  babies against pertussis. TETANUS (Lockjaw) is rare in the [facility] today. It causes painful muscle tightening and stiffness, usually all over the body. ? It can lead to tightening of muscles in the head and neck so you cant open your mouth, swallow, or sometimes even breathe. Tetanus kills about 1 out of 10 people who are infected even after receiving the best medical care. DIPHTHERIA is also rare in the [facility] today. It can cause a thick coating to form in the back of the throat. ? It can lead to breathing problems, heart failure, paralysis, and death. PERTUSSIS (Whooping Cough) causes severe coughing spells, which can cause difficulty breathing, vomiting, and disturbed sleep. ? It can also lead to weight loss, incontinence, and rib fractures. Up to 2 in 100 adolescents and 5 in 100 adults with pertussis are hospitalized or have complications, which could include pneumonia or death. These diseases are caused by bacteria. Diphtheria and pertussis are spread from person to person through secretions from coughing or sneezing. Tetanus enters the body through cuts, scratches, or wounds. Before vaccines, as many as 200,000 cases of diphtheria, 200,000 cases of pertussis, and hundreds of cases of tetanus, were reported in the [place] each year. Since vaccination began, reports of cases for tetanus and diphtheria have dropped by about 99% and for pertussis by about 80%. 2. Tdap vaccine Tdap vaccine can protect adolescents and adults from tetanus, diphtheria, and pertussis. One dose of Tdap is routinely given at age 6 or 15. People who did not get Tdap at that age should get it as soon as possible. Tdap is especially important for health care professionals and anyone having close contact with a baby younger than 12 months. Pregnant women should get a dose of Tdap during every pregnancy, to protect the  from pertussis. Infants are most at risk for severe, life-threatening complications from pertussis. Another vaccine, called Td, protects against tetanus and diphtheria, but not pertussis. A Td booster should be given every 10 years. Tdap may be given as one of these boosters if you have never gotten Tdap before. Tdap may also be given after a severe cut or burn to prevent tetanus infection. Your doctor or the person giving you the vaccine can give you more information. Tdap may safely be given at the same time as other vaccines. 3. Some people should not get this vaccine  A person who has ever had a life-threatening allergic reaction after a previous dose of any diphtheria, tetanus or pertussis containing vaccine, OR has a severe allergy to any part of this vaccine, should not get Tdap vaccine. Tell the person giving the vaccine about any severe allergies.  Anyone who had coma or long repeated seizures within 7 days after a childhood dose of DTP or DTaP, or a previous dose of Tdap, should not get Tdap, unless a cause other than the vaccine was found. They can still get Td.  Talk to your doctor if you: 
- have seizures or another nervous system problem, 
- had severe pain or swelling after any vaccine containing diphtheria, tetanus or pertussis,  
- ever had a condition called Guillain Barré Syndrome (GBS), 
- arent feeling well on the day the shot is scheduled. 4. Risks With any medicine, including vaccines, there is a chance of side effects. These are usually mild and go away on their own. Serious reactions are also possible but are rare. Most people who get Tdap vaccine do not have any problems with it. Mild Problems following Tdap 
(Did not interfere with activities)  Pain where the shot was given (about 3 in 4 adolescents or 2 in 3 adults)  Redness or swelling where the shot was given (about 1 person in 5)  Mild fever of at least 100.4°F (up to about 1 in 25 adolescents or 1 in 100 adults)  Headache (about 3 or 4 people in 10)  Tiredness (about 1 person in 3 or 4)  Nausea, vomiting, diarrhea, stomach ache (up to 1 in 4 adolescents or 1 in 10 adults)  Chills,  sore joints (about 1 person in 10)  Body aches (about 1 person in 3 or 4)  Rash, swollen glands (uncommon) Moderate Problems following Tdap (Interfered with activities, but did not require medical attention)  Pain where the shot was given (up to 1 in 5 or 6)  Redness or swelling where the shot was given (up to about 1 in 16 adolescents or 1 in 12 adults)  Fever over 102°F (about 1 in 100 adolescents or 1 in 250 adults)  Headache (about 1 in 7 adolescents or 1 in 10 adults)  Nausea, vomiting, diarrhea, stomach ache (up to 1 or 3 people in 100)  Swelling of the entire arm where the shot was given (up to about 1 in 500). Severe Problems following Tdap 
(Unable to perform usual activities; required medical attention)  Swelling, severe pain, bleeding, and redness in the arm where the shot was given (rare). Problems that could happen after any vaccine:  People sometimes faint after a medical procedure, including vaccination. Sitting or lying down for about 15 minutes can help prevent fainting, and injuries caused by a fall. Tell your doctor if you feel dizzy, or have vision changes or ringing in the ears.  Some people get severe pain in the shoulder and have difficulty moving the arm where a shot was given. This happens very rarely.  Any medication can cause a severe allergic reaction.  Such reactions from a vaccine are very rare, estimated at fewer than 1 in a million doses, and would happen within a few minutes to a few hours after the vaccination. As with any medicine, there is a very remote chance of a vaccine causing a serious injury or death. The safety of vaccines is always being monitored. For more information, visit: www.cdc.gov/vaccinesafety/ 
 
 
The [facility] Vaccine Injury Compensation Program (VICP) is a federal program that was created to compensate people who may have been injured by certain vaccines. Persons who believe they may have been injured by a vaccine can learn about the program and about filing a claim by calling 4-312.975.2577 or visiting the [employer] website at www.[facility].gov/vaccinecompensation. There is a time limit to file a claim for compensation. 7. How can I learn more?  Ask your doctor. He or she can give you the vaccine package insert or suggest other sources of information.  Call your local or state health department.  Contact the Centers for Disease Control and Prevention (CDC): 
- Call 7-707.141.5847 (1-800-CDC-INFO) or 
- Visit CDCs website at www.cdc.gov/vaccines Vaccine Information Statement Tdap Vaccine 
(2/24/2015) 42 SANGEETHA Baldwin [id number] Department of [facility] and [employer] [place] Centers for Disease Control and Prevention Office Use Only Introducing [facility] SERVICES! Dear Fatmata Reyez: Thank you for requesting a [employer] account. Our records indicate that you already have an active [employer] account. You can access your account anytime at [email or web address]. [email or web address] Did you know that you can access your hospital and ER discharge instructions at any time in [employer]? You can also review all of your test results from your hospital stay or ER visit. Additional Information If you have questions, please visit the Frequently Asked Questions section of the [employer] website at [email or web address] Remember, [employer] is NOT to be used for urgent needs. For medical emergencies, dial 911. Now available from your iPhone and Android! Please provide this summary of care documentation to your next provider. Your primary care clinician is listed as Bela Alanis. If you have any questions after today's visit, please call [phone].

## 2017-12-01 NOTE — PATIENT INSTRUCTIONS
1) 1. For sciatica - intermittent application of ice (frozen peas make good ice packs) and moist heat (no dry heat packs) to affected area 10 min on, 10 min off. Can take 20 minute warm, epsom salt bath ([employer] carries a good one called Dr. Carla Louise for about $6), if you are safe getting in and out of tub.    2. Relative rest.  Avoid strenuous lifting, pushing, pulling. Avoid twisting and bending. 3. Back stretching and strengthening exercises. Please do the following exercises at least twice a day:    Windmill stretch: Lie flat on back, arms extended from shoulders, bend one leg at knee, put bent knee over body, look opposite direction. Hold stretch for 10 seconds. Repeat on opposite side. Do this in morning - either while still in bed or when you first get up, on floor and again at night, before going to sleep. Lie on the back with both feet flat on the floor and both knees bent. Pull the right knee up to the chest, grasp the knee with the left hand and pull it towards the left shoulder and hold the stretch. Repeat for each side. Lie on the back with both feet flat on the floor and both knees bent. Rest the ankle of the right leg over the knee of the left leg. Pull the left thigh toward the chest and hold the stretch. Repeat for each side. 4. Flexeril 5 mg (a muscle relaxer) has been prescribed. Please take 1 tab up to every 6 hours for pain or once a day before bedtime. This medication may cause drowsiness. 2) Healthy Weight  Body Mass Index is a noninvasive way to screen for weight and body fat. This is a mathematical calculation based on your height and weight. A healthy BMI is between 20% -24.9%. Your BMI is 30%.   There is a relationship between high BMI and various healthy problems, such as osteoarthritis, muscle pain, increased risk of cancer, diabetes, heart disease, stroke, hypertension, high cholesterol, sleep apnea, breathing problems, depression, which is why weight loss is so important. In terms of weight loss, a 5-10% reduction in body weight over 3-6 months is a reasonable goal.  There would likely be improvements in risk for disease such as diabetes and heart disease, with this amount of weight loss. In order to lose weight, try reducing your daily intake of calories by decreasing portion size of food and increase exercise to help reduce weight. Eat 3-5 small meals per day instead of 3 large meals. Choose lean meats for protein source which include chicken, pork, and turkey. The recommended serving size for protein is a 2-3 oz serving (the size of your fist), and 1-1.5 oz of carbohydrate per meal (about 1 cup). Increase servings of fruits and vegetables. Limit processed carbohydrates, (i.e. most breads, crackers, pasta, chips, rice, breaded or battered food, etc). If you choose to eat carbohydrates, whole wheat, (instead of white) is a healthier option for bread, rice, and crackers. Avoid fried foods. Limit sugar and do not drink alcohol, juice, sodas or sweet teas. Drink plenty of water (at least 64 oz/day). Daily exercise will also help with weight loss and overall health. A minimum of 150 minutes a week of moderate exercise is recommended (30 minutes per day). Make exercise a routine part of your day - for example, park in spaces far away from [facility], take stairs instead of elevator, if sitting for long periods, get up from chair and walk every hour. Recruit a friend or relative to exercise with you and keep you on schedule. It is much easier to exercise with a ehsan who will make sure you work out each day! Meal planning smart phone applications like [employer] can help plan healthy meals. Get 7-8 hours of sleep each night. You may wish to consider seeing the nutritionist at [facility] ([phone]), [facility] ([phone]) or [place] ([phone]).      For reliable dietary information, go to www.[employer].org.    Free smart phone application to help manage weight loss: [employer] = tracks food intake, exercise and weight. Daily nutritional summary. Meal        3) Tdap and Prevnar 13 today; Rx for Shingrix given  Vaccine Information Statement     Pneumococcal Conjugate Vaccine (PCV13): What You Need to Know    Many Vaccine Information Statements are available in Azeri and other languages. See www.immunize.org/vis. Hojas de información Sobre Vacunas están disponibles en español y en muchos otros idiomas. Visite www.immunize.org/vis. 1. Why get vaccinated? Vaccination can protect both children and adults from pneumococcal disease. Pneumococcal disease is caused by bacteria that can spread from person to person through close contact. It can cause ear infections, and it can also lead to more serious infections of the:   Lungs (pneumonia),   Blood (bacteremia), and   Covering of the brain and spinal cord (meningitis). Pneumococcal pneumonia is most common among adults. Pneumococcal meningitis can cause deafness and brain damage, and it kills about 1 child in 10 who get it. Anyone can get pneumococcal disease, but children under 3years of age and adults 72 years and older, people with certain medical conditions, and cigarette smokers are at the highest risk. Before there was a vaccine, the [facility] saw:   more than 700 cases of meningitis,   about 13,000 blood infections,   about 5 million ear infections, and   about 200 deaths  in children under 5 each year from pneumococcal disease. Since vaccine became available, severe pneumococcal disease in these children has fallen by 88%. About 18,000 older adults die of pneumococcal disease each year in the [place]. Treatment of pneumococcal infections with penicillin and other drugs is not as effective as it used to be, because some strains of the disease have become resistant to these drugs.  This makes prevention of the disease, through vaccination, even more important. 2. PCV13 vaccine    Pneumococcal conjugate vaccine (called PCV13) protects against 13 types of pneumococcal bacteria. PCV13 is routinely given to children at 2, 4, 6, and 1515 months of age. It is also recommended for children and adults 3to 59years of age with certain health conditions, and for all adults 72years of age and older. Your doctor can give you details. 3. Some people should not get this vaccine    Anyone who has ever had a life-threatening allergic reaction to a dose of this vaccine, to an earlier pneumococcal vaccine called PCV7, or to any vaccine containing diphtheria toxoid (for example, DTaP), should not get PCV13. Anyone with a severe allergy to any component of PCV13 should not get the vaccine. Tell your doctor if the person being vaccinated has any severe allergies. If the person scheduled for vaccination is not feeling well, your healthcare provider might decide to reschedule the shot on another day. 4. Risks of a vaccine reaction    With any medicine, including vaccines, there is a chance of reactions. These are usually mild and go away on their own, but serious reactions are also possible. Problems reported following PCV13 varied by age and dose in the series. The most common problems reported among children were:    About half became drowsy after the shot, had a temporary loss of appetite, or had redness or tenderness where the shot was given.  About 1 out of 3 had swelling where the shot was given.  About 1 out of 3 had a mild fever, and about 1 in 20 had a fever over 102.2°F.   Up to about 8 out of 10 became fussy or irritable. Adults have reported pain, redness, and swelling where the shot was given; also mild fever, fatigue, headache, chills, or muscle pain.     [facility] children who get PCV13 along with inactivated flu vaccine at the same time may be at increased risk for seizures caused by fever. Ask your doctor for more information. Problems that could happen after any vaccine:     People sometimes faint after a medical procedure, including vaccination. Sitting or lying down for about 15 minutes can help prevent fainting, and injuries caused by a fall. Tell your doctor if you feel dizzy, or have vision changes or ringing in the ears.  Some older children and adults get severe pain in the shoulder and have difficulty moving the arm where a shot was given. This happens very rarely.  Any medication can cause a severe allergic reaction. Such reactions from a vaccine are very rare, estimated at about 1 in a million doses, and would happen within a few minutes to a few hours after the vaccination. As with any medicine, there is a very small chance of a vaccine causing a serious injury or death. The safety of vaccines is always being monitored. For more information, visit: www.cdc.gov/vaccinesafety/     5. What if there is a serious reaction? What should I look for?  Look for anything that concerns you, such as signs of a severe allergic reaction, very high fever, or unusual behavior. Signs of a severe allergic reaction can include hives, swelling of the face and throat, difficulty breathing, a fast heartbeat, dizziness, and weakness - usually within a few minutes to a few hours after the vaccination. What should I do?  If you think it is a severe allergic reaction or other emergency that cant wait, call 9-1-1 or get the person to the nearest hospital. Otherwise, call your doctor. Reactions should be reported to the Vaccine Adverse Event Reporting System (VAERS). Your doctor should file this report, or you can do it yourself through the VAERS web site at www.vaers. hhs.gov, or by calling 5-220.150.7510. VAERS does not give medical advice.     6. The National Vaccine Injury Compensation Program    The [facility] Vaccine Injury Compensation Program ([address]) is a federal program that was created to compensate people who may have been injured by certain vaccines. Persons who believe they may have been injured by a vaccine can learn about the program and about filing a claim by calling 9-728.838.3210 or visiting the [employer] website at www.[facility].gov/vaccinecompensation. There is a time limit to file a claim for compensation. 7. How can I learn more?  Ask your healthcare provider. He or she can give you the vaccine package insert or suggest other sources of information.  Call your local or state health department.  Contact the Centers for Disease Control and Prevention (CDC):  - Call 6-588.632.7736 (1-006-HMD-INFO) or  - Visit CDCs website at www.cdc.gov/vaccines    Vaccine Information Statement   PCV13 Vaccine   2015   42 SANGE[address] [id number]    Department of Health and Human Services  Centers for Disease Control and Prevention    Office Use Only    Vaccine Information Statement     Tdap (Tetanus, Diphtheria, Pertussis) Vaccine: What You Need to Know    Many Vaccine Information Statements are available in [place]ian and other languages. See www.immunize.org/vis. Hojas de Información Sobre Vacunas están disponibles en español y en muchos otros idiomas. Visite [facility].si    1. Why get vaccinated? Tetanus, diphtheria, and pertussis are very serious diseases. Tdap vaccine can protect us from these diseases. And, Tdap vaccine given to pregnant women can protect  babies against pertussis. TETANUS (Lockjaw) is rare in the [facility] today. It causes painful muscle tightening and stiffness, usually all over the body.  It can lead to tightening of muscles in the head and neck so you cant open your mouth, swallow, or sometimes even breathe. Tetanus kills about 1 out of 10 people who are infected even after receiving the best medical care. DIPHTHERIA is also rare in the [facility] today.   It can cause a thick coating to form in the back of the throat.  It can lead to breathing problems, heart failure, paralysis, and death. PERTUSSIS (Whooping Cough) causes severe coughing spells, which can cause difficulty breathing, vomiting, and disturbed sleep.  It can also lead to weight loss, incontinence, and rib fractures. Up to 2 in 100 adolescents and 5 in 100 adults with pertussis are hospitalized or have complications, which could include pneumonia or death. These diseases are caused by bacteria. Diphtheria and pertussis are spread from person to person through secretions from coughing or sneezing. Tetanus enters the body through cuts, scratches, or wounds. Before vaccines, as many as 200,000 cases of diphtheria, 200,000 cases of pertussis, and hundreds of cases of tetanus, were reported in the [place] each year. Since vaccination began, reports of cases for tetanus and diphtheria have dropped by about 99% and for pertussis by about 80%. 2. Tdap vaccine    Tdap vaccine can protect adolescents and adults from tetanus, diphtheria, and pertussis. One dose of Tdap is routinely given at age 6 or 15. People who did not get Tdap at that age should get it as soon as possible. Tdap is especially important for health care professionals and anyone having close contact with a baby younger than 12 months. Pregnant women should get a dose of Tdap during every pregnancy, to protect the  from pertussis. Infants are most at risk for severe, life-threatening complications from pertussis. Another vaccine, called Td, protects against tetanus and diphtheria, but not pertussis. A Td booster should be given every 10 years. Tdap may be given as one of these boosters if you have never gotten Tdap before. Tdap may also be given after a severe cut or burn to prevent tetanus infection. Your doctor or the person giving you the vaccine can give you more information.     Tdap may safely be given at the same time as other vaccines. 3. Some people should not get this vaccine     A person who has ever had a life-threatening allergic reaction after a previous dose of any diphtheria, tetanus or pertussis containing vaccine, OR has a severe allergy to any part of this vaccine, should not get Tdap vaccine. Tell the person giving the vaccine about any severe allergies.  Anyone who had coma or long repeated seizures within 7 days after a childhood dose of DTP or DTaP, or a previous dose of Tdap, should not get Tdap, unless a cause other than the vaccine was found. They can still get Td.  Talk to your doctor if you:  - have seizures or another nervous system problem,  - had severe pain or swelling after any vaccine containing diphtheria, tetanus or pertussis,   - ever had a condition called Guillain Barré Syndrome (GBS),  - arent feeling well on the day the shot is scheduled. 4. Risks    With any medicine, including vaccines, there is a chance of side effects. These are usually mild and go away on their own. Serious reactions are also possible but are rare. Most people who get Tdap vaccine do not have any problems with it.     Mild Problems following Tdap  (Did not interfere with activities)   Pain where the shot was given (about 3 in 4 adolescents or 2 in 3 adults)   Redness or swelling where the shot was given (about 1 person in 5)   Mild fever of at least 100.4°F (up to about 1 in 25 adolescents or 1 in 100 adults)   Headache (about 3 or 4 people in 10)   Tiredness (about 1 person in 3 or 4)   Nausea, vomiting, diarrhea, stomach ache (up to 1 in 4 adolescents or 1 in 10 adults)   Chills,  sore joints (about 1 person in 10)   Body aches (about 1 person in 3 or 4)    Rash, swollen glands (uncommon)    Moderate Problems following Tdap  (Interfered with activities, but did not require medical attention)   Pain where the shot was given (up to 1 in 5 or 6)    Redness or swelling where the shot was given (up to about 1 in 16 adolescents or 1 in 12 adults)   Fever over 102°F (about 1 in 100 adolescents or 1 in 250 adults)   Headache (about 1 in 7 adolescents or 1 in 10 adults)   Nausea, vomiting, diarrhea, stomach ache (up to 1 or 3 people in 100)   Swelling of the entire arm where the shot was given (up to about 1 in 500). Severe Problems following Tdap  (Unable to perform usual activities; required medical attention)   Swelling, severe pain, bleeding, and redness in the arm where the shot was given (rare). Problems that could happen after any vaccine:     People sometimes faint after a medical procedure, including vaccination. Sitting or lying down for about 15 minutes can help prevent fainting, and injuries caused by a fall. Tell your doctor if you feel dizzy, or have vision changes or ringing in the ears.  Some people get severe pain in the shoulder and have difficulty moving the arm where a shot was given. This happens very rarely.  Any medication can cause a severe allergic reaction. Such reactions from a vaccine are very rare, estimated at fewer than 1 in a million doses, and would happen within a few minutes to a few hours after the vaccination. As with any medicine, there is a very remote chance of a vaccine causing a serious injury or death. The safety of vaccines is always being monitored. For more information, visit: www.cdc.gov/vaccinesafety/    5. What if there is a serious problem? What should I look for?  Look for anything that concerns you, such as signs of a severe allergic reaction, very high fever, or unusual behavior.  Signs of a severe allergic reaction can include hives, swelling of the face and throat, difficulty breathing, a fast heartbeat, dizziness, and weakness. These would usually start a few minutes to a few hours after the vaccination. What should I do?    If you think it is a severe allergic reaction or other emergency that cant wait, call 9-1-1 or get the person to the nearest hospital. Otherwise, call your doctor.  Afterward, the reaction should be reported to the Vaccine Adverse Event Reporting System (VAERS). Your doctor might file this report, or you can do it yourself through the VAERS web site at www.vaers. [facility].gov, or by calling 2-741.518.6914. VAERS does not give medical advice. 6. The National Vaccine Injury Compensation Program    The [facility] Vaccine Injury Compensation Program (VICP) is a federal program that was created to compensate people who may have been injured by certain vaccines. Persons who believe they may have been injured by a vaccine can learn about the program and about filing a claim by calling 3-223.606.9558 or visiting the [facility] [employer] website at www.[facility].gov/vaccinecompensation. There is a time limit to file a claim for compensation. 7. How can I learn more?  Ask your doctor. He or she can give you the vaccine package insert or suggest other sources of information.  Call your local or state health department.  Contact the Centers for Disease Control and Prevention (CDC):  - Call 0-547.721.9969 (1-800-CDC-INFO) or  - Visit CDCs website at www.cdc.gov/vaccines      Vaccine Information Statement   Tdap Vaccine  (2/24/2015)  42 SANGEETHA [address] [id number]    Department of Health and Human Services  Centers for Disease Control and Prevention    Office Use Only

## 2017-12-05 NOTE — PROGRESS NOTES
Chief Complaint   Patient presents with    Other     Pain starting at right upper thigh going down to ankle  x 2-3 weeks    Immunization/Injection     Reviewed record in preparation for visit and have obtained necessary documentation. Identified pt with two pt identifiers(name and ). Chief Complaint   Patient presents with    Other     Pain starting at right upper thigh going down to ankle  x 2-3 weeks    Immunization/Injection       Vitals:    17 1405   BP: 143/73   Pulse: 84   Resp: 19   Temp: 97.2 °F (36.2 °C)   TempSrc: Oral   SpO2: 97%   Weight: 164 lb 9.6 oz (74.7 kg)   Height: 5' 2\" (1.575 m)   PainSc:   8       Coordination of Care Questionnaire:  :     1) Have you been to an emergency room, urgent care clinic since your last visit? no   Hospitalized since your last visit? no             2) Have you seen or consulted any other health care providers outside of [facility] [place] since your last visit? no  (Include any pap smears or colon screenings in this section.)    3) In the event something were to happen to you and you were unable to speak on your behalf, do you have an Advance Directive/ Living Will in place stating your wishes? NO    Do you have an Advance Directive on file? no    4) Are you interested in receiving information on Advance Directives? NO    Patient is accompanied by self I have received verbal consent from [facility] to discuss any/all medical information while they are present in the room.

## 2017-12-11 ENCOUNTER — HOSPITAL ENCOUNTER (OUTPATIENT)
Dept: GENERAL RADIOLOGY | Age: 57
Discharge: HOME | End: 2017-12-11
Attending: FAMILY MEDICINE
Payer: COMMERCIAL

## 2017-12-11 ENCOUNTER — OFFICE VISIT (OUTPATIENT)
Dept: INTERNAL MEDICINE CLINIC | Age: 57
End: 2017-12-11

## 2017-12-11 ENCOUNTER — HOSPITAL ENCOUNTER (OUTPATIENT)
Dept: VASCULAR SURGERY | Age: 57
Discharge: HOME | End: 2017-12-11
Attending: FAMILY MEDICINE
Payer: COMMERCIAL

## 2017-12-11 VITALS
WEIGHT: 164 LBS | HEART RATE: 90 BPM | DIASTOLIC BLOOD PRESSURE: 77 MMHG | TEMPERATURE: 98.6 F | HEIGHT: 62 IN | OXYGEN SATURATION: 98 % | BODY MASS INDEX: 30.18 KG/M2 | RESPIRATION RATE: 16 BRPM | SYSTOLIC BLOOD PRESSURE: 127 MMHG

## 2017-12-11 DIAGNOSIS — M17.11 PRIMARY OSTEOARTHRITIS OF RIGHT KNEE: ICD-10-CM

## 2017-12-11 DIAGNOSIS — M17.11 PRIMARY OSTEOARTHRITIS OF RIGHT KNEE: Primary | ICD-10-CM

## 2017-12-11 DIAGNOSIS — M79.661 RIGHT CALF PAIN: ICD-10-CM

## 2017-12-11 PROCEDURE — 73562 X-RAY EXAM OF KNEE 3: CPT

## 2017-12-11 PROCEDURE — 93970 EXTREMITY STUDY: CPT

## 2017-12-11 RX ORDER — LORAZEPAM 1 MG/1
TABLET ORAL
Refills: 4 | Status: ON HOLD | COMMUNITY
Start: 2017-12-07 | End: 2020-11-09

## 2017-12-11 NOTE — PROCEDURES
[facility]  *** FINAL REPORT ***    Name: Jade Rinne  MRN: [id number]    Outpatient  : [id number]  HIS Order #: [id number]  [id number] [facility] Visit #: [id number]  Date: 11 Dec 2017    TYPE OF TEST: Peripheral Venous Testing    REASON FOR TEST  Right calf pain    Right Leg:-  Deep venous thrombosis:           No  Superficial venous thrombosis:    No  Deep venous insufficiency:        Not examined  Superficial venous insufficiency: Not examined    Left Leg:-  Deep venous thrombosis:           No  Superficial venous thrombosis:    No  Deep venous insufficiency:        Not examined  Superficial venous insufficiency: Not examined      INTERPRETATION/FINDINGS  PROCEDURE:  Color duplex ultrasound imaging of lower extremity veins. FINDINGS:       Right: The common femoral, deep femoral, femoral, popliteal,  posterior tibial, peroneal, and great saphenous are patent and without   evidence of thrombus;  each is fully compressible and there is no  narrowing of the flow channel on color Doppler imaging. Phasic flow  is observed in the common femoral vein. Left:   The common femoral, deep femoral, femoral, popliteal,  posterior tibial, peroneal, and great saphenous are patent and without   evidence of thrombus;  each is fully compressible and there is no  narrowing of the flow channel on color Doppler imaging. Phasic flow  is observed in the common femoral vein. IMPRESSION:  No evidence of right or left lower extremity vein  thrombosis. ADDITIONAL COMMENTS    I have personally reviewed the data relevant to the interpretation of  this  study.     TECHNOLOGIST: Fredy Monreal RVT  Signed: 2017 05:24 PM    PHYSICIAN: Fannie Orellana MD  Signed: 2017 07:27 AM

## 2017-12-11 NOTE — PROGRESS NOTES
Chief Complaint   Patient presents with    Leg Pain     she is a 56y.o. year old female who presents for evaluation of Right Leg Pain  Pain Assessment Encounter      Kaelyn Hayden  2017  Onset of Symptoms: Several Weeks  ________________________________________________________________________  Description: Patient states she felt a \"crunch\" in her leg 5 weeks ago when she was standing up from a sitting position    Frequency: more than 5 times a day  Pain Scale:(1-10): 7  Trauma Hx: None  Hx of similar symptoms: No  Radiation: None  Duration:  continuous      Progression: is unchanged  What makes it better?: heat  What makes it worse?:walking and stairs  Medications tried: None    Reviewed and agree with Nurse Note and duplicated in this note. Reviewed PmHx, RxHx, FmHx, SocHx, AllgHx and updated and dated in the chart.     Family History   Problem Relation Age of Onset    Hypertension Mother     High Cholesterol Mother     Thyroid Disease Mother     Heart Disease Mother      chf    Kidney Disease Mother     Headache Mother     Heart Disease Father     Heart Attack Father 36      MI age 36   [facility] High Cholesterol Father     Hypertension Sister     Thyroid Disease Sister     Hypertension Sister    [facility] Arthritis-rheumatoid Sister     Diabetes Sister     Thyroid Disease Sister     Heart Attack Brother      massive MI at age 46, survived    Thyroid Disease Other      niece, Harpal Kirby    High Cholesterol Brother        Past Medical History:   Diagnosis Date    Acute Anterior Myocardial Infarction, s/p PTCA w/ stent 10/2010 10/31/2010    Asthma     mild    Diabetes mellitus, type 2 ([address] 2012    GERD (gastroesophageal reflux disease)     hiatal hernia    H/O recurrent Sinusitis 3/16/2011    Heart failure ([address]     Hiatal Hernia w/ GERD (gastroesophageal reflux disease) 3/16/2011    Hypercholesterolemia     Hypertension 3/16/2011    Ill-defined condition     pacemaker put in last July 2015 because of CHF    Impaired fasting glucose 4/18/2011    Iron deficiency anemia 3/16/2011    Multinodular thyroid 3/16/2011    Palpitations, PVC's 10/13/2011    Perennial allergic rhinitis 3/16/2011    Pityriasis rosea 5/30/2012    Post-menopausal     LMP 46y. 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           Self Employed     Social History Main Topics    Smoking status: Former Smoker     Packs/day: 0.25     Years: 30.00     Types: Cigarettes     Quit date: 7/24/2013    Smokeless tobacco: Never Used      Comment: had quit 10/2010 then restarted ~ 3/2012;  about 5 cigarettes a day; on and off since age 26 yo x ~ 30 yrs    Alcohol use No      Comment: none    Drug use: No    Sexual activity: Yes     Partners: Male     Birth control/ protection: None      Comment:  Ash Ron      Other Topics Concern    Caffeine Concern No     down to 1 Coke a day    Weight Concern Yes     stable now, had gained some wt over the past yr; was in low 150's previously; wants to lose 10 lbs    Special Diet Yes     heart healthy diet (white meats, salads, veggies, fruits)    Exercise Yes     exercise walking tape x 45 minutes qod     Social History Narrative    New to [facility] as of 4/2009, referred by moose Nicole; pervious PCP Dr Marcus Askew [facility] - negative except as listed above      Objective:     Vitals:    12/11/17 1444   Weight: 164 lb (74.4 kg)   Height: 5' 2\" (1.575 m)       Physical Examination: General appearance - alert, well appearing, and in no distress  Back exam - full range of motion, no tenderness, palpable spasm or pain on motion  Neurological - alert, oriented, normal speech, no focal findings or movement disorder noted  Musculoskeletal - right knee exam:  The patient'sright Knee  is  normal to inspection. The ROM is normal and there is flexion to 60 Effusion is: moderate The joint exhibits absent warmth and Crepitus is: moderate. The Eloina test is:  Negative Joint Line Tenderness is  Positive medial and lateral.  The Thessaly Test is Negative  and the Lachman is  negative. The Anterior Drawer is Negative. The Posterior Drawer is:  negative. Valgus Stress (for MCL) is:  normal . Varus Stress (for LCL) is  normal  . The Ralph Test is negative and the Apprehension Sign:  negative  Patellar Grind Negative and Tracking is normal  Pain with palpation of lateral calf  Extremities - peripheral pulses normal, no pedal edema, no clubbing or cyanosis  Skin - normal coloration and turgor, no rashes, no suspicious skin lesions noted    Assessment/ Plan:   Diagnoses and all orders for this visit:    1. Primary osteoarthritis of right knee  -     XR KNEE RT MIN 4 V; Future    2. Right calf pain  -     DUPLEX LOWER EXT VENOUS BILAT; Future  She will return for steroid injection pending Doppler study    Pathophysiology, recovery and rehabilitation process discussed and questions answered   Counseling for 30 Minutes of the total visit duration   Pictures and figures used as necessary   Provided reassurance   Monitor response to Physical Therapy   Recommend activity modification   Recommend  lower impact activities-walking, Eliptical, Nordic Track, cycling or swimming   Xray's reviewed - within normal limits     1) Remember to stay active and/or exercise regularly (I suggest 30-45 minutes daily)   2) For reliable dietary information, go to www. EATRIGHT.org. You may wish to consider seeing the nutritionist at [facility] [phone], also consider the [address]       I have discussed the diagnosis with the patient and the intended plan as seen in the above orders. The patient has received an after-visit summary and questions were answered concerning future plans. Medication Side Effects and Warnings were discussed with patient: yes  Patient Labs were reviewed and or requested: yes  Patient Past Records were reviewed and or requested  yes  I have discussed the diagnosis with the patient and the intended plan as seen in the above orders. The patient has received an after-visit summary and questions were answered concerning future plans. Pt agrees to call or return to clinic and/or go to closest ER with any worsening of symptoms. This may include, but not limited to increased fever (>100.4) with NSAIDS or Tylenol, increased edema, confusion, rash, worsening of presenting symptoms.

## 2017-12-15 ENCOUNTER — OFFICE VISIT (OUTPATIENT)
Dept: INTERNAL MEDICINE CLINIC | Age: 57
End: 2017-12-15

## 2017-12-15 VITALS
OXYGEN SATURATION: 99 % | HEIGHT: 62 IN | RESPIRATION RATE: 16 BRPM | DIASTOLIC BLOOD PRESSURE: 77 MMHG | HEART RATE: 84 BPM | WEIGHT: 164 LBS | TEMPERATURE: 98.6 F | BODY MASS INDEX: 30.18 KG/M2 | SYSTOLIC BLOOD PRESSURE: 133 MMHG

## 2017-12-15 DIAGNOSIS — M17.11 PRIMARY OSTEOARTHRITIS OF RIGHT KNEE: Primary | ICD-10-CM

## 2017-12-15 RX ORDER — TRIAMCINOLONE ACETONIDE 40 MG/ML
40 INJECTION, SUSPENSION INTRA-ARTICULAR; INTRAMUSCULAR ONCE
Qty: 1 ML | Refills: 0
Start: 2017-12-15 | End: 2017-12-15

## 2017-12-15 NOTE — MR AVS SNAPSHOT
Visit Information Date & Time Provider Department Dept. Phone Encounter #  
 12/15/2017  1:45 PM [facility] MD [facility] Sports [facility] and [place] [id number] Follow-up Instructions Return if symptoms worsen or fail to improve. Follow-up and Disposition History Upcoming Health Maintenance Date Due Hepatitis C Screening 1960 EYE EXAM RETINAL OR DILATED Q1 3/1/2015 PAP AKA CERVICAL CYTOLOGY 3/26/2016 HEMOGLOBIN A1C Q6M 1/29/2018 FOOT EXAM Q1 7/17/2018 MICROALBUMIN Q1 7/17/2018 LIPID PANEL Q1 7/29/2018 COLONOSCOPY 4/19/2027 DTaP/Tdap/Td series (2 - Td) 12/4/2027 Allergies as of 12/15/2017  Review Complete On: 12/15/2017 By: [facility] MD [place]  
  
 Severity Noted Reaction Type Reactions Ace Inhibitors  10/13/2011    Cough ?10/2011 Seafood [Shellfish Containing Products]  11/01/2010    Hives, Shortness of Breath, Nausea and Vomiting Current Immunizations  Reviewed on 12/1/2017 Name Date Influenza Vaccine 9/3/2017, 12/16/2014  4:31 PM, 1/10/2013 Influenza Vaccine (Quad) PF 10/19/2016  6:19 PM  
 Influenza Vaccine Intradermal PF 11/2/2015 Influenza Vaccine PF 10/7/2013 Influenza Vaccine Split 10/6/2010 Pneumococcal Conjugate (PCV-13) 12/4/2017 Pneumococcal Polysaccharide (PPSV-23) 7/31/2015  4:49 PM  
 TD Vaccine 4/27/2009 Tdap 12/4/2017 Not reviewed this visit You Were Diagnosed With   
  
 Codes Comments Primary osteoarthritis of right knee    -  Primary ICD-10-CM: M17.11 ICD-9-CM: 715.16 Vitals BP Pulse Temp Resp Height(growth percentile) Weight(growth percentile) 133/77 (BP 1 Location: Left arm, BP Patient Position: Sitting) 84 98.6 °F (37 °C) (Oral) 16 5' 2\" (1.575 m) 164 lb (74.4 kg) LMP SpO2 BMI OB Status Smoking Status (Exact Date) 99% 30 kg/m2 Postmenopausal Former Smoker Vitals History BMI and BSA Data Body Mass Index Body Surface Area  
 30 kg/m 2 1.8 m 2 Preferred Pharmacy Pharmacy Name Phone [id number] [place], [address] [phone] Your Updated Medication List  
  
   
This list is accurate as of: 12/15/17  2:31 PM.  Always use your most recent med list.  
  
  
  
  
 albuterol 90 mcg/actuation inhaler Commonly known as:  PROAIR HFA Take 2 Puffs by inhalation every six (6) hours as needed for Wheezing. amoxicillin-clavulanate 875-125 mg per tablet Commonly known as:  AUGMENTIN  
TAKE 1 TABLET BY MOUTH TWICE A DAY  
  
 atorvastatin 80 mg tablet Commonly known as:  LIPITOR Take 1 Tab by mouth nightly. bumetanide 0.5 mg tablet Commonly known as:  Merla Goods TAKE 1 TABLET BY MOUTH TWICE A DAY AS NEEDED  
  
 carvedilol 25 mg tablet Commonly known as:  Jestine Pellet Take 1 Tab by mouth two (2) times a day. cetirizine 10 mg tablet Commonly known as:  ZYRTEC Take 10 mg by mouth daily as needed for Allergies. cloNIDine HCl 0.3 mg tablet Commonly known as:  CATAPRES Take 0.3 mg by mouth two (2) times daily as needed. *IF BLOOD PRESSURE IS >170/90*  
  
 cyclobenzaprine 5 mg tablet Commonly known as:  FLEXERIL Take 1 Tab by mouth daily as needed for Muscle Spasm(s). Do not take with the Ambien  
  
 diclofenac 1 % Gel Commonly known as:  VOLTAREN Apply 2 g to affected area every six (6) hours. EPINEPHrine 0.3 mg/0.3 mL injection Commonly known as:  EPIPEN  
0.3 mL by IntraMUSCular route once as needed for up to 1 dose.  
  
 famotidine 40 mg tablet Commonly known as:  PEPCID Take 40 mg by mouth nightly. ipratropium 42 mcg (0.06 %) nasal spray Commonly known as:  ATROVENT  
2 Sprays by Both Nostrils route four (4) times daily as needed for Rhinitis. Indications: RHINORRHEA LORazepam 1 mg tablet Commonly known as:  ATIVAN  
TAKE 1 TABLET BY MOUTH FOR ANXIETY  
  
 losartan 100 mg tablet Commonly known as:  COZAAR Take 1 Tab by mouth every evening. metFORMIN 500 mg tablet Commonly known as:  GLUCOPHAGE Decrease to 250mg BID and monitor blood sugars  
  
 montelukast 10 mg tablet Commonly known as:  SINGULAIR  
TAKE 1 TAB BY MOUTH DAILY. INDICATIONS: ALLERGIC RHINITIS  
  
 pantoprazole 40 mg tablet Commonly known as:  PROTONIX Take 1 Tab by mouth daily. sucralfate 1 gram tablet Commonly known as:  Adah Dory Take 1 Tab by mouth four (4) times daily. triamcinolone acetonide 40 mg/mL injection Commonly known as:  KENALOG  
1 mL by IntraMUSCular route once for 1 dose. VITAMIN D 2,000 unit Cap capsule Generic drug:  Cholecalciferol (Vitamin D3) Take 4,000 Units by mouth daily. Takes 2 of the 2,000 unit tabs qd  
  
 zolpidem 10 mg tablet Commonly known as:  AMBIEN Take 1 Tab by mouth nightly as needed for Sleep. Max Daily Amount: 10 mg. We Performed the Following [place] ARTHROCENTESIS ASPIR&/INJ INTERM JT/BURS W/US [90766 CPT(R)] TRIAMCINOLONE ACETONIDE INJ [ \[facility] [place] [facility]\""] Follow-up Instructions Return if symptoms worsen or fail to improve. Patient Instructions The patient is asked to continue to rest the area for a few more days before resuming regular activities. It may be more painful for the first 1-2 days. NSAIDS are to be avoided. Watch for fever, or increased swelling or persistent pain in the joint. Call or return to clinic prn if such symptoms occur or there is failure to improve as anticipated. Introducing [facility] & HE[facility]! Dear Natanael Garza: Thank you for requesting a [employer] account. Our records indicate that you already have an active [employer] account. You can access your account anytime at [email or web address] Did you know that you can access your hospital and ER discharge instructions at any time in [employer]?   You can also review all of your test results from your hospital stay or ER visit. Additional Information If you have questions, please visit the Frequently Asked Questions section of the [employer] website at [email or web address] Remember, [employer] is NOT to be used for urgent needs. For medical emergencies, dial 911. Now available from your iPhone and Android! Please provide this summary of care documentation to your next provider. Your primary care clinician is listed as Roxie Howard. If you have any questions after today's visit, please call [phone].

## 2018-06-11 DIAGNOSIS — G47.00 INSOMNIA, UNSPECIFIED TYPE: ICD-10-CM

## 2018-06-11 DIAGNOSIS — I10 ESSENTIAL HYPERTENSION: Chronic | ICD-10-CM

## 2018-06-11 RX ORDER — BUMETANIDE 0.5 MG/1
TABLET ORAL
Qty: 180 TAB | Refills: 1 | OUTPATIENT
Start: 2018-06-11

## 2018-06-11 NOTE — TELEPHONE ENCOUNTER
Last Visit: 7/16/2014-Kd  Next Appt: None  Previous Refill Encounter: N/A    Requested Prescriptions     Pending Prescriptions Disp Refills    bumetanide (BUMEX) 0.5 mg tablet 180 Tab 1     Sig: TAKE 1 TABLET BY MOUTH TWICE A DAY AS NEEDED

## 2020-08-12 ENCOUNTER — HOSPITAL ENCOUNTER (OUTPATIENT)
Dept: ULTRASOUND IMAGING | Age: 60
Discharge: HOME | End: 2020-08-12
Attending: INTERNAL MEDICINE
Payer: COMMERCIAL

## 2020-08-12 DIAGNOSIS — N18.30 CHRONIC KIDNEY DISEASE, STAGE III (MODERATE) (HCC): ICD-10-CM

## 2020-08-12 PROCEDURE — 76770 US EXAM ABDO BACK WALL COMP: CPT

## 2020-10-15 NOTE — PATIENT INSTRUCTIONS
Call with any concerns Detail Level: Simple Price (Do Not Change): 0.00 Instructions: This plan will send the code FBSD to the PM system.  DO NOT or CHANGE the price.

## 2020-10-27 ENCOUNTER — APPOINTMENT (OUTPATIENT)
Dept: GENERAL RADIOLOGY | Age: 60
End: 2020-10-27
Attending: EMERGENCY MEDICINE
Payer: COMMERCIAL

## 2020-10-27 ENCOUNTER — HOSPITAL ENCOUNTER (EMERGENCY)
Age: 60
Discharge: HOME | End: 2020-10-27
Attending: EMERGENCY MEDICINE
Payer: COMMERCIAL

## 2020-10-27 VITALS
DIASTOLIC BLOOD PRESSURE: 92 MMHG | OXYGEN SATURATION: 99 % | TEMPERATURE: 98.1 F | RESPIRATION RATE: 18 BRPM | HEART RATE: 87 BPM | SYSTOLIC BLOOD PRESSURE: 160 MMHG

## 2020-10-27 DIAGNOSIS — K21.9 GASTROESOPHAGEAL REFLUX DISEASE WITHOUT ESOPHAGITIS: Primary | ICD-10-CM

## 2020-10-27 LAB
ALBUMIN SERPL-MCNC: 3.8 G/DL (ref 3.5–5)
ALBUMIN/GLOB SERPL: 1 {RATIO} (ref 1.1–2.2)
ALP SERPL-CCNC: 93 U/L (ref 45–117)
ALT SERPL-CCNC: 22 U/L (ref 12–78)
ANION GAP SERPL CALC-SCNC: 9 MMOL/L (ref 5–15)
AST SERPL-CCNC: 22 U/L (ref 15–37)
ATRIAL RATE: 86 BPM
BASOPHILS # BLD: 0 K/UL (ref 0–0.1)
BASOPHILS NFR BLD: 0 % (ref 0–1)
BILIRUB SERPL-MCNC: 0.2 MG/DL (ref 0.2–1)
BUN SERPL-MCNC: 29 MG/DL (ref 6–20)
BUN/CREAT SERPL: 14 (ref 12–20)
CALCIUM SERPL-MCNC: 9.7 MG/DL (ref 8.5–10.1)
CALCULATED P AXIS, ECG09: 39 DEGREES
CALCULATED R AXIS, ECG10: -16 DEGREES
CALCULATED T AXIS, ECG11: 64 DEGREES
CHLORIDE SERPL-SCNC: 110 MMOL/L (ref 97–108)
CO2 SERPL-SCNC: 23 MMOL/L (ref 21–32)
COMMENT, HOLDF: NORMAL
CREAT SERPL-MCNC: 2.06 MG/DL (ref 0.55–1.02)
DIAGNOSIS, 93000: NORMAL
DIFFERENTIAL METHOD BLD: ABNORMAL
EOSINOPHIL # BLD: 0.2 K/UL (ref 0–0.4)
EOSINOPHIL NFR BLD: 2 % (ref 0–7)
ERYTHROCYTE [DISTWIDTH] IN BLOOD BY AUTOMATED COUNT: 15.5 % (ref 11.5–14.5)
GLOBULIN SER CALC-MCNC: 3.7 G/DL (ref 2–4)
GLUCOSE SERPL-MCNC: 95 MG/DL (ref 65–100)
HCT VFR BLD AUTO: 31.9 % (ref 35–47)
HGB BLD-MCNC: 9.8 G/DL (ref 11.5–16)
IMM GRANULOCYTES # BLD AUTO: 0 K/UL (ref 0–0.04)
IMM GRANULOCYTES NFR BLD AUTO: 0 % (ref 0–0.5)
LIPASE SERPL-CCNC: 135 U/L (ref 73–393)
LYMPHOCYTES # BLD: 4 K/UL (ref 0.8–3.5)
LYMPHOCYTES NFR BLD: 43 % (ref 12–49)
MCH RBC QN AUTO: 27.1 PG (ref 26–34)
MCHC RBC AUTO-ENTMCNC: 30.7 G/DL (ref 30–36.5)
MCV RBC AUTO: 88.4 FL (ref 80–99)
MONOCYTES # BLD: 0.8 K/UL (ref 0–1)
MONOCYTES NFR BLD: 8 % (ref 5–13)
NEUTS SEG # BLD: 4.4 K/UL (ref 1.8–8)
NEUTS SEG NFR BLD: 47 % (ref 32–75)
NRBC # BLD: 0 K/UL (ref 0–0.01)
NRBC BLD-RTO: 0 PER 100 WBC
P-R INTERVAL, ECG05: 176 MS
PLATELET # BLD AUTO: 211 K/UL (ref 150–400)
PMV BLD AUTO: 9 FL (ref 8.9–12.9)
POTASSIUM SERPL-SCNC: 3.4 MMOL/L (ref 3.5–5.1)
PROT SERPL-MCNC: 7.5 G/DL (ref 6.4–8.2)
Q-T INTERVAL, ECG07: 396 MS
QRS DURATION, ECG06: 74 MS
QTC CALCULATION (BEZET), ECG08: 473 MS
RBC # BLD AUTO: 3.61 M/UL (ref 3.8–5.2)
SAMPLES BEING HELD,HOLD: NORMAL
SODIUM SERPL-SCNC: 142 MMOL/L (ref 136–145)
TROPONIN I SERPL-MCNC: <0.05 NG/ML
VENTRICULAR RATE, ECG03: 86 BPM
WBC # BLD AUTO: 9.4 K/UL (ref 3.6–11)

## 2020-10-27 PROCEDURE — 85025 COMPLETE CBC W/AUTO DIFF WBC: CPT

## 2020-10-27 PROCEDURE — 80053 COMPREHEN METABOLIC PANEL: CPT

## 2020-10-27 PROCEDURE — 93005 ELECTROCARDIOGRAM TRACING: CPT

## 2020-10-27 PROCEDURE — 96374 THER/PROPH/DIAG INJ IV PUSH: CPT

## 2020-10-27 PROCEDURE — 74011250636 HC RX REV CODE- 250/636: Performed by: EMERGENCY MEDICINE

## 2020-10-27 PROCEDURE — 74011000250 HC RX REV CODE- 250: Performed by: EMERGENCY MEDICINE

## 2020-10-27 PROCEDURE — C9113 INJ PANTOPRAZOLE SODIUM, VIA: HCPCS | Performed by: EMERGENCY MEDICINE

## 2020-10-27 PROCEDURE — 71046 X-RAY EXAM CHEST 2 VIEWS: CPT

## 2020-10-27 PROCEDURE — 83690 ASSAY OF LIPASE: CPT

## 2020-10-27 PROCEDURE — 99282 EMERGENCY DEPT VISIT SF MDM: CPT

## 2020-10-27 PROCEDURE — 84484 ASSAY OF TROPONIN QUANT: CPT

## 2020-10-27 PROCEDURE — 74011250637 HC RX REV CODE- 250/637: Performed by: EMERGENCY MEDICINE

## 2020-10-27 PROCEDURE — 36415 COLL VENOUS BLD VENIPUNCTURE: CPT

## 2020-10-27 RX ORDER — SUCRALFATE 1 G/1
1 TABLET ORAL 4 TIMES DAILY
Qty: 28 TAB | Refills: 0 | Status: SHIPPED | OUTPATIENT
Start: 2020-10-27 | End: 2020-11-03

## 2020-10-27 RX ADMIN — LIDOCAINE HYDROCHLORIDE 40 ML: 20 SOLUTION ORAL; TOPICAL at 02:46

## 2020-10-27 RX ADMIN — SODIUM CHLORIDE 40 MG: 9 INJECTION, SOLUTION INTRAMUSCULAR; INTRAVENOUS; SUBCUTANEOUS at 02:46

## 2020-10-27 NOTE — ED TRIAGE NOTES
Pt arrives with CC of chest pain and gas. States she has a hx of MI that felt just like this . Cardiologist is Dr. Cara Levine. She has a defibrillator and feels like the pain is around the device.

## 2020-10-27 NOTE — DISCHARGE INSTRUCTIONS
Patient Education      Work up in the ED was reassuring for your heart. Follow up with the GI tomorrow as planned. You can add the Carafate to help with your symptoms./  If you develop worsening chest pain, shortness of breath, nausea/vomiting, etc. Return to the ED. Please follow up with Dr. Annabelle Ferreira, your cardiologist, as well given your history. Gastroesophageal Reflux Disease (GERD): Care Instructions  Overview     Gastroesophageal reflux disease (GERD) is the backward flow of stomach acid into the esophagus. The esophagus is the tube that leads from your throat to your stomach. A one-way valve prevents the stomach acid from backing up into this tube. But when you have GERD, this valve does not close tightly enough. This can also cause pain and swelling in your esophagus. (This is called esophagitis.)  If you have mild GERD symptoms including heartburn, you may be able to control the problem with antacids or over-the-counter medicine. You can also make lifestyle changes to help reduce your symptoms. These include changing your diet and eating habits, such as not eating late at night and losing weight. Follow-up care is a key part of your treatment and safety. Be sure to make and go to all appointments, and call your doctor if you are having problems. It's also a good idea to know your test results and keep a list of the medicines you take. How can you care for yourself at home? · Take your medicines exactly as prescribed. Call your doctor if you think you are having a problem with your medicine. · Your doctor may recommend over-the-counter medicine. For mild or occasional indigestion, antacids, such as Tums, Gaviscon, Mylanta, or Maalox, may help. Your doctor also may recommend over-the-counter acid reducers, such as famotidine (Pepcid AC), cimetidine (Tagamet HB), or omeprazole (Prilosec). Read and follow all instructions on the label. If you use these medicines often, talk with your doctor.   · Change your eating habits. ? It's best to eat several small meals instead of two or three large meals. ? After you eat, wait 2 to 3 hours before you lie down. ? Chocolate, mint, and alcohol can make GERD worse. ? Spicy foods, foods that have a lot of acid (like tomatoes and oranges), and coffee can make GERD symptoms worse in some people. If your symptoms are worse after you eat a certain food, you may want to stop eating that food to see if your symptoms get better. · Do not smoke or chew tobacco. Smoking can make GERD worse. If you need help quitting, talk to your doctor about stop-smoking programs and medicines. These can increase your chances of quitting for good. · If you have GERD symptoms at night, raise the head of your bed 6 to 8 inches by putting the frame on blocks or placing a foam wedge under the head of your mattress. (Adding extra pillows does not work.)  · Do not wear tight clothing around your middle. · Lose weight if you need to. Losing just 5 to 10 pounds can help. When should you call for help? Call your doctor now or seek immediate medical care if:    · You have new or different belly pain.     · Your stools are black and tarlike or have streaks of blood. Watch closely for changes in your health, and be sure to contact your doctor if:    · Your symptoms have not improved after 2 days.     · Food seems to catch in your throat or chest.   Where can you learn more? Go to http://www.gray.com/  Enter O578 in the search box to learn more about \"Gastroesophageal Reflux Disease (GERD): Care Instructions. \"  Current as of: April 15, 2020               Content Version: 12.6  © 1855-7313 [employer]wise[employer]. Care instructions adapted under license by [employer] (which disclaims liability or warranty for this information).  If you have questions about a medical condition or this instruction, always ask your healthcare professional. Janice Jones disclaims any warranty or liability for your use of this information.

## 2020-10-27 NOTE — ED PROVIDER NOTES
HPI     42-year-old female with a history of MI  , Diabetes, acid reflux, heart failure, hypertension, high cholesterol, presents emergency department complaining of gas and acid reflux. Patient states it has been ongoing all week. She takes Zantac and Protonix on a daily basis. Today the pain went into her left shoulder and back. She states it also hurts to turn her head to the side. She states she had this in the past and ended up being a heart attack. She denies any associated shortness of breath, nausea, or diaphoresis. She is followed by Dr. Erica Hagen. She states her symptoms are worse laying down. She denies any calf or leg pain. She denies a fever or cough or any known COVID-19 exposure. She states her pain is currently a 6 out of 10. She denies any change in appetite or food. She does not take any NSAIDs or aspirin. Past Medical History:   Diagnosis Date    Acute Anterior Myocardial Infarction, s/p PTCA w/ stent 10/2010 10/31/2010    Asthma     mild    Diabetes mellitus, type 2 ([facility] [address] 8/24/2012    GERD (gastroesophageal reflux disease)     hiatal hernia    H/O recurrent Sinusitis 3/16/2011    Heart failure ([facility] [address]     Hiatal Hernia w/ GERD (gastroesophageal reflux disease) 3/16/2011    Hypercholesterolemia     Hypertension 3/16/2011    Ill-defined condition     pacemaker put in last July 2015 because of CHF    Impaired fasting glucose 4/18/2011    Iron deficiency anemia 3/16/2011    Multinodular thyroid 3/16/2011    Palpitations, PVC's 10/13/2011    Perennial allergic rhinitis 3/16/2011    Pityriasis rosea 5/30/2012    Post-menopausal     LMP 46y. o, no HRT    Postmenopause, LMP 40 yo, No HRT 3/16/2011    Reflux esophagitis 3/16/2011    Tobacco user     Vitamin D deficiency 10/13/2011       Past Surgical History:   Procedure Laterality Date    CARDIAC SURG PROCEDURE UNLIST      cardiac stent     COLONOSCOPY N/A 4/19/2017    COLONOSCOPY performed by Ed Serra MD at [address] [place] [address] ENDOSCOPY    EGD  2009    hiatal hernia, esophagitis; Dr Jhon Montilla, COLON, DIAGNOSTIC  2009    HX GYN      FNA bilateral benign     HX LITHOTRIPSY  2012    failed    KIDNEY STONE ([place]ULI) EVAL  2012    scope w/ kidney stone extractions, multiple; Dr Shea Wharton, [facility]    [place] COLONOSCOPY W/BIOPSY SINGLE/MULTIPLE  2009    benign polyp, recheck in 5 years, Dr Junior Palomino    PTCA W/ STENT, INITIAL  10/2010    Dr Evette Nash         Family History:   Problem Relation Age of Onset    Hypertension Mother     High Cholesterol Mother     Thyroid Disease Mother     Heart Disease Mother         chf    Kidney Disease Mother     Headache Mother     Heart Disease Father     Heart Attack Father 36         MI age 36   Christine Kelley High Cholesterol Father     Hypertension Sister     Thyroid Disease Sister     Hypertension Sister     Arthritis-rheumatoid Sister     Diabetes Sister     Thyroid Disease Sister     Heart Attack Brother         massive MI at age 46, survived    Thyroid Disease Other         niece, Neha Garcia    High Cholesterol Brother        Social History     Socioeconomic History    Marital status:      Spouse name: vijay shea to give infor     Number of children: 0    Years of education: Not on file    Highest education level: Not on file   Occupational History    Occupation: ; 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    Occupation:      Employer: [employer] resource strain: Not on file    Food insecurity     Worry: Not on file     Inability: Not on file   K[place]on needs     Medical: Not on file     Non-medical: Not on file   Tobacco Use    Smoking status: Former Smoker     Packs/day: 0.25     Years: 30.00     Pack years: 7.50     Types: Cigarettes     Last attempt to quit: 2013     Years since quittin.2    Smokeless tobacco: Never Used    Tobacco comment: had quit 10/2010 then restarted ~ 3/2012;  about 5 cigarettes a day; on and off since age 26 yo x ~ 30 yrs   Substance and Sexual Activity    Alcohol use: No     Comment: none    Drug use: No    Sexual activity: Yes     Partners: Male     Birth control/protection: None     Comment:  Dollene Speaker    Lifestyle    Physical activity     Days per week: Not on file     Minutes per session: Not on file    Stress: Not on file   Relationships    Social connections     Talks on phone: Not on file     Gets together: Not on file     Attends Congregational service: Not on file     Active member of club or organization: Not on file     Attends meetings of clubs or organizations: Not on file     Relationship status: Not on file    Intimate partner violence     Fear of current or ex partner: Not on file     Emotionally abused: Not on file     Physically abused: Not on file     Forced sexual activity: Not on file   Other Topics Concern     Service Not Asked    Blood Transfusions Not Asked    Caffeine Concern No     Comment: down to 1 Coke a day    Occupational Exposure Not Asked    Hobby Hazards Not Asked    Sleep Concern Not Asked    Stress Concern Not Asked    Weight Concern Yes     Comment: stable now, had gained some wt over the past yr; was in low 150's previously; wants to lose 10 lbs    Special Diet Yes     Comment: heart healthy diet (white meats, salads, veggies, fruits)    Back Care Not Asked    Exercise Yes     Comment: exercise walking tape x 45 minutes qod    Bike Helmet Not Asked    Seat Belt Not Asked    Self-Exams Not Asked   Social History Narrative    New to [facility] as of 4/2009, referred by moose Ramirez; pervious PCP Dr Constantino Penaloza: Ace inhibitors and Seafood [shellfish containing products]    Review of Systems   Constitutional: Negative for fever. HENT: Negative for congestion. Eyes: Negative for visual disturbance. Respiratory: Negative for cough and shortness of breath. Cardiovascular: Positive for chest pain. Gastrointestinal: Negative for abdominal pain, nausea and vomiting. Genitourinary: Negative for dysuria. Musculoskeletal: Positive for back pain. Skin: Negative for rash. Neurological: Negative for headaches. Psychiatric/Behavioral: Negative for dysphoric mood. Vitals:    10/27/20 0117   BP: (!) 160/92   Pulse: 87   Resp: 18   Temp: 98.1 °F (36.7 °C)   SpO2: 99%            Physical Exam  Constitutional:       General: She is not in acute distress. Appearance: She is well-developed. HENT:      Head: Normocephalic and atraumatic. Mouth/Throat:      Pharynx: No oropharyngeal exudate. Eyes:      General: No scleral icterus. Right eye: No discharge. Left eye: No discharge. Pupils: Pupils are equal, round, and reactive to light. Neck:      Musculoskeletal: Normal range of motion and neck supple. Vascular: No JVD. Cardiovascular:      Rate and Rhythm: Normal rate and regular rhythm. Heart sounds: Normal heart sounds. No murmur. Pulmonary:      Effort: Pulmonary effort is normal. No respiratory distress. Breath sounds: Normal breath sounds. No stridor. No wheezing or rales. Chest:      Chest wall: No tenderness. Abdominal:      General: Bowel sounds are normal. There is no distension. Palpations: Abdomen is soft. There is no mass. Tenderness: There is no abdominal tenderness. There is no guarding or rebound. Musculoskeletal: Normal range of motion. Skin:     General: Skin is warm and dry. Capillary Refill: Capillary refill takes less than 2 seconds. Findings: No rash. Neurological:      Mental Status: She is oriented to person, place, and time. Psychiatric:         Behavior: Behavior normal.         Thought Content: Thought content normal.         Judgment: Judgment normal.          MDM       Procedures      ED EKG interpretation:  Rhythm: normal sinus rhythm; and regular .  Rate (approx.): 86; Axis: left axis deviation; P wave: normal; QRS interval: normal ; ST/T wave: non-specific changes; i. This EKG was interpreted by Daquan Fulton MD,ED Provider. Work up is reassuring, no ekg changes, neg troponin. Chest pain resolved with gi cocktail. She has ointment with GI on Wednesday and would like to follow-up with them. We will try Carafate for her symptoms. She will follow up with her cardiologist as a precaution.   She was instructed to return should her symptoms worsen especially she develops any shortness of breath or diaphoresis, etc.

## 2020-10-29 ENCOUNTER — TRANSCRIBE ORDER (OUTPATIENT)
Dept: SCHEDULING | Age: 60
End: 2020-10-29

## 2020-10-29 DIAGNOSIS — R14.2 BELCHING SYMPTOM: ICD-10-CM

## 2020-10-29 DIAGNOSIS — R14.0 ABDOMINAL DISTENSION: ICD-10-CM

## 2020-10-29 DIAGNOSIS — K21.9 GASTROESOPHAGEAL REFLUX DISEASE: Primary | ICD-10-CM

## 2020-10-29 DIAGNOSIS — R07.89 OTHER CHEST PAIN: ICD-10-CM

## 2020-11-05 ENCOUNTER — HOSPITAL ENCOUNTER (OUTPATIENT)
Dept: PREADMISSION TESTING | Age: 60
Discharge: HOME | End: 2020-11-05
Payer: COMMERCIAL

## 2020-11-05 PROCEDURE — 87635 SARS-COV-2 COVID-19 AMP PRB: CPT

## 2020-11-06 ENCOUNTER — TRANSCRIBE ORDER (OUTPATIENT)
Dept: SCHEDULING | Age: 60
End: 2020-11-06

## 2020-11-06 DIAGNOSIS — J32.0 CHRONIC MAXILLARY SINUSITIS: Primary | ICD-10-CM

## 2020-11-06 LAB
HEALTH STATUS, XMCV2T: NORMAL
SARS-COV-2, COV2NT: NOT DETECTED
SOURCE, COVRS: NORMAL
SPECIMEN SOURCE, FCOV2M: NORMAL
SPECIMEN TYPE, XMCV1T: NORMAL

## 2020-11-09 ENCOUNTER — HOSPITAL ENCOUNTER (OUTPATIENT)
Age: 60
Setting detail: OUTPATIENT SURGERY
Discharge: HOSPICE | End: 2020-11-09
Attending: SPECIALIST | Admitting: SPECIALIST
Payer: COMMERCIAL

## 2020-11-09 VITALS
RESPIRATION RATE: 15 BRPM | HEIGHT: 62 IN | OXYGEN SATURATION: 99 % | BODY MASS INDEX: 30 KG/M2 | HEART RATE: 68 BPM | DIASTOLIC BLOOD PRESSURE: 84 MMHG | SYSTOLIC BLOOD PRESSURE: 185 MMHG

## 2020-11-09 PROCEDURE — 76040000019: Performed by: SPECIALIST

## 2020-11-09 PROCEDURE — 2709999900 HC NON-CHARGEABLE SUPPLY: Performed by: SPECIALIST

## 2020-11-09 PROCEDURE — 74011000250 HC RX REV CODE- 250: Performed by: SPECIALIST

## 2020-11-09 RX ORDER — POTASSIUM CHLORIDE 20 MEQ/1
20 TABLET, EXTENDED RELEASE ORAL 2 TIMES DAILY
COMMUNITY
End: 2022-05-31 | Stop reason: ALTCHOICE

## 2020-11-09 RX ORDER — LEVOCETIRIZINE DIHYDROCHLORIDE 5 MG/1
5 TABLET, FILM COATED ORAL
COMMUNITY

## 2020-11-09 RX ORDER — LIDOCAINE HYDROCHLORIDE 20 MG/ML
JELLY TOPICAL ONCE
Status: COMPLETED | OUTPATIENT
Start: 2020-11-09 | End: 2020-11-09

## 2020-11-09 RX ORDER — ROSUVASTATIN CALCIUM 20 MG/1
20 TABLET, COATED ORAL
COMMUNITY

## 2020-11-09 RX ORDER — SACUBITRIL AND VALSARTAN 24; 26 MG/1; MG/1
1 TABLET, FILM COATED ORAL 2 TIMES DAILY
COMMUNITY

## 2020-11-09 RX ADMIN — LIDOCAINE HYDROCHLORIDE 5 ML: 20 JELLY TOPICAL at 08:32

## 2020-11-09 NOTE — DISCHARGE INSTRUCTIONS
Altagracia Jones  [id number]  1960      MANOMETRY DISCHARGE INSTRUCTION    You may resume your regular diet as tolerated. You may resume your normal daily activities. If you develop a sore throat- throat lozenges or warm salt water gargles will help. Call your Physician if you have any complications or questions. [employer] Activation    Thank you for requesting access to [employer]. Please follow the instructions below to securely access and download your online medical record. G[employer]eter allows you to send messages to your doctor, view your test results, renew your prescriptions, schedule appointments, and more. How Do I Sign Up? 1. In your internet browser, go to [email or web address]  2. Click on the First Time User? Click Here link in the Sign In box. You will be redirect to the New Member Sign Up page. 3. Enter your G[employer]eter Access Code exactly as it appears below. You will not need to use this code after youve completed the sign-up process. If you do not sign up before the expiration date, you must request a new code. Giveter Access Code: Activation code not generated  Current G[employer]eter Status: Active (This is the date your Giveter access code will )    4. Enter the last four digits of your Social Security Number (xxxx) and Date of Birth (mm/dd/yyyy) as indicated and click Submit. You will be taken to the next sign-up page. 5. Create a Giveter ID. This will be your Giveter login ID and cannot be changed, so think of one that is secure and easy to remember. 6. Create a Giveter password. You can change your password at any time. 7. Enter your Password Reset Question and Answer. This can be used at a later time if you forget your password. 8. Enter your e-mail address. You will receive e-mail notification when new information is available in [address] 9. Click Sign Up. You can now view and download portions of your medical record.   10. Click the Download Summary menu link to download a portable copy of your medical information. Additional Information    If you have questions, please visit the Frequently Asked Questions section of the [employer]Mail website at [email or web address] Remember, [employer]Mail is NOT to be used for urgent needs. For medical emergencies, dial 911.

## 2020-11-10 ENCOUNTER — HOSPITAL ENCOUNTER (OUTPATIENT)
Dept: GENERAL RADIOLOGY | Age: 60
Discharge: HOME | End: 2020-11-10
Attending: SPECIALIST
Payer: COMMERCIAL

## 2020-11-10 ENCOUNTER — HOSPITAL ENCOUNTER (OUTPATIENT)
Dept: CT IMAGING | Age: 60
Discharge: HOME | End: 2020-11-10
Attending: OTOLARYNGOLOGY
Payer: COMMERCIAL

## 2020-11-10 DIAGNOSIS — R14.2 BELCHING SYMPTOM: ICD-10-CM

## 2020-11-10 DIAGNOSIS — K21.9 GASTROESOPHAGEAL REFLUX DISEASE: ICD-10-CM

## 2020-11-10 DIAGNOSIS — R07.89 OTHER CHEST PAIN: ICD-10-CM

## 2020-11-10 DIAGNOSIS — J32.0 CHRONIC MAXILLARY SINUSITIS: ICD-10-CM

## 2020-11-10 DIAGNOSIS — R14.0 ABDOMINAL DISTENSION: ICD-10-CM

## 2020-11-10 PROCEDURE — 70486 CT MAXILLOFACIAL W/O DYE: CPT

## 2020-11-10 PROCEDURE — 74220 X-RAY XM ESOPHAGUS 1CNTRST: CPT

## 2021-05-27 RX ORDER — SODIUM CHLORIDE 9 MG/ML
25 INJECTION, SOLUTION INTRAVENOUS CONTINUOUS
Status: DISCONTINUED | OUTPATIENT
Start: 2021-06-02 | End: 2021-06-03 | Stop reason: HOSPADM

## 2021-06-02 ENCOUNTER — HOSPITAL ENCOUNTER (OUTPATIENT)
Dept: INFUSION THERAPY | Age: 61
Discharge: HOME | End: 2021-06-02

## 2021-10-10 ENCOUNTER — APPOINTMENT (OUTPATIENT)
Dept: GENERAL RADIOLOGY | Age: 61
End: 2021-10-10
Attending: EMERGENCY MEDICINE
Payer: COMMERCIAL

## 2021-10-10 ENCOUNTER — HOSPITAL ENCOUNTER (OUTPATIENT)
Age: 61
Setting detail: OBSERVATION
Discharge: HOME | End: 2021-10-11
Attending: EMERGENCY MEDICINE | Admitting: FAMILY MEDICINE
Payer: COMMERCIAL

## 2021-10-10 DIAGNOSIS — R07.9 CHEST PAIN, UNSPECIFIED TYPE: Primary | ICD-10-CM

## 2021-10-10 LAB
ALBUMIN SERPL-MCNC: 3.4 G/DL (ref 3.5–5)
ALBUMIN/GLOB SERPL: 0.9 {RATIO} (ref 1.1–2.2)
ALP SERPL-CCNC: 81 U/L (ref 45–117)
ALT SERPL-CCNC: 17 U/L (ref 12–78)
ANION GAP SERPL CALC-SCNC: 5 MMOL/L (ref 5–15)
AST SERPL-CCNC: 25 U/L (ref 15–37)
ATRIAL RATE: 77 BPM
BASOPHILS # BLD: 0.1 K/UL (ref 0–0.1)
BASOPHILS NFR BLD: 1 % (ref 0–1)
BILIRUB SERPL-MCNC: 0.2 MG/DL (ref 0.2–1)
BNP SERPL-MCNC: 1055 PG/ML
BUN SERPL-MCNC: 36 MG/DL (ref 6–20)
BUN/CREAT SERPL: 18 (ref 12–20)
CALCIUM SERPL-MCNC: 9.8 MG/DL (ref 8.5–10.1)
CALCULATED P AXIS, ECG09: 49 DEGREES
CALCULATED R AXIS, ECG10: -13 DEGREES
CALCULATED T AXIS, ECG11: 33 DEGREES
CHLORIDE SERPL-SCNC: 114 MMOL/L (ref 97–108)
CO2 SERPL-SCNC: 23 MMOL/L (ref 21–32)
COMMENT, HOLDF: NORMAL
CREAT SERPL-MCNC: 2 MG/DL (ref 0.55–1.02)
DIAGNOSIS, 93000: NORMAL
DIFFERENTIAL METHOD BLD: ABNORMAL
EOSINOPHIL # BLD: 0.1 K/UL (ref 0–0.4)
EOSINOPHIL NFR BLD: 1 % (ref 0–7)
ERYTHROCYTE [DISTWIDTH] IN BLOOD BY AUTOMATED COUNT: 14.5 % (ref 11.5–14.5)
GLOBULIN SER CALC-MCNC: 3.9 G/DL (ref 2–4)
GLUCOSE SERPL-MCNC: 116 MG/DL (ref 65–100)
HCT VFR BLD AUTO: 30.4 % (ref 35–47)
HGB BLD-MCNC: 9.1 G/DL (ref 11.5–16)
IMM GRANULOCYTES # BLD AUTO: 0 K/UL
IMM GRANULOCYTES NFR BLD AUTO: 0 %
LYMPHOCYTES # BLD: 2.6 K/UL (ref 0.8–3.5)
LYMPHOCYTES NFR BLD: 32 % (ref 12–49)
MAGNESIUM SERPL-MCNC: 1.9 MG/DL (ref 1.6–2.4)
MCH RBC QN AUTO: 26.8 PG (ref 26–34)
MCHC RBC AUTO-ENTMCNC: 29.9 G/DL (ref 30–36.5)
MCV RBC AUTO: 89.7 FL (ref 80–99)
MONOCYTES # BLD: 1.1 K/UL (ref 0–1)
MONOCYTES NFR BLD: 14 % (ref 5–13)
NEUTS SEG # BLD: 4.3 K/UL (ref 1.8–8)
NEUTS SEG NFR BLD: 52 % (ref 32–75)
NRBC # BLD: 0 K/UL (ref 0–0.01)
NRBC BLD-RTO: 0 PER 100 WBC
P-R INTERVAL, ECG05: 172 MS
PLATELET # BLD AUTO: 208 K/UL (ref 150–400)
PMV BLD AUTO: 10.3 FL (ref 8.9–12.9)
POTASSIUM SERPL-SCNC: 3.6 MMOL/L (ref 3.5–5.1)
PROT SERPL-MCNC: 7.3 G/DL (ref 6.4–8.2)
Q-T INTERVAL, ECG07: 388 MS
QRS DURATION, ECG06: 74 MS
QTC CALCULATION (BEZET), ECG08: 439 MS
RBC # BLD AUTO: 3.39 M/UL (ref 3.8–5.2)
RBC MORPH BLD: ABNORMAL
SAMPLES BEING HELD,HOLD: NORMAL
SODIUM SERPL-SCNC: 142 MMOL/L (ref 136–145)
TROPONIN-HIGH SENSITIVITY: 24 NG/L (ref 0–51)
TROPONIN-HIGH SENSITIVITY: 24 NG/L (ref 0–51)
VENTRICULAR RATE, ECG03: 77 BPM
WBC # BLD AUTO: 8.2 K/UL (ref 3.6–11)

## 2021-10-10 PROCEDURE — 84484 ASSAY OF TROPONIN QUANT: CPT

## 2021-10-10 PROCEDURE — 85025 COMPLETE CBC W/AUTO DIFF WBC: CPT

## 2021-10-10 PROCEDURE — 36415 COLL VENOUS BLD VENIPUNCTURE: CPT

## 2021-10-10 PROCEDURE — 74011250636 HC RX REV CODE- 250/636: Performed by: FAMILY MEDICINE

## 2021-10-10 PROCEDURE — 83735 ASSAY OF MAGNESIUM: CPT

## 2021-10-10 PROCEDURE — 71045 X-RAY EXAM CHEST 1 VIEW: CPT

## 2021-10-10 PROCEDURE — 80053 COMPREHEN METABOLIC PANEL: CPT

## 2021-10-10 PROCEDURE — 74011000250 HC RX REV CODE- 250: Performed by: FAMILY MEDICINE

## 2021-10-10 PROCEDURE — 74011250637 HC RX REV CODE- 250/637: Performed by: FAMILY MEDICINE

## 2021-10-10 PROCEDURE — 99285 EMERGENCY DEPT VISIT HI MDM: CPT

## 2021-10-10 PROCEDURE — 96374 THER/PROPH/DIAG INJ IV PUSH: CPT

## 2021-10-10 PROCEDURE — 93005 ELECTROCARDIOGRAM TRACING: CPT

## 2021-10-10 PROCEDURE — 83880 ASSAY OF NATRIURETIC PEPTIDE: CPT

## 2021-10-10 PROCEDURE — 99218 HC RM OBSERVATION: CPT

## 2021-10-10 PROCEDURE — 74011250637 HC RX REV CODE- 250/637: Performed by: EMERGENCY MEDICINE

## 2021-10-10 PROCEDURE — 96372 THER/PROPH/DIAG INJ SC/IM: CPT

## 2021-10-10 RX ORDER — GUAIFENESIN 100 MG/5ML
162 LIQUID (ML) ORAL
Status: COMPLETED | OUTPATIENT
Start: 2021-10-10 | End: 2021-10-10

## 2021-10-10 RX ORDER — CARVEDILOL 12.5 MG/1
25 TABLET ORAL 2 TIMES DAILY
Status: DISCONTINUED | OUTPATIENT
Start: 2021-10-10 | End: 2021-10-11 | Stop reason: HOSPADM

## 2021-10-10 RX ORDER — MONTELUKAST SODIUM 10 MG/1
10 TABLET ORAL
Status: DISCONTINUED | OUTPATIENT
Start: 2021-10-10 | End: 2021-10-11 | Stop reason: HOSPADM

## 2021-10-10 RX ORDER — NITROGLYCERIN 0.4 MG/1
0.4 TABLET SUBLINGUAL
Status: DISCONTINUED | OUTPATIENT
Start: 2021-10-10 | End: 2021-10-11 | Stop reason: HOSPADM

## 2021-10-10 RX ORDER — SODIUM CHLORIDE 0.9 % (FLUSH) 0.9 %
5-40 SYRINGE (ML) INJECTION AS NEEDED
Status: DISCONTINUED | OUTPATIENT
Start: 2021-10-10 | End: 2021-10-11 | Stop reason: HOSPADM

## 2021-10-10 RX ORDER — GUAIFENESIN 100 MG/5ML
81 LIQUID (ML) ORAL DAILY
Status: DISCONTINUED | OUTPATIENT
Start: 2021-10-11 | End: 2021-10-11 | Stop reason: HOSPADM

## 2021-10-10 RX ORDER — PANTOPRAZOLE SODIUM 40 MG/1
40 TABLET, DELAYED RELEASE ORAL DAILY
Status: DISCONTINUED | OUTPATIENT
Start: 2021-10-10 | End: 2021-10-11 | Stop reason: HOSPADM

## 2021-10-10 RX ORDER — CARVEDILOL 12.5 MG/1
25 TABLET ORAL 2 TIMES DAILY
Status: DISCONTINUED | OUTPATIENT
Start: 2021-10-10 | End: 2021-10-10

## 2021-10-10 RX ORDER — FLUTICASONE PROPIONATE 50 MCG
2 SPRAY, SUSPENSION (ML) NASAL DAILY PRN
Status: DISCONTINUED | OUTPATIENT
Start: 2021-10-10 | End: 2021-10-11 | Stop reason: HOSPADM

## 2021-10-10 RX ORDER — ONDANSETRON 4 MG/1
4 TABLET, ORALLY DISINTEGRATING ORAL
Status: DISCONTINUED | OUTPATIENT
Start: 2021-10-10 | End: 2021-10-11 | Stop reason: HOSPADM

## 2021-10-10 RX ORDER — ACETAMINOPHEN 325 MG/1
650 TABLET ORAL
Status: DISCONTINUED | OUTPATIENT
Start: 2021-10-10 | End: 2021-10-11 | Stop reason: HOSPADM

## 2021-10-10 RX ORDER — ACETAMINOPHEN 650 MG/1
650 SUPPOSITORY RECTAL
Status: DISCONTINUED | OUTPATIENT
Start: 2021-10-10 | End: 2021-10-11 | Stop reason: HOSPADM

## 2021-10-10 RX ORDER — CETIRIZINE HCL 10 MG
10 TABLET ORAL DAILY
Status: DISCONTINUED | OUTPATIENT
Start: 2021-10-10 | End: 2021-10-11 | Stop reason: HOSPADM

## 2021-10-10 RX ORDER — ENOXAPARIN SODIUM 100 MG/ML
30 INJECTION SUBCUTANEOUS DAILY
Status: DISCONTINUED | OUTPATIENT
Start: 2021-10-10 | End: 2021-10-11 | Stop reason: HOSPADM

## 2021-10-10 RX ORDER — BUMETANIDE 0.25 MG/ML
1 INJECTION INTRAMUSCULAR; INTRAVENOUS ONCE
Status: COMPLETED | OUTPATIENT
Start: 2021-10-10 | End: 2021-10-10

## 2021-10-10 RX ORDER — ONDANSETRON 2 MG/ML
4 INJECTION INTRAMUSCULAR; INTRAVENOUS
Status: DISCONTINUED | OUTPATIENT
Start: 2021-10-10 | End: 2021-10-11 | Stop reason: HOSPADM

## 2021-10-10 RX ORDER — FAMOTIDINE 20 MG/1
40 TABLET, FILM COATED ORAL
Status: DISCONTINUED | OUTPATIENT
Start: 2021-10-10 | End: 2021-10-11 | Stop reason: HOSPADM

## 2021-10-10 RX ORDER — SODIUM CHLORIDE 0.9 % (FLUSH) 0.9 %
5-40 SYRINGE (ML) INJECTION EVERY 8 HOURS
Status: DISCONTINUED | OUTPATIENT
Start: 2021-10-10 | End: 2021-10-11 | Stop reason: HOSPADM

## 2021-10-10 RX ORDER — ROSUVASTATIN CALCIUM 10 MG/1
20 TABLET, COATED ORAL
Status: DISCONTINUED | OUTPATIENT
Start: 2021-10-10 | End: 2021-10-11 | Stop reason: HOSPADM

## 2021-10-10 RX ADMIN — ROSUVASTATIN 20 MG: 10 TABLET, FILM COATED ORAL at 21:19

## 2021-10-10 RX ADMIN — MONTELUKAST 10 MG: 10 TABLET, FILM COATED ORAL at 21:19

## 2021-10-10 RX ADMIN — SACUBITRIL AND VALSARTAN 1 TABLET: 24; 26 TABLET, FILM COATED ORAL at 10:33

## 2021-10-10 RX ADMIN — SACUBITRIL AND VALSARTAN 1 TABLET: 24; 26 TABLET, FILM COATED ORAL at 21:19

## 2021-10-10 RX ADMIN — PANTOPRAZOLE SODIUM 40 MG: 40 TABLET, DELAYED RELEASE ORAL at 10:33

## 2021-10-10 RX ADMIN — FAMOTIDINE 40 MG: 20 TABLET ORAL at 21:19

## 2021-10-10 RX ADMIN — ASPIRIN 81 MG CHEWABLE TABLET 162 MG: 81 TABLET CHEWABLE at 04:10

## 2021-10-10 RX ADMIN — Medication 10 ML: at 21:20

## 2021-10-10 RX ADMIN — NITROGLYCERIN 0.4 MG: 0.4 TABLET, ORALLY DISINTEGRATING SUBLINGUAL at 04:26

## 2021-10-10 RX ADMIN — NITROGLYCERIN 0.4 MG: 0.4 TABLET, ORALLY DISINTEGRATING SUBLINGUAL at 04:11

## 2021-10-10 RX ADMIN — BUMETANIDE 1 MG: 0.25 INJECTION, SOLUTION INTRAMUSCULAR; INTRAVENOUS at 07:03

## 2021-10-10 RX ADMIN — CARVEDILOL 25 MG: 12.5 TABLET, FILM COATED ORAL at 17:38

## 2021-10-10 RX ADMIN — ENOXAPARIN SODIUM 30 MG: 30 INJECTION SUBCUTANEOUS at 10:33

## 2021-10-10 RX ADMIN — CARVEDILOL 25 MG: 12.5 TABLET, FILM COATED ORAL at 10:33

## 2021-10-10 RX ADMIN — CETIRIZINE HYDROCHLORIDE 10 MG: 10 TABLET, FILM COATED ORAL at 10:33

## 2021-10-10 NOTE — ED PROVIDER NOTES
HPI     79-year-old female with a history of MI, diabetes, acid reflux, heart failure, high cholesterol, hypertension, presents emergency department with intermittent chest pain for the past few days. Patient states the pain is sharp in nature radiates to the left shoulder and lasts about 10 minutes. She states this pain is similar to the pain she had when she had a heart attack in 2011. She states at that time she thought the pain was gas and ended up being a heart. She has not had any recent change in medications. She does report some increased swelling in her ankles bilaterally. She reports shortness of breath with exertion which is seems to be about at baseline. When she gets the chest pain she does not get more short of breath nauseated or sweaty. She states she has been waking up with night sweats. She currently states her pain is about a 4 out of 10. It started around 1130 last night. Past Medical History:   Diagnosis Date    Acute Anterior Myocardial Infarction, s/p PTCA w/ stent 10/2010 10/31/2010    Asthma     mild    Diabetes mellitus, type 2 ([address] 8/24/2012    GERD (gastroesophageal reflux disease)     hiatal hernia    H/O recurrent Sinusitis 3/16/2011    Heart failure ([facility] [address]     Hiatal Hernia w/ GERD (gastroesophageal reflux disease) 3/16/2011    Hypercholesterolemia     Hypertension 3/16/2011    Ill-defined condition     pacemaker put in last July 2015 because of CHF    Impaired fasting glucose 4/18/2011    Iron deficiency anemia 3/16/2011    Multinodular thyroid 3/16/2011    Palpitations, PVC's 10/13/2011    Perennial allergic rhinitis 3/16/2011    Pityriasis rosea 5/30/2012    Post-menopausal     LMP 46y. o, no HRT    Postmenopause, LMP 40 yo, No HRT 3/16/2011    Reflux esophagitis 3/16/2011    Tobacco user     Vitamin D deficiency 10/13/2011       Past Surgical History:   Procedure Laterality Date    CARDIAC SURG PROCEDURE UNLIST      cardiac stent     COLONOSCOPY N/A 2017    COLONOSCOPY performed by Ezio Limon MD at [address] EGD  2009    hiatal hernia, esophagitis; Dr Dolores Munoz, COLON, DIAGNOSTIC  2009    HX GYN      FNA bilateral benign     HX LITHOTRIPSY  2012    failed   [address] ([place]) EVAL  2012    scope w/ kidney stone extractions, multiple; Dr Abdulaziz Hernandez, [facility]    OR COLONOSCOPY W/BIOPSY SINGLE/MULTIPLE  2009    benign polyp, recheck in 5 years, Dr Casie Cole    PTCA W/ STENT, INITIAL  10/2010    Dr Taylor Niece         Family History:   Problem Relation Age of Onset    Hypertension Mother     High Cholesterol Mother     Thyroid Disease Mother     Heart Disease Mother         chf    Kidney Disease Mother     Headache Mother     Heart Disease Father     Heart Attack Father 36         MI age 36   [place] High Cholesterol Father     Hypertension Sister     Thyroid Disease Sister     Hypertension Sister     Arthritis-rheumatoid Sister     Diabetes Sister     Thyroid Disease Sister     Heart Attack Brother         massive MI at age 46, survived    Thyroid Disease Other         niece, Neha Jose    High Cholesterol Brother        Social History     Socioeconomic History    Marital status:      Spouse name: vijay shea to give infor     Number of children: 0    Years of education: Not on file    Highest education level: Not on file   Occupational History    Occupation: ; 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    Occupation:      Employer: SELF EMPLOYED   Tobacco Use    Smoking status: Former Smoker     Packs/day: 0.25     Years: 30.00     Pack years: 7.50     Types: Cigarettes     Quit date: 2013     Years since quittin.2    Smokeless tobacco: Never Used    Tobacco comment: had quit 10/2010 then restarted ~ 3/2012;  about 5 cigarettes a day; on and off since age 28 yo x ~ 30 yrs   Substance and Sexual Activity    Alcohol use: No     Comment: none    Drug use: No    Sexual activity: Yes     Partners: Male     Birth control/protection: None     Comment:  Asha De Leon    Other Topics Concern     Service Not Asked    Blood Transfusions Not Asked    Caffeine Concern No     Comment: down to 1 Coke a day    Occupational Exposure Not Asked    Hobby Hazards Not Asked    Sleep Concern Not Asked    Stress Concern Not Asked    Weight Concern Yes     Comment: stable now, had gained some wt over the past yr; was in low 150's previously; wants to lose 10 lbs    Special Diet Yes     Comment: heart healthy diet (white meats, salads, veggies, fruits)    Back Care Not Asked    Exercise Yes     Comment: exercise walking tape x 45 minutes qod    Bike Helmet Not Asked    Seat Belt Not Asked    Self-Exams Not Asked   Social History Narrative    New to [facility] as of 4/2009, referred by moose Sabillon; pervious PCP Dr Ponce Danielle Determinants of Health     Financial Resource Strain:     Difficulty of Paying Living Expenses:    Food Insecurity:     Worried About [address] in the Last Year:     [address] in the Last Year:    Transportation Needs:     Lack of Transportation (Medical):  Lack of Transportation (Non-Medical):    Physical Activity:     Days of Exercise per Week:     Minutes of Exercise per Session:    Stress:     Feeling of Stress :    Social Connections:     Frequency of Communication with Friends and Family:     Frequency of Social Gatherings with Friends and Family:     Attends Sikhism Services:     Active Member of Clubs or Organizations:     Attends Club or Organization Meetings:     Marital Status:    Intimate Partner Violence:     Fear of Current or Ex-Partner:     Emotionally Abused:     Physically Abused:     Sexually Abused: ALLERGIES: Ace inhibitors and Seafood [shellfish containing products]    Review of Systems   Constitutional: Negative for fever. HENT: Negative for congestion.     Eyes: Positive for visual disturbance. Respiratory: Positive for shortness of breath. Cardiovascular: Positive for chest pain and leg swelling. Negative for palpitations. Gastrointestinal: Negative for abdominal pain, nausea and vomiting. Endocrine: Negative for polyuria. Genitourinary: Negative for dysuria. Musculoskeletal: Negative for gait problem. Skin: Negative for rash. Neurological: Negative for headaches. Psychiatric/Behavioral: Negative for dysphoric mood. Vitals:    10/10/21 0234   BP: (!) 170/80   Pulse: 80   Resp: 16   Temp: 97 °F (36.1 °C)   SpO2: 97%   Weight: 74.4 kg (164 lb)            Physical Exam  Constitutional:       General: She is not in acute distress. Appearance: She is well-developed. HENT:      Head: Normocephalic and atraumatic. Mouth/Throat:      Pharynx: No oropharyngeal exudate. Eyes:      General: No scleral icterus. Right eye: No discharge. Left eye: No discharge. Pupils: Pupils are equal, round, and reactive to light. Neck:      Vascular: No JVD. Cardiovascular:      Rate and Rhythm: Normal rate and regular rhythm. Heart sounds: Normal heart sounds. No murmur heard. Pulmonary:      Effort: Pulmonary effort is normal. No respiratory distress. Breath sounds: Normal breath sounds. No stridor. No wheezing or rales. Chest:      Chest wall: No tenderness. Abdominal:      General: Bowel sounds are normal. There is no distension. Palpations: Abdomen is soft. There is no mass. Tenderness: There is no abdominal tenderness. There is no guarding or rebound. Musculoskeletal:         General: Normal range of motion. Cervical back: Normal range of motion and neck supple. Right lower leg: Edema present. Left lower leg: Edema present. Skin:     General: Skin is warm and dry. Capillary Refill: Capillary refill takes less than 2 seconds. Findings: No rash.    Neurological:      Mental Status: She is oriented to person, place, and time. Psychiatric:         Behavior: Behavior normal.         Thought Content: Thought content normal.         Judgment: Judgment normal.          MDM       Procedures      ED EKG interpretation:  Rhythm: normal sinus rhythm; and regular . Rate (approx.): 77; Axis: left axis deviation; P wave: normal; QRS interval: normal ; ST/T wave: non-specific changes; More pronounced T wave inversion in lateral leads compared to prior EKG on file from 2020 This EKG was interpreted by Jessica Duke MD,ED Provider. Initial troponin neg. BNP is elevated some. CXR is clear. She did get some relief with nitro. Will admit for further cardiac eval given risk factors. Perfect Serve Consult for Admission  4:26 AM    ED Room Number: ZI70/70  Patient Name and age:  Marques Jean 64 y.o.  female  Working Diagnosis:   1. Chest pain, unspecified type        COVID-19 Suspicion:  no  Sepsis present:  no  Reassessment needed: no  Code Status:  Full Code  Readmission: no  Isolation Requirements:  no  Recommended Level of Care:  telemetry  Department:[facility] Adult ED - ([phone]  Other:  64year old female with CAD s/p stents 10 years ago, cardiomyopathy, DM, HTN, chol, presents with intermittent cp x a few days- similar to her MI 10 year ago. EKG shows more pronounced T wave inversion in lateral leads. Initial trop is neg. She is getting relief with sl Nitro.

## 2021-10-10 NOTE — ED NOTES
Bedside shift change report given to [address] (oncoming nurse) by Lavella Goodell (offgoing nurse). Report included the following information SBAR, Kardex, ED Summary and Recent Results.

## 2021-10-10 NOTE — PROGRESS NOTES
Patient to have stress test tomorrow, DM controlled by diet. Problem: Falls - Risk of  Goal: *Absence of Falls  Description: Document Aguilar Reason Fall Risk and appropriate interventions in the flowsheet.   Outcome: Progressing Towards Goal  Note: Fall Risk Interventions:            Medication Interventions: Evaluate medications/consider consulting pharmacy                   Problem: Patient Education: Go to Patient Education Activity  Goal: Patient/Family Education  Outcome: Progressing Towards Goal

## 2021-10-10 NOTE — ED NOTES
Verbal shift change report given to Cosme Merchant RN (oncoming nurse) by Madhu Serrano RN (offgoing nurse). Report included the following information SBAR, ED Summary, MAR and Recent Results.

## 2021-10-10 NOTE — ED NOTES
Pt presents to ED with cc of chest pain that goes down her left arm and back that started 3-4 days ago.  Pt reports cp comes and goes and sob with exertion    Pt stated that she had the same symptoms when she had her heart attack in 2011

## 2021-10-10 NOTE — PROGRESS NOTES
Hospitalist Progress Note. Patient seen and examined. Patient admitted by Dr. Ari Biswas with atypical chest pain with history of CAD and HF. Cardiology consulted. Home medications restarted. Plans per Dr. Ari Biswas.          Moris Garcia DO

## 2021-10-10 NOTE — PROGRESS NOTES
Bedside shift change report given to [address] (oncoming nurse) by Karen West  (offgoing nurse). Report included the following information SBAR, Kardex, ED Summary, [facility] and Recent Results.

## 2021-10-10 NOTE — PROGRESS NOTES
TRANSITION OF CARE  RUR--N/A  Disposition--Return home to independent functioning. Transport--    Patient's primary family contact:  Hamida Harper    CM gave patient the Patient Guide to Observation and Outpatient Care which patient signed--original to patient and copy to chart. Reason for Admission:  Atypical chest pain            History of CAD and HF                   RUR Score: N.A                Plan for utilizing home health:      None    PCP: First and Last name:  Renata Carlos MD   Name of Practice:    Are you a current patient: Yes    Approximate date of last visit: May 2021   Can you participate in a virtual visit with your PCP:                     Current Advanced Directive/Advance Care Plan: Full Code    Healthcare Decision Maker:   Click here to complete [place] including selection of the Healthcare Decision Maker Relationship (ie \"Primary\")                           Transition of Care Plan:                    CM met with patient. Patient lives with her . She has a brother and a sister who live locally. Patient reports good family /social support. Patient is ambulatory and expects return to independent functioning and employment as an . . Patient confirmed PCP, health insurance, and prescription coverage.    Previous home health : None  Previous IPR/ SNF rehab : None  DME : None

## 2021-10-10 NOTE — PROGRESS NOTES
TRANSFER - IN REPORT:    Verbal report received from Ray(name) on [employer]  being received from ED(unit) for routine progression of care      Report consisted of patients Situation, Background, Assessment and   Recommendations(SBAR). Information from the following report(s) SBAR and ED Summary was reviewed with the receiving nurse. Opportunity for questions and clarification was provided. Assessment completed upon patients arrival to unit and care assumed.

## 2021-10-10 NOTE — CONSULTS
Cardiology Consult Note    CC: CP  Reason for consult:  CP  Requesting MD:  Dr. Cristel Bernal     Subjective:      Date of  Admission: 10/10/2021  2:41 AM     Admission type:Emergency    Hermes Jones is a 64 y.o. female admitted for Chest pain [R07.9]. Patient complains of intermittent Lt side discomfort with Lt shoulder radiation and belching and burping. She thinks it is her GERD but her first MI in 2010 started out with similar sx. Her troponins are negative and ekg showed no significant change. Her last stress test with our office was two years ago and was negative for reversible ischemia and last cath by Dr. Rafiq Shin in 2015 which showed patent LAD stent. Her other cardiac data include ICD, ischemic CM with last EF 35%, and remote CHF related to MI. Denies any recent ICD discharge or palpitations. No failure sx.     Patient Active Problem List    Diagnosis Date Noted    Chest pain 07/29/2017    Mild intermittent asthma without complication 63/49/1625    Essential hypertension 12/22/2015    Advance care planning 12/22/2015    Hypokalemia 07/29/2015    Asthma exacerbation 07/26/2015    Cardiomegaly 07/26/2015    Pulmonary edema 07/26/2015    Acute exacerbation of CHF (congestive heart failure) ([facility] 07/26/2015    Pleural effusion, right 07/26/2015    Nephrolithiasis 01/10/2013    Non-insulin dependent type 2 diabetes mellitus [facility] 01/10/2013    Pityriasis rosea 05/30/2012    Palpitations, PVC's 10/13/2011    Postmenopause, LMP 38 yo, No HRT 03/16/2011    Reflux esophagitis 03/16/2011    Hiatal Hernia w/ GERD (gastroesophageal reflux disease) 03/16/2011    Iron deficiency anemia 03/16/2011    Perennial allergic rhinitis 03/16/2011    H/O recurrent Sinusitis 03/16/2011    Multinodular thyroid 03/16/2011    Acute Anterior Myocardial Infarction, s/p PTCA w/ stent 10/2010 10/31/2010    Hypercholesteremia 10/31/2010    Asthma, Mild, Intermittent 10/31/2010      Danita Pulido MD  Past Medical History:   Diagnosis Date    Acute Anterior Myocardial Infarction, s/p PTCA w/ stent 10/2010 10/31/2010    Asthma     mild    Diabetes mellitus, type 2 ([facility] [address] 8/24/2012    GERD (gastroesophageal reflux disease)     hiatal hernia    H/O recurrent Sinusitis 3/16/2011    Heart failure ([facility]     Hiatal Hernia w/ GERD (gastroesophageal reflux disease) 3/16/2011    Hypercholesterolemia     Hypertension 3/16/2011    Ill-defined condition     pacemaker put in last July 2015 because of CHF    Impaired fasting glucose 4/18/2011    Iron deficiency anemia 3/16/2011    Multinodular thyroid 3/16/2011    Palpitations, PVC's 10/13/2011    Perennial allergic rhinitis 3/16/2011    Pityriasis rosea 5/30/2012    Post-menopausal     LMP 46y. o, no HRT    Postmenopause, LMP 38 yo, No HRT 3/16/2011    Reflux esophagitis 3/16/2011    Tobacco user     Vitamin D deficiency 10/13/2011      Past Surgical History:   Procedure Laterality Date    CARDIAC SURG PROCEDURE UNLIST      cardiac stent     COLONOSCOPY N/A 4/19/2017    COLONOSCOPY performed by James Dietz MD at [facility] ENDOSCOPY    EGD  4/2009    hiatal hernia, esophagitis; Dr Radha Morgan, COLON, DIAGNOSTIC  4/2009    HX GYN      FNA bilateral benign     HX LITHOTRIPSY  4/2012    failed    KIDNEY STONE (CALCULI) EVAL  5/2012    scope w/ kidney stone extractions, multiple; Dr Jeremiah Lamas, [facility]    [place] COLONOSCOPY W/BIOPSY SINGLE/MULTIPLE  4/2009    benign polyp, recheck in 5 years, Dr Magali Esparza, INITIAL  10/2010    Dr Apolonia Campbell     Allergies   Allergen Reactions    Ace Inhibitors Cough     ?10/2011    Seafood [Shellfish Containing Products] Hives, Shortness of Breath and Nausea and Vomiting      Family History   Problem Relation Age of Onset    Hypertension Mother     High Cholesterol Mother     Thyroid Disease Mother     Heart Disease Mother         chf    Kidney Disease Mother     Headache Mother     Heart Disease Father     Heart Attack Father 36         MI age 45    High Cholesterol Father     Hypertension Sister     Thyroid Disease Sister     Hypertension Sister    [facility] Arthritis-rheumatoid Sister     Diabetes Sister     Thyroid Disease Sister     Heart Attack Brother         massive MI at age 46, survived    Thyroid Disease Other         niece, Derik Swartz    High Cholesterol Brother       Current Facility-Administered Medications   Medication Dose Route Frequency    nitroglycerin (NITROSTAT) tablet 0.4 mg  0.4 mg SubLINGual Q5MIN PRN    sodium chloride (NS) flush 5-40 mL  5-40 mL IntraVENous Q8H    sodium chloride (NS) flush 5-40 mL  5-40 mL IntraVENous PRN    acetaminophen (TYLENOL) tablet 650 mg  650 mg Oral Q6H PRN    Or    acetaminophen (TYLENOL) suppository 650 mg  650 mg Rectal Q6H PRN    ondansetron (ZOFRAN ODT) tablet 4 mg  4 mg Oral Q8H PRN    Or    ondansetron (ZOFRAN) injection 4 mg  4 mg IntraVENous Q6H PRN    enoxaparin (LOVENOX) injection 30 mg  30 mg SubCUTAneous DAILY    montelukast (SINGULAIR) tablet 10 mg  10 mg Oral QHS    pantoprazole (PROTONIX) tablet 40 mg  40 mg Oral DAILY    sacubitriL-valsartan (ENTRESTO) 24-26 mg tablet 1 Tablet  1 Tablet Oral BID    rosuvastatin (CRESTOR) tablet 20 mg  20 mg Oral QHS    cetirizine (ZYRTEC) tablet 10 mg  10 mg Oral DAILY    famotidine (PEPCID) tablet 40 mg  40 mg Oral QHS    fluticasone propionate (FLONASE) 50 mcg/actuation nasal spray 2 Spray  2 Spray Both Nostrils DAILY PRN    carvediloL (COREG) tablet 25 mg  25 mg Oral BID        Prior to Admission Medications:  Prior to Admission medications    Medication Sig Start Date End Date Taking? Authorizing Provider   rosuvastatin (Crestor) 20 mg tablet Take 20 mg by mouth nightly. Indications: high cholesterol    Provider, Historical   fluticasone propionate (FLONASE ALLERGY RELIEF NA) by Nasal route.     Provider, Historical   sacubitriL-valsartan Mechele Friendly) 24-26 mg tablet Take 1 Tab by mouth two (2) times a day. Indications: chronic heart failure    Provider, Historical   potassium chloride (K-DUR, KLOR-CON) 20 mEq tablet Take 20 mEq by mouth two (2) times a day. Provider, Historical   levocetirizine (XYZAL) 5 mg tablet Take 5 mg by mouth. Provider, Historical   EPINEPHrine (EPIPEN) 0.3 mg/0.3 mL injection 0.3 mL by IntraMUSCular route once as needed for up to 1 dose. 12/1/17   Hoda Yip NP   zolpidem (AMBIEN) 10 mg tablet Take 1 Tab by mouth nightly as needed for Sleep. Max Daily Amount: 10 mg. 8/22/17   Greg Townsend MD   metFORMIN (GLUCOPHAGE) 500 mg tablet Decrease to 250mg BID and monitor blood sugars 8/22/17   Eulogio Salvador MD   pantoprazole (PROTONIX) 40 mg tablet Take 1 Tab by mouth daily. 8/14/17   Greg Townsend MD   bumetanide (BUMEX) 0.5 mg tablet TAKE 1 TABLET BY MOUTH TWICE A DAY AS NEEDED 7/17/17   Eulogio Salvador MD   montelukast (SINGULAIR) 10 mg tablet TAKE 1 TAB BY MOUTH DAILY. INDICATIONS: ALLERGIC RHINITIS 4/13/17   Eulogio Salvador MD   carvedilol (COREG) 25 mg tablet Take 1 Tab by mouth two (2) times a day. 4/13/17   Greg Townsend MD   diclofenac (VOLTAREN) 1 % gel Apply 2 g to affected area every six (6) hours. 4/13/17   Greg Townsend MD   albuterol (PROAIR HFA) 90 mcg/actuation inhaler Take 2 Puffs by inhalation every six (6) hours as needed for Wheezing. 1/30/17   Greg Townsend MD   famotidine (PEPCID) 40 mg tablet Take 40 mg by mouth nightly. 9/25/16   Provider, Historical   Cholecalciferol, Vitamin D3, (VITAMIN D) 2,000 unit Cap Take 4,000 Units by mouth daily.  Takes 2 of the 2,000 unit tabs qd    Provider, Historical        Review of Symptoms:  Except as noted in HPI, patient denies recent fever or chills, nausea, vomiting, diarrhea, hemoptysis, hematemesis, dysuria, myalgias, focal neurologic symptoms, ecchymosis, angioedema, odynophagia, dysphagia, sore throat, earache,rash, melena, hematochezia, depression, GERD, cold intolerance, petechia, bleeding gums, or significant weight loss. A comprehensive review of systems was negative except for that written in the HPI. Subjective:    24 hr VS reviewed, overall VSSAF  Temp (24hrs), Av.1 °F (36.7 °C), Min:97 °F (36.1 °C), Max:98.8 °F (37.1 °C)    Patient Vitals for the past 8 hrs:   Pulse   10/10/21 1550 66   10/10/21 1411 93   10/10/21 1322 75   10/10/21 1208 93   10/10/21 1020 74   10/10/21 0916 69   10/10/21 0915 63    Patient Vitals for the past 8 hrs:   Resp   10/10/21 1550 16   10/10/21 1322 16   10/10/21 0915 16    Patient Vitals for the past 8 hrs:   BP   10/10/21 1550 (!) 157/98   10/10/21 1322 (!) 170/92   10/10/21 0915 (!) 184/105        No intake or output data in the 24 hours ending 10/10/21 1556      Physical Exam (complete single organ system exam)        Visit Vitals  BP (!) 157/98 (BP 1 Location: Right upper arm, BP Patient Position: At rest)   Pulse 66   Temp 98.8 °F (37.1 °C)   Resp 16   Ht 5' 2\" (1.575 m)   Wt 164 lb (74.4 kg)   SpO2 96%   BMI 30.00 kg/m²     General Appearance:  Well developed, well nourished,alert and oriented x 3, and individual in no acute distress. Ears/Nose/Mouth/Throat:   Hearing grossly normal.         Neck: Supple. Chest:   Lungs clear to auscultation bilaterally. Cardiovascular:  Regular rate and rhythm, S1, S2 normal, no murmur. Abdomen:   Soft, non-tender, bowel sounds are active. Extremities: No edema bilaterally. Skin: Warm and dry.                Cardiographics    Telemetry: normal sinus rhythm  ECG: normal EKG, normal sinus rhythm, unchanged from previous tracings  Echocardiogram: Not done    Labs:   Recent Results (from the past 24 hour(s))   EKG, 12 LEAD, INITIAL    Collection Time: 10/10/21  2:40 AM   Result Value Ref Range    Ventricular Rate 77 BPM    Atrial Rate 77 BPM    P-R Interval 172 ms    QRS Duration 74 ms    Q-T Interval 388 ms    QTC Calculation (Bezet) 439 ms    Calculated P Axis 49 degrees    Calculated R Axis -13 degrees    Calculated T Axis 33 degrees    Diagnosis       Normal sinus rhythm  Minimal voltage criteria for LVH, may be normal variant ( R in aVL )  T wave abnormality, consider lateral ischemia  Abnormal ECG  When compared with ECG of 27-OCT-2020 01:21,  Nonspecific T wave abnormality now evident in Inferior leads  Inverted T waves have replaced nonspecific T wave abnormality in Lateral   leads  Confirmed by Leonel Olmos M.D., Mateus De Leon ([id number]) on 10/10/2021 12:32:57 PM     SAMPLES BEING HELD    Collection Time: 10/10/21  2:58 AM   Result Value Ref Range    SAMPLES BEING HELD 1BLU 1RED     COMMENT        Add-on orders for these samples will be processed based on acceptable specimen integrity and analyte stability, which may vary by analyte. TROPONIN-HIGH SENSITIVITY    Collection Time: 10/10/21  2:58 AM   Result Value Ref Range    Troponin-High Sensitivity 24 0 - 51 ng/L   CBC WITH AUTOMATED DIFF    Collection Time: 10/10/21  2:58 AM   Result Value Ref Range    WBC 8.2 3.6 - 11.0 K/uL    RBC 3.39 (L) 3.80 - 5.20 M/uL    HGB 9.1 (L) 11.5 - 16.0 g/dL    HCT 30.4 (L) 35.0 - 47.0 %    MCV 89.7 80.0 - 99.0 FL    MCH 26.8 26.0 - 34.0 PG    MCHC 29.9 (L) 30.0 - 36.5 g/dL    RDW 14.5 11.5 - 14.5 %    PLATELET 870 217 - 804 K/uL    MPV 10.3 8.9 - 12.9 FL    NRBC 0.0 0  WBC    ABSOLUTE NRBC 0.00 0.00 - 0.01 K/uL    NEUTROPHILS 52 32 - 75 %    LYMPHOCYTES 32 12 - 49 %    MONOCYTES 14 (H) 5 - 13 %    EOSINOPHILS 1 0 - 7 %    BASOPHILS 1 0 - 1 %    IMMATURE GRANULOCYTES 0 %    ABS. NEUTROPHILS 4.3 1.8 - 8.0 K/UL    ABS. LYMPHOCYTES 2.6 0.8 - 3.5 K/UL    ABS. MONOCYTES 1.1 (H) 0.0 - 1.0 K/UL    ABS. EOSINOPHILS 0.1 0.0 - 0.4 K/UL    ABS. BASOPHILS 0.1 0.0 - 0.1 K/UL    ABS. IMM.  GRANS. 0.0 K/UL    DF AUTOMATED      RBC COMMENTS NORMOCYTIC, NORMOCHROMIC     METABOLIC PANEL, COMPREHENSIVE    Collection Time: 10/10/21  2:58 AM   Result Value Ref Range    Sodium 142 136 - 145 mmol/L Potassium 3.6 3.5 - 5.1 mmol/L    Chloride 114 (H) 97 - 108 mmol/L    CO2 23 21 - 32 mmol/L    Anion gap 5 5 - 15 mmol/L    Glucose 116 (H) 65 - 100 mg/dL    BUN 36 (H) 6 - 20 MG/DL    Creatinine 2.00 (H) 0.55 - 1.02 MG/DL    BUN/Creatinine ratio 18 12 - 20      GFR est AA 31 (L) >60 ml/min/1.73m2    GFR est non-AA 26 (L) >60 ml/min/1.73m2    Calcium 9.8 8.5 - 10.1 MG/DL    Bilirubin, total 0.2 0.2 - 1.0 MG/DL    ALT (SGPT) 17 12 - 78 U/L    AST (SGOT) 25 15 - 37 U/L    Alk.  phosphatase 81 45 - 117 U/L    Protein, total 7.3 6.4 - 8.2 g/dL    Albumin 3.4 (L) 3.5 - 5.0 g/dL    Globulin 3.9 2.0 - 4.0 g/dL    A-G Ratio 0.9 (L) 1.1 - 2.2     NT-PRO BNP    Collection Time: 10/10/21  2:58 AM   Result Value Ref Range    NT pro-BNP 1,055 (H) <125 PG/ML   MAGNESIUM    Collection Time: 10/10/21  2:58 AM   Result Value Ref Range    Magnesium 1.9 1.6 - 2.4 mg/dL   TROPONIN-HIGH SENSITIVITY    Collection Time: 10/10/21  7:04 AM   Result Value Ref Range    Troponin-High Sensitivity 24 0 - 51 ng/L        Assessment:     Assessment:   CP; atypical; needs to rule out ischemia  CKD  Ischemic CM  S/p single chamber ICD  H/o AWMI  CAD; s/p LAD stent     Plan:   Tele  Continue current cardiac regimen  A Lexiscan stress test to document objective ischemia    Jonny Oliveira MD

## 2021-10-10 NOTE — ED NOTES
TRANSFER - OUT REPORT:    Verbal report given to Pennie Buerger RN(name) on Logan  being transferred to (unit) for routine progression of care       Report consisted of patients Situation, Background, Assessment and   Recommendations(SBAR). Information from the following report(s) SBAR, ED Summary, [facility] and Recent Results was reviewed with the receiving nurse. Lines:   Peripheral IV 10/10/21 Left Antecubital (Active)   Site Assessment Clean, dry, & intact 10/10/21 0301   Phlebitis Assessment 0 10/10/21 0301   Infiltration Assessment 0 10/10/21 0301   Dressing Status Clean, dry, & intact 10/10/21 0301   Dressing Type Transparent 10/10/21 0301   Hub Color/Line Status Pink;Flushed;Patent 10/10/21 0301   Action Taken Blood drawn 10/10/21 0301        Opportunity for questions and clarification was provided.       Patient transported with:   Registered Nurse

## 2021-10-10 NOTE — H&P
[address] [facility] Adult  Hospitalist Group  History and Physical    Primary Care Provider: Mike Ron MD  Date of Service:  10/10/2021    Subjective:     Michelle Zapata is a 64 y.o. female with a pmhx CAD s/p stents 10 yeas ago DM II, GERD, HFrEF s/p ICD, HTN, dyslipidemia and s/p PM who presents with CC of intermittent chest pain. She stats that pain has been intermittent for that past 2 weeks. It is sharp, and located in her left chest.  She decided to proceed the hospital when the pain started to radiate to her left shoulder, and arm. The episodes of pain last 20minutes, and are not associated with activity. It improves with massage, belching, and also with the sL NTG given today in the ED. She reports that she has been feeling progressively short of breath, with worsening LE edema, and had pnd over the past two weeks. She has had two prior presentations that were similar, and discharged home with tx for GERD. She is followed by [facility]. In the ED, she was hypertensive with SBP to 170. Other VSS. Labs were significant for stable anemia with Hgb 9, and probnp was 1,055. Initial troponin was negative. CXR was negative for acute cardiopulmonary process. EKG with inferior T wave flattening, and lateral T wave inversions. In the ED,  SBP was elevated to 170's,     Review of Systems:    All other review of systems were negative except for that written in the History of Present Illness.      Past Medical History:   Diagnosis Date    Acute Anterior Myocardial Infarction, s/p PTCA w/ stent 10/2010 10/31/2010    Asthma     mild    Diabetes mellitus, type 2 ([address] 8/24/2012    GERD (gastroesophageal reflux disease)     hiatal hernia    H/O recurrent Sinusitis 3/16/2011    Heart failure ([address]     Hiatal Hernia w/ GERD (gastroesophageal reflux disease) 3/16/2011    Hypercholesterolemia     Hypertension 3/16/2011    Ill-defined condition     pacemaker put in last July 2015 because of CHF  Impaired fasting glucose 4/18/2011    Iron deficiency anemia 3/16/2011    Multinodular thyroid 3/16/2011    Palpitations, PVC's 10/13/2011    Perennial allergic rhinitis 3/16/2011    Pityriasis rosea 5/30/2012    Post-menopausal     LMP 46y. o, no HRT    Postmenopause, LMP 38 yo, No HRT 3/16/2011    Reflux esophagitis 3/16/2011    Tobacco user     Vitamin D deficiency 10/13/2011      Past Surgical History:   Procedure Laterality Date    CARDIAC SURG PROCEDURE UNLIST      cardiac stent     COLONOSCOPY N/A 4/19/2017    COLON[facility] Deysi Saunders [facility] at [facility] ENDOSCOPY    EGD  4/2009    hiatal hernia, esophagitis; Dr Jie Rogers, COLON, DIAGNOSTIC  4/2009    HX GYN      FNA bilateral benign     HX LITHOTRIPSY  4/2012    failed    KIDNEY STONE (CALCULI) EVAL  5/2012    scope w/ kidney stone extractions, multiple; Dr Olive Kessler, [facility]    [place] COLONOSCOPY W/BIOPSY SINGLE/MULTIPLE  4/2009    benign polyp, recheck in 5 years, Dr Gunner Painter, INITIAL  10/2010    Dr Fer Quintero     Prior to Admission medications    Medication Sig Start Date End Date Taking? Authorizing Provider   rosuvastatin (Crestor) 20 mg tablet Take 20 mg by mouth nightly. Indications: high cholesterol    Provider, Historical   fluticasone propionate (FLONASE ALLERGY RELIEF NA) by Nasal route. Provider, Historical   sacubitriL-valsartan Medinaele January) 24-26 mg tablet Take 1 Tab by mouth two (2) times a day. Indications: chronic heart failure    Provider, Historical   potassium chloride (K-DUR, KLOR-CON) 20 mEq tablet Take 20 mEq by mouth two (2) times a day. Provider, Historical   levocetirizine (XYZAL) 5 mg tablet Take 5 mg by mouth. Provider, Historical   EPINEPHrine (EPIPEN) 0.3 mg/0.3 mL injection 0.3 mL by IntraMUSCular route once as needed for up to 1 dose. 12/1/17   Clarisa Martinez NP   zolpidem (AMBIEN) 10 mg tablet Take 1 Tab by mouth nightly as needed for Sleep. Max Daily Amount: 10 mg. 17   Maria Isabel Chin MD   metFORMIN (GLUCOPHAGE) 500 mg tablet Decrease to 250mg BID and monitor blood sugars 17   Eulogio Salvador MD   pantoprazole (PROTONIX) 40 mg tablet Take 1 Tab by mouth daily. 17   Maria Isabel Chin MD   bumetanide (BUMEX) 0.5 mg tablet TAKE 1 TABLET BY MOUTH TWICE A DAY AS NEEDED 17   Eulogio Salvador MD   montelukast (SINGULAIR) 10 mg tablet TAKE 1 TAB BY MOUTH DAILY. INDICATIONS: ALLERGIC RHINITIS 17   Eulogio Salvador MD   carvedilol (COREG) 25 mg tablet Take 1 Tab by mouth two (2) times a day. 17   Maria Isabel Chin MD   diclofenac (VOLTAREN) 1 % gel Apply 2 g to affected area every six (6) hours. 17   Maria Isabel Chin MD   albuterol (PROAIR HFA) 90 mcg/actuation inhaler Take 2 Puffs by inhalation every six (6) hours as needed for Wheezing. 17   Maria Isabel Chin MD   famotidine (PEPCID) 40 mg tablet Take 40 mg by mouth nightly. 16   Provider, Historical   Cholecalciferol, Vitamin D3, (VITAMIN D) 2,000 unit Cap Take 4,000 Units by mouth daily.  Takes 2 of the 2,000 unit tabs qd    Provider, Historical     Allergies   Allergen Reactions    Ace Inhibitors Cough     ?10/2011    Seafood [Shellfish Containing Products] Hives, Shortness of Breath and Nausea and Vomiting      Family History   Problem Relation Age of Onset    Hypertension Mother     High Cholesterol Mother     Thyroid Disease Mother     Heart Disease Mother         chf    Kidney Disease Mother     Headache Mother     Heart Disease Father     Heart Attack Father 36         MI age 45    High Cholesterol Father     Hypertension Sister     Thyroid Disease Sister     Hypertension Sister    Jannell Outhouse Arthritis-rheumatoid Sister     Diabetes Sister     Thyroid Disease Sister     Heart Attack Brother         massive MI at age 46, survived   Melinda Outhouse Thyroid Disease Other         niece, Delmi Overton High Cholesterol Brother SOCIAL HISTORY:  Patient resides at Home. Patient ambulates none  Smoking history:  none  Alcohol history: none        Objective:       Physical Exam:   Visit Vitals  BP (!) 173/74   Pulse 65   Temp 97 °F (36.1 °C)   Resp 17   Ht 5' 2\" (1.575 m)   Wt 74.4 kg (164 lb)   SpO2 99%   BMI 30.00 kg/m²     General:  Alert, cooperative, no distress, appears stated age. Head:  Normocephalic, without obvious abnormality, atraumatic. Eyes:  Conjunctivae/corneas clear. EOMs intact. Nose: Nares normal. Septum midline. Throat: Lips, mucosa, and tongue normal.    Neck: Supple, symmetrical, trachea midline   Back:   Symmetric, no curvature. ROM normal.   Lungs:   Clear to auscultation bilaterally. Chest wall:  No tenderness or deformity. Heart:  Regular rate and rhythm, S1, S2 normal, no murmur, click, rub or gallop. Abdomen:   Soft, non-tender. Bowel sounds normal           Extremities: Extremities normal, atraumatic, no cyanosis. Trace b/l LE pitting edema   Pulses: 2+ rardial pulses   Skin: Skin color, texture, turgor normal. No rashes or lesions. ECG:  EKG with inferior T wave flattening, and lateral T wave inversions. Data Review: All diagnostic labs and studies have been reviewed. Chest x-ray: no acute cardiopulmonary process    Assessment:     Active Problems:    * No active hospital problems. *      Plan:     #Atypical Chest Pain  #CAD  -initial trop negative, EKG with lateral T wave inversions that are slightly more pronounced in the past, and flat T waves in inferior leads  -trop negative times one, will trend  -prn NTG, and continue home PPI, and pepciddue to patient having presented similarly in the past with improvement after carafate slurry  -continue home coreg, crestor, and entresto  -cards consulted as per above    #acute on chronic HFrEF s/p ICD  -probnp elevated, and pt. With increased dyspnea on exertion, LE edema, and Pnd  -takes bumex 0.5mg PO at home.   Will give bumex 1mg times one  -continue coreg, and entresto as per above  -strict I&O, daily weight    #stage III Ckd  -creatinine stable    #Seasonal Allergies  #Asthma  -continue home meds    FEN: cardiac  dvt ppx: lovenox  MPOA:  Simonne Sever  Code: Full    FUNCTIONAL STATUS PRIOR TO HOSPITALIZATION Ambulates Independently   Signed By: Ant Mercer MD     October 10, 2021

## 2021-10-11 ENCOUNTER — APPOINTMENT (OUTPATIENT)
Dept: NUCLEAR MEDICINE | Age: 61
End: 2021-10-11
Attending: INTERNAL MEDICINE
Payer: COMMERCIAL

## 2021-10-11 ENCOUNTER — APPOINTMENT (OUTPATIENT)
Dept: NON INVASIVE DIAGNOSTICS | Age: 61
End: 2021-10-11
Attending: INTERNAL MEDICINE
Payer: COMMERCIAL

## 2021-10-11 VITALS
BODY MASS INDEX: 30.79 KG/M2 | DIASTOLIC BLOOD PRESSURE: 80 MMHG | WEIGHT: 167.3 LBS | HEIGHT: 62 IN | OXYGEN SATURATION: 98 % | SYSTOLIC BLOOD PRESSURE: 163 MMHG | HEART RATE: 94 BPM | RESPIRATION RATE: 16 BRPM | TEMPERATURE: 98.1 F

## 2021-10-11 LAB
ANION GAP SERPL CALC-SCNC: 6 MMOL/L (ref 5–15)
BUN SERPL-MCNC: 31 MG/DL (ref 6–20)
BUN/CREAT SERPL: 17 (ref 12–20)
CALCIUM SERPL-MCNC: 9 MG/DL (ref 8.5–10.1)
CHLORIDE SERPL-SCNC: 111 MMOL/L (ref 97–108)
CO2 SERPL-SCNC: 26 MMOL/L (ref 21–32)
CREAT SERPL-MCNC: 1.86 MG/DL (ref 0.55–1.02)
ERYTHROCYTE [DISTWIDTH] IN BLOOD BY AUTOMATED COUNT: 14.4 % (ref 11.5–14.5)
GLUCOSE SERPL-MCNC: 101 MG/DL (ref 65–100)
HCT VFR BLD AUTO: 28.9 % (ref 35–47)
HGB BLD-MCNC: 8.7 G/DL (ref 11.5–16)
MAGNESIUM SERPL-MCNC: 2 MG/DL (ref 1.6–2.4)
MCH RBC QN AUTO: 26.9 PG (ref 26–34)
MCHC RBC AUTO-ENTMCNC: 30.1 G/DL (ref 30–36.5)
MCV RBC AUTO: 89.2 FL (ref 80–99)
NRBC # BLD: 0 K/UL (ref 0–0.01)
NRBC BLD-RTO: 0 PER 100 WBC
PLATELET # BLD AUTO: 224 K/UL (ref 150–400)
PMV BLD AUTO: 9.2 FL (ref 8.9–12.9)
POTASSIUM SERPL-SCNC: 3.6 MMOL/L (ref 3.5–5.1)
RBC # BLD AUTO: 3.24 M/UL (ref 3.8–5.2)
SODIUM SERPL-SCNC: 143 MMOL/L (ref 136–145)
STRESS BASELINE DIAS BP: 84 MMHG
STRESS BASELINE HR: 60 BPM
STRESS BASELINE SYS BP: 186 MMHG
STRESS ESTIMATED WORKLOAD: 1 METS
STRESS EXERCISE DUR MIN: NORMAL
STRESS PEAK DIAS BP: 84 MMHG
STRESS PEAK SYS BP: 186 MMHG
STRESS PERCENT HR ACHIEVED: 72 %
STRESS POST PEAK HR: 116 BPM
STRESS RATE PRESSURE PRODUCT: NORMAL BPM*MMHG
STRESS STAGE 1 BP: NORMAL MMHG
STRESS STAGE 1 HR: 110 BPM
STRESS STAGE RECOVERY 1 BP: NORMAL MMHG
STRESS STAGE RECOVERY 1 HR: 100 BPM
STRESS TARGET HR: 162 BPM
WBC # BLD AUTO: 7.2 K/UL (ref 3.6–11)

## 2021-10-11 PROCEDURE — 36415 COLL VENOUS BLD VENIPUNCTURE: CPT

## 2021-10-11 PROCEDURE — 85027 COMPLETE CBC AUTOMATED: CPT

## 2021-10-11 PROCEDURE — 78452 HT MUSCLE IMAGE SPECT MULT: CPT

## 2021-10-11 PROCEDURE — 83735 ASSAY OF MAGNESIUM: CPT

## 2021-10-11 PROCEDURE — 99218 HC RM OBSERVATION: CPT

## 2021-10-11 PROCEDURE — 80048 BASIC METABOLIC PNL TOTAL CA: CPT

## 2021-10-11 PROCEDURE — 74011250636 HC RX REV CODE- 250/636: Performed by: INTERNAL MEDICINE

## 2021-10-11 PROCEDURE — A9500 TC99M SESTAMIBI: HCPCS

## 2021-10-11 RX ORDER — SODIUM CHLORIDE 0.9 % (FLUSH) 0.9 %
20 SYRINGE (ML) INJECTION
Status: COMPLETED | OUTPATIENT
Start: 2021-10-11 | End: 2021-10-11

## 2021-10-11 RX ORDER — TETRAKIS(2-METHOXYISOBUTYLISOCYANIDE)COPPER(I) TETRAFLUOROBORATE 1 MG/ML
10.2 INJECTION, POWDER, LYOPHILIZED, FOR SOLUTION INTRAVENOUS
Status: COMPLETED | OUTPATIENT
Start: 2021-10-11 | End: 2021-10-11

## 2021-10-11 RX ORDER — TETRAKIS(2-METHOXYISOBUTYLISOCYANIDE)COPPER(I) TETRAFLUOROBORATE 1 MG/ML
33 INJECTION, POWDER, LYOPHILIZED, FOR SOLUTION INTRAVENOUS
Status: COMPLETED | OUTPATIENT
Start: 2021-10-11 | End: 2021-10-11

## 2021-10-11 RX ADMIN — REGADENOSON 0.4 MG: 0.08 INJECTION, SOLUTION INTRAVENOUS at 12:02

## 2021-10-11 RX ADMIN — TETRAKIS(2-METHOXYISOBUTYLISOCYANIDE)COPPER(I) TETRAFLUOROBORATE 33 MILLICURIE: 1 INJECTION, POWDER, LYOPHILIZED, FOR SOLUTION INTRAVENOUS at 12:05

## 2021-10-11 RX ADMIN — Medication 20 ML: at 12:03

## 2021-10-11 RX ADMIN — Medication 10 ML: at 14:07

## 2021-10-11 RX ADMIN — Medication 10 ML: at 06:43

## 2021-10-11 RX ADMIN — TETRAKIS(2-METHOXYISOBUTYLISOCYANIDE)COPPER(I) TETRAFLUOROBORATE 10.2 MILLICURIE: 1 INJECTION, POWDER, LYOPHILIZED, FOR SOLUTION INTRAVENOUS at 07:30

## 2021-10-11 NOTE — PROGRESS NOTES
Cardiology Progress Note                                        Admit Date: 10/10/2021    Assessment/Plan:     CP; stress test just came back negative for ischemia; previous anterior infarct but no residual ischemia  GERD; this also could be source of her CP  CAD; remote stent of LAD  Cardiomyopathy; ischemic and improved on today's stress test EF 46% and s/p ICD; stable    Stella Sewell is a 64 y.o. female with     PROBLEM LIST:  Patient Active Problem List    Diagnosis Date Noted    Chest pain 07/29/2017    Mild intermittent asthma without complication 62/13/9412    Essential hypertension 12/22/2015    Advance care planning 12/22/2015    Hypokalemia 07/29/2015    Asthma exacerbation 07/26/2015    Cardiomegaly 07/26/2015    Pulmonary edema 07/26/2015    Acute exacerbation of CHF (congestive heart failure) ([facility] 07/26/2015    Pleural effusion, right 07/26/2015    Nephrolithiasis 01/10/2013    Non-insulin dependent type 2 diabetes mellitus ([facility] 01/10/2013    Pityriasis rosea 05/30/2012    Palpitations, PVC's 10/13/2011    Postmenopause, LMP 38 yo, No HRT 03/16/2011    Reflux esophagitis 03/16/2011    Hiatal Hernia w/ GERD (gastroesophageal reflux disease) 03/16/2011    Iron deficiency anemia 03/16/2011    Perennial allergic rhinitis 03/16/2011    H/O recurrent Sinusitis 03/16/2011    Multinodular thyroid 03/16/2011    Acute Anterior Myocardial Infarction, s/p PTCA w/ stent 10/2010 10/31/2010    Hypercholesteremia 10/31/2010    Asthma, Mild, Intermittent 10/31/2010         Subjective:     Stella Sewell reports none.     Visit Vitals  BP (!) 157/75 (BP 1 Location: Right upper arm, BP Patient Position: At rest)   Pulse 64   Temp 98.1 °F (36.7 °C)   Resp 16   Ht 5' 2\" (1.575 m)   Wt 167 lb 4.8 oz (75.9 kg)   SpO2 96%   BMI 30.60 kg/m²       Intake/Output Summary (Last 24 hours) at 10/11/2021 1318  Last data filed at 10/10/2021 1800  Gross per 24 hour   Intake 360 ml   Output --   Net 360 ml       Objective:      Physical Exam:  HEENT: P[place], EOMI  Neck: No JVD,  No thyroidmegaly  Resp: CTA bilaterally;  No wheezes or rales  CV: RRR s1s2 No murmur no s3  Abd:Soft, Nontender  Ext: No edema  Neuro: Alert and oriented; Nonfocal  Skin: Warm, Dry, Intact  Pulses: 2+ DP/PT/Rad      Telemetry: normal sinus rhythm    Current Facility-Administered Medications   Medication Dose Route Frequency    nitroglycerin (NITROSTAT) tablet 0.4 mg  0.4 mg SubLINGual Q5MIN PRN    sodium chloride (NS) flush 5-40 mL  5-40 mL IntraVENous Q8H    sodium chloride (NS) flush 5-40 mL  5-40 mL IntraVENous PRN    acetaminophen (TYLENOL) tablet 650 mg  650 mg Oral Q6H PRN    Or    acetaminophen (TYLENOL) suppository 650 mg  650 mg Rectal Q6H PRN    ondansetron (ZOFRAN ODT) tablet 4 mg  4 mg Oral Q8H PRN    Or    ondansetron (ZOFRAN) injection 4 mg  4 mg IntraVENous Q6H PRN    enoxaparin (LOVENOX) injection 30 mg  30 mg SubCUTAneous DAILY    montelukast (SINGULAIR) tablet 10 mg  10 mg Oral QHS    pantoprazole (PROTONIX) tablet 40 mg  40 mg Oral DAILY    sacubitriL-valsartan (ENTRESTO) 24-26 mg tablet 1 Tablet  1 Tablet Oral BID    rosuvastatin (CRESTOR) tablet 20 mg  20 mg Oral QHS    cetirizine (ZYRTEC) tablet 10 mg  10 mg Oral DAILY    famotidine (PEPCID) tablet 40 mg  40 mg Oral QHS    fluticasone propionate (FLONASE) 50 mcg/actuation nasal spray 2 Spray  2 Spray Both Nostrils DAILY PRN    carvediloL (COREG) tablet 25 mg  25 mg Oral BID    aspirin chewable tablet 81 mg  81 mg Oral DAILY         Data Review:   Labs:    Recent Results (from the past 24 hour(s))   METABOLIC PANEL, BASIC    Collection Time: 10/11/21 12:54 AM   Result Value Ref Range    Sodium 143 136 - 145 mmol/L    Potassium 3.6 3.5 - 5.1 mmol/L    Chloride 111 (H) 97 - 108 mmol/L    CO2 26 21 - 32 mmol/L    Anion gap 6 5 - 15 mmol/L    Glucose 101 (H) 65 - 100 mg/dL    BUN 31 (H) 6 - 20 MG/DL    Creatinine 1.86 (H) 0.55 - 1.02 MG/DL BUN/Creatinine ratio 17 12 - 20      GFR est AA 34 (L) >60 ml/min/1.73m2    GFR est non-AA 28 (L) >60 ml/min/1.73m2    Calcium 9.0 8.5 - 10.1 MG/DL   MAGNESIUM    Collection Time: 10/11/21 12:54 AM   Result Value Ref Range    Magnesium 2.0 1.6 - 2.4 mg/dL   CBC W/O DIFF    Collection Time: 10/11/21 12:54 AM   Result Value Ref Range    WBC 7.2 3.6 - 11.0 K/uL    RBC 3.24 (L) 3.80 - 5.20 M/uL    HGB 8.7 (L) 11.5 - 16.0 g/dL    HCT 28.9 (L) 35.0 - 47.0 %    MCV 89.2 80.0 - 99.0 FL    MCH 26.9 26.0 - 34.0 PG    MCHC 30.1 30.0 - 36.5 g/dL    RDW 14.4 11.5 - 14.5 %    PLATELET 420 283 - 472 K/uL    MPV 9.2 8.9 - 12.9 FL    NRBC 0.0 0  WBC    ABSOLUTE NRBC 0.00 0.00 - 0.01 K/uL   NUCLEAR CARDIAC STRESS TEST    Collection Time: 10/11/21 12:11 PM   Result Value Ref Range    Target  bpm    Exercise duration time 00:03:00     Stress Base Systolic  mmHg    Stress Base Diastolic BP 84 mmHg    Post peak  BPM    Baseline HR 60 BPM    Estimated workload 1.0 METS    Baseline  mmHg    Percent HR 72 %    Stress Base Diastolic BP 84 mmHg    Stress Rate Pressure Product 21,576 BPM*mmHg    Stress Stage 1  bpm    Stress Stage 1 /96 mmHg    Recovery Stage 1  bpm    Recovery Stage 1 /90 mmHg

## 2021-10-11 NOTE — PROGRESS NOTES
Hospital follow-up PCP transitional care appointment has been scheduled with Dr. Masoud Gandara for Monday, 10/18/21 at 8:20 a.m. Pending patient discharge.   Sussy Kirby, Care Management Specialist.

## 2021-10-11 NOTE — DISCHARGE SUMMARY
Discharge Summary       PATIENT ID: Michelle Zapata  MRN: [id number]   YOB: 1960    DATE OF ADMISSION: 10/10/2021  2:41 AM    DATE OF DISCHARGE: 10/11/2021  PRIMARY CARE PROVIDER: Mike Ron MD     ATTENDING PHYSICIAN: Srinath George DO   DISCHARGING PROVIDER: Srinath George DO    To contact this individual call [phone] and ask the  to page. If unavailable ask to be transferred the Adult Hospitalist Department. CONSULTATIONS: IP CONSULT TO CARDIOLOGY    PROCEDURES/SURGERIES: * No surgery found *    ADMITTING DIAGNOSES & HOSPITAL COURSE:     Admission History: \"Allan Casiano is a 64 y.o. female with a pmhx CAD s/p stents 10 yeas ago DM II, GERD, HFrEF s/p ICD, HTN, dyslipidemia and s/p PM who presents with CC of intermittent chest pain. She stats that pain has been intermittent for that past 2 weeks. It is sharp, and located in her left chest.  She decided to proceed the hospital when the pain started to radiate to her left shoulder, and arm. The episodes of pain last 20minutes, and are not associated with activity. It improves with massage, belching, and also with the sL NTG given today in the ED. She reports that she has been feeling progressively short of breath, with worsening LE edema, and had pnd over the past two weeks. She has had two prior presentations that were similar, and discharged home with tx for GERD. She is followed by [facility]. In the ED, she was hypertensive with SBP to 170. Other VSS. Labs were significant for stable anemia with Hgb 9, and probnp was 1,055. Initial troponin was negative. CXR was negative for acute cardiopulmonary process. EKG with inferior T wave flattening, and lateral T wave inversions.   In the ED,  SBP was elevated to 170's\"             DISCHARGE DIAGNOSES / PLAN:      Atypical Chest Pain- more consistent with GERD  CAD  -Cardiology consulted  -Underwent cardiac stress test- negative  -suspected symptoms due to GERD  -continue home PPI    chronic HFrEF s/p ICD  -home medications    stage III Ckd  -creatinine stable     Seasonal Allergies  Asthma        ADDITIONAL CARE RECOMMENDATIONS:   Resume home medications     PENDING TEST RESULTS:   At the time of discharge the following test results are still pending: none    FOLLOW UP APPOINTMENTS:    Follow-up Information     Follow up With Specialties Details Why Contact Info    Shira Estrada MD Internal Medicine On 10/18/2021 Hospital follow up PCP appointment Monday, 10/18/21 at 8:20 a.m. with Dr. Petr Gallegos [address] [address] [place] [address]             DISCHARGE MEDICATIONS:  Discharge Medication List as of 10/11/2021  3:27 PM      CONTINUE these medications which have NOT CHANGED    Details   rosuvastatin (Crestor) 20 mg tablet Take 20 mg by mouth nightly. Indications: high cholesterol, Historical Med      fluticasone propionate (FLONASE ALLERGY RELIEF NA) by Nasal route., Historical Med      sacubitriL-valsartan (Entresto) 24-26 mg tablet Take 1 Tab by mouth two (2) times a day. Indications: chronic heart failure, Historical Med      potassium chloride (K-DUR, KLOR-CON) 20 mEq tablet Take 20 mEq by mouth two (2) times a day., Historical Med      levocetirizine (XYZAL) 5 mg tablet Take 5 mg by mouth., Historical Med      EPINEPHrine (EPIPEN) 0.3 mg/0.3 mL injection 0.3 mL by IntraMUSCular route once as needed for up to 1 dose., Normal, Disp-2 Syringe, R-1      zolpidem (AMBIEN) 10 mg tablet Take 1 Tab by mouth nightly as needed for Sleep. Max Daily Amount: 10 mg., Print, Disp-30 Tab, R-0      metFORMIN (GLUCOPHAGE) 500 mg tablet Decrease to 250mg BID and monitor blood sugars, No Print, Disp-60 Tab, R-0      pantoprazole (PROTONIX) 40 mg tablet Take 1 Tab by mouth daily. , Normal, Disp-30 Tab, R-3      bumetanide (BUMEX) 0.5 mg tablet TAKE 1 TABLET BY MOUTH TWICE A DAY AS NEEDED, Normal, Disp-180 Tab, R-1      montelukast (SINGULAIR) 10 mg tablet TAKE 1 TAB BY MOUTH DAILY. INDICATIONS: ALLERGIC RHINITIS, Normal, Disp-30 Tab, R-11      carvedilol (COREG) 25 mg tablet Take 1 Tab by mouth two (2) times a day., Normal, Disp-60 Tab, R-11      diclofenac (VOLTAREN) 1 % gel Apply 2 g to affected area every six (6) hours. , Normal, Disp-100 g, R-0      albuterol (PROAIR HFA) 90 mcg/actuation inhaler Take 2 Puffs by inhalation every six (6) hours as needed for Wheezing., Normal, Disp-1 Inhaler, R-1      famotidine (PEPCID) 40 mg tablet Take 40 mg by mouth nightly., Historical Med      Cholecalciferol, Vitamin D3, (VITAMIN D) 2,000 unit Cap Take 4,000 Units by mouth daily. Takes 2 of the 2,000 unit tabs qd, Historical Med               NOTIFY YOUR PHYSICIAN FOR ANY OF THE FOLLOWING:   Fever over 101 degrees for 24 hours. Chest pain, shortness of breath, fever, chills, nausea, vomiting, diarrhea, change in mentation, falling, weakness, bleeding. Severe pain or pain not relieved by medications. Or, any other signs or symptoms that you may have questions about. DISPOSITION:   x Home With:   OT  PT  HH  RN       Long term SNF/Inpatient Rehab    Independent/assisted living    Hospice    Other:       PATIENT CONDITION AT DISCHARGE:     Functional status    Poor     Deconditioned    x Independent      Cognition   x  Lucid     Forgetful     Dementia      Catheters/lines (plus indication)    Loza     PICC     PEG    x None      Code status   x  Full code     DNR      PHYSICAL EXAMINATION AT DISCHARGE:  General:          Alert, cooperative, no distress, appears stated age. Lungs:             Clear to auscultation bilaterally. No Wheezing or Rhonchi. No rales. Heart:              Regular  rhythm,  No  murmur   No edema  Abdomen:        Soft, non-tender. Not distended. Extremities:     No cyanosis. No clubbing,    Skin:                Not pale. Not Jaundiced  No rashes   Psych:             Not anxious or agitated.   Neurologic:      Alert, moves all extremities, answers questions appropriately and responds to commands       CHRONIC MEDICAL DIAGNOSES:  Problem List as of 10/11/2021 Date Reviewed: 12/15/2017        Codes Class Noted - Resolved    Chest pain ICD-10-CM: R07.9  ICD-9-CM: 786.50  7/29/2017 - Present        Mild intermittent asthma without complication [facility]-56-PM: E64.75  ICD-9-CM: 493.90  1/30/2017 - Present        Essential hypertension (Chronic) ICD-10-CM: I10  ICD-9-CM: 401.9  12/22/2015 - Present        Advance care planning ICD-10-CM: Z71.89  ICD-9-CM: V65.49  12/22/2015 - Present    Overview Signed 12/22/2015  6:20 PM by Amilcar MONDRAGON  Completed form in chart             Hypokalemia ICD-10-CM: E87.6  ICD-9-CM: 276.8  7/29/2015 - Present        Asthma exacerbation ICD-10-CM: J45.901  ICD-9-CM: 493.92  7/26/2015 - Present        Cardiomegaly ICD-10-CM: I51.7  ICD-9-CM: 429.3  7/26/2015 - Present        Pulmonary edema ICD-10-CM: J81.1  ICD-9-CM: 623  7/26/2015 - Present        Acute exacerbation of CHF (congestive heart failure) ([facility] ICD-10-CM: I50.9  ICD-9-CM: 428.0  7/26/2015 - Present        Pleural effusion, right ICD-10-CM: J90  ICD-9-CM: 511.9  7/26/2015 - Present        Nephrolithiasis (Chronic) ICD-10-CM: N20.0  ICD-9-CM: 592.0  1/10/2013 - Present    Overview Signed 1/10/2013  3:35 PM by Amilcar Velez     Urethral stents placed left              Non-insulin dependent type 2 diabetes mellitus ([facility] ICD-10-CM: E11.9  ICD-9-CM: 250.00  1/10/2013 - Present        Pityriasis rosea ICD-10-CM: L42  ICD-9-CM: 696.3  5/30/2012 - Present    Overview Signed 5/30/2012 12:34 PM by Pop Gil MD     ~2003, Derm Dr Addison Lowe             Palpitations, PVC's ICD-10-CM: R00.2  ICD-9-CM: 785.1  10/13/2011 - Present    Overview Signed 10/13/2011  2:40 PM by Pop Gil MD     2011, Cardio Dr Kristyn Mclaughlin, LMP 38 yo, No HRT (Chronic) ICD-10-CM: Z78.0  ICD-9-CM: V49.81  3/16/2011 - Present        Reflux esophagitis (Chronic) ICD-10-CM: K21.00  ICD-9-CM: 530.11  3/16/2011 - Present    Overview Signed 3/16/2011 10:25 AM by Calvin Morales MD     EGD 4/2009; Dr Aron Kim             Hiatal Hernia w/ GERD (gastroesophageal reflux disease) (Chronic) ICD-10-CM: K21.9  ICD-9-CM: 530.81  3/16/2011 - Present        Iron deficiency anemia (Chronic) ICD-10-CM: D50.9  ICD-9-CM: 280.9  3/16/2011 - Present    Overview Signed 3/16/2011 10:33 AM by Calvin Morales MD     Since childhood and adulthood;on iron since ~1999             Perennial allergic rhinitis (Chronic) ICD-10-CM: J30.89  ICD-9-CM: 477.8  3/16/2011 - Present        H/O recurrent Sinusitis ICD-10-CM: J32.9  ICD-9-CM: 473.9  3/16/2011 - Present        Multinodular thyroid (Chronic) ICD-10-CM: K74.6  ICD-9-CM: 241.1  3/16/2011 - Present    Overview Addendum 10/13/2011  2:47 PM by Calvin Morales MD     Prev Endo Dr Emerson Arizmendi 5/2009, now sees his partner, Dr Ela Jin; small nodules, f/u scans qyr only for now             Acute Anterior Myocardial Infarction, s/p PTCA w/ stent 10/2010 (Chronic) ICD-10-CM: I21.09  ICD-9-CM: 410.11  10/31/2010 - Present    Overview Signed 3/16/2011 10:23 AM by MD Dr Thaddeus Song at [facility]             Hypercholesteremia (Chronic) ICD-10-CM: E78.00  ICD-9-CM: 272.0  10/31/2010 - Present        Asthma, Mild, Intermittent (Chronic) ICD-10-CM: J45.909  ICD-9-CM: 493.90  10/31/2010 - Present        RESOLVED: Tobacco abuse ICD-10-CM: Z72.0  ICD-9-CM: 305.1  1/10/2013 - 10/7/2013        RESOLVED: Diabetes mellitus, type 2 ([address] (Chronic) ICD-10-CM: E11.9  ICD-9-CM: 250.00  8/24/2012 - 11/6/2014        RESOLVED: Vitamin D deficiency ICD-10-CM: E55.9  ICD-9-CM: 268.9  10/13/2011 - 11/6/2014    Overview Addendum 8/21/2012 11:30 AM by Calvin Morales MD     Per Endo summer 2011, 4,000 units daily, NOT rx (coronary calcium precipitation)             RESOLVED: Type II or unspecified type diabetes mellitus without mention of complication, not stated as uncontrolled ICD-10-CM: E11.9  ICD-9-CM: 250.00  4/18/2011 - 10/29/2013    Overview Signed 4/18/2011 10:25 PM by Nargis Haro MD     Mild [id number]             RESOLVED: Hypertension (Chronic) ICD-10-CM: I10  ICD-9-CM: 401.9  3/16/2011 - 12/22/2015              Greater than 30 minutes were spent with the patient on counseling and coordination of care    Signed:   [facility],   10/11/2021  4:43 PM

## 2021-10-11 NOTE — DISCHARGE INSTRUCTIONS
Discharge Instructions       PATIENT ID: Harrison Barthel  MRN: [id number]   YOB: 1960    DATE OF ADMISSION: 10/10/2021  2:41 AM    DATE OF DISCHARGE: 10/11/2021    PRIMARY CARE PROVIDER: Sandie Stone MD     ATTENDING PHYSICIAN: Nina De La Torre DO  DISCHARGING PROVIDER: Yaz Trujillo DO    To contact this individual call [phone] and ask the  to page. If unavailable ask to be transferred the Adult Hospitalist Department. DISCHARGE DIAGNOSES     Chest pain     CONSULTATIONS: IP CONSULT TO CARDIOLOGY    PROCEDURES/SURGERIES: * No surgery found *    PENDING TEST RESULTS:   At the time of discharge the following test results are still pending: none    FOLLOW UP APPOINTMENTS:   Follow-up Information    None          ADDITIONAL CARE RECOMMENDATIONS:     Restart home medications. Your new EF is 46%! DISCHARGE MEDICATIONS:   See Medication Reconciliation Form    · It is important that you take the medication exactly as they are prescribed. · Keep your medication in the bottles provided by the pharmacist and keep a list of the medication names, dosages, and times to be taken in your wallet. · Do not take other medications without consulting your doctor. NOTIFY YOUR PHYSICIAN FOR ANY OF THE FOLLOWING:   Fever over 101 degrees for 24 hours. Chest pain, shortness of breath, fever, chills, nausea, vomiting, diarrhea, change in mentation, falling, weakness, bleeding. Severe pain or pain not relieved by medications. Or, any other signs or symptoms that you may have questions about.       Signed:   Yaz Trujillo DO  10/11/2021  3:24 PM

## 2021-10-11 NOTE — PROGRESS NOTES
Problem: Falls - Risk of  Goal: *Absence of Falls  Description: Document [place] Flow Fall Risk and appropriate interventions in the flowsheet.   Outcome: Resolved/Met     Problem: Patient Education: Go to Patient Education Activity  Goal: Patient/Family Education  Outcome: Resolved/Met

## 2021-10-11 NOTE — PROGRESS NOTES
Bedside shift change report given to [address] [place] [address] (oncoming nurse) by Lissette Ivy (offgoing nurse). Report included the following information SBAR, Kardex, MAR and Recent Results.

## 2021-10-11 NOTE — NURSE NAVIGATOR
Chart reviewed by Heart Failure Nurse Navigator. Heart Failure database completed. EF:  pending    ACEi/ARB/ARNi: Entresto    BB: Coreg    Aldosterone Antagonist:     Obstructive Sleep Apnea Screening: N/1   STOP-BANG score:   Referred to Sleep Medicine:     CRT ICD     NYHA Functional Class . Heart Failure Teach Back in Patient Education. Heart Failure Avoiding Triggers on Discharge Instructions. Cardiologist: MARILEE      Post discharge follow up phone call to be made within 48-72 hours of discharge.

## 2022-03-18 PROBLEM — R07.9 CHEST PAIN: Status: ACTIVE | Noted: 2017-07-29

## 2022-03-19 PROBLEM — J45.20 MILD INTERMITTENT ASTHMA WITHOUT COMPLICATION: Status: ACTIVE | Noted: 2017-01-30

## 2022-04-10 ENCOUNTER — APPOINTMENT (OUTPATIENT)
Dept: CT IMAGING | Age: 62
End: 2022-04-10
Attending: EMERGENCY MEDICINE
Payer: COMMERCIAL

## 2022-04-10 ENCOUNTER — HOSPITAL ENCOUNTER (EMERGENCY)
Age: 62
Discharge: HOME | End: 2022-04-10
Attending: EMERGENCY MEDICINE | Admitting: EMERGENCY MEDICINE
Payer: COMMERCIAL

## 2022-04-10 ENCOUNTER — APPOINTMENT (OUTPATIENT)
Dept: GENERAL RADIOLOGY | Age: 62
End: 2022-04-10
Attending: EMERGENCY MEDICINE
Payer: COMMERCIAL

## 2022-04-10 VITALS
DIASTOLIC BLOOD PRESSURE: 64 MMHG | OXYGEN SATURATION: 100 % | HEART RATE: 71 BPM | TEMPERATURE: 97 F | RESPIRATION RATE: 20 BRPM | SYSTOLIC BLOOD PRESSURE: 132 MMHG | BODY MASS INDEX: 30.31 KG/M2 | HEIGHT: 62 IN | WEIGHT: 164.68 LBS

## 2022-04-10 DIAGNOSIS — R55 VASOVAGAL SYNCOPE: Primary | ICD-10-CM

## 2022-04-10 DIAGNOSIS — N18.9 CHRONIC RENAL IMPAIRMENT, UNSPECIFIED CKD STAGE: ICD-10-CM

## 2022-04-10 DIAGNOSIS — S80.02XA CONTUSION OF LEFT KNEE, INITIAL ENCOUNTER: ICD-10-CM

## 2022-04-10 LAB
ALBUMIN SERPL-MCNC: 4.1 G/DL (ref 3.5–5)
ALBUMIN/GLOB SERPL: 1.1 {RATIO} (ref 1.1–2.2)
ALP SERPL-CCNC: 95 U/L (ref 45–117)
ALT SERPL-CCNC: 17 U/L (ref 12–78)
ANION GAP SERPL CALC-SCNC: 4 MMOL/L (ref 5–15)
AST SERPL-CCNC: 13 U/L (ref 15–37)
BASOPHILS # BLD: 0.1 K/UL (ref 0–0.1)
BASOPHILS NFR BLD: 1 % (ref 0–1)
BILIRUB SERPL-MCNC: 0.3 MG/DL (ref 0.2–1)
BUN SERPL-MCNC: 39 MG/DL (ref 6–20)
BUN/CREAT SERPL: 14 (ref 12–20)
CALCIUM SERPL-MCNC: 9.7 MG/DL (ref 8.5–10.1)
CHLORIDE SERPL-SCNC: 111 MMOL/L (ref 97–108)
CO2 SERPL-SCNC: 24 MMOL/L (ref 21–32)
COMMENT, HOLDF: NORMAL
CREAT SERPL-MCNC: 2.69 MG/DL (ref 0.55–1.02)
DIFFERENTIAL METHOD BLD: ABNORMAL
EOSINOPHIL # BLD: 0.1 K/UL (ref 0–0.4)
EOSINOPHIL NFR BLD: 1 % (ref 0–7)
ERYTHROCYTE [DISTWIDTH] IN BLOOD BY AUTOMATED COUNT: 15.7 % (ref 11.5–14.5)
GLOBULIN SER CALC-MCNC: 3.9 G/DL (ref 2–4)
GLUCOSE SERPL-MCNC: 157 MG/DL (ref 65–100)
HCT VFR BLD AUTO: 35.4 % (ref 35–47)
HGB BLD-MCNC: 10.6 G/DL (ref 11.5–16)
IMM GRANULOCYTES # BLD AUTO: 0 K/UL (ref 0–0.04)
IMM GRANULOCYTES NFR BLD AUTO: 0 % (ref 0–0.5)
LYMPHOCYTES # BLD: 1.8 K/UL (ref 0.8–3.5)
LYMPHOCYTES NFR BLD: 22 % (ref 12–49)
MAGNESIUM SERPL-MCNC: 2.1 MG/DL (ref 1.6–2.4)
MCH RBC QN AUTO: 26.7 PG (ref 26–34)
MCHC RBC AUTO-ENTMCNC: 29.9 G/DL (ref 30–36.5)
MCV RBC AUTO: 89.2 FL (ref 80–99)
MONOCYTES # BLD: 0.5 K/UL (ref 0–1)
MONOCYTES NFR BLD: 7 % (ref 5–13)
NEUTS SEG # BLD: 5.5 K/UL (ref 1.8–8)
NEUTS SEG NFR BLD: 69 % (ref 32–75)
NRBC # BLD: 0 K/UL (ref 0–0.01)
NRBC BLD-RTO: 0 PER 100 WBC
PLATELET # BLD AUTO: 220 K/UL (ref 150–400)
PMV BLD AUTO: 9 FL (ref 8.9–12.9)
POTASSIUM SERPL-SCNC: 4.2 MMOL/L (ref 3.5–5.1)
PROT SERPL-MCNC: 8 G/DL (ref 6.4–8.2)
RBC # BLD AUTO: 3.97 M/UL (ref 3.8–5.2)
SAMPLES BEING HELD,HOLD: NORMAL
SODIUM SERPL-SCNC: 139 MMOL/L (ref 136–145)
TROPONIN-HIGH SENSITIVITY: 70 NG/L (ref 0–51)
TROPONIN-HIGH SENSITIVITY: 78 NG/L (ref 0–51)
WBC # BLD AUTO: 7.9 K/UL (ref 3.6–11)

## 2022-04-10 PROCEDURE — 36415 COLL VENOUS BLD VENIPUNCTURE: CPT

## 2022-04-10 PROCEDURE — 84484 ASSAY OF TROPONIN QUANT: CPT

## 2022-04-10 PROCEDURE — 99285 EMERGENCY DEPT VISIT HI MDM: CPT

## 2022-04-10 PROCEDURE — 83735 ASSAY OF MAGNESIUM: CPT

## 2022-04-10 PROCEDURE — 70450 CT HEAD/BRAIN W/O DYE: CPT

## 2022-04-10 PROCEDURE — 71046 X-RAY EXAM CHEST 2 VIEWS: CPT

## 2022-04-10 PROCEDURE — 73562 X-RAY EXAM OF KNEE 3: CPT

## 2022-04-10 PROCEDURE — 93005 ELECTROCARDIOGRAM TRACING: CPT

## 2022-04-10 PROCEDURE — 80053 COMPREHEN METABOLIC PANEL: CPT

## 2022-04-10 PROCEDURE — 85025 COMPLETE CBC W/AUTO DIFF WBC: CPT

## 2022-04-10 NOTE — DISCHARGE INSTRUCTIONS
Please follow closely with your doctor as discussed and scheduled. Return to the emergency department for any new or worsening symptoms. Wear your knee brace as discussed and keep leg elevated as much as possible.

## 2022-04-10 NOTE — ED TRIAGE NOTES
Pt arrived from home c/o a GLF. States she took a laxative for constipation and had a syncopal episode from it which resulted in a fall. Reports hitting head. Pt on on blood thinners. HX of CHF, htn.

## 2022-04-10 NOTE — ED NOTES
Pt states she was constipated for several days/ took laxative. Was using the bathroom and kept having \"waves\". Finished and got up to go to the bedroom. States the next thing she knew her  was calling her name and she was on the floor.  L knee pain is the only issue at present

## 2022-04-10 NOTE — ED PROVIDER NOTES
Pt Is a 61-year-old female with past medical history significant for diabetes, GERD, heart failure who presents emergency department via private vehicle for evaluation of syncopal event. She states she has had a lot of issues with constipation and had taken a laxative due to constipation of late. She states that when she was going to the bathroom she had significant abdominal cramping and with bearing down bowel movement started to feel lightheaded. Denies any shortness of breath or chest pain. She states this is happened to her many times in the past.  She states after going the bathroom she thought she can take it back to bed but had a syncopal event on the way. States she thinks she hit the door jam and injured her left knee. States that her reported contact with falling was the right side of her nose but denies any malocclusion. No nosebleed. Patient's  at bedside states that he heard it happen and she was very rapidly back to normal.  Patient denies any neck pain or back pain. No abdominal pain. Denies any hematochezia or melena. denies being on any blood thinners           Past Medical History:   Diagnosis Date    Acute Anterior Myocardial Infarction, s/p PTCA w/ stent 10/2010 10/31/2010    Asthma     mild    Diabetes mellitus, type 2 ([facility] 8/24/2012    GERD (gastroesophageal reflux disease)     hiatal hernia    H/O recurrent Sinusitis 3/16/2011    Heart failure ([facility]     Hiatal Hernia w/ GERD (gastroesophageal reflux disease) 3/16/2011    Hypercholesterolemia     Hypertension 3/16/2011    Ill-defined condition     pacemaker put in last July 2015 because of CHF    Impaired fasting glucose 4/18/2011    Iron deficiency anemia 3/16/2011    Multinodular thyroid 3/16/2011    Palpitations, PVC's 10/13/2011    Perennial allergic rhinitis 3/16/2011    Pityriasis rosea 5/30/2012    Post-menopausal     LMP 46y. o, no HRT    Postmenopause, LMP 40 yo, No HRT 3/16/2011    Reflux esophagitis 3/16/2011    Tobacco user     Vitamin D deficiency 10/13/2011       Past Surgical History:   Procedure Laterality Date    COLONOSCOPY N/A 2017    COLONOSCOPY performed by Laquita Verde MD at [address] EGD  2009    hiatal hernia, esophagitis; Dr Capo Bautista, COLON, DIAGNOSTIC  2009    HX GYN      FNA bilateral benign     HX LITHOTRIPSY  2012    failed   [address]) EVAL  2012    scope w/ kidney stone extractions, multiple; Dr Barbi Linder, [facility]    [address] [place] [address]      cardiac stent     [place] COLONOSCOPY W/BIOPSY SINGLE/MULTIPLE  2009    benign polyp, recheck in 5 years, Dr Kip Johansen, INITIAL  10/2010    Dr Krista Hearn         Family History:   Problem Relation Age of Onset    Hypertension Mother     High Cholesterol Mother     Thyroid Disease Mother     Heart Disease Mother         chf    Kidney Disease Mother     Headache Mother     Heart Disease Father     Heart Attack Father 36         MI age 36   [place] High Cholesterol Father     Hypertension Sister     Thyroid Disease Sister     Hypertension Sister    [place] Arthritis-rheumatoid Sister     Diabetes Sister     Thyroid Disease Sister     Heart Attack Brother         massive MI at age 46, survived    Thyroid Disease Other         niece, Neha Snook    High Cholesterol Brother        Social History     Socioeconomic History    Marital status:      Spouse name: vijay shea to give infor     Number of children: 0    Years of education: Not on file    Highest education level: Not on file   Occupational History    Occupation: ; 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    Occupation:      Employer: SELF EMPLOYED   Tobacco Use    Smoking status: Former Smoker     Packs/day: 0.25     Years: 30.00     Pack years: 7.50     Types: Cigarettes     Quit date: 2013     Years since quittin.7    Smokeless tobacco: Never Used    Tobacco comment: had quit 10/2010 then restarted ~ 3/2012;  about 5 cigarettes a day; on and off since age 26 yo x ~ 30 yrs   Substance and Sexual Activity    Alcohol use: No     Comment: none    Drug use: No    Sexual activity: Yes     Partners: Male     Birth control/protection: None     Comment:  Debbie Lee    Other Topics Concern     Service Not Asked    Blood Transfusions Not Asked    Caffeine Concern No     Comment: down to 1 Coke a day    Occupational Exposure Not Asked    Hobby Hazards Not Asked    Sleep Concern Not Asked    Stress Concern Not Asked    Weight Concern Yes     Comment: stable now, had gained some wt over the past yr; was in low 150's previously; wants to lose 10 lbs    Special Diet Yes     Comment: heart healthy diet (white meats, salads, veggies, fruits)    Back Care Not Asked    Exercise Yes     Comment: exercise walking tape x 45 minutes qod    Bike Helmet Not Asked    Seat Belt Not Asked    Self-Exams Not Asked   Social History Narrative    New to [facility] as of 4/2009, referred by moose Rios; pervious PCP Dr Valeria Georges Determinants of Health     Financial Resource Strain:     Difficulty of Paying Living Expenses: Not on file   Food Insecurity:     Worried About [address] in the Last Year: Not on file    [address] in the Last Year: Not on file   Transportation Needs:     Lack of Transportation (Medical): Not on file    Lack of Transportation (Non-Medical):  Not on file   Physical Activity:     Days of Exercise per Week: Not on file    Minutes of Exercise per Session: Not on file   Stress:     Feeling of Stress : Not on file   Social Connections:     Frequency of Communication with Friends and Family: Not on file    Frequency of Social Gatherings with Friends and Family: Not on file    Attends Yarsanism Services: Not on file    Active Member of Clubs or Organizations: Not on file    Attends Club or Organization Meetings: Not on file    Marital Status: Not on file   Intimate Partner Violence:     Fear of Current or Ex-Partner: Not on file    Emotionally Abused: Not on file    Physically Abused: Not on file    Sexually Abused: Not on file   Housing Stability:     Unable to Pay for Housing in the Last Year: Not on file    Number of [place] in the Last Year: Not on file    Unstable Housing in the Last Year: Not on file         ALLERGIES: Ace inhibitors and Seafood [shellfish containing products]    Review of Systems   Constitutional: Negative for diaphoresis and fever. HENT: Negative for drooling. Respiratory: Negative for shortness of breath. Cardiovascular: Negative for chest pain. Gastrointestinal: Positive for constipation. Negative for abdominal pain, blood in stool and vomiting. Genitourinary: Negative for hematuria. Musculoskeletal: Positive for arthralgias. Negative for back pain and neck pain. Skin: Negative for rash and wound. Neurological: Positive for syncope. Negative for seizures. Hematological: Does not bruise/bleed easily. Psychiatric/Behavioral: Negative. Vitals:    04/10/22 1544 04/10/22 1626 04/10/22 1630   BP: 120/68  (!) 118/59   Pulse: 73  80   Resp: 16  22   Temp: 97 °F (36.1 °C)     SpO2: 99%  98%   Weight: 73 kg (161 lb) 74.7 kg (164 lb 10.9 oz)    Height: 5' 2\" (1.575 m)              Physical Exam  Vitals and nursing note reviewed. Constitutional:       Appearance: Normal appearance. She is not toxic-appearing. Comments: Appears younger than stated age   HENT:      Head: Normocephalic and atraumatic. No abrasion, contusion or laceration. Jaw: There is normal jaw occlusion. Right Ear: External ear normal.      Left Ear: External ear normal.      Nose: Nose normal. No nasal deformity, laceration or rhinorrhea. Right Nostril: No epistaxis or septal hematoma. Left Nostril: No epistaxis or septal hematoma. Right Turbinates: Not swollen.       Left Turbinates: Not swollen. Eyes:      General: No scleral icterus. Extraocular Movements: Extraocular movements intact. Cardiovascular:      Rate and Rhythm: Normal rate. Pulses: Normal pulses. Heart sounds: No friction rub. Pulmonary:      Effort: Pulmonary effort is normal.      Breath sounds: Normal breath sounds. No rales. Abdominal:      Palpations: Abdomen is soft. Tenderness: There is no abdominal tenderness. There is no guarding or rebound. Musculoskeletal:      Cervical back: Neck supple. Right lower leg: No edema. Left lower leg: No edema. Comments: Tenderness to palpation of the left knee no significant swelling   Skin:     General: Skin is warm and dry. Neurological:      Mental Status: She is alert and oriented to person, place, and time. Psychiatric:         Mood and Affect: Mood normal.         Behavior: Behavior normal.         Thought Content: Thought content normal.         Judgment: Judgment normal.          MDM  Number of Diagnoses or Management Options  Diagnosis management comments: Reevaluated and states she is feeling well. Only complaint is knee pain from her fall. Denies any chest pain or shortness of breath. Initial mild elevation of troponin likely related to renal insufficiency repeat is improved and reassuring. Patient notes she has upcoming appointment on Tuesday with her cardiologist.  Like to be discharged home.   Given strict return precautions       Amount and/or Complexity of Data Reviewed  Clinical lab tests: reviewed and ordered  Tests in the radiology section of CPT®: reviewed and ordered  Decide to obtain previous medical records or to obtain history from someone other than the patient: yes  Independent visualization of images, tracings, or specimens: yes           Procedures

## 2022-04-11 LAB
ATRIAL RATE: 70 BPM
CALCULATED P AXIS, ECG09: 59 DEGREES
CALCULATED R AXIS, ECG10: 43 DEGREES
CALCULATED T AXIS, ECG11: -56 DEGREES
DIAGNOSIS, 93000: NORMAL
P-R INTERVAL, ECG05: 168 MS
Q-T INTERVAL, ECG07: 424 MS
QRS DURATION, ECG06: 94 MS
QTC CALCULATION (BEZET), ECG08: 457 MS
VENTRICULAR RATE, ECG03: 70 BPM

## 2022-04-21 ENCOUNTER — TELEPHONE (OUTPATIENT)
Dept: CARDIOLOGY CLINIC | Age: 62
End: 2022-04-21

## 2022-04-21 NOTE — TELEPHONE ENCOUNTER
New referral received from Dr. Mercedes Farley. Patient has been called regarding scheduling a new patient appointment. Patient states that she is unaware of this referral.    Called Dr. Elizabeth Painter office regarding this . Louise Killian will give message to nurse who will call the patient to discuss Dr. Roxana Saez for her.

## 2022-04-22 ENCOUNTER — TELEPHONE (OUTPATIENT)
Dept: CARDIOLOGY CLINIC | Age: 62
End: 2022-04-22

## 2022-04-22 NOTE — TELEPHONE ENCOUNTER
Patient returned call after speaking with Dr. Luana Herron nurse. She has agreed to see Dr. Junito Hannah. Appointment is scheduled on Tuesday May 31st at 1pm with Dr. Irineo Medel. Patient has been informed to bring all medications to this appointment. Also, that our office will call day before as a reminder and to screen for Covid.

## 2022-05-04 ENCOUNTER — OFFICE VISIT (OUTPATIENT)
Dept: FAMILY MEDICINE CLINIC | Age: 62
End: 2022-05-04
Payer: COMMERCIAL

## 2022-05-04 VITALS
BODY MASS INDEX: 29.44 KG/M2 | OXYGEN SATURATION: 98 % | TEMPERATURE: 98.4 F | DIASTOLIC BLOOD PRESSURE: 69 MMHG | HEART RATE: 76 BPM | RESPIRATION RATE: 16 BRPM | HEIGHT: 62 IN | WEIGHT: 160 LBS | SYSTOLIC BLOOD PRESSURE: 137 MMHG

## 2022-05-04 DIAGNOSIS — I15.2 HYPERTENSION ASSOCIATED WITH DIABETES (HCC): ICD-10-CM

## 2022-05-04 DIAGNOSIS — E11.59 HYPERTENSION ASSOCIATED WITH DIABETES (HCC): ICD-10-CM

## 2022-05-04 DIAGNOSIS — N18.32 TYPE 2 DIABETES MELLITUS WITH STAGE 3B CHRONIC KIDNEY DISEASE, WITHOUT LONG-TERM CURRENT USE OF INSULIN (HCC): Primary | ICD-10-CM

## 2022-05-04 DIAGNOSIS — E11.22 TYPE 2 DIABETES MELLITUS WITH STAGE 3B CHRONIC KIDNEY DISEASE, WITHOUT LONG-TERM CURRENT USE OF INSULIN (HCC): Primary | ICD-10-CM

## 2022-05-04 DIAGNOSIS — E78.2 MIXED HYPERLIPIDEMIA: ICD-10-CM

## 2022-05-04 DIAGNOSIS — I50.22 CHRONIC SYSTOLIC HEART FAILURE (HCC): ICD-10-CM

## 2022-05-04 DIAGNOSIS — Z11.59 NEED FOR HEPATITIS C SCREENING TEST: ICD-10-CM

## 2022-05-04 PROBLEM — I42.9 CARDIOMYOPATHY (HCC): Status: ACTIVE | Noted: 2022-05-04

## 2022-05-04 PROBLEM — N18.30 STAGE 3 CHRONIC KIDNEY DISEASE (HCC): Status: ACTIVE | Noted: 2020-07-31

## 2022-05-04 LAB
ANION GAP SERPL CALC-SCNC: 8 MMOL/L (ref 5–15)
BUN SERPL-MCNC: 40 MG/DL (ref 6–20)
BUN/CREAT SERPL: 19 (ref 12–20)
CALCIUM SERPL-MCNC: 9.7 MG/DL (ref 8.5–10.1)
CHLORIDE SERPL-SCNC: 116 MMOL/L (ref 97–108)
CHOLEST SERPL-MCNC: 178 MG/DL
CO2 SERPL-SCNC: 20 MMOL/L (ref 21–32)
CREAT SERPL-MCNC: 2.15 MG/DL (ref 0.55–1.02)
CREAT UR-MCNC: 70 MG/DL
EST. AVERAGE GLUCOSE BLD GHB EST-MCNC: 143 MG/DL
GLUCOSE SERPL-MCNC: 112 MG/DL (ref 65–100)
HBA1C MFR BLD: 6.6 % (ref 4–5.6)
HCV AB SERPL QL IA: NONREACTIVE
HDLC SERPL-MCNC: 59 MG/DL
HDLC SERPL: 3 {RATIO} (ref 0–5)
LDLC SERPL CALC-MCNC: 97.6 MG/DL (ref 0–100)
MICROALBUMIN UR-MCNC: 0.83 MG/DL
MICROALBUMIN/CREAT UR-RTO: 12 MG/G (ref 0–30)
POTASSIUM SERPL-SCNC: 4.2 MMOL/L (ref 3.5–5.1)
SODIUM SERPL-SCNC: 144 MMOL/L (ref 136–145)
TRIGL SERPL-MCNC: 107 MG/DL (ref ?–150)
VLDLC SERPL CALC-MCNC: 21.4 MG/DL

## 2022-05-04 PROCEDURE — 99203 OFFICE O/P NEW LOW 30 MIN: CPT | Performed by: STUDENT IN AN ORGANIZED HEALTH CARE EDUCATION/TRAINING PROGRAM

## 2022-05-04 RX ORDER — BUMETANIDE 1 MG/1
1 TABLET ORAL
COMMUNITY

## 2022-05-04 RX ORDER — ATORVASTATIN CALCIUM 80 MG/1
80 TABLET, FILM COATED ORAL
Status: ON HOLD | COMMUNITY
End: 2022-06-16

## 2022-05-04 NOTE — PROGRESS NOTES
[address] Radha Dewitt. Aide, Trena [address] [place] [address]  [phone]    C/C: Diabetes, CKD and CHF     HPI:    Soni Bolanos is a 63 y.o. BLACK/  female who presents to clinic today for evaluation of the issues listed above. Pt is new to the practice       Previous pcp: Dr. Jud Shah , [facility] physician. Switching because not satisfied with care. Subjective; Patient presenting for initial office visit with me today. Pt have a PMHx significant for DM, CKD, MI s/p LAD stent (2011), CHF s/p ICD (2015), HTN and HLD. DM:  Last A1C was <7 per patient. On metformin 500mg daily    CHF s/p ICD and h/o MI s/p stent  Follows with Trace Franz with [facility]. Recently referred patient to see [facility]. Sees yearly in the past but recently sees him about every 4 months because her ICD failed. On Entrestor daily, Bumex (every other day), and Coreg daily. Stress test (10/11/2021): LVEF 46% with global hypokinesia. No evidence of myocardial ischemic risk. CKD: Follows with Dr. Greg Chaves, every 6 months. Last GFR 22 (4/10/2022). Pt denies any  fever, chill, chest pain, SOB, MOREL, PND, abdominal pain, n/v/d, melena, hematuria, HA or dizziness. Other Health Habits and social history:  Smoking history: former smoker  Alcohol history: socially   , no children. Allergies- reviewed:    Allergies   Allergen Reactions    Ace Inhibitors Cough     ?10/2011    Seafood [Shellfish Containing Products] Hives, Shortness of Breath and Nausea and Vomiting       Past Medical History- reviewed:  Past Medical History:   Diagnosis Date    Acute Anterior Myocardial Infarction, s/p PTCA w/ stent 10/2010 10/31/2010    Asthma     mild    Diabetes mellitus, type 2 ([facility] [address] 8/24/2012    GERD (gastroesophageal reflux disease)     hiatal hernia    H/O recurrent Sinusitis 3/16/2011    Heart failure (HCC)     Hiatal Hernia w/ GERD (gastroesophageal reflux disease) 3/16/2011    Hypercholesterolemia     Hypertension 3/16/2011    Ill-defined condition     pacemaker put in last 2015 because of CHF    Impaired fasting glucose 2011    Iron deficiency anemia 3/16/2011    Multinodular thyroid 3/16/2011    Palpitations, PVC's 10/13/2011    Perennial allergic rhinitis 3/16/2011    Pityriasis rosea 2012    Post-menopausal     LMP 46y. o, no HRT    Postmenopause, LMP 38 yo, No HRT 3/16/2011    Reflux esophagitis 3/16/2011    Tobacco user     Vitamin D deficiency 10/13/2011       Family History - reviewed:  Family History   Problem Relation Age of Onset    Hypertension Mother     High Cholesterol Mother     Thyroid Disease Mother     Heart Disease Mother         chf    Kidney Disease Mother     Headache Mother     Heart Disease Father     Heart Attack Father 36         MI age 36   [place] High Cholesterol Father     Hypertension Sister     Thyroid Disease Sister     Hypertension Sister    [place] Arthritis-rheumatoid Sister     Diabetes Sister     Thyroid Disease Sister     Heart Attack Brother         massive MI at age 46, survived    Thyroid Disease Other         niece, Neha Garcia    High Cholesterol Brother        Social History - reviewed:  Social History     Socioeconomic History    Marital status:      Spouse name: vijay shea to give infor     Number of children: 0    Years of education: Not on file    Highest education level: Not on file   Occupational History    Occupation: ; 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    Occupation:      Employer: SELF EMPLOYED   Tobacco Use    Smoking status: Former Smoker     Packs/day: 0.25     Years: 30.00     Pack years: 7.50     Types: Cigarettes     Quit date: 2013     Years since quittin.7    Smokeless tobacco: Never Used    Tobacco comment: had quit 10/2010 then restarted ~ 3/2012;  about 5 cigarettes a day; on and off since age 26 yo x ~ 30 yrs   Substance and Sexual Activity    Alcohol use: No     Comment: none    Drug use: No    Sexual activity: Yes     Partners: Male     Birth control/protection: None     Comment:  [place] Parents    Other Topics Concern     Service Not Asked    Blood Transfusions Not Asked    Caffeine Concern No     Comment: down to 1 Coke a day    Occupational Exposure Not Asked    Hobby Hazards Not Asked    Sleep Concern Not Asked    Stress Concern Not Asked    Weight Concern Yes     Comment: stable now, had gained some wt over the past yr; was in low 150's previously; wants to lose 10 lbs    Special Diet Yes     Comment: heart healthy diet (white meats, salads, veggies, fruits)    Back Care Not Asked    Exercise Yes     Comment: exercise walking tape x 45 minutes qod    Bike Helmet Not Asked    Seat Belt Not Asked    Self-Exams Not Asked   Social History Narrative    New to [facility] as of 4/2009, referred by moose Wray; pervious PCP Dr Salo Kuo Determinants of Health     Financial Resource Strain:     Difficulty of Paying Living Expenses: Not on file   Food Insecurity:     Worried About [address] in the Last Year: Not on file    [address] in the Last Year: Not on file   Transportation Needs:     Lack of Transportation (Medical): Not on file    Lack of Transportation (Non-Medical):  Not on file   Physical Activity:     Days of Exercise per Week: Not on file    Minutes of Exercise per Session: Not on file   Stress:     Feeling of Stress : Not on file   Social Connections:     Frequency of Communication with Friends and Family: Not on file    Frequency of Social Gatherings with Friends and Family: Not on file    Attends Anabaptism Services: Not on file    Active Member of Clubs or Organizations: Not on file    Attends Club or Organization Meetings: Not on file    Marital Status: Not on file   Intimate Partner Violence:     Fear of Current or Ex-Partner: Not on file   [employer] Abused: Not on file    Physically Abused: Not on file    Sexually Abused: Not on file   Housing Stability:     Unable to Pay for Housing in the Last Year: Not on file    Number of [place] in the Last Year: Not on file    Unstable Housing in the Last Year: Not on file       Depression screening:  3 most recent PHQ Screens 5/4/2022   Little interest or pleasure in doing things Not at all   Feeling down, depressed, irritable, or hopeless Not at all   Total Score PHQ 2 0   Trouble falling or staying asleep, or sleeping too much Not at all   Feeling tired or having little energy Not at all   Poor appetite, weight loss, or overeating Not at all   Feeling bad about yourself - or that you are a failure or have let yourself or your family down Not at all   Trouble concentrating on things such as school, work, reading, or watching TV Not at all   Moving or speaking so slowly that other people could have noticed; or the opposite being so fidgety that others notice Not at all   Thoughts of being better off dead, or hurting yourself in some way Not at all   PHQ 9 Score 0   How difficult have these problems made it for you to do your work, take care of your home and get along with others Not difficult at all         Review of systems:     A comprehensive review of systems was negative except for that written in the History of Present Illness. Visit Vitals  /69 (BP 1 Location: Right arm, BP Patient Position: Sitting, BP Cuff Size: Adult)   Pulse 76   Temp 98.4 °F (36.9 °C) (Oral)   Resp 16   Ht 5' 2\" (1.575 m)   Wt 160 lb (72.6 kg)   SpO2 98%   BMI 29.26 kg/m²       General: Alert and oriented, in no acute distress. Well nourished. EYE: PERRL. Sclera and conjuctival clear. Extraocular movements intact. EARS: External normal, canals clear, tympanic membranes normal.   NOSE: Mucosa healthy without drainage or ulceration.   OROPHARYNX: No suspicious lesions, normal dentition, pharynx, tongue and tonsils normal.  NECK: Supple; no masses; thyroid normal.  LUNGS: Respirations unlabored; clear to auscultation bilaterally. CARDIOVASCULAR: Regular, rate, and rhythm without murmurs, gallops or rubs. ABDOMEN: Soft; nontender; nondistended; normoactive bowel sounds; no masses or organomegaly. MUSCULOSKELETAL: FROM in all extremities     EXT: No edema. Neurovascularlly intact. Normal gait. SKIN: No rash. No suspicious lesions or moles. Neuro: Mental Status: Pt is alert and oriented to person, place, and time. Assessment/Plan       ICD-10-CM ICD-9-CM    1. Type 2 diabetes mellitus with stage 3b chronic kidney disease, without long-term current use of insulin (HCC)  E11.22 250.40 MICROALBUMIN, UR, RAND W/ MICROALB/CREAT RATIO    N18.32 585.3 HEMOGLOBIN A1C WITH EAG      METABOLIC PANEL, BASIC      HM DIABETES FOOT EXAM      METABOLIC PANEL, BASIC      HEMOGLOBIN A1C WITH EAG      MICROALBUMIN, UR, RAND W/ MICROALB/CREAT RATIO   2. Hypertension associated with diabetes ([facility] [address]  E11.59 250.80     I15.2 401.9    3. Chronic systolic heart failure (HCC)  I50.22 428.22    4. Mixed hyperlipidemia  E78.2 272.2 LIPID PANEL      LIPID PANEL   5. Need for hepatitis C screening test  Z11.59 V73.89 HEPATITIS C AB      HEPATITIS C AB       1. Type 2 diabetes mellitus with stage 3b chronic kidney disease, without long-term current use of insulin ([address]  GFR 22 from most recent labwork. Has nephro appointment next month.  -continue Metformin 500mg daily    - MICROALBUMIN, UR, RAND W/ MICROALB/CREAT RATIO; Future  - HEMOGLOBIN A1C WITH EAG; Future  - METABOLIC PANEL, BASIC; Future  - Avoid nephrotoxic agents. Will consider discontinuing Metformin if progressively worsening GFR. 2. Hypertension associated with diabetes ([address]  Continue Coreg. Also taking bumex every other day. 3. Chronic systolic heart failure (HCC) s/p ICD and h/o MI s/p stent  Follows with Macey Gamino with [facility] and also referred to [facility].    Stress test (10/11/2021): LVEF 46% with global hypokinesia. No evidence of myocardial ischemic risk.  -Continue Entrestor daily, Bumex (every other day), and Coreg daily. 4. Mixed hyperlipidemia  Continue Cresto 20mg daily  - LIPID PANEL, Future    5. Need for hepatitis C screening test  - HEPATITIS C AB; Future      Follow up: 3 months    On this date 05/04/22 I have spent 35 minutes reviewing previous notes, test results and face to face with the patient discussing the diagnosis and importance of compliance with the treatment plan as well as documenting on the day of the visit. I have discussed the diagnosis with the patient and the intended plan as seen in the above orders. The patient has received an after-visit summary and questions were answered concerning future plans. I have discussed medication side effects and warnings with the patient as well. Informed patient to return to the office if new symptoms arise.     Signed By: Chris Aly MD     May 4, 2022

## 2022-05-04 NOTE — PROGRESS NOTES
Name and  Verified. Previous PCP:  Dr Denton Prather verified    Chief Complaint   Patient presents with    New Patient   China Antonio Establish Care     Patient is fasting for labs. 1. Have you been to the ER, urgent care clinic since your last visit? Hospitalized since your last visit? Yes  4/10/2022  [facility]  Vasovagal syncope /Contusion of left knee, initial encounter/ Chronic renal impairment, unspecified CKD stage     2. Have you seen or consulted any other health care providers outside of the [facility] [place] [address] since your last visit? Include any pap smears or colon screening.  No      Health Maintenance Due   Topic Date Due    Hepatitis C Screening  Never done    Depression Screen  Never done    Cervical cancer screen  Never done    Shingrix Vaccine Age 50> (1 of 2) Never done    Eye Exam Retinal or Dilated  2016    Foot Exam Q1  2018    MICROALBUMIN Q1  2018    A1C test (Diabetic or Prediabetic)  2018    Lipid Screen  2018    Breast Cancer Screen Mammogram  2019

## 2022-05-27 ENCOUNTER — TELEPHONE (OUTPATIENT)
Dept: CARDIOLOGY CLINIC | Age: 62
End: 2022-05-27

## 2022-05-27 NOTE — TELEPHONE ENCOUNTER
Telephone Call RE:  Appointment reminder     Outcome:     [x] Patient confirmed appointment   [] Patient rescheduled appointment for    [] Unable to reach  [] Left message             [] Other:     ---------------------             [x] Screened for NETO Daniel

## 2022-05-31 ENCOUNTER — OFFICE VISIT (OUTPATIENT)
Dept: CARDIOLOGY CLINIC | Age: 62
End: 2022-05-31
Payer: COMMERCIAL

## 2022-05-31 ENCOUNTER — TELEPHONE (OUTPATIENT)
Dept: CARDIOLOGY CLINIC | Age: 62
End: 2022-05-31

## 2022-05-31 VITALS
WEIGHT: 156.8 LBS | DIASTOLIC BLOOD PRESSURE: 68 MMHG | BODY MASS INDEX: 28.85 KG/M2 | TEMPERATURE: 98.8 F | OXYGEN SATURATION: 98 % | HEIGHT: 62 IN | RESPIRATION RATE: 12 BRPM | SYSTOLIC BLOOD PRESSURE: 120 MMHG | HEART RATE: 75 BPM

## 2022-05-31 DIAGNOSIS — R06.02 SHORTNESS OF BREATH: ICD-10-CM

## 2022-05-31 DIAGNOSIS — E55.9 VITAMIN D DEFICIENCY: ICD-10-CM

## 2022-05-31 DIAGNOSIS — D50.8 OTHER IRON DEFICIENCY ANEMIA: ICD-10-CM

## 2022-05-31 DIAGNOSIS — E78.00 HYPERCHOLESTEREMIA: ICD-10-CM

## 2022-05-31 DIAGNOSIS — I10 ESSENTIAL HYPERTENSION: ICD-10-CM

## 2022-05-31 DIAGNOSIS — I50.22 CHRONIC SYSTOLIC HEART FAILURE (HCC): Primary | ICD-10-CM

## 2022-05-31 DIAGNOSIS — Z11.59 ENCOUNTER FOR HEPATITIS C SCREENING TEST FOR LOW RISK PATIENT: ICD-10-CM

## 2022-05-31 DIAGNOSIS — N28.9 KIDNEY DISEASE: ICD-10-CM

## 2022-05-31 PROCEDURE — 99204 OFFICE O/P NEW MOD 45 MIN: CPT | Performed by: INTERNAL MEDICINE

## 2022-05-31 RX ORDER — CHOLECALCIFEROL (VITAMIN D3) 125 MCG
200 CAPSULE ORAL DAILY
COMMUNITY

## 2022-05-31 RX ORDER — AZELASTINE 1 MG/ML
SPRAY, METERED NASAL
COMMUNITY
Start: 2022-02-23

## 2022-05-31 RX ORDER — POTASSIUM CHLORIDE 1.5 G/1.77G
POWDER, FOR SOLUTION ORAL
COMMUNITY

## 2022-05-31 RX ORDER — SUCRALFATE 1 G/1
TABLET ORAL
COMMUNITY

## 2022-05-31 RX ORDER — OMEPRAZOLE 40 MG/1
CAPSULE, DELAYED RELEASE ORAL
COMMUNITY
Start: 2022-05-26 | End: 2022-08-04

## 2022-05-31 NOTE — PROGRESS NOTES
[facility] in [facility], [facility] Note    Patient name: Inocencia Gonzalez  Patient : 1960  Patient MRN: [id number]  Date of service: 22    Primary care physician: Judith Lerma MD  Primary general cardiologist: Dr. Chapis Mcconnell     Primary F cardiologist: Tom Monaco MD    CHIEF COMPLAINT:  Chronic systolic heart failure    PLAN OF CARE:  · 62 y/o female with asthma and HFpEF 40-45% due to mixed ischemic and non-ischemic cardiomyopathy, stage C, NYHA class IIIA symptoms on GDMT; difficult to dose diuretics due to CKD stage 3 with Cr 2-2.6  · Obtain medical records from pulmonology and PFTs, and schedule RHC to determine dry weight and presence of diastolic heart failure contributing to disproportional symptoms of dyspnea and fatigue    PLAN:  Continue current medical therapy for heart failure  Does not take beta-blockers (suspect due to asthma)  Continue current dose of entresto 24/26mg twice daily  Cannot tolerate spironolactone due to CKD  Concern to start SGLT2 inhibitor due to eGFR 28 (contraindicated with eGFR < 25)  Continue current dose of bumex 1mg every other day  Not on allopurinol or uloric, check uric acid level  Does not take aspirin   Continue current dose of statin, check lipid profile, CPK and LFT  ICD interrogation every 3 months per routine  Schedule sleep study    Schedule PYP test  Schedule ultrasound of kidneys and renal arteries  Obtain genetic testing  Obtain medical records from pulmonologist and PFT  Schedule RHC  Screening HF labs: CBC, BMP, Mg, LFT, uric acid, pro-NT-BNP, iron profile with ferritin, TSH, vitamin D level, lipid profile, CPK, gammopathy profile, HIV, hepatitis panel, LINDA, HGBA1C  Reinforced low salt diet  Reinforced fluid restriction to 6 x 8oz glasses per day  Provided educational materials \"Living with heart failure\"   Provided advanced care plan forms to be filled out Referral to nutritionist for HF diet and weight loss  Follow-up with primary cardiologist, Dr. Jelly Aguilar with EP cardiologist  Recommend flu, covid and pneumonia vaccinations  Return to [address] [place] [address] in one month with MD to discuss results    IMPRESSION:  Fatigue  Shortness of breath  Chronic systolic heart failure   Stage C, NYHA class IIIA symptoms   Mixed ischemic and non-ischemic cardiomyopathy, LVEF 46% (by NST 10/2021) from 20% (by echo 2015)  Coronary artery disease  · S/p anterior subendocardial infarct s/p LAD stent 10/2010  S/p ICD  Mitral valve regurgitation, mod-severe improved to trace (by echo 2020)  Cardiac risk factors   HTN   HL   DM2, hgba1c 6.6   Mild LANRE (diagnosed around )   H/o tobacco abuse, quit around   CKD stage 3 (Cr 2.0-2.6)  Anemia  GERD  Asthma  Multinodular thyroid  Family h/o early CAD (father  of MI at 43years old)    CARDIAC IMAGING:  Echo (20) LVEF 40-45%  EKG (22) NSR 77bpm, non-specific ST-T changes  LHC (2015) open stent, no progressive CAD  NST (10/11/2021) Fixed defect in the atnerolateral wall is compatible with infarct. No evidence of myocardium at ischemic risk. Left ventricular dilation. LVEF 46%. Global hypokinesia and anterolateral akinesia. ICD interrogation    HEMODYNAMICS:  RHC not done  CPEST not done    6 Min Walk Report 2010   (PRE) HR 70 70   (POST) HR 70 -     PERSONAL GOALS:  Lifestyle goals reviewed with the patient. HISTORY OF PRESENT ILLNESS:  I had the pleasure of seeing Steven Menon in [address] [place] [address] at [facility] in [place].     Briefly, Steven Menon is a 63 y.o. female with h/o HTN, HL, DM2, LANRE, h/o remote tobacco use, chronic systolic heart failure, stage C, NYHA class IIIA symptoms, mixed ischemic and non-ischemic cardiomyopathy, LVEF 46% (by NST 10/2021) from 20% (by echo 2015), coronary artery disease s/p anterior subendocardial infarct s/p LAD stent 10/2010, s/p ICD, CKD stage 3 (Cr 2.0-2.6), anemia, GERD, asthma, multinodular thyroid and family h/o early CAD (father  of MI at 43years old). H/o vasovagal syncope, last seen in ER 4/10/22 after abdominal cramping in the bathroom. Patient was referred to [facility] due to disproportional symptoms to degree of heart failure and difficulty dosing diuretics with creatinine around 2; with narrow window - dehydrated above Cr 2 and dyspneic below it. INTERVAL HISTORY:  Today, patient presents for initial clinic visit. Patient is doing \"okay\". Patient can walk not more than one block due to fatigue and shortness of breath. Patient can walk one flight of stairs, however, gets very tired and short of breath and has to lay down to rest. Patient cannot perform home activities anymore such as cooking or cleaning due to fatigue and dyspnea, her  has to help and she hired help. She gets tired having to take shower. Patient denies symptoms of volume overload or leg edema today, but often feels abdominal bloating. Patient's weight remained stable. Patient denies orthopnea, PND or nocturia; however uses wedge every night to manage GERD. Patient has often non-restorative sleep, and almost every day feels like taking a nap at noon. Patient denies irregular heart rate or palpitations. ICD has not fired. Patient denies other cardiac symptoms such as chest pain, however, does report heaviness in legs. Patient is compliant with fluid restriction and taking medications as prescribed. Patient manages his own medications. REVIEW OF SYSTEMS:  General: Denies fever, night sweats.   Ear, nose and throat: Denies difficulty hearing, sinus problems, runny nose, post-nasal drip, ringing in ears, mouth sores, loose teeth, ear pain, nosebleeds, sore throate, facial pain or numbess  Cardiovascular: see above in the interval history  Respiratory: Denies cough, wheezing, sputum production, hemoptysis. Gastrointestinal: Denies heartburn, constipation, diarrhea, abdominal pain, nausea, vomiting, difficulty swallowing, blood in stool  Kidney and bladder: Denies painful urination, frequent urination, urgency  Musculoskeletal: Denies joint pain, muscle weakness  Skin and hair: Denies change in existing skin lesions, hair loss or increase, breast changes    PHYSICAL EXAM:  Visit Vitals  /68 (BP 1 Location: Left arm, BP Patient Position: Sitting, BP Cuff Size: Adult)   Pulse 75   Temp 98.8 °F (37.1 °C) (Oral)   Resp 12   Ht 5' 2\" (1.575 m)   Wt 156 lb 12.8 oz (71.1 kg)   SpO2 98%   BMI 28.68 kg/m²     General: Patient is well developed, well-nourished in no acute distress  HEENT: Normocephalic and atraumatic. No scleral icterus. Pupils are equal, round and reactive to light and accomodation. No conjunctival injection. Oropharynx is clear. Neck: Supple. No evidence of thyroid enlargements or lymphadenopathy. JVD: JVC 12cm  Lungs: Breath sounds are equal and clear bilaterally. No wheezes, rhonchi, or rales. Heart: Regular rate and rhythm with normal S1 and S2. No murmurs, gallops or rubs. Abdomen: Soft, no mass or tenderness. No organomegaly or hernia. Bowel sounds present. Genitourinary and rectal: deferred  Extremities: No cyanosis, clubbing, or edema. Neurologic: No focal sensory or motor deficits are noted. Grossly intact. Psychiatric: Awake, alert an doriented x 3. Appropriate mood and affect. Skin: Warm, dry and well perfused. No lesions, nodules or rashes are noted.     PAST MEDICAL HISTORY:  Past Medical History:   Diagnosis Date    Acute Anterior Myocardial Infarction, s/p PTCA w/ stent 10/2010 10/31/2010    Asthma     mild    Diabetes mellitus, type 2 ([facility] [address] 8/24/2012    GERD (gastroesophageal reflux disease)     hiatal hernia    H/O recurrent Sinusitis 3/16/2011    Heart failure (HCC)     Hiatal Hernia w/ GERD (gastroesophageal reflux disease) 3/16/2011    Hypercholesterolemia     Hypertension 3/16/2011    Ill-defined condition     pacemaker put in last 2015 because of CHF    Impaired fasting glucose 2011    Iron deficiency anemia 3/16/2011    Multinodular thyroid 3/16/2011    Palpitations, PVC's 10/13/2011    Perennial allergic rhinitis 3/16/2011    Pityriasis rosea 2012    Post-menopausal     LMP 46y. o, no HRT    Postmenopause, LMP 38 yo, No HRT 3/16/2011    Reflux esophagitis 3/16/2011    Tobacco user     Vitamin D deficiency 10/13/2011       PAST SURGICAL HISTORY:  Past Surgical History:   Procedure Laterality Date    COLONOSCOPY N/A 2017    COLONOSCOPY performed by Laquita Verde MD at [address] EGD  2009    hiatal hernia, esophagitis; Dr Capo Bautista, COLON, DIAGNOSTIC  2009    HX GYN      FNA bilateral benign     HX LITHOTRIPSY  2012    failed    KIDNEY STONE (CALCULI) EVAL  2012    scope w/ kidney stone extractions, multiple; Dr Barbi Linder, [facility]   [address] UN[facility]      cardiac stent     HI COLONOSCOPY W/BIOPSY SINGLE/MULTIPLE  2009    benign polyp, recheck in 5 years, Dr Giovanni Ivy    PTCA W/ STENT, INITIAL  10/2010    Dr Momin Pair:  Family History   Problem Relation Age of Onset    Hypertension Mother     High Cholesterol Mother     Thyroid Disease Mother     Heart Disease Mother         chf    Kidney Disease Mother     Headache Mother     Heart Disease Father     Heart Attack Father 36         MI age 36   [place] High Cholesterol Father     Hypertension Sister     Thyroid Disease Sister     Hypertension Sister    [place] Arthritis-rheumatoid Sister     Diabetes Sister     Thyroid Disease Sister     Heart Attack Brother         massive MI at age 46, survived    Thyroid Disease Other         niece, Kelley Rosales    High Cholesterol Brother        SOCIAL HISTORY:  Social History     Socioeconomic History    Marital status:      Spouse name: vijay ok to give infor     Number of children: 0   Occupational History    Occupation: ; 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    Occupation:      Employer: SELF EMPLOYED   Tobacco Use    Smoking status: Former Smoker     Packs/day: 0.25     Years: 30.00     Pack years: 7.50     Types: Cigarettes     Quit date: 2013     Years since quittin.8    Smokeless tobacco: Never Used    Tobacco comment: had quit 10/2010 then restarted ~ 3/2012;  about 5 cigarettes a day; on and off since age 28 yo x ~ 30 yrs   Substance and Sexual Activity    Alcohol use: No     Comment: none    Drug use: No    Sexual activity: Yes     Partners: Male     Birth control/protection: None     Comment:  Edward Dougherty    Other Topics Concern    Caffeine Concern No     Comment: down to 1 Coke a day    Weight Concern Yes     Comment: stable now, had gained some wt over the past yr; was in low 150's previously; wants to lose 10 lbs    Special Diet Yes     Comment: heart healthy diet (white meats, salads, veggies, fruits)    Exercise Yes     Comment: exercise walking tape x 45 minutes qod   Social History Narrative    New to [facility] as of 2009, referred by moose Shannon; pervious PCP Dr Vivian Barros:  Labs Latest Ref Rng & Units 2022 4/10/2022   WBC 3.6 - 11.0 K/uL - 7.9   RBC 3.80 - 5.20 M/uL - 3.97   Hemoglobin 11.5 - 16.0 g/dL - 10. 6(L)   Hematocrit 35.0 - 47.0 % - 35.4   MCV 80.0 - 99.0 FL - 89.2   Platelets 302 - 756 K/uL - 220   Lymphocytes 12 - 49 % - 22   Monocytes 5 - 13 % - 7   Eosinophils 0 - 7 % - 1   Basophils 0 - 1 % - 1   Albumin 3.5 - 5.0 g/dL - 4.1   Calcium 8.5 - 10.1 MG/DL 9.7 9.7   Glucose 65 - 100 mg/dL 112(H) 157(H)   BUN 6 - 20 MG/DL 40(H) 39(H)   Creatinine 0.55 - 1.02 MG/DL 2.15(H) 2.69(H)   Sodium 136 - 145 mmol/L 144 139   Potassium 3.5 - 5.1 mmol/L 4.2 4.2   Some recent data might be hidden       ALLERGY:  Allergies   Allergen Reactions    Ace Inhibitors Cough ?10/2011    Seafood [Shellfish Containing Products] Hives, Shortness of Breath and Nausea and Vomiting        CURRENT MEDICATIONS:    Current Outpatient Medications:     omeprazole (PRILOSEC) 40 mg capsule, , Disp: , Rfl:     azelastine (ASTELIN) 137 mcg (0.1 %) nasal spray, azelastine 137 mcg (0.1 %) nasal spray aerosol  USE 1 SPRAY IN EACH NOSTRIL TWICE A DAY 90, Disp: , Rfl:     sucralfate (CARAFATE) 1 gram tablet, sucralfate 1 gram tablet  TAKE 1 TAB BY MOUTH BEFORE MEALS AND AT BEDTIME, Disp: , Rfl:     potassium chloride (KLOR-CON) 20 mEq pack, potassium chloride 20 mEq oral packet  TAKE 1 PACKET(S) BY MOUTH DAILY, Disp: , Rfl:     co-enzyme Q-10 (CoQ-10) 100 mg capsule, Take 200 mg by mouth daily. , Disp: , Rfl:     bumetanide (BUMEX) 1 mg tablet, Take 1 mg by mouth. Take 1 tab every other day, Disp: , Rfl:     rosuvastatin (Crestor) 20 mg tablet, Take 20 mg by mouth nightly. Indications: high cholesterol, Disp: , Rfl:     fluticasone propionate (FLONASE ALLERGY RELIEF NA), by Nasal route., Disp: , Rfl:     sacubitriL-valsartan (Entresto) 24-26 mg tablet, Take 1 Tab by mouth two (2) times a day. Indications: chronic heart failure, Disp: , Rfl:     levocetirizine (XYZAL) 5 mg tablet, Take 5 mg by mouth., Disp: , Rfl:     EPINEPHrine (EPIPEN) 0.3 mg/0.3 mL injection, 0.3 mL by IntraMUSCular route once as needed for up to 1 dose., Disp: 2 Syringe, Rfl: 1    zolpidem (AMBIEN) 10 mg tablet, Take 1 Tab by mouth nightly as needed for Sleep. Max Daily Amount: 10 mg., Disp: 30 Tab, Rfl: 0    metFORMIN (GLUCOPHAGE) 500 mg tablet, Decrease to 250mg BID and monitor blood sugars, Disp: 60 Tab, Rfl: 0    montelukast (SINGULAIR) 10 mg tablet, TAKE 1 TAB BY MOUTH DAILY. INDICATIONS: ALLERGIC RHINITIS, Disp: 30 Tab, Rfl: 11    carvedilol (COREG) 25 mg tablet, Take 1 Tab by mouth two (2) times a day., Disp: 60 Tab, Rfl: 11    diclofenac (VOLTAREN) 1 % gel, Apply 2 g to affected area every six (6) hours. , Disp: 100 g, Rfl: 0    albuterol (PROAIR HFA) 90 mcg/actuation inhaler, Take 2 Puffs by inhalation every six (6) hours as needed for Wheezing., Disp: 1 Inhaler, Rfl: 1    famotidine (PEPCID) 40 mg tablet, Take 40 mg by mouth nightly., Disp: , Rfl:     Cholecalciferol, Vitamin D3, (VITAMIN D) 2,000 unit Cap, Take 4,000 Units by mouth daily. Takes 2 of the 2,000 unit tabs qd, Disp: , Rfl:     atorvastatin (LIPITOR) 80 mg tablet, 80 mg., Disp: , Rfl:     Thank you for your referral and allowing me to participate in this patient's care.     Micheal Wilson MD PhD  [address] [place] [address], [address]  Phone: [phone]  Fax: [phone]    PATIENT CARE TEAM:  Patient Care Team:  Davi Sorto MD as PCP - General (Family Medicine)  Lauri Garcia, Joan Sánchez MD as PCP - [facility] Empaneled Provider  Minor Castro RN as Nurse Navigator  Stephan Flores MD as Physician (Cardiovascular Disease Physician)     Total visit time: 60 minutes (> 50% spent face-to-face counseling)

## 2022-05-31 NOTE — PATIENT INSTRUCTIONS
Medication changes:    none    Please take this to your pharmacy to notify them of the change in medications. Testing Ordered:    Lab work has been drawn today. You will be contacted with any abnormal results requiring changes to your current plan of care. PYP testing has been scheduled for Thursday June 30 at 12:00. Please arrive by 11:45 to check in. This test is located at the [facility] at [address] , [address], [place], [address] [place] [address]. Please be aware that the entire process takes approximately 4 hours, as you will be injected with a radioisotope and then asked to wait for 3 hours after which you will be imaged for the scan. You can leave the facility and return in 2.5 hours to be imaged at the 3 hour jerardo. If this appointment does not work for you, please contact the [facility] at [phone] to reschedule. We will reach out to Dr. Charlee Gomez office to schedule a Right Heart Cath-they will call you to schedule. An order for a renal artery and Kidney ultrasound has been placed to be done in next 1-2 weeks. You will be receiving an automated call from Coordination of Care to schedule this test. If you are unavailable to receive the call or would like to contact coordination of care yourself you may contact [phone] to schedule. You will need to contact coordination of care yourself if you miss their calls as they will only make 3 attempts to reach you. Other Recommendations:     A referral to Nutrition has been placed. Please contact [phone] to schedule an appointment. A referral to sleep medicine has been placed. You will be contacted for scheduling. Ensure your drinking an adequate amount of water with a goal of 6-8 eight ounce glasses (1.5-2 liters) of fluid daily. Your urine should be clear and light yellow straw colored.       If your blood pressure begins to consistently run below 90/60 and/or you begin to experience dizziness or lightheadedness, please contact the [facility] [address] at [phone]. Follow up in one month with Dr. Pio Lam to discuss results with Alexia Meyer      Please monitor your weights daily upon waking and after using the bathroom. Keep a written records of your weights and bring to your next appointment. If you have a weight gain of 3 or more pounds overnight OR 5 or more pounds in one week please contact our office. Thank you for allowing us the privilege of being a part of your healthcare team! Please do not hesitate to contact our office at [phone] with any questions or concerns. Virtual Heart Failure [address] invites you to learn more about heart failure and to share your questions, ideas, and experiences with others. Each month, the Heart Failure Support Group features a new educational topic and a guest speaker, followed by an interactive discussion. Our Heart Failure Nurse Navigator will moderate each session. You will be able to participate by phone, tablet or computer through [employer]. This support group takes place on the 3rd Thursday of each month from 6:00-7:30PM. All individuals living with heart failure and their caregivers are welcome to join. If you are interested in participating, please contact us at [email or web address] and you will be sent the link to join the [employer].

## 2022-05-31 NOTE — LETTER
6/1/2022 11:04 AM      Dear Dr. Sam Snow,    Thank you for your consult of patient Geremias Fatima to the [address] [place] [address] at [facility].  She was seen on 5/31/2022. I have attached a copy of the progress note detailing my exam and plan of care. We appreciate your confidence in our clinic to provide the highest quality of care to your patient. If I can answer any questions regarding our plan of care, please don't hesitate to contact my office at [phone].           Sincerely,            Kristin Darnell MD

## 2022-06-01 ENCOUNTER — PATIENT OUTREACH (OUTPATIENT)
Dept: CASE MANAGEMENT | Age: 62
End: 2022-06-01

## 2022-06-01 LAB
25(OH)D3 SERPL-MCNC: 68.4 NG/ML (ref 30–100)
ALBUMIN SERPL-MCNC: 4.6 G/DL (ref 3.5–5)
ALBUMIN/GLOB SERPL: 1.3 {RATIO} (ref 1.1–2.2)
ALP SERPL-CCNC: 83 U/L (ref 45–117)
ALT SERPL-CCNC: 15 U/L (ref 12–78)
ANION GAP SERPL CALC-SCNC: 7 MMOL/L (ref 5–15)
AST SERPL-CCNC: 18 U/L (ref 15–37)
BILIRUB SERPL-MCNC: 0.4 MG/DL (ref 0.2–1)
BNP SERPL-MCNC: 2260 PG/ML
BUN SERPL-MCNC: 42 MG/DL (ref 6–20)
BUN/CREAT SERPL: 18 (ref 12–20)
CALCIUM SERPL-MCNC: 10.2 MG/DL (ref 8.5–10.1)
CHLORIDE SERPL-SCNC: 112 MMOL/L (ref 97–108)
CHOLEST SERPL-MCNC: 202 MG/DL
CK SERPL-CCNC: 142 U/L (ref 26–192)
CO2 SERPL-SCNC: 21 MMOL/L (ref 21–32)
CREAT SERPL-MCNC: 2.28 MG/DL (ref 0.55–1.02)
ERYTHROCYTE [DISTWIDTH] IN BLOOD BY AUTOMATED COUNT: 17.3 % (ref 11.5–14.5)
ERYTHROCYTE [SEDIMENTATION RATE] IN BLOOD: 56 MM/HR (ref 0–30)
FERRITIN SERPL-MCNC: 128 NG/ML (ref 8–252)
GLOBULIN SER CALC-MCNC: 3.5 G/DL (ref 2–4)
GLUCOSE SERPL-MCNC: 98 MG/DL (ref 65–100)
HAV IGM SER QL: NONREACTIVE
HBV CORE IGM SER QL: NONREACTIVE
HBV SURFACE AG SER QL: <0.1 INDEX
HBV SURFACE AG SER QL: NEGATIVE
HCT VFR BLD AUTO: 37.4 % (ref 35–47)
HCV AB SERPL QL IA: NONREACTIVE
HDLC SERPL-MCNC: 63 MG/DL
HDLC SERPL: 3.2 {RATIO} (ref 0–5)
HGB BLD-MCNC: 10.8 G/DL (ref 11.5–16)
IRON SATN MFR SERPL: 22 % (ref 20–50)
IRON SERPL-MCNC: 67 UG/DL (ref 35–150)
LDLC SERPL CALC-MCNC: 111.8 MG/DL (ref 0–100)
MAGNESIUM SERPL-MCNC: 2.1 MG/DL (ref 1.6–2.4)
MCH RBC QN AUTO: 26.8 PG (ref 26–34)
MCHC RBC AUTO-ENTMCNC: 28.9 G/DL (ref 30–36.5)
MCV RBC AUTO: 92.8 FL (ref 80–99)
NRBC # BLD: 0 K/UL (ref 0–0.01)
NRBC BLD-RTO: 0 PER 100 WBC
PLATELET # BLD AUTO: 247 K/UL (ref 150–400)
PMV BLD AUTO: 10.2 FL (ref 8.9–12.9)
POTASSIUM SERPL-SCNC: 4.4 MMOL/L (ref 3.5–5.1)
PROT SERPL-MCNC: 8.1 G/DL (ref 6.4–8.2)
RBC # BLD AUTO: 4.03 M/UL (ref 3.8–5.2)
SODIUM SERPL-SCNC: 140 MMOL/L (ref 136–145)
SP1: NORMAL
SP2: NORMAL
SP3: NORMAL
T4 FREE SERPL-MCNC: 1.27 NG/DL (ref 0.82–1.77)
TIBC SERPL-MCNC: 310 UG/DL (ref 250–450)
TRIGL SERPL-MCNC: 136 MG/DL (ref ?–150)
TSH SERPL DL<=0.005 MIU/L-ACNC: 1.56 UIU/ML (ref 0.45–4.5)
URATE SERPL-MCNC: 7.6 MG/DL (ref 2.6–6)
VLDLC SERPL CALC-MCNC: 27.2 MG/DL
WBC # BLD AUTO: 7.5 K/UL (ref 3.6–11)

## 2022-06-01 NOTE — ACP (ADVANCE CARE PLANNING)
Advance Care Planning   Ambulatory ACP Specialist Patient Outreach    Date:  6/1/2022    ACP Specialist:  Yessica Bain LPN    Outreach call to patient in follow-up to ACP Specialist referral from:    [x] PCP  [] Provider   [] Ambulatory Care Management [] Other     For:                  [x] Advance Directive Assistance              [] Complete Portable DNR order              [] Complete POST/MOST              [] Code Status Discussion             [] Discuss Goals of Care             [] Early ACP Decision-Making              [] Other (Specify)    Date Referral Received: 5/31/22    Today's Outreach:  [x] First   [] Second  [] Third       Third outreach made by: [] Phone  [] Email / mail    [] [employer]     Intervention:  [x] Spoke with Patient   [] Left VM requesting return call      Outcome: Spoke with the pt regarding ACP referral. It is noted that the pt already has an AMD in her chart. AMD briefly reviewed and she does not request any changes at this time. Pt did however ask that a copy be e-mailed to her for her personal records. HCDM field has been updated. Will close referral at this time. Advance Care Planning   Healthcare Decision Maker:       Primary Decision Maker: Emily Turner - [phone]    Secondary Decision Maker: Roxanne Shay - [phone]    Click here to complete [address] including selection of the Healthcare Decision Maker Relationship (ie \"Primary\")                  Next Step:   [] ACP scheduled conversation  [] Outreach again in one week               [] Email / Mail [address]  [] Email / Mail Advance Directive   [] Closing referral.  Routing closure to referring provider/staff and to [employer]. [] Closure letter mailed to patient with invitation to contact ACP Specialist if / when ready.   Thank you for this referral.

## 2022-06-02 ENCOUNTER — DOCUMENTATION ONLY (OUTPATIENT)
Dept: FAMILY MEDICINE CLINIC | Age: 62
End: 2022-06-02

## 2022-06-02 ENCOUNTER — TELEPHONE (OUTPATIENT)
Dept: CARDIOLOGY CLINIC | Age: 62
End: 2022-06-02

## 2022-06-02 LAB
CENTROMERE B AB SER-ACNC: <0.2 AI (ref 0–0.9)
CHROMATIN AB SERPL-ACNC: <0.2 AI (ref 0–0.9)
DSDNA AB SER-ACNC: <1 IU/ML (ref 0–9)
ENA JO1 AB SER-ACNC: <0.2 AI (ref 0–0.9)
ENA RNP AB SER-ACNC: 0.2 AI (ref 0–0.9)
ENA SCL70 AB SER-ACNC: <0.2 AI (ref 0–0.9)
ENA SM AB SER-ACNC: <0.2 AI (ref 0–0.9)
ENA SS-A AB SER-ACNC: <0.2 AI (ref 0–0.9)
ENA SS-B AB SER-ACNC: <0.2 AI (ref 0–0.9)
SEE BELOW, 164869: NORMAL
TROPONIN T SERPL-MCNC: 23 NG/L (ref 0–14)

## 2022-06-02 NOTE — TELEPHONE ENCOUNTER
Patient is scheduled for RHC with Dr. Princess Plasencia at [facility] on June 16 at 9:30 per  [facility] ([phone]), all instructions have been given.

## 2022-06-02 NOTE — PROGRESS NOTES
Reviewed Notes from previous pcp (Dr. Rosa De La Torre) - 11/23/21    Per report, pt was on  - coreg 25 mg BID  - Crestor 20mg  -Entresto 24-26mg BID  - Levalbuterol and proair  -metformin 500mg daily  famotidinie 40mg   - singulaire  - bumex 0.5mg daily    CARD:  -Dr Angeles Marquez, dr. Babs Romberg - has defibrillator    Full report will be scanned into chart.     Signed By: Ysabel Obando MD     June 2, 2022

## 2022-06-05 LAB
ALBUMIN SERPL ELPH-MCNC: 4.3 G/DL (ref 2.9–4.4)
ALBUMIN/GLOB SERPL: 1.2 {RATIO} (ref 0.7–1.7)
ALPHA1 GLOB SERPL ELPH-MCNC: 0.3 G/DL (ref 0–0.4)
ALPHA2 GLOB SERPL ELPH-MCNC: 1.1 G/DL (ref 0.4–1)
B-GLOBULIN SERPL ELPH-MCNC: 1.2 G/DL (ref 0.7–1.3)
GAMMA GLOB SERPL ELPH-MCNC: 1 G/DL (ref 0.4–1.8)
GLOBULIN SER-MCNC: 3.6 G/DL (ref 2.2–3.9)
IGA SERPL-MCNC: 240 MG/DL (ref 87–352)
IGG SERPL-MCNC: 958 MG/DL (ref 586–1602)
IGM SERPL-MCNC: 75 MG/DL (ref 26–217)
INTERPRETATION SERPL IEP-IMP: ABNORMAL
KAPPA LC FREE SER-MCNC: 26.3 MG/L (ref 3.3–19.4)
KAPPA LC FREE/LAMBDA FREE SER: 1.3 {RATIO} (ref 0.26–1.65)
LAMBDA LC FREE SERPL-MCNC: 20.3 MG/L (ref 5.7–26.3)
M PROTEIN SERPL ELPH-MCNC: ABNORMAL G/DL
PROT SERPL-MCNC: 7.9 G/DL (ref 6–8.5)

## 2022-06-08 ENCOUNTER — PATIENT MESSAGE (OUTPATIENT)
Dept: FAMILY MEDICINE CLINIC | Age: 62
End: 2022-06-08

## 2022-06-08 DIAGNOSIS — I10 ESSENTIAL HYPERTENSION: Chronic | ICD-10-CM

## 2022-06-08 DIAGNOSIS — G47.00 INSOMNIA, UNSPECIFIED TYPE: ICD-10-CM

## 2022-06-09 ENCOUNTER — TELEPHONE (OUTPATIENT)
Dept: CARDIOLOGY CLINIC | Age: 62
End: 2022-06-09

## 2022-06-09 ENCOUNTER — HOSPITAL ENCOUNTER (OUTPATIENT)
Dept: VASCULAR SURGERY | Age: 62
Discharge: HOME | End: 2022-06-09
Attending: INTERNAL MEDICINE
Payer: COMMERCIAL

## 2022-06-09 ENCOUNTER — HOSPITAL ENCOUNTER (OUTPATIENT)
Dept: ULTRASOUND IMAGING | Age: 62
Discharge: HOME | End: 2022-06-09
Attending: INTERNAL MEDICINE
Payer: COMMERCIAL

## 2022-06-09 DIAGNOSIS — I10 ESSENTIAL HYPERTENSION: ICD-10-CM

## 2022-06-09 DIAGNOSIS — N28.9 KIDNEY DISEASE: ICD-10-CM

## 2022-06-09 PROCEDURE — 93975 VASCULAR STUDY: CPT

## 2022-06-09 PROCEDURE — 76770 US EXAM ABDO BACK WALL COMP: CPT

## 2022-06-09 RX ORDER — ZOLPIDEM TARTRATE 10 MG/1
10 TABLET ORAL
Qty: 30 TABLET | Refills: 0 | Status: SHIPPED | OUTPATIENT
Start: 2022-06-09 | End: 2022-07-08

## 2022-06-09 NOTE — TELEPHONE ENCOUNTER
----- Message from Micheal Wilson MD sent at 6/9/2022  2:00 PM EDT -----  Noted rise of creatinine. Scheduled for RHC next week. Would just make sure she keeps her weight unchanged between now and RHC.  ----- Message -----  From: Prakash, Lab In [facility]  Sent: 6/1/2022   9:42 PM EDT  To: Micheal Wilson MD    I called patient, reviewed lab results. She states she saw nephrologist on Monday and creatinine was 2.0. Her weight has been 155-156 lb, she denies shortness of breath or dizziness. She has no further questions at this time.

## 2022-06-09 NOTE — TELEPHONE ENCOUNTER
Medication request:  zolpidem (AMBIEN) 10 mg tablet  Sig: Take 1 Tab by mouth nightly as needed for Sleep. Max Daily Amount: 10 mg. Last Seen: 5/4/2022    Last Filled:  By Dr. Thang Montano    Next visit: 8/4/2022

## 2022-06-10 ENCOUNTER — TELEPHONE (OUTPATIENT)
Dept: CARDIOLOGY CLINIC | Age: 62
End: 2022-06-10

## 2022-06-10 LAB
ABDOMINAL PROX AORTA VEL: 88.9 CM/S
CELIAC EDV: 12.6 CM/S
CELIAC PSV: 107.4 CM/S
DIST AORTIC AP: 1.58 CM
LEFT KIDNEY LENGTH: 10.62 CM
LEFT KIDNEY WIDTH: 5.17 CM
LEFT RENAL DIST DIAS: 8.5 CM/S
LEFT RENAL DIST RAR: 0.91
LEFT RENAL DIST RI: 0.9
LEFT RENAL DIST SYS: 81 CM/S
LEFT RENAL LOWER PARENCHYMA MAX: 13.3 CM/S
LEFT RENAL LOWER PARENCHYMA MIN: 4.1 CM/S
LEFT RENAL LOWER PARENCHYMA RI: 0.69
LEFT RENAL MID DIAS: 7.9 CM/S
LEFT RENAL MID RAR: 0.66
LEFT RENAL MID RI: 0.86
LEFT RENAL MID SYS: 58.3 CM/S
LEFT RENAL MIDDLE PARENCHYMA MAX: 15.7 CM/S
LEFT RENAL MIDDLE PARENCHYMA MIN: 4.1 CM/S
LEFT RENAL MIDDLE PARENCHYMA RI: 0.74
LEFT RENAL PROX DIAS: 8.9 CM/S
LEFT RENAL PROX RAR: 0.68
LEFT RENAL PROX RI: 0.85
LEFT RENAL PROX SYS: 60.1 CM/S
LEFT RENAL UPPER PARENCHYMA MAX: 15.7 CM/S
LEFT RENAL UPPER PARENCHYMA MIN: 4.1 CM/S
LEFT RENAL UPPER PARENCHYMA RI: 0.74
PROX AORTIC AP: 1.92 CM
PROX SMA EDV: 18.2 CM/S
PROX SMA PSV: 182.8 CM/S
RIGHT KIDNEY LENGTH: 10.77 CM
RIGHT KIDNEY WIDTH: 6.4 CM
RIGHT RENAL DIST DIAS: 21.2 CM/S
RIGHT RENAL DIST RAR: 1.62
RIGHT RENAL DIST RI: 0.85
RIGHT RENAL DIST SYS: 143.6 CM/S
RIGHT RENAL LOWER PARENCHYMA MAX: 13 CM/S
RIGHT RENAL LOWER PARENCHYMA MIN: 4.7 CM/S
RIGHT RENAL LOWER PARENCHYMA RI: 0.64
RIGHT RENAL MID DIAS: 12.4 CM/S
RIGHT RENAL MID RAR: 0.95
RIGHT RENAL MID RI: 0.85
RIGHT RENAL MID SYS: 84.6 CM/S
RIGHT RENAL MIDDLE PARENCHYMA MAX: 24.5 CM/S
RIGHT RENAL MIDDLE PARENCHYMA MIN: 5.8 CM/S
RIGHT RENAL MIDDLE PARENCHYMA RI: 0.76
RIGHT RENAL PROX DIAS: 10.6 CM/S
RIGHT RENAL PROX RAR: 0.7
RIGHT RENAL PROX RI: 0.83
RIGHT RENAL PROX SYS: 62.4 CM/S
RIGHT RENAL UPPER PARENCHYMA MAX: 19.6 CM/S
RIGHT RENAL UPPER PARENCHYMA MIN: 5.3 CM/S
RIGHT RENAL UPPER PARENCHYMA RI: 0.73

## 2022-06-10 NOTE — TELEPHONE ENCOUNTER
Nutrition referral faxed to Mahad Brown ([phone]). This will be reviewed and they will contact patient to schedule her appointment.

## 2022-06-14 ENCOUNTER — TELEPHONE (OUTPATIENT)
Dept: CARDIOLOGY CLINIC | Age: 62
End: 2022-06-14

## 2022-06-14 NOTE — LETTER
6/14/2022 9:01 AM    Ms. CLAUDIO [facility]  [address] [address]     Dear Ms. Carli Montes is a copy of your genetic testing results that were completed during your visit to the [address] [place] [address]. Your results showed one or more of the genes tested to be VUS or Variant(s) of uncertain significance. This means that no significant changes (pathogenic variants or mutations) were found in your genetic test.  However, your test did find a genetic change called a variant of uncertain significance (VUS) in one or more of the genes tested. When we see a genetic change, but are unsure of its impact on health, it is called a variant of uncertain significance. Right now, there is not enough information about the VUS to know whether it causes disease or not. A VUS is a common type of result. We all have many genetic changes that do not cause medical problems. Most of the time, we later learn that a VUS is not related to disease risk. Your risk for disease could still be influenced by a combination of unidentified genetic, personal, lifestyle, and/or environmental factors. So, it's important to talk to your healthcare provider if you have questions about your risk. We will be happy to discuss these results with you at your next appointment on 6/29/22. Your testing results qualify for complimentary genetic counseling through [employer]. Please visit [email or web address] to schedule your telephone genetic counseling appointment. Your RQ number from your test is [id number]. You will be asked for this number when you schedule the appointment. Finally, testing family members for a VUS is usually not recommended. However, your report will note if testing your family members will help us learn more about your specific VUS. If so, this is time sensitive and must be completed within 150 days. Please refer to  [email or web address] if your paperwork indicates to proceed with family testing. Please contact our office at [phone] for any questions prior to discussing this further at your next appointment.         Sincerely,            Yelena Abdi MD

## 2022-06-16 ENCOUNTER — HOSPITAL ENCOUNTER (OUTPATIENT)
Age: 62
Setting detail: OUTPATIENT SURGERY
Discharge: HOME | End: 2022-06-16
Attending: INTERNAL MEDICINE | Admitting: INTERNAL MEDICINE
Payer: COMMERCIAL

## 2022-06-16 VITALS
TEMPERATURE: 98.6 F | SYSTOLIC BLOOD PRESSURE: 130 MMHG | DIASTOLIC BLOOD PRESSURE: 85 MMHG | HEART RATE: 69 BPM | HEIGHT: 62 IN | BODY MASS INDEX: 28.34 KG/M2 | RESPIRATION RATE: 20 BRPM | WEIGHT: 154 LBS | OXYGEN SATURATION: 100 %

## 2022-06-16 DIAGNOSIS — I50.9 CONGESTIVE HEART FAILURE, UNSPECIFIED HF CHRONICITY, UNSPECIFIED HEART FAILURE TYPE (HCC): ICD-10-CM

## 2022-06-16 LAB
GLUCOSE BLD STRIP.AUTO-MCNC: 126 MG/DL (ref 65–117)
SERVICE CMNT-IMP: ABNORMAL

## 2022-06-16 PROCEDURE — C1894 INTRO/SHEATH, NON-LASER: HCPCS | Performed by: INTERNAL MEDICINE

## 2022-06-16 PROCEDURE — 74011000250 HC RX REV CODE- 250: Performed by: INTERNAL MEDICINE

## 2022-06-16 PROCEDURE — 76937 US GUIDE VASCULAR ACCESS: CPT | Performed by: INTERNAL MEDICINE

## 2022-06-16 PROCEDURE — 93451 RIGHT HEART CATH: CPT | Performed by: INTERNAL MEDICINE

## 2022-06-16 PROCEDURE — 82962 GLUCOSE BLOOD TEST: CPT

## 2022-06-16 PROCEDURE — C1751 CATH, INF, PER/CENT/MIDLINE: HCPCS | Performed by: INTERNAL MEDICINE

## 2022-06-16 PROCEDURE — 77030013744: Performed by: INTERNAL MEDICINE

## 2022-06-16 RX ORDER — ACETAMINOPHEN 325 MG/1
650 TABLET ORAL
Status: DISCONTINUED | OUTPATIENT
Start: 2022-06-16 | End: 2022-06-17 | Stop reason: HOSPADM

## 2022-06-16 RX ORDER — SODIUM CHLORIDE 9 MG/ML
25 INJECTION, SOLUTION INTRAVENOUS CONTINUOUS
Status: DISCONTINUED | OUTPATIENT
Start: 2022-06-16 | End: 2022-06-17 | Stop reason: HOSPADM

## 2022-06-16 RX ORDER — LIDOCAINE HYDROCHLORIDE 10 MG/ML
INJECTION INFILTRATION; PERINEURAL AS NEEDED
Status: DISCONTINUED | OUTPATIENT
Start: 2022-06-16 | End: 2022-06-16 | Stop reason: HOSPADM

## 2022-06-16 NOTE — PROGRESS NOTES
Transfer to [address] [place] [address] from Procedure Area    Verbal report given to Lissette Altamirano on Kevon Hubbard being transferred to Cardiac Cath Lab  for routine progression of care   Patient is post RHC procedure. Patient stable upon transfer to . Report consisted of patients Situation, Background, Assessment and   Recommendations(SBAR). Information from the following report(s) Procedure Summary, Intake/Output and Recent Results was reviewed with the receiving nurse. Opportunity for questions and clarification was provided. Patient medicated during procedure with orders obtained and verified by Dr. Fabienne Lizarraga. Refer to patient PROCEDURE REPORT for vital signs, assessment, status, and response during procedure.

## 2022-06-16 NOTE — PROGRESS NOTES
Cardiac Cath Lab Recovery Arrival Note:      Mehran Mcginnis arrived to Cardiac Cath Lab, Recovery Area. Staff introduced to patient. Patient identifiers verified with NAME and DATE OF BIRTH. Procedure verified with patient. Consent forms reviewed and signed by patient or authorized representative and verified. Allergies verified. Patient and family oriented to department. Patient and family informed of procedure and plan of care. Questions answered with review. Patient prepped for procedure, per orders from physician, prior to arrival.    Patient on cardiac monitor, non-invasive blood pressure, SPO2 monitor. On room air. Patient is A&Ox 4. Patient reports no complaints. Patient in stretcher, in low position, with side rails up, call bell within reach, patient instructed to call if assistance as needed. Patient prep in: [facility] Recovery Area, [place] 4 fast track. Patient family has pager # [phone]  Magdy Eaton in: Waiting upstairs.    Prep by: Enid Woo

## 2022-06-16 NOTE — DISCHARGE INSTRUCTIONS
**Your heart cath shows that your heart is working fairly well and the pressures in your heart are on the high side of normal.  Dr. Horace Oneill will use this information to optimize your heart medications. **There have been NO changes to your medications. **Please keep your previously scheduled follow-up. YOUR CURRENT MEDICATIONS  Current Discharge Medication List      CONTINUE these medications which have NOT CHANGED    Details   zolpidem (AMBIEN) 10 mg tablet Take 1 Tablet by mouth nightly as needed for Sleep. Max Daily Amount: 10 mg.  Qty: 30 Tablet, Refills: 0    Associated Diagnoses: Insomnia, unspecified type; Essential hypertension      omeprazole (PRILOSEC) 40 mg capsule       azelastine (ASTELIN) 137 mcg (0.1 %) nasal spray azelastine 137 mcg (0.1 %) nasal spray aerosol   USE 1 SPRAY IN EACH NOSTRIL TWICE A DAY 90      sucralfate (CARAFATE) 1 gram tablet sucralfate 1 gram tablet   TAKE 1 TAB BY MOUTH BEFORE MEALS AND AT BEDTIME      potassium chloride (KLOR-CON) 20 mEq pack potassium chloride 20 mEq oral packet   TAKE 1 PACKET(S) BY MOUTH DAILY      co-enzyme Q-10 (CoQ-10) 100 mg capsule Take 200 mg by mouth daily. bumetanide (BUMEX) 1 mg tablet Take 1 mg by mouth. Take 1 tab every other day      rosuvastatin (Crestor) 20 mg tablet Take 20 mg by mouth nightly. Indications: high cholesterol      sacubitriL-valsartan (Entresto) 24-26 mg tablet Take 1 Tab by mouth two (2) times a day. Indications: chronic heart failure      levocetirizine (XYZAL) 5 mg tablet Take 5 mg by mouth.      metFORMIN (GLUCOPHAGE) 500 mg tablet Decrease to 250mg BID and monitor blood sugars  Qty: 60 Tab, Refills: 0      montelukast (SINGULAIR) 10 mg tablet TAKE 1 TAB BY MOUTH DAILY. INDICATIONS: ALLERGIC RHINITIS  Qty: 30 Tab, Refills: 11      carvedilol (COREG) 25 mg tablet Take 1 Tab by mouth two (2) times a day.   Qty: 60 Tab, Refills: 11      diclofenac (VOLTAREN) 1 % gel Apply 2 g to affected area every six (6) hours.  Qty: 100 g, Refills: 0      albuterol (PROAIR HFA) 90 mcg/actuation inhaler Take 2 Puffs by inhalation every six (6) hours as needed for Wheezing. Qty: 1 Inhaler, Refills: 1    Associated Diagnoses: Mild intermittent asthma without complication      famotidine (PEPCID) 40 mg tablet Take 40 mg by mouth nightly. Cholecalciferol, Vitamin D3, (VITAMIN D) 2,000 unit Cap Take 4,000 Units by mouth daily. Takes 2 of the 2,000 unit tabs qd      EPINEPHrine (EPIPEN) 0.3 mg/0.3 mL injection 0.3 mL by IntraMUSCular route once as needed for up to 1 dose. Qty: 2 Syringe, Refills: 1              After Your Cardiac Catheterization    Cardiac catheterization (also called cardiac cath) is an invasive imaging procedure that allows your doctor to look at your coronary arteries to diagnose coronary artery disease. It can also be used to measure pressures in your chambers, and evaluate the function of your heart. Instructions for going home after Cardiac Catheterization    Care for the Catheter Insertion Site  Procedures may be performed in the femoral artery in the groin (in the area at the top of your thigh) or in the radial artery in your arm. When you go home, there will be a bandage (dressing) over the catheter insertion site (also called the wound site).  The morning after your procedure, you may take the dressing off. The easiest way to do this is when you are showering, get the tape and dressing wet and remove it.  After the bandage is removed, cover the area with a small adhesive bandage. It is normal for the catheter insertion site to be black and blue for a couple of days. The site may also be slightly swollen and pink, and there may be a small lump (about the size of a quarter) at the site.  Wash the catheter insertion site at least once daily with soap and water. Place soapy water on your hand or washcloth and gently wash the insertion site; do not rub.     Keep the area clean and dry when you are not showering.  Do not use powders, creams, lotions or ointment on the wound site.  Wear loose clothes and loose underwear.  Do not take a bath, tub soak, go in a Jacuzzi, or swim in a pool or lake for one week after the procedure.  You may notice a small lump at the site. This is normal and may last up to 6 weeks. CHECK THE CATHETER INSERTION SITE DAILY:    If bleeding at the cath site occurs, take a clean washcloth and apply direct pressure just above the puncture site for at least 15 minutes. Call 911 immediately if the bleeding is not controlled. Continue to apply direct pressure until an ambulance gets to your location. Do not try to drive yourself or have someone else drive you to the hospital.  Have the ambulance bring you to the emergency room. Activity Guidelines  Your doctor will tell you when you can resume activities. In general, you will need to take it easy for the first two days after you get home. You can expect to feel tired and weak the day after the procedure. Take walks around your house and plan to rest during the day. For femoral cardiac cath   Do not strain during bowel movements for the first 3 to 4 days after the procedure to prevent bleeding from the catheter insertion site.  Avoid heavy lifting (more than 10 pounds) and pushing or pulling heavy objects for the first 5 to 7 days after the procedure.  Do not participate in strenuous activities for 5 days after the procedure. This includes most sports - jogging, golfing, play tennis, and bowling.  You may climb stairs if needed, but walk up and down the stairs more slowly than usual.    Gradually increase your activities until you reach your normal activity level within one week after the procedure. For radial cardiac cath   Do not participate in strenuous activities for 2 days after the procedure. This includes most sports - jogging, golfing, play tennis, and bowling.     Gradually increase your activities until you reach your normal activity level within two days after the procedure. Ask your doctor when it is safe to   Return to work.  Resume sexual activity.  Resume driving. Most people are able to resume driving within 24 hours after going home. Medications   Please review your medications with your doctor before you go home. Ask your doctor if you should continue taking the medications you were taking before the procedure.  If you have diabetes, your doctor may adjust your diabetes medications for one to two days after your procedure. Please be sure to ask for specific directions about taking your diabetes medication after the procedure.  Depending on the results of your procedure, your doctor may prescribe new medication. Please make sure you understand what medications you should be taking after the procedure and how often to take them.  You may use Tylenol 325mg 1-2 tablets every 6 hours as needed for pain or discomfort, unless otherwise instructed. If you have significant         discomfort more than 48 hours after your procedure, please call your physicians office.  If you take METFORMIN, do NOT take this for the next 2 days after your procedure. Importance of a Heart-Healthy Lifestyle  It is important for you to be committed to leading a heart-healthy lifestyle. Your health care team can help you achieve your goals, but it is up to you to take your medications as prescribed, make dietary changes, quit smoking, exercise regularly, keep your follow-up appointments and be an active member of the treatment team.    Follow Up  Please call your cardiologist to schedule a follow-up appointment in the next 1-2 weeks if possible. Ask your primary care doctor when you should return for follow-up. Please ask your doctor if you have any questions about cardiac catheterization, angioplasty or stenting. If possible, have someone stay with you for the first night.  It is normal to feel tired for the first couple of days. Take it easy and follow your physicians instructions on activity. CALL THE PHYSICIAN:  ? If the site becomes red, swollen, or feels warm to the touch, or is healing poorly    ? If you note any large/extending bruise, fever >101.0 or chills  ? If your extremity has numbness, tingling, discoloration, abnormal swelling, tightness or pain   ? If you have difficulty with urination or develop nausea, vomiting, or severe abdominal pain    SIGNS AND SYMPTOMS:  ? Notify your physician for new or recurrent symptoms of chest discomfort, unusual shortness of breath or fatigue. These could be signs of a problem and should be discussed with your physician. ? For significant chest pain or symptoms of angina not relieved with rest:  if you have been prescribed Nitroglycerin, take as directed (taken under the tongue, one at a time 5 minutes apart for a total of 3 doses, sitting down). If the discomfort is not relieved after the 3rd Nitroglycerin, call 911. If you have not been prescribed Nitroglycerin and your chest discomfort is significant, call 911. Take the ambulance, do not try to drive yourself or have someone else drive you to the hospital.     AFTER CARE:  ? Follow up with your physician as instructed  ? Follow a heart healthy diet with proper portion control, daily stress management, daily exercise, blood pressure and cholesterol control, and smoking cessation. The success of your stent, if you had one placed, and the prevention of future catheterizations heavily depends on your lifestyle changes you make now! ? You may start walking short distances the day of your procedure. Gradually increase as tolerated each day. Current activity recommendations are 30 minutes of exercise at least 5 days a week. Work up to this as you recover. Walk, ride a bike, or choose any other activity you enjoy to reach this activity goal.   ?  Avoid walking outside in extremes of heat or cold. Walk inside (at home, at the mall, or at a large store) when it is cold and windy or hot and humid. ? If you had a stent placed, consider [facility]iac [facility]ness as a resource to help you make the needed lifestyle changes to live a heart healthy lifestyle. Discuss your candidacy with your physician. If you have questions, call your physicians office or the Cardiac Cath Lab at [phone]. The Cath Lab is operational from 6:30 a.m. to 5:00 p.m., Monday through Friday. After hours, notify your physician. [address] [place] [address] can be reached at [phone]. [facility] is operational Monday-Thursday 8:30 a.m. to 5:00 p.m. and Friday 8:30 a.m. to 12:00 p.m.     Remember:  IN CASE OF BLEEDING: KEEP FIRM PRESSURE ON THE PROCEDURE SITE AND CALL [phone] IF NOT CONTROLLED

## 2022-06-16 NOTE — PROGRESS NOTES
1100  No bleeding and no hematoma present at sites. [facility] [facility] reviewed discharge instructions with pt. Pt had an opportunity to ask questions. Pt removed from monitor  Pt getting dressed    [phone] called pt ride home. RN Removed pt IV    1200  Pt discharged to ride at main entrance of hospital via wheel chair by RN.

## 2022-06-16 NOTE — Clinical Note
Right internal jugular vein. Radial access needle used. Using ultrasound guidance.  Number of attempts =  1. 6 fr slender

## 2022-06-16 NOTE — PROGRESS NOTES
1058  TRANSFER - IN REPORT:    Verbal report received from [place] on Logan  being received from [facility] procedural Area for routine progression of care. Report consisted of patients Situation, Background, Assessment and Recommendations(SBAR). Information from the following report(s) SBAR, Procedure Summary, Intake/Output, MAR, Recent Results and Cardiac Rhythm NSR was reviewed with the receiving clinician. Opportunity for questions and clarification was provided. Assessment completed upon patients arrival to [address] [place] [address] and care assumed. Cardiac Cath Lab Recovery Arrival Note:    Logan arrived to [facility] [place] recovery area. Patient procedure= RHC. Patient on cardiac monitor, non-invasive blood pressure, SPO2 monitor. On room air . IV  Saline locked. Patient status doing well without problems. Patient is A&Ox 4. Patient reports no complaints. PROCEDURE SITE CHECK:    Procedure site:without any bleeding and no hematoma, no pain/discomfort reported at procedure site. No change in patient status. Continue to monitor patient and status.

## 2022-06-16 NOTE — PROGRESS NOTES
Cardiac Cath Lab Procedure Area Arrival Note:    Marilin Whitmore arrived to Cardiac Cath Lab, Procedure Area. Patient identifiers verified with NAME and DATE OF BIRTH. Procedure verified with patient. Consent forms verified. Allergies verified. Patient informed of procedure and plan of care. Questions answered with review. Patient voiced understanding of procedure and plan of care. Patient on cardiac monitor. IV of normal saline on pump at 15 ml/hr. Patient status doing well without problems. Patient is A&Ox 4. Patient reports no pain. Patient medicated during procedure with orders obtained and verified by Dr. Alejandrina Castañeda. Refer to patients Cardiac Cath Lab PROCEDURE REPORT for vital signs, assessment, status, and response during procedure, printed at end of case. Printed report on chart or scanned into chart.

## 2022-06-21 ENCOUNTER — HOSPITAL ENCOUNTER (OUTPATIENT)
Dept: NUTRITION | Age: 62
Discharge: HOME | End: 2022-06-21
Attending: INTERNAL MEDICINE
Payer: COMMERCIAL

## 2022-06-21 VITALS — HEIGHT: 62 IN | BODY MASS INDEX: 28.17 KG/M2

## 2022-06-21 PROCEDURE — 97802 MEDICAL NUTRITION INDIV IN: CPT

## 2022-06-21 NOTE — PROGRESS NOTES
Nutrition [facility] Assessment - Initial Nutrition Evaluation   DATE: 2022 Time IN: 9:05   Time OUT: 10:05        REFERRING PHYSICIAN: Beau Negro MD  NAME: Inocencia Gonzalez YOB: 1960 AGE: 63 y.o. GENDER: female  REASON FOR VISIT: cardiac diet education, DM, CKD    ASSESSMENT:  Past Medical Hx:  has a past medical history of Acute Anterior Myocardial Infarction, s/p PTCA w/ stent 10/2010 (10/31/2010), Asthma, Diabetes mellitus, type 2 ([address] (2012), GERD (gastroesophageal reflux disease), H/O recurrent Sinusitis (3/16/2011), Heart failure ([facility] [address] Hiatal Hernia w/ GERD (gastroesophageal reflux disease) (3/16/2011), Hypercholesterolemia, Hypertension (3/16/2011), Ill-defined condition, Impaired fasting glucose (2011), Iron deficiency anemia (3/16/2011), Multinodular thyroid (3/16/2011), Palpitations, PVC's (10/13/2011), Perennial allergic rhinitis (3/16/2011), Pityriasis rosea (2012), Post-menopausal, Postmenopause, LMP 38 yo, No HRT (3/16/2011), Reflux esophagitis (3/16/2011), Tobacco user, and Vitamin D deficiency (10/13/2011). Pt seen in office for diet education regarding heart failure. Pt has in the past drank more soda and eaten more ice cream and has quit smoking and drinking etoh socially. Pt used to drink 3-4 or more Coke's per day, but now drinks 1 8 oz regular Coke per day and does not really want to give this up. Pt eat 1-2 Oreos (without the cream) or will have a small bowl of ice cream. Pt/ cook most days of the week with x1 meal from restaurant which might be high salt. Discussed reducing salt in home cooked foods, by using more salt free seasonings and less [place]. In addition, pt notes  used to cook less fried foods, but more recently has been cooking more fried foods, so she will have  cook less fried chicken at home.  Pt hurt her knee a few months back and this has taken a while to heal. Pt is still doing some exercise, and wt is stable. Pt weighs daily for CHF and is always between 152-154 lbs. Discussed reasons why to reduce soda, ice cream, Oreo's, salty foods, etc. Pt agreeable. Also noted pt repots gas (mostly belching). Discussed fiber rich foods, considering enzymes, or cooking foods and pt prefers vegetables raw. LABS:   Lab Results   Component Value Date/Time    Hemoglobin A1c 6.6 (H) 05/04/2022 11:10 AM    Hemoglobin A1c (POC) 6.0 09/16/2014 03:48 PM       MEDICATIONS/SUPPLEMENTS:   [unfilled]  Prior to Admission medications    Medication Sig Start Date End Date Taking? Authorizing Provider   zolpidem (AMBIEN) 10 mg tablet Take 1 Tablet by mouth nightly as needed for Sleep. Max Daily Amount: 10 mg. 6/9/22   Mauricio Vallecillo MD   omeprazole (PRILOSEC) 40 mg capsule  5/26/22   Provider, Historical   azelastine (ASTELIN) 137 mcg (0.1 %) nasal spray azelastine 137 mcg (0.1 %) nasal spray aerosol   USE 1 SPRAY IN EACH NOSTRIL TWICE A DAY 90 2/23/22   Provider, Historical   sucralfate (CARAFATE) 1 gram tablet sucralfate 1 gram tablet   TAKE 1 TAB BY MOUTH BEFORE MEALS AND AT BEDTIME    Provider, Historical   potassium chloride (KLOR-CON) 20 mEq pack potassium chloride 20 mEq oral packet   TAKE 1 PACKET(S) BY MOUTH DAILY    Provider, Historical   co-enzyme Q-10 (CoQ-10) 100 mg capsule Take 200 mg by mouth daily. Provider, Historical   bumetanide (BUMEX) 1 mg tablet Take 1 mg by mouth. Take 1 tab every other day    Provider, Historical   rosuvastatin (Crestor) 20 mg tablet Take 20 mg by mouth nightly. Indications: high cholesterol    Provider, Historical   sacubitriL-valsartan (Entresto) 24-26 mg tablet Take 1 Tab by mouth two (2) times a day. Indications: chronic heart failure    Provider, Historical   levocetirizine (XYZAL) 5 mg tablet Take 5 mg by mouth. Provider, Historical   EPINEPHrine (EPIPEN) 0.3 mg/0.3 mL injection 0.3 mL by IntraMUSCular route once as needed for up to 1 dose.  12/1/17   Fabricio Sewell NP metFORMIN (GLUCOPHAGE) 500 mg tablet Decrease to 250mg BID and monitor blood sugars 8/22/17   Eulogio Salvador MD   montelukast (SINGULAIR) 10 mg tablet TAKE 1 TAB BY MOUTH DAILY. INDICATIONS: ALLERGIC RHINITIS 4/13/17   Eulogio Salvador MD   carvedilol (COREG) 25 mg tablet Take 1 Tab by mouth two (2) times a day. 4/13/17   Luann Beckford MD   diclofenac (VOLTAREN) 1 % gel Apply 2 g to affected area every six (6) hours. 4/13/17   Luann Beckford MD   albuterol (PROAIR HFA) 90 mcg/actuation inhaler Take 2 Puffs by inhalation every six (6) hours as needed for Wheezing. 1/30/17   Luann Beckford MD   famotidine (PEPCID) 40 mg tablet Take 40 mg by mouth nightly. 9/25/16   Provider, Historical   Cholecalciferol, Vitamin D3, (VITAMIN D) 2,000 unit Cap Take 4,000 Units by mouth daily. Takes 2 of the 2,000 unit tabs qd    Provider, Historical       FOOD ALLERGIES/INTOLERANCES: shellfish    ANTHROPOMETRICS:    Ht Readings from Last 1 Encounters:   06/21/22 5' 2\" (1.575 m)      Wt Readings from Last 1 Encounters:   06/16/22 69.9 kg (154 lb)         BMI: Body mass index is 28.17 kg/m². Category: overweight    Reported Wt Hx:    Estimated Nutritional Needs:   Kcals/day: 9126  Protein: 70    Fluid: [id number]        Reported Diet Hx:     24 Hour Diet Recall  Breakfast 12-1 -Brunch - fruit, chips, greenberg x 2 slices or sausage x 1. Lunch     Dinner  Mostly chicken (fried, baked, grilled, etc), raw veggies, collards, cabbage.      Snacks  Ice cream, oreos (no cream)   Beverages  3 water, 1 8 oz coke     Exercise/Physical Actvity: 30 minutes cardio/bodyweight DVD     Environmental/Social: lives with spouse        NUTRITION DIAGNOSIS: Food & nutrition-related knowledge deficit related to cardiac dysfunction as evidenced by localized or generalized fluid accumulation,lab values      NUTRITION INTERVENTION:   Salt reduction in foods made at home and less restaurant or better choices      PATIENT GOALS:  Maintain weight, weight daily  Preserve cardiac function - reduce salt at home ([place])  Preserve renal function and control DM - reduce sweets. Specific tips and techniques to facilitate compliance with above recommendations were provided and discussed. Pt was strongly encourage to begin making necessary changes now, and re-visit the dietitian prn. If further details are desired please feel free to contact me at [phone]. This phone number was also provided to the patient for any further questions or concerns.           Kai Pacheco, RD

## 2022-06-28 ENCOUNTER — TELEPHONE (OUTPATIENT)
Dept: CARDIOLOGY CLINIC | Age: 62
End: 2022-06-28

## 2022-06-28 NOTE — TELEPHONE ENCOUNTER
Telephone Call RE:  Appointment reminder     Outcome:     [x] Patient confirmed appointment   [] Patient rescheduled appointment for    [] Unable to reach  [] Left message             [] Other:     ---------------------             [] Screened for [address] [facility]

## 2022-06-29 ENCOUNTER — OFFICE VISIT (OUTPATIENT)
Dept: CARDIOLOGY CLINIC | Age: 62
End: 2022-06-29
Payer: COMMERCIAL

## 2022-06-29 VITALS
HEART RATE: 73 BPM | RESPIRATION RATE: 12 BRPM | DIASTOLIC BLOOD PRESSURE: 68 MMHG | WEIGHT: 156 LBS | HEIGHT: 62 IN | SYSTOLIC BLOOD PRESSURE: 120 MMHG | TEMPERATURE: 99.7 F | OXYGEN SATURATION: 98 % | BODY MASS INDEX: 28.71 KG/M2

## 2022-06-29 DIAGNOSIS — I50.22 SYSTOLIC CHF, CHRONIC (HCC): Primary | ICD-10-CM

## 2022-06-29 PROCEDURE — 99215 OFFICE O/P EST HI 40 MIN: CPT | Performed by: INTERNAL MEDICINE

## 2022-06-29 NOTE — PATIENT INSTRUCTIONS
Medication changes:    No medication changes     Please take this to your pharmacy to notify them of the change in medications. Testing Ordered: Other Recommendations: Follow up with nephrologist to determine if it is safe to start entresto     Follow up with managing provider for diabetes     Follow up with Dr. Camryn Azar in one month     It is recommended that you begin to walk 30 minutes a day      Ensure your drinking an adequate amount of water with a goal of 6-8 eight ounce glasses (1.5-2 liters) of fluid daily. Your urine should be clear and light yellow straw colored. If your blood pressure begins to consistently run below 90/60 and/or you begin to experience dizziness or lightheadedness, please contact the [place] [address] at [phone]. Follow up 6 months  with [facility]      Please monitor your weights daily upon waking and after using the bathroom. Keep a written records of your weights and bring to your next appointment. If you have a weight gain of 3 or more pounds overnight OR 5 or more pounds in one week please contact our office. Thank you for allowing us the privilege of being a part of your healthcare team! Please do not hesitate to contact our office at [phone] with any questions or concerns. Virtual Heart Failure [address] invites you to learn more about heart failure and to share your questions, ideas, and experiences with others. Each month, the Heart Failure Support Group features a new educational topic and a guest speaker, followed by an interactive discussion. Our Heart Failure Nurse Navigator will moderate each session. You will be able to participate by phone, tablet or computer through [address] [place] [address]. This support group takes place on the 3rd Thursday of each month from 6:00-7:30PM. All individuals living with heart failure and their caregivers are welcome to join.      If you are interested in participating, please contact us at [email or web address] and you will be sent the link to join the [employer].

## 2022-06-29 NOTE — PROGRESS NOTES
[facility] in [facility], [facility] Note    Patient name: Layne Carvajal  Patient : 1960  Patient MRN: [id number]  Date of service: 22    Primary care physician: Sohail Salmeron MD  Primary general cardiologist: Dr. Ofelia Orlando     Primary [facility] cardiologist: Lemuel Haney MD     CHIEF COMPLAINT:  Chronic systolic heart failure     PLAN OF CARE:  · 62 y/o female with asthma and HFpEF 40-45% due to mixed ischemic and non-ischemic cardiomyopathy, stage C, NYHA class IIIA symptoms on GDMT; difficult to dose diuretics due to CKD stage 3 with Cr 2-2.6  · Patients symptoms markedly improved to NYHA class II with diuresis and RHC showed normal right sided and elevated wedge 20 with normal cardiac output indicating diastolic dysfunction at weight 156lbs  · Considering results of the RHC, patient's dry weight should be around 154-155lbs and baseline Cr is around 2.3 - will dose diuretics accordingly and refer to nephrology to determine if we can use ARB/ANi (CrCl 24 and no safety data < 20).      PLAN:  Continue current medical therapy for heart failure  Does not take beta-blockers (suspect due to asthma)  Continue current dose of entresto 24/26mg twice daily  Hydralazine/isordil not indicated for class II symptoms  Cannot tolerate spironolactone due to CKD  Would not use SGLT2 inhibitor due to eGFR 27 (contraindicated with eGFR < 25 and noted progressive renal dysfunction)  Continue current dose of bumex 1mg daily to keep weight 155lbs  Not on allopurinol or uloric, check uric acid level  Does not take aspirin   Continue current dose of statin, check lipid profile, CPK and LFT  ICD interrogation every 3 months per routine  Schedule sleep study    Schedule PYP test  Obtain medical records from pulmonologist and PFT  Routine HF labs q3mos, next in 2022  Reinforced low salt diet  Reinforced fluid restriction to 6 x 8oz glasses per day  Provided educational materials \"Living with heart failure\"   Provided advanced care plan forms to be filled out    Referral to nutritionist for HF diet and weight loss  Follow-up with primary cardiologist, Dr. Nimisha Burnette next month; will alternate visits and see patient 1-2 times yearly due to recovered LVEF  Follow-up with EP cardiologist  Recommend flu, covid and pneumonia vaccinations  Return to [address] in 6 months with NP     IMPRESSION:  Fatigue  Shortness of breath  Chronic systolic heart failure  · Stage C, NYHA class IIIA symptoms  · Mixed ischemic and non-ischemic cardiomyopathy, LVEF 46% (by NST 10/2021) from 20% (by echo 2015)  · Genetic test for cardiomyopathy, VUSx1  Coronary artery disease  · S/p anterior subendocardial infarct s/p LAD stent 10/2010  S/p ICD  Mitral valve regurgitation, mod-severe improved to trace (by echo 2020)  Cardiac risk factors  · HTN  · HL  · DM2, hgba1c 6.6  · Mild LANRE (diagnosed around )  · H/o tobacco abuse, quit around   CKD stage 3 (Cr 2.0-2.6)  Anemia  GERD  Asthma  Multinodular thyroid  Family h/o early CAD (father  of MI at 43years old)     CARDIAC IMAGING:  Echo (20) LVEF 40-45%  EKG (22) NSR 77bpm, non-specific ST-T changes  LHC (2015) open stent, no progressive CAD  NST (10/11/2021) Fixed defect in the atnerolateral wall is compatible with infarct. No evidence of myocardium at ischemic risk. Left ventricular dilation. LVEF 46%.  Global hypokinesia and anterolateral akinesia.     HEMODYNAMICS:  RHC (22) 10, PA , W 20, Melia 2.93  CPEST not done    6 Min Walk Report 2010   (PRE) HR 70 70   (POST) HR 70 -      PERSONAL GOALS:  Lifestyle goals reviewed with the patient.     HISTORY OF PRESENT ILLNESS:  I had the pleasure of seeing Mehran Mcginnis in [address] [place] [address] at [facility] in [address] [place] [address], Mehran Mcginnis is a 63 y.o. female with h/o HTN, HL, DM2, LANRE, h/o remote tobacco use, chronic systolic heart failure, stage C, NYHA class IIIA symptoms, mixed ischemic and non-ischemic cardiomyopathy, LVEF 46% (by NST 10/2021) from 20% (by echo 2015), coronary artery disease s/p anterior subendocardial infarct s/p LAD stent 10/2010, s/p ICD, CKD stage 3 (Cr 2.0-2.6), anemia, GERD, asthma, multinodular thyroid and family h/o early CAD (father  of MI at 43years old). H/o vasovagal syncope, last seen in ER 4/10/22 after abdominal cramping in the bathroom.     [facility] due to disproportional symptoms to degree of heart failure and difficulty dosing diuretics with creatinine around 2; with narrow window - dehydrated above Cr 2 and dyspneic below it. Patient has significantly improved with diuretics and her dry weight has been determined to be around 154-155lbs based on RHC.     INTERVAL HISTORY:  Today, patient presents for initial clinic visit.     Patient is doing much better.     Patient can walk more than one block now and walk upstairs. She feels much improved. Patient can perform her daily ADLs with no problem now. She cannot walk much because of knee injury.      Patient denies symptoms of volume overload or leg edema today, but often feels abdominal bloating. Patient's weight remained stable.      Patient denies orthopnea, PND or nocturia; however uses wedge every night to manage GERD.     Patient has often non-restorative sleep, and almost every day feels like taking a nap at noon.     Patient denies irregular heart rate or palpitations. ICD has not fired.     Patient denies other cardiac symptoms such as chest pain, however, does report heaviness in legs.      Patient is compliant with fluid restriction and taking medications as prescribed. Patient manages his own medications. REVIEW OF SYSTEMS:  General: Denies fever, night sweats.   Ear, nose and throat: Denies difficulty hearing, sinus problems, runny nose, post-nasal drip, ringing in ears, mouth sores, loose teeth, ear pain, nosebleeds, sore throate, facial pain or numbess  Cardiovascular: see above in the interval history  Respiratory: Denies cough, wheezing, sputum production, hemoptysis. Gastrointestinal: Denies heartburn, constipation, diarrhea, abdominal pain, nausea, vomiting, difficulty swallowing, blood in stool  Kidney and bladder: Denies painful urination, frequent urination, urgency  Musculoskeletal: Denies joint pain, muscle weakness  Skin and hair: Denies change in existing skin lesions, hair loss or increase, breast changes    PHYSICAL EXAM:  Visit Vitals  /68 (BP 1 Location: Right arm, BP Patient Position: Sitting, BP Cuff Size: Adult)   Pulse 73   Temp 99.7 °F (37.6 °C) (Oral)   Resp 12   Ht 5' 2\" (1.575 m)   Wt 156 lb (70.8 kg)   SpO2 98%   BMI 28.53 kg/m²     General: Patient is well developed, well-nourished in no acute distress  HEENT: Normocephalic and atraumatic. No scleral icterus. Pupils are equal, round and reactive to light and accomodation. No conjunctival injection. Oropharynx is clear. Neck: Supple. No evidence of thyroid enlargements or lymphadenopathy. JVD: Cannot be appreciated   Lungs: Breath sounds are equal and clear bilaterally. No wheezes, rhonchi, or rales. Heart: Regular rate and rhythm with normal S1 and S2. No murmurs, gallops or rubs. Abdomen: Soft, no mass or tenderness. No organomegaly or hernia. Bowel sounds present. Genitourinary and rectal: deferred  Extremities: No cyanosis, clubbing, or edema. Neurologic: No focal sensory or motor deficits are noted. Grossly intact. Psychiatric: Awake, alert an doriented x 3. Appropriate mood and affect. Skin: Warm, dry and well perfused. No lesions, nodules or rashes are noted.     PAST MEDICAL HISTORY:  Past Medical History:   Diagnosis Date    Acute Anterior Myocardial Infarction, s/p PTCA w/ stent 10/2010 10/31/2010    Asthma     mild    [facility] ([facility] 2012    GERD (gastroesophageal reflux disease)     hiatal hernia    H/O recurrent Sinusitis 3/16/2011    Heart failure (HCC)     Hiatal Hernia w/ GERD (gastroesophageal reflux disease) 3/16/2011    Hypercholesterolemia     Hypertension 3/16/2011    Ill-defined condition     pacemaker put in last 2015 because of CHF    Impaired fasting glucose 2011    Iron deficiency anemia 3/16/2011    Multinodular thyroid 3/16/2011    Palpitations, PVC's 10/13/2011    Perennial allergic rhinitis 3/16/2011    Pityriasis rosea 2012    Post-menopausal     LMP 46y. o, no HRT    Postmenopause, LMP 38 yo, No HRT 3/16/2011    Reflux esophagitis 3/16/2011    Tobacco user     Vitamin D deficiency 10/13/2011       PAST SURGICAL HISTORY:  Past Surgical History:   Procedure Laterality Date    COLONOSCOPY N/A 2017    COLONOSCOPY performed by Charanjit Cloud MD at [address] EGD  2009    hiatal hernia, esophagitis; Dr Winnie Rg, COLON, DIAGNOSTIC  2009    HX GYN      FNA bilateral benign     HX LITHOTRIPSY  2012    failed    KIDNEY STONE (CALCULI) EVAL  2012    scope w/ kidney stone extractions, multiple; Dr Timothy Rosenthal, [facility]    [facility] UNLIST      cardiac stent     [place] COLONOSCOPY W/BIOPSY SINGLE/MULTIPLE  2009    benign polyp, recheck in 5 years, Dr Elizabeth Lopez, INITIAL  10/2010    Dr Ralph Roe:  Family History   Problem Relation Age of Onset    Hypertension Mother     High Cholesterol Mother     Thyroid Disease Mother     Heart Disease Mother         chf    Kidney Disease Mother     Headache Mother     Heart Disease Father     Heart Attack Father 36         MI age 45    High Cholesterol Father     Hypertension Sister     Thyroid Disease Sister     Hypertension Sister    [place] Arthritis-rheumatoid Sister     Diabetes Sister     Thyroid Disease Sister     Heart Attack Brother         massive MI at age 46, survived    Thyroid Disease Other         niece, Ann Thomas High Cholesterol Brother        SOCIAL HISTORY:  Social History     Socioeconomic History    Marital status:      Spouse name: vijay shea to give infor     Number of children: 0   Occupational History    Occupation: ; 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    Occupation:      Employer: SELF EMPLOYED   Tobacco Use    Smoking status: Former Smoker     Packs/day: 0.25     Years: 30.00     Pack years: 7.50     Types: Cigarettes     Quit date: 2013     Years since quittin.9    Smokeless tobacco: Never Used    Tobacco comment: had quit 10/2010 then restarted ~ 3/2012;  about 5 cigarettes a day; on and off since age 26 yo x ~ 30 yrs   Substance and Sexual Activity    Alcohol use: No     Comment: none    Drug use: No    Sexual activity: Yes     Partners: Male     Birth control/protection: None     Comment:  Kimmy Austin    Other Topics Concern    Caffeine Concern No     Comment: down to 1 Coke a day    Weight Concern Yes     Comment: stable now, had gained some wt over the past yr; was in low 150's previously; wants to lose 10 lbs    Special Diet Yes     Comment: heart healthy diet (white meats, salads, veggies, fruits)    Exercise Yes     Comment: exercise walking tape x 45 minutes qod   Social History Narrative    New to [facility] as of 2009, referred by moose Pardo; pervious PCP Dr Steven Fuentes:  Labs Latest Ref Rng & Units 2022   WBC 3.6 - 11.0 K/uL 7.5 -   RBC 3.80 - 5.20 M/uL 4.03 -   Hemoglobin 11.5 - 16.0 g/dL 10. 8(L) -   Hematocrit 35.0 - 47.0 % 37.4 -   MCV 80.0 - 99.0 FL 92.8 -   Platelets 929 - 226 K/uL 247 -   Albumin 3.5 - 5.0 g/dL 4.6 -   Calcium 8.5 - 10.1 MG/DL 10. 2(H) 9.7   Glucose 65 - 100 mg/dL 98 112(H)   BUN 6 - 20 MG/DL 42(H) 40(H)   Creatinine 0.55 - 1.02 MG/DL 2.28(H) 2.15(H)   Sodium 136 - 145 mmol/L 140 144   Potassium 3.5 - 5.1 mmol/L 4.4 4.2   TSH 0.450 - 4.500 uIU/mL 1.560 -   Some recent data might be hidden       ALLERGY:  Allergies   Allergen Reactions    Ace Inhibitors Cough     ?10/2011    Seafood [Shellfish Containing Products] Hives, Shortness of Breath and Nausea and Vomiting        CURRENT MEDICATIONS:    Current Outpatient Medications:     zolpidem (AMBIEN) 10 mg tablet, Take 1 Tablet by mouth nightly as needed for Sleep. Max Daily Amount: 10 mg., Disp: 30 Tablet, Rfl: 0    azelastine (ASTELIN) 137 mcg (0.1 %) nasal spray, azelastine 137 mcg (0.1 %) nasal spray aerosol  USE 1 SPRAY IN EACH NOSTRIL TWICE A DAY 90, Disp: , Rfl:     sucralfate (CARAFATE) 1 gram tablet, sucralfate 1 gram tablet  TAKE 1 TAB BY MOUTH BEFORE MEALS AND AT BEDTIME, Disp: , Rfl:     potassium chloride (KLOR-CON) 20 mEq pack, potassium chloride 20 mEq oral packet  TAKE 1 PACKET(S) BY MOUTH DAILY, Disp: , Rfl:     co-enzyme Q-10 (CoQ-10) 100 mg capsule, Take 200 mg by mouth daily. , Disp: , Rfl:     bumetanide (BUMEX) 1 mg tablet, Take 1 mg by mouth. Take 1 tab every other day, Disp: , Rfl:     rosuvastatin (Crestor) 20 mg tablet, Take 20 mg by mouth nightly. Indications: high cholesterol, Disp: , Rfl:     sacubitriL-valsartan (Entresto) 24-26 mg tablet, Take 1 Tab by mouth two (2) times a day. Indications: chronic heart failure, Disp: , Rfl:     levocetirizine (XYZAL) 5 mg tablet, Take 5 mg by mouth., Disp: , Rfl:     EPINEPHrine (EPIPEN) 0.3 mg/0.3 mL injection, 0.3 mL by IntraMUSCular route once as needed for up to 1 dose., Disp: 2 Syringe, Rfl: 1    metFORMIN (GLUCOPHAGE) 500 mg tablet, Decrease to 250mg BID and monitor blood sugars, Disp: 60 Tab, Rfl: 0    montelukast (SINGULAIR) 10 mg tablet, TAKE 1 TAB BY MOUTH DAILY. INDICATIONS: ALLERGIC RHINITIS, Disp: 30 Tab, Rfl: 11    carvedilol (COREG) 25 mg tablet, Take 1 Tab by mouth two (2) times a day., Disp: 60 Tab, Rfl: 11    diclofenac (VOLTAREN) 1 % gel, Apply 2 g to affected area every six (6) hours. , Disp: 100 g, Rfl: 0    albuterol (PROAIR HFA) 90 mcg/actuation inhaler, Take 2 Puffs by inhalation every six (6) hours as needed for Wheezing., Disp: 1 Inhaler, Rfl: 1    famotidine (PEPCID) 40 mg tablet, Take 40 mg by mouth nightly., Disp: , Rfl:     Cholecalciferol, Vitamin D3, (VITAMIN D) 2,000 unit Cap, Take 4,000 Units by mouth daily. Takes 2 of the 2,000 unit tabs qd, Disp: , Rfl:     omeprazole (PRILOSEC) 40 mg capsule, , Disp: , Rfl:     Thank you for your referral and allowing me to participate in this patient's care.     Matty Lott MD PhD  [address] [place] [address], [address]  Phone: [phone]  Fax: [phone]    PATIENT CARE TEAM:  Patient Care Team:  Johan Hickey MD as PCP - General (Family Medicine)  Tu Garcia MD as PCP - [facility]  Oscar White RN as Nurse Navigator  Nicky Marino MD as Physician (Cardiovascular Disease Physician)  Keshawn King MD ([address] [place] [address] Vascular Surgery)     Total visit time: 45 minutes  (> 50% spent face-to-face counseling)

## 2022-07-08 DIAGNOSIS — I10 ESSENTIAL HYPERTENSION: Chronic | ICD-10-CM

## 2022-07-08 DIAGNOSIS — G47.00 INSOMNIA, UNSPECIFIED TYPE: ICD-10-CM

## 2022-07-08 RX ORDER — ZOLPIDEM TARTRATE 10 MG/1
10 TABLET ORAL
Qty: 30 TABLET | Refills: 0 | Status: SHIPPED | OUTPATIENT
Start: 2022-07-08 | End: 2022-08-04 | Stop reason: SDUPTHER

## 2022-07-12 ENCOUNTER — TELEPHONE (OUTPATIENT)
Dept: SLEEP MEDICINE | Age: 62
End: 2022-07-12

## 2022-07-12 NOTE — TELEPHONE ENCOUNTER
ERCIA on 07/12/2022 at 1:56pm and 2:15pm to triage patient for an appointment with Gaurav Bautista NP. No response from patient.

## 2022-08-04 ENCOUNTER — OFFICE VISIT (OUTPATIENT)
Dept: FAMILY MEDICINE CLINIC | Age: 62
End: 2022-08-04
Payer: COMMERCIAL

## 2022-08-04 VITALS
WEIGHT: 157 LBS | TEMPERATURE: 98.3 F | DIASTOLIC BLOOD PRESSURE: 76 MMHG | SYSTOLIC BLOOD PRESSURE: 128 MMHG | BODY MASS INDEX: 28.89 KG/M2 | RESPIRATION RATE: 16 BRPM | HEIGHT: 62 IN | HEART RATE: 78 BPM | OXYGEN SATURATION: 98 %

## 2022-08-04 DIAGNOSIS — J01.00 ACUTE NON-RECURRENT MAXILLARY SINUSITIS: ICD-10-CM

## 2022-08-04 DIAGNOSIS — E11.22 TYPE 2 DIABETES MELLITUS WITH STAGE 3B CHRONIC KIDNEY DISEASE, WITHOUT LONG-TERM CURRENT USE OF INSULIN (HCC): Primary | ICD-10-CM

## 2022-08-04 DIAGNOSIS — N18.32 TYPE 2 DIABETES MELLITUS WITH STAGE 3B CHRONIC KIDNEY DISEASE, WITHOUT LONG-TERM CURRENT USE OF INSULIN (HCC): Primary | ICD-10-CM

## 2022-08-04 DIAGNOSIS — I10 ESSENTIAL HYPERTENSION: Chronic | ICD-10-CM

## 2022-08-04 DIAGNOSIS — G47.00 INSOMNIA, UNSPECIFIED TYPE: ICD-10-CM

## 2022-08-04 LAB — HBA1C MFR BLD HPLC: 6.3 %

## 2022-08-04 PROCEDURE — 3044F HG A1C LEVEL LT 7.0%: CPT | Performed by: STUDENT IN AN ORGANIZED HEALTH CARE EDUCATION/TRAINING PROGRAM

## 2022-08-04 PROCEDURE — 83036 HEMOGLOBIN GLYCOSYLATED A1C: CPT | Performed by: STUDENT IN AN ORGANIZED HEALTH CARE EDUCATION/TRAINING PROGRAM

## 2022-08-04 PROCEDURE — 99214 OFFICE O/P EST MOD 30 MIN: CPT | Performed by: STUDENT IN AN ORGANIZED HEALTH CARE EDUCATION/TRAINING PROGRAM

## 2022-08-04 RX ORDER — MONTELUKAST SODIUM 10 MG/1
TABLET ORAL
Qty: 30 TABLET | Refills: 11 | Status: SHIPPED | OUTPATIENT
Start: 2022-08-04

## 2022-08-04 RX ORDER — AMOXICILLIN AND CLAVULANATE POTASSIUM 875; 125 MG/1; MG/1
1 TABLET, FILM COATED ORAL EVERY 12 HOURS
Qty: 20 TABLET | Refills: 0 | Status: SHIPPED | OUTPATIENT
Start: 2022-08-04 | End: 2022-08-14

## 2022-08-04 RX ORDER — ZOLPIDEM TARTRATE 10 MG/1
10 TABLET ORAL
Qty: 30 TABLET | Refills: 3 | Status: SHIPPED | OUTPATIENT
Start: 2022-08-04

## 2022-08-04 RX ORDER — GABAPENTIN 100 MG/1
100 CAPSULE ORAL
COMMUNITY
Start: 2022-06-08

## 2022-08-04 NOTE — PROGRESS NOTES
Name and  Verified. Pharmacy verified     Chief Complaint   Patient presents with    Follow-up     3 month 22 Diabetes       1. Have you been to the ER, urgent care clinic since your last visit? Hospitalized since your last visit? Yes  2022  [facility]    2. Have you seen or consulted any other health care providers outside of the [address] [place] [address] since your last visit? Include any pap smears or colon screening.  No      Health Maintenance Due   Topic Date Due    Cervical cancer screen  Never done    Shingrix Vaccine Age 49> (1 of 2) Never done    Eye Exam Retinal or Dilated  2016    Breast Cancer Screen Mammogram  2019

## 2022-08-04 NOTE — PROGRESS NOTES
[address] [address] ROBERTA Ulloa. Aide, [address] [place] [address]  [phone]    C/C: Diabetes, sinus infection, CKD and CHF     HPI:    Juan Myers is a 63 y.o. BLACK/  female who presents to clinic today for evaluation of the issues listed above. Subjective; Pt have a PMHx significant for DM, CKD, MI s/p LAD stent (2011), CHF s/p ICD (2015), HTN and HLD. DM:  Last A1C was <7 per patient. Has overall been well controlled and patient would like to get off Metformin. Would not use SGLT2 inhibitor due to worsening kidney function. Also has CKD IV, has appointment 8/10/22 with Dr. Bill Guthrie. GFR 27 (05/2022), baseline Cr 2.3    CHF s/p ICD and h/o MI s/p stent:  Recently admitted at [facility] on 05/2022. Follows with Dr. Kenji Brown with [facility]. Recently referred patient to see [facility] and saw Dr. Everardo Donovan. On Entrestor daily, Bumex (every other day), and Coreg daily. Stress test (10/11/2021): LVEF 46% with global hypokinesia. No evidence of myocardial ischemic risk. Sinusitis:  The patient has been having sinus pain, postnasal drip, discolored mucous for about a week. No fever, sore throat , chest pain or sob.home covid test was negative. Pt denies any  fever, chill, chest pain, or sob. Other Health Habits and social history:  Smoking history: former smoker  Alcohol history: socially   , no children. Allergies- reviewed:    Allergies   Allergen Reactions    Ace Inhibitors Cough     ?10/2011    Seafood [Shellfish Containing Products] Hives, Shortness of Breath and Nausea and Vomiting       Past Medical History- reviewed:  Past Medical History:   Diagnosis Date    Acute Anterior Myocardial Infarction, s/p PTCA w/ stent 10/2010 10/31/2010    Asthma     mild    Diabetes mellitus, type 2 ([facility] [address] 8/24/2012    GERD (gastroesophageal reflux disease)     hiatal hernia    H/O recurrent Sinusitis 3/16/2011    Heart failure (HCC)     Hiatal Hernia w/ GERD (gastroesophageal reflux disease) 3/16/2011    Hypercholesterolemia     Hypertension 3/16/2011    Ill-defined condition     pacemaker put in last 2015 because of CHF    Impaired fasting glucose 2011    Iron deficiency anemia 3/16/2011    Multinodular thyroid 3/16/2011    Palpitations, PVC's 10/13/2011    Perennial allergic rhinitis 3/16/2011    Pityriasis rosea 2012    Post-menopausal     LMP 46y. o, no HRT    Postmenopause, LMP 38 yo, No HRT 3/16/2011    Reflux esophagitis 3/16/2011    Tobacco user     Vitamin D deficiency 10/13/2011       Family History - reviewed:  Family History   Problem Relation Age of Onset    Hypertension Mother     High Cholesterol Mother     Thyroid Disease Mother     Heart Disease Mother         chf    Kidney Disease Mother     Headache Mother     Heart Disease Father     Heart Attack Father 36         MI age 36    High Cholesterol Father     Hypertension Sister     Thyroid Disease Sister     Hypertension Sister     Arthritis-rheumatoid Sister     Diabetes Sister     Thyroid Disease Sister     Heart Attack Brother         massive MI at age 46, survived    Thyroid Disease Other         niece, Neha Garcia    High Cholesterol Brother        Social History - reviewed:  Social History     Socioeconomic History    Marital status:      Spouse name: vijay shea to give infor     Number of children: 0    Years of education: Not on file    Highest education level: Not on file   Occupational History    Occupation: ; 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    Occupation:      Employer: SELF EMPLOYED   Tobacco Use    Smoking status: Former     Packs/day: 0.25     Years: 30.00     Pack years: 7.50     Types: Cigarettes     Quit date: 2013     Years since quittin.0    Smokeless tobacco: Never    Tobacco comments:     had quit 10/2010 then restarted ~ 3/2012;  about 5 cigarettes a day; on and off since age 26 yo x ~ 30 yrs   Substance and Sexual Activity    Alcohol use: No     Comment: none    Drug use: No    Sexual activity: Yes     Partners: Male     Birth control/protection: None     Comment:  [employer]    Other Topics Concern     Service Not Asked    Blood Transfusions Not Asked    Caffeine Concern No     Comment: down to 1 Coke a day    Occupational Exposure Not Asked    Hobby Hazards Not Asked    Sleep Concern Not Asked    Stress Concern Not Asked    Weight Concern Yes     Comment: stable now, had gained some wt over the past yr; was in low 150's previously; wants to lose 10 lbs    Special Diet Yes     Comment: heart healthy diet (white meats, salads, veggies, fruits)    Back Care Not Asked    Exercise Yes     Comment: exercise walking tape x 45 minutes qod    Bike Helmet Not Asked    Seat Belt Not Asked    Self-Exams Not Asked   Social History Narrative    New to [facility] as of 4/2009, referred by moose eKlly; pervious PCP Dr Cachorro Russell Determinants of Health     Financial Resource Strain: Not on file   Food Insecurity: Not on file   Transportation Needs: Not on file   Physical Activity: Not on file   Stress: Not on file   Social Connections: Not on file   Intimate Partner Violence: Not on file   Housing Stability: Not on file       Depression screening:  3 most recent [address] 6/29/2022   Little interest or pleasure in doing things Not at all   Feeling down, depressed, irritable, or hopeless Not at all   Total Score PHQ 2 0   Trouble falling or staying asleep, or sleeping too much -   Feeling tired or having little energy -   Poor appetite, weight loss, or overeating -   Feeling bad about yourself - or that you are a failure or have let yourself or your family down -   Trouble concentrating on things such as school, work, reading, or watching TV -   Moving or speaking so slowly that other people could have noticed; or the opposite being so fidgety that others notice -   Thoughts of being better off dead, or hurting yourself in some way -   PHQ 9 Score -   How difficult have these problems made it for you to do your work, take care of your home and get along with others -         Review of systems:     A comprehensive review of systems was negative except for that written in the History of Present Illness. Visit Vitals  /76 (BP 1 Location: Right arm, BP Patient Position: Sitting, BP Cuff Size: Adult)   Pulse 78   Temp 98.3 °F (36.8 °C) (Oral)   Resp 16   Ht 5' 2\" (1.575 m)   Wt 157 lb (71.2 kg)   SpO2 98%   BMI 28.72 kg/m²       General: Alert and oriented, in no acute distress. Well nourished. EYE: PERRL. Sclera and conjuctival clear. Extraocular movements intact. EARS: External normal, canals clear, tympanic membranes normal.   NOSE: Mucosa healthy without drainage or ulceration. OROPHARYNX: No suspicious lesions, normal dentition, pharynx, tongue and tonsils normal.  NECK: Supple; no masses; thyroid normal.  LUNGS: Respirations unlabored; clear to auscultation bilaterally. CARDIOVASCULAR: Regular, rate, and rhythm without murmurs, gallops or rubs. ABDOMEN: Soft; nontender; nondistended; normoactive bowel sounds; no masses or organomegaly. MUSCULOSKELETAL: FROM in all extremities     EXT: No edema. Neurovascularlly intact. Normal gait. SKIN: No rash. No suspicious lesions or moles. Neuro: Mental Status: Pt is alert and oriented to person, place, and time. Assessment/Plan       ICD-10-CM ICD-9-CM    1. Type 2 diabetes mellitus with stage 3b chronic kidney disease, without long-term current use of insulin ([facility])  E11.22 250.40 AMB POC HEMOGLOBIN A1C    N18.32 585.3       2. Acute non-recurrent maxillary sinusitis  J01.00 461.0 amoxicillin-clavulanate (AUGMENTIN) 875-125 mg per tablet      montelukast (SINGULAIR) 10 mg tablet      3. Insomnia, unspecified type  G47.00 780.52 zolpidem (AMBIEN) 10 mg tablet      4.  Essential hypertension  I10 401.9 zolpidem (AMBIEN) 10 mg tablet        1. Type 2 diabetes mellitus with stage 3b chronic kidney disease, without long-term current use of insulin ([facility])  Lab Results   Component Value Date/Time    Hemoglobin A1c 6.6 (H) 05/04/2022 11:10 AM    Hemoglobin A1c (POC) 6.3 08/04/2022 03:08 PM     Has overall been well controlled and patient would like to get off Metformin. Would not use SGLT2 inhibitor due to worsening kidney function. Also has CKD IV, has appointment 8/10/22 with Dr. Raquel Rodriguez. GFR 27 (05/2022), baseline Cr 2.3    - will monitor without metformin     2. Acute non-recurrent maxillary sinusitis  - amoxicillin-clavulanate (AUGMENTIN) 875-125 mg per tablet; Take 1 Tablet by mouth every twelve (12) hours for 10 days. - montelukast (SINGULAIR) 10 mg tablet; TAKE 1 TAB BY MOUTH DAILY. INDICATIONS: ALLERGIC RHINITIS      3. Insomnia, unspecified type  - zolpidem (AMBIEN) 10 mg tablet; Take 1 Tablet by mouth nightly as needed for Sleep. 4. Essential hypertension:  Continue Coreg 25 mg BID. Follow up: 3 months      I have discussed the diagnosis with the patient and the intended plan as seen in the above orders. The patient has received an after-visit summary and questions were answered concerning future plans. I have discussed medication side effects and warnings with the patient as well. Informed patient to return to the office if new symptoms arise.     Signed By: Rosann Meckel, MD     August 4, 2022

## 2022-08-17 ENCOUNTER — TELEPHONE (OUTPATIENT)
Dept: CARDIOLOGY CLINIC | Age: 62
End: 2022-08-17

## 2022-08-17 NOTE — TELEPHONE ENCOUNTER
Left message for nuclear PYP test appointment reminder including date, time, location, and duration of test.

## 2022-08-18 ENCOUNTER — ANCILLARY PROCEDURE (OUTPATIENT)
Dept: CARDIOLOGY CLINIC | Age: 62
End: 2022-08-18
Payer: COMMERCIAL

## 2022-08-18 VITALS
SYSTOLIC BLOOD PRESSURE: 128 MMHG | WEIGHT: 157 LBS | BODY MASS INDEX: 28.89 KG/M2 | HEIGHT: 62 IN | DIASTOLIC BLOOD PRESSURE: 76 MMHG

## 2022-08-18 DIAGNOSIS — I50.22 CHRONIC SYSTOLIC HEART FAILURE (HCC): ICD-10-CM

## 2022-08-18 DIAGNOSIS — R06.02 SHORTNESS OF BREATH: ICD-10-CM

## 2022-08-18 PROCEDURE — 78803 RP LOCLZJ TUM SPECT 1 AREA: CPT | Performed by: INTERNAL MEDICINE

## 2022-08-18 PROCEDURE — 78800 RP LOCLZJ TUM 1 AREA 1 D IMG: CPT | Performed by: INTERNAL MEDICINE

## 2022-08-18 PROCEDURE — A9538 TC99M PYROPHOSPHATE: HCPCS | Performed by: INTERNAL MEDICINE

## 2022-08-19 LAB — NUC 3 HOUR HEART TO CONTRALATERAL RATIO: 1.27

## 2022-10-31 NOTE — PROGRESS NOTES
No chief complaint on file. she is a 56y.o. year old female who presents for follow up of injury. Follow Up Pain Assessment Encounter      Onset of Symptoms: Several Weeks  ________________________________________________________________________  Description: Pain has worsened slightly      Pain Scale:(1-10): 8  Duration:  continuous  What makes it better?: rest  What makes it worse?:walking  Medications tried: None  Modalities tried: None        Reviewed and agree with Nurse Note and duplicated in this note. Reviewed PmHx, RxHx, FmHx, SocHx, AllgHx and updated and dated in the chart.     Family History   Problem Relation Age of Onset    Hypertension Mother     High Cholesterol Mother     Thyroid Disease Mother     Heart Disease Mother      chf    Kidney Disease Mother     Headache Mother     Heart Disease Father     Heart Attack Father 36      MI age 36   [facility] High Cholesterol Father     Hypertension Sister     Thyroid Disease Sister     Hypertension Sister    [facility] Arthritis-rheumatoid Sister     Diabetes Sister     Thyroid Disease Sister     Heart Attack Brother      massive MI at age 46, survived    Thyroid Disease Other      niece, Bisi     High Cholesterol Brother        Past Medical History:   Diagnosis Date    Acute Anterior Myocardial Infarction, s/p PTCA w/ stent 10/2010 10/31/2010    Asthma     mild    Diabetes mellitus, type 2 ([address] 2012    GERD (gastroesophageal reflux disease)     hiatal hernia    H/O recurrent Sinusitis 3/16/2011    Heart failure ([facility] [address]     Hiatal Hernia w/ GERD (gastroesophageal reflux disease) 3/16/2011    Hypercholesterolemia     Hypertension 3/16/2011    Ill-defined condition     pacemaker put in last 2015 because of CHF    Impaired fasting glucose 2011    Iron deficiency anemia 3/16/2011    Multinodular thyroid 3/16/2011    Palpitations, PVC's 10/13/2011    Perennial allergic rhinitis 3/16/2011    Pityriasis rosea 5/30/2012    Post-menopausal     LMP 46y. 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           Self Employed     Social History Main Topics    Smoking status: Former Smoker     Packs/day: 0.25     Years: 30.00     Types: Cigarettes     Quit date: 7/24/2013    Smokeless tobacco: Never Used      Comment: had quit 10/2010 then restarted ~ 3/2012;  about 5 cigarettes a day; on and off since age 26 yo x ~ 30 yrs    Alcohol use No      Comment: none    Drug use: No    Sexual activity: Yes     Partners: Male     Birth control/ protection: None      Comment:  Savannah Ribera      Other Topics Concern    Caffeine Concern No     down to 1 Coke a day    Weight Concern Yes     stable now, had gained some wt over the past yr; was in low 150's previously; wants to lose 10 lbs    Special Diet Yes     heart healthy diet (white meats, salads, veggies, fruits)    Exercise Yes     exercise walking tape x 45 minutes qod     Social History Narrative    New to [facility] as of 4/2009, referred by moose Patel; pervious PCP Dr Hugh Black of [facility] - negative except as listed above      Objective:     Vitals:    12/15/17 1320   BP: 133/77   Pulse: 84   Resp: 16   Temp: 98.6 °F (37 °C)   TempSrc: Oral   SpO2: 99%   Weight: 164 lb (74.4 kg)   Height: 5' 2\" (1.575 m)       Physical Examination: General appearance - alert, well appearing, and in no distress  Back exam - full range of motion, no tenderness, palpable spasm or pain on motion  Neurological - alert, oriented, normal speech, no focal findings or movement disorder noted  Musculoskeletal - right knee exam:  The patient'[facility] Knee  is  normal to inspection. The ROM is normal and there is flexion to 60 Effusion is: mild The joint exhibits absent warmth and Crepitus is: absent. The Eloina test is:  Negative Joint Line Tenderness is  Negative . The [facility] Test is Negative  and the Lachman is  negative. The Anterior Drawer is Negative. The Posterior Drawer is:  negative. Valgus Stress (for MCL) is:  normal . Varus Stress (for LCL) is  normal  . The Ralph Test is negative and the Apprehension Sign:  negative  Patellar Grind Negative and Tracking is normal    Extremities - peripheral pulses normal, no pedal edema, no clubbing or cyanosis  Skin - normal coloration and turgor, no rashes, no suspicious skin lesions noted    Assessment/ Plan:   Diagnoses and all orders for this visit:    1. Primary osteoarthritis of right knee  -     TRIAMCINOLONE ACETONIDE INJ  -     triamcinolone acetonide (KENALOG) 40 mg/mL injection; 1 mL by IntraMUSCular route once for 1 dose. -     [address]606 - DRAIN/INJECT INTERMEDIATE JOINT/BURSA WITH US     Follow-up Disposition: Not on File    Pathophysiology, recovery and rehabilitation process discussed and questions answered   Counseling for 30 Minutes of the total visit duration   Pictures and figures used as necessary   Provided reassurance   Monitor response to injection   Discussed steroid side effects of fat atrophy, hypopigmentation, steroid flare or infection   Monitor response to home exercises   Recommend activity modification   Recommend  lower impact activities-walking, Eliptical, Nordic Track, cycling or swimming   Follow up in 4 week(s)      1) Remember to stay active and/or exercise regularly (I suggest 30-45 minutes daily)   2) For reliable dietary information, go to www. EATRIGHT.org. You may wish to consider seeing the nutritionist at [facility] [phone], also consider the [address] I have discussed the diagnosis with the patient and the intended plan as seen in the above orders. Additional Notes: Patient is not currently on any medications. The patient has received an after-visit summary and questions were answered concerning future plans. Medication Side Effects and Warnings were discussed with patient: yes  Patient Labs were reviewed and or requested: yes  Patient Past Records were reviewed and or requested  yes  I have discussed the diagnosis with the patient and the intended plan as seen in the above orders. The patient has received an after-visit summary and questions were answered concerning future plans. Pt agrees to call or return to clinic and/or go to closest ER with any worsening of symptoms. This may include, but not limited to increased fever (>100.4) with NSAIDS or Tylenol, increased edema, confusion, rash, worsening of presenting symptoms. Detail Level: Generalized Quality 130: Documentation Of Current Medications In The Medical Record: Documentation of a medical reason(s) for not documenting, updating, or reviewing the patientâs current medications list (e.g., patient is in an urgent or emergent medical situation)

## 2022-11-04 ENCOUNTER — OFFICE VISIT (OUTPATIENT)
Dept: FAMILY MEDICINE CLINIC | Age: 62
End: 2022-11-04
Payer: COMMERCIAL

## 2022-11-04 VITALS
OXYGEN SATURATION: 97 % | HEIGHT: 62 IN | BODY MASS INDEX: 28.71 KG/M2 | DIASTOLIC BLOOD PRESSURE: 80 MMHG | WEIGHT: 156 LBS | TEMPERATURE: 98.1 F | RESPIRATION RATE: 17 BRPM | SYSTOLIC BLOOD PRESSURE: 128 MMHG | HEART RATE: 76 BPM

## 2022-11-04 DIAGNOSIS — K21.9 GASTROESOPHAGEAL REFLUX DISEASE, UNSPECIFIED WHETHER ESOPHAGITIS PRESENT: ICD-10-CM

## 2022-11-04 DIAGNOSIS — E11.22 TYPE 2 DIABETES MELLITUS WITH STAGE 3B CHRONIC KIDNEY DISEASE, WITHOUT LONG-TERM CURRENT USE OF INSULIN (HCC): Primary | ICD-10-CM

## 2022-11-04 DIAGNOSIS — N18.32 TYPE 2 DIABETES MELLITUS WITH STAGE 3B CHRONIC KIDNEY DISEASE, WITHOUT LONG-TERM CURRENT USE OF INSULIN (HCC): Primary | ICD-10-CM

## 2022-11-04 LAB — HBA1C MFR BLD HPLC: 6.4 %

## 2022-11-04 PROCEDURE — 83036 HEMOGLOBIN GLYCOSYLATED A1C: CPT | Performed by: STUDENT IN AN ORGANIZED HEALTH CARE EDUCATION/TRAINING PROGRAM

## 2022-11-04 PROCEDURE — 3074F SYST BP LT 130 MM HG: CPT | Performed by: STUDENT IN AN ORGANIZED HEALTH CARE EDUCATION/TRAINING PROGRAM

## 2022-11-04 PROCEDURE — 3078F DIAST BP <80 MM HG: CPT | Performed by: STUDENT IN AN ORGANIZED HEALTH CARE EDUCATION/TRAINING PROGRAM

## 2022-11-04 PROCEDURE — 99213 OFFICE O/P EST LOW 20 MIN: CPT | Performed by: STUDENT IN AN ORGANIZED HEALTH CARE EDUCATION/TRAINING PROGRAM

## 2022-11-04 PROCEDURE — 3044F HG A1C LEVEL LT 7.0%: CPT | Performed by: STUDENT IN AN ORGANIZED HEALTH CARE EDUCATION/TRAINING PROGRAM

## 2022-11-04 RX ORDER — FAMOTIDINE 40 MG/1
40 TABLET, FILM COATED ORAL
Qty: 90 TABLET | Refills: 3 | Status: SHIPPED | OUTPATIENT
Start: 2022-11-04

## 2022-11-04 NOTE — PROGRESS NOTES
[address] [address] TIANNA Sharpe. [address]  [phone]    C/C: Diabetes  HPI:    Keke Coon is a 63 y.o. BLACK/  female who presents to clinic today for evaluation of the issues listed above. Subjective; Pt have a PMHx significant for DM, CKD, MI s/p LAD stent (2011), CHF s/p ICD (2015), HTN and HLD. DM with CKD IV:  Last A1C was <7 per patient. Has overall been well controlled and patient would like to get off Metformin. Would not use SGLT2 inhibitor due to worsening kidney function. Also has CKD IV, follows with Dr. Kady Aleman. GFR 27 (05/2022), baseline Cr 2.3    CHF s/p ICD and h/o MI s/p stent:  Recently admitted at [facility] on 05/2022. Follows with Dr. Reny Santacruz with [facility]. Also follows with the [facility] team at [facility] (Dr. Jeyson Zapien). On Entrestor daily, Bumex (every other day), and Coreg daily. Stress test (10/11/2021): LVEF 46% with global hypokinesia. No evidence of myocardial ischemic risk. Pt denies any  fever, chill, chest pain, or sob. Other Health Habits and social history:  Smoking history: former smoker  Alcohol history: socially   , no children. Allergies- reviewed:    Allergies   Allergen Reactions    Ace Inhibitors Cough     ?10/2011    Seafood [Shellfish Containing Products] Hives, Shortness of Breath and Nausea and Vomiting       Past Medical History- reviewed:  Past Medical History:   Diagnosis Date    Acute Anterior Myocardial Infarction, s/p PTCA w/ stent 10/2010 10/31/2010    Asthma     mild    Diabetes mellitus, type 2 ([facility] [address] 8/24/2012    GERD (gastroesophageal reflux disease)     hiatal hernia    H/O recurrent Sinusitis 3/16/2011    Heart failure ([facility] [address]     Hiatal Hernia w/ GERD (gastroesophageal reflux disease) 3/16/2011    Hypercholesterolemia     Hypertension 3/16/2011    Ill-defined condition     pacemaker put in last July 2015 because of CHF    Impaired fasting glucose 2011    Iron deficiency anemia 3/16/2011    Multinodular thyroid 3/16/2011    Palpitations, PVC's 10/13/2011    Perennial allergic rhinitis 3/16/2011    Pityriasis rosea 2012    Post-menopausal     LMP 46y. 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    Occupation:      Employer: SELF EMPLOYED   Tobacco Use    Smoking status: Former     Packs/day: 0.25     Years: 30.00     Pack years: 7.50     Types: Cigarettes     Quit date: 2013     Years since quittin.2    Smokeless tobacco: Never    Tobacco comments:     had quit 10/2010 then restarted ~ 3/2012;  about 5 cigarettes a day; on and off since age 28 yo x ~ 30 yrs   Substance and Sexual Activity    Alcohol use: No     Comment: none    Drug use: No    Sexual activity: Yes     Partners: Male     Birth control/protection: None     Comment:  Max Blanton    Other Topics Concern     Service Not Asked    Blood Transfusions Not Asked    Caffeine Concern No     Comment: down to 1 Coke a day    Occupational Exposure Not Asked    Hobby Hazards Not Asked    Sleep Concern Not Asked    Stress Concern Not Asked    Weight Concern Yes     Comment: stable now, had gained some wt over the past yr; was in low 150's previously; wants to lose 10 lbs    Special Diet Yes     Comment: heart healthy diet (white meats, salads, veggies, fruits)    Back Care Not Asked    Exercise Yes     Comment: exercise walking tape x 45 minutes qod    Bike Helmet Not Asked    Seat Belt Not Asked    Self-Exams Not Asked   Social History Narrative    New to [facility] as of 4/2009, referred by moose Rondon Si; pervious PCP Dr Maci Nunez Determinants of Health     Financial Resource Strain: Not on file   Food Insecurity: Not on file   Transportation Needs: Not on file   Physical Activity: Not on file   Stress: Not on file   Social Connections: Not on file   Intimate Partner Violence: Not on file   Housing Stability: Not on file       Depression screening:  3 most recent [address] 6/29/2022   Little interest or pleasure in doing things Not at all   Feeling down, depressed, irritable, or hopeless Not at all   Total Score PHQ 2 0   Trouble falling or staying asleep, or sleeping too much -   Feeling tired or having little energy -   Poor appetite, weight loss, or overeating -   Feeling bad about yourself - or that you are a failure or have let yourself or your family down -   Trouble concentrating on things such as school, work, reading, or watching TV -   Moving or speaking so slowly that other people could have noticed; or the opposite being so fidgety that others notice -   Thoughts of being better off dead, or hurting yourself in some way -   PHQ 9 Score -   How difficult have these problems made it for you to do your work, take care of your home and get along with others -         Review of systems:     A comprehensive review of systems was negative except for that written in the History of Present Illness. Visit Vitals  /80 (BP 1 Location: Right arm, BP Patient Position: Sitting, BP Cuff Size: Adult)   Pulse 76   Temp 98.1 °F (36.7 °C) (Oral)   Resp 17   Ht 5' 2\" (1.575 m)   Wt 156 lb (70.8 kg)   SpO2 97%   BMI 28.53 kg/m²         General: Alert and oriented, in no acute distress. Well nourished. EYE: PERRL. Sclera and conjuctival clear. Extraocular movements intact.   EARS: External normal, canals clear, tympanic membranes normal.   NOSE: Mucosa healthy without drainage or ulceration. OROPHARYNX: No suspicious lesions, normal dentition, pharynx, tongue and tonsils normal.  NECK: Supple; no masses; thyroid normal.  LUNGS: Respirations unlabored; clear to auscultation bilaterally. CARDIOVASCULAR: Regular, rate, and rhythm without murmurs, gallops or rubs. ABDOMEN: Soft; nontender; nondistended; normoactive bowel sounds; no masses or organomegaly. MUSCULOSKELETAL: FROM in all extremities     EXT: No edema. Neurovascularlly intact. Normal gait. SKIN: No rash. No suspicious lesions or moles. Neuro: Mental Status: Pt is alert and oriented to person, place, and time. Assessment/Plan       ICD-10-CM ICD-9-CM    1. Type 2 diabetes mellitus with stage 3b chronic kidney disease, without long-term current use of insulin ([facility])  E11.22 250.40 AMB POC HEMOGLOBIN A1C    N18.32 585.3       2. Gastroesophageal reflux disease, unspecified whether esophagitis present  K21.9 530.81 famotidine (PEPCID) 40 mg tablet        1. Type 2 diabetes mellitus with stage 3b chronic kidney disease, without long-term current use of insulin ([facility])  Lab Results   Component Value Date/Time    Hemoglobin A1c 6.6 (H) 05/04/2022 11:10 AM    Hemoglobin A1c (POC) 6.4 11/04/2022 02:50 PM     Has overall been well controlled and patient would like to get off Metformin. Would not use SGLT2 inhibitor due to worsening kidney function.    - Continue diet control    2. Gastroesophageal reflux disease, unspecified whether esophagitis present  - famotidine (PEPCID) 40 mg tablet; Take 1 Tablet by mouth nightly. Follow up: 6 months or as needed      I have discussed the diagnosis with the patient and the intended plan as seen in the above orders. The patient has received an after-visit summary and questions were answered concerning future plans. I have discussed medication side effects and warnings with the patient as well. Informed patient to return to the office if new symptoms arise.     Signed By: Steven Haynes Shea Blum MD     November 4, 2022

## 2022-11-04 NOTE — PROGRESS NOTES
Name and  Verified. Pharmacy verified     Chief Complaint   Patient presents with    Follow-up     3 months 2022 Diabetes       1. Have you been to the ER, urgent care clinic since your last visit? Hospitalized since your last visit? Yes  10/2022  [facility]  Right Foot Injury      2. Have you seen or consulted any other health care providers outside of the [address] [place] [address] since your last visit? Include any pap smears or colon screening.  No      Health Maintenance Due   Topic Date Due    Hepatitis B Vaccine (1 of 3 - Risk 3-dose series) Never done    Cervical cancer screen  Never done    Shingrix Vaccine Age 50> (1 of 2) Never done    Eye Exam Retinal or Dilated  2016    Breast Cancer Screen Mammogram  2019    Flu Vaccine (1) 2022

## 2022-12-08 DIAGNOSIS — G47.00 INSOMNIA, UNSPECIFIED TYPE: ICD-10-CM

## 2022-12-09 RX ORDER — ZOLPIDEM TARTRATE 10 MG/1
10 TABLET ORAL
Qty: 30 TABLET | Refills: 3 | Status: SHIPPED | OUTPATIENT
Start: 2022-12-09

## 2022-12-09 NOTE — TELEPHONE ENCOUNTER
Last Visit: 11/4/22 with MD Mendy Villatoro  Next Appointment: 5/12/23 with MD Mkai  Previous Refill Encounter(s): 8/4/22 #30 with 3 refills    Requested Prescriptions     Pending Prescriptions Disp Refills    zolpidem (AMBIEN) 10 mg tablet 30 Tablet 3     Sig: Take 1 Tablet by mouth nightly as needed for Sleep. Max Daily Amount: 10 mg. For [facility] in place:   Recommendation Provided To:    Intervention Detail: New Rx: 1, reason: Patient Preference  Gap Closed?:   Intervention Accepted By:   Time Spent (min): 5

## 2023-01-19 ENCOUNTER — OFFICE VISIT (OUTPATIENT)
Dept: CARDIOLOGY CLINIC | Age: 63
End: 2023-01-19
Payer: COMMERCIAL

## 2023-01-19 VITALS
HEART RATE: 78 BPM | DIASTOLIC BLOOD PRESSURE: 100 MMHG | OXYGEN SATURATION: 98 % | WEIGHT: 164 LBS | RESPIRATION RATE: 16 BRPM | SYSTOLIC BLOOD PRESSURE: 178 MMHG | HEIGHT: 62 IN | BODY MASS INDEX: 30.18 KG/M2 | TEMPERATURE: 98 F

## 2023-01-19 DIAGNOSIS — I10 PRIMARY HYPERTENSION: ICD-10-CM

## 2023-01-19 DIAGNOSIS — I50.22 CHRONIC SYSTOLIC HEART FAILURE (HCC): ICD-10-CM

## 2023-01-19 DIAGNOSIS — N18.30 STAGE 3 CHRONIC KIDNEY DISEASE, UNSPECIFIED WHETHER STAGE 3A OR 3B CKD (HCC): ICD-10-CM

## 2023-01-19 DIAGNOSIS — D50.8 OTHER IRON DEFICIENCY ANEMIA: Primary | ICD-10-CM

## 2023-01-19 PROCEDURE — 99214 OFFICE O/P EST MOD 30 MIN: CPT | Performed by: NURSE PRACTITIONER

## 2023-01-19 PROCEDURE — 3077F SYST BP >= 140 MM HG: CPT | Performed by: NURSE PRACTITIONER

## 2023-01-19 PROCEDURE — 3080F DIAST BP >= 90 MM HG: CPT | Performed by: NURSE PRACTITIONER

## 2023-01-19 RX ORDER — FERROUS SULFATE 325(65) MG
325 TABLET, DELAYED RELEASE (ENTERIC COATED) ORAL
Qty: 90 TABLET | Refills: 3 | Status: SHIPPED | OUTPATIENT
Start: 2023-01-19

## 2023-01-19 RX ORDER — AMLODIPINE BESYLATE 5 MG/1
2.5 TABLET ORAL DAILY
Qty: 30 TABLET | Refills: 1 | Status: SHIPPED | OUTPATIENT
Start: 2023-01-19

## 2023-01-19 NOTE — PROGRESS NOTES
Patient instructed to call on Friday morning and in one week with BP and HR in am and two hours later. She was also instructed in use of amlodipine to lower blood pressure. She states understanding and has no further questions.

## 2023-01-19 NOTE — PATIENT INSTRUCTIONS
Medication changes:    Start iron 325 mg daily    Please take this to your pharmacy to notify them of the change in medications. Testing Ordered: An order for an echocardiogram has been placed to be done in next 1-2 weeks. You will be receiving an automated call from Coordination of Care to schedule this test. If you are unavailable to receive the call or would like to contact coordination of care yourself you may contact [phone] to schedule. You will need to contact coordination of care yourself if you miss their calls as they will only make 3 attempts to reach you. Lab work has been ordered. Please present to a [facility] location of your choice with the orders provided to have lab work done. Please wear a mask when you present to [facility] and practice diligent hand hygeine before and after your visit. Our office will notify you of any abnormal results requiring changes to your current plan of care. Other Recommendations:     A referral to sleep medicine has been placed. You will be contacted for scheduling. Ensure your drinking an adequate amount of water with a goal of 6-8 eight ounce glasses (1.5-2 liters) of fluid daily. Your urine should be clear and light yellow straw colored. If your blood pressure begins to consistently run below 90/60 and/or you begin to experience dizziness or lightheadedness, please contact the [facility] at [phone]. Follow up in 6 months with NP with [facility]      Please monitor your weights daily upon waking and after using the bathroom. Keep a written records of your weights and bring to your next appointment. If you have a weight gain of 3 or more pounds overnight OR 5 or more pounds in one week please contact our office. Thank you for allowing us the privilege of being a part of your healthcare team! Please do not hesitate to contact our office at [phone] with any questions or concerns. Virtual Heart Failure [address] invites you to learn more about heart failure and to share your questions, ideas, and experiences with others. Each month, the Heart Failure Support Group features a new educational topic and a guest speaker, followed by an interactive discussion. Our Heart Failure Nurse Navigator will moderate each session. You will be able to participate by phone, tablet or computer through [address] [place] [address]. This support group takes place on the 3rd Thursday of each month from 6:00-7:30PM. All individuals living with heart failure and their caregivers are welcome to join. If you are interested in participating, please contact us at [email or web address] and you will be sent the link to join the [employer].

## 2023-01-19 NOTE — LETTER
1/19/2023    Patient: Anil Saez   YOB: 1960   Date of Visit: 1/19/2023     Doreen Berumen MD  [address] [address]  Via In Basket    Dear Doreen Berumen MD,      Thank you for referring Ms. Lizet Fields to [facility] for evaluation. My notes for this consultation are attached. If you have questions, please do not hesitate to call me. I look forward to following your patient along with you.       Sincerely,    Felecia Peabody, NP

## 2023-01-19 NOTE — PROGRESS NOTES
[facility] in [place], [facility] Note    Patient name: Kalyani Fields  Patient : 1960  Patient MRN: [id number]  Date of service: 23    Primary care physician: Sea Ruvalcaba MD  Primary general cardiologist: Dr. Amanda Tinoco     Primary [facility] cardiologist: Warden Jeffry MD     Chief Complaint   Patient presents with    CHF     Follow up-no complaints          PLAN OF CARE:  64 y/o female with asthma and HFpEF 40-45% due to mixed ischemic and non-ischemic cardiomyopathy, stage C, NYHA class I-II symptoms on GDMT; difficult to dose diuretics due to CKD stage 3 with Cr 2-2.6  Patients symptoms markedly improved to NYHA class II with diuresis and RHC showed normal right sided and elevated wedge 20 with normal cardiac output indicating diastolic dysfunction at weight 156lbs  Considering results of the RHC, patient's dry weight should be around 154-155lbs and baseline Cr is around 2.3.  patient does not believe recent weight gain is fluid related (current weight 164lbs)     INTERVAL HISTORY:  -BP elevated at 160/90; HR 78  -labs  with creat 2.14; GFR 26  -PYP not indicative of amyloid  -weight up 7lbs since last visit. She does not believe it to be fluid weight.    -Ms. Nay Rendon is here for routine heart failure follow up. She is feeling well. She denies any SOB/MOREL. Activity not limited by SOB, but occasionally feels fatigue with stairs. No chest pain, palpitations, dizziness, orthopnea, PND, changes in appetite. She states her BPs at home have been around 725 systolic. She has been \"rolling\" her ankles more often and wonders if her calcium is related. PLAN:  Continue current medical therapy for heart failure  Continue Coreg 25mg BID  Continue current dose of entresto 24/26mg twice daily. Will not increase today due to renal function.    Hydralazine/isordil not indicated for class I-II symptoms  Cannot tolerate spironolactone due to CKD  Would not use SGLT2 inhibitor due to eGFR 26 (contraindicated with eGFR < 25 and noted progressive renal dysfunction)  Continue current dose of bumex 1mg daily for weight gain or swelling. Not on allopurinol or uloric, check uric acid level  Does not take aspirin   Continue current dose of statin. Seeing PCP soon   ICD interrogation every 3 months per routine  Schedule sleep study. Revisited with patient     PYP test completed and not indicative of amyloid   Obtain medical records from pulmonologist and PFT  Routine HF labs q3mos, order  Reinforced low salt diet  Reinforced fluid restriction to 6 x 8oz glasses per day  Patient wishes to defer 6mw due to left ankle pain   Previously provided advanced care plan forms to be filled out    Consider referral to nutritionist for HF diet and weight loss  Follow-up with primary cardiologist, Dr. Kylie Burnette ~ March per patient; will alternate visits and see patient 1-2 times yearly due to improved LVEF  Follow-up with EP cardiologist  Has follow up with nephrology in March   Up to date on  flu, covid and pneumonia vaccinations. Check ECHO  Recheck BP in clinic to see if additional HTN meds needed. Recheck elevated further. Will start amlodipine 2.5mg daily, and have patient call at the end of next week with her BPs prior to meds and 2 hours after to see if further adjustments needed. Patient counseled that amlodipine can sometimes cause leg swelling and to notify us if she notices any. Return to [facility] in 6 months with NP.  ECHO and labs ordered.       IMPRESSION:  Fatigue rare  Chronic systolic heart failure  Stage C, NYHA class I-II symptoms  Mixed ischemic and non-ischemic cardiomyopathy, LVEF 46% (by NST 10/2021) from 20% (by echo 7/2015)  Genetic test for cardiomyopathy, VUSx1  Coronary artery disease  S/p anterior subendocardial infarct s/p LAD stent 10/2010  S/p ICD  Mitral valve regurgitation, mod-severe improved to trace (by echo 2020)  Cardiac risk factors  HTN - elevated today   HL  DM2, hgba1c 6.6  Mild LANRE (diagnosed around )  H/o tobacco abuse, quit around   CKD stage 3 (Cr 2.0-2.6)  Anemia -iron deficiency   GERD  Asthma  Multinodular thyroid  Family h/o early CAD (father  of MI at 43years old)     CARDIAC IMAGING:  Echo (20) LVEF 40-45%  EKG (22) NSR 77bpm, non-specific ST-T changes  LHC (2015) open stent, no progressive CAD  NST (10/11/2021) Fixed defect in the atnerolateral wall is compatible with infarct. No evidence of myocardium at ischemic risk. Left ventricular dilation. LVEF 46%. Global hypokinesia and anterolateral akinesia. HEMODYNAMICS:  RHC (22) 10, PA , W 20, Melia 2.93  CPEST not done    6 Min Walk Report 2010   (PRE) HR 70 70   (POST) HR 70 -      PERSONAL GOALS:  Lifestyle goals reviewed with the patient. HISTORY OF PRESENT ILLNESS:  From prior notes,   \"I had the pleasure of seeing Logan in [facility] at [address] [place] [address] in [place]. Briefly, Logan is a 63 y.o. female with h/o HTN, HL, DM2, LANRE, h/o remote tobacco use, chronic systolic heart failure, stage C, NYHA class IIIA symptoms, mixed ischemic and non-ischemic cardiomyopathy, LVEF 46% (by NST 10/2021) from 20% (by echo 2015), coronary artery disease s/p anterior subendocardial infarct s/p LAD stent 10/2010, s/p ICD, CKD stage 3 (Cr 2.0-2.6), anemia, GERD, asthma, multinodular thyroid and family h/o early CAD (father  of MI at 43years old). H/o vasovagal syncope, last seen in ER 4/10/22 after abdominal cramping in the bathroom. Patient was referred to [facility] due to disproportional symptoms to degree of heart failure and difficulty dosing diuretics with creatinine around 2; with narrow window - dehydrated above Cr 2 and dyspneic below it.  Patient has significantly improved with diuretics and her dry weight has been determined to be around 154-155lbs based on RHC. \"      REVIEW OF SYSTEMS:  Review of Symptoms:  Constitutional: occasional fatigue   Eyes: negative  Ears, nose, mouth, throat, and face: negative  Respiratory: negative  Cardiovascular: negative  Gastrointestinal: negative  Genitourinary:negative  Musculoskeletal: left ankle pain   Neurological: negative  Behvioral/Psych: negative  Endocrine: negative      PHYSICAL EXAM:  Visit Vitals  BP (!) 160/90 (BP 1 Location: Left upper arm, BP Patient Position: Sitting)   Pulse 78   Temp 98 °F (36.7 °C) (Oral)   Resp 16   Ht 5' 2\" (1.575 m)   Wt 164 lb (74.4 kg)   SpO2 98%   BMI 30.00 kg/m²     Physical Assessment:   General Appearance: alert, cooperative, obese AAF sitting in chair in NAD; appears stated age  Eyes: sclera anicteric  Mouth/Throat: mask  Neck: supple; no JVD  Pulmonary:  clear to auscultation bilaterally; good effort;   Cardiovascular: regular rate and rhythm; no murmur, click, rub, or gallop  Abdomen: soft, non-tender, non-distended; bowel sounds normal  Musculoskeletal: no swelling or deformity; moves all extremities  Extremities: no edema; palpable distal pulses   Skin: warm and dry  Neuro: grossly normal  Psych: normal mood and affect given the setting      PAST MEDICAL HISTORY:  Past Medical History:   Diagnosis Date    Acute Anterior Myocardial Infarction, s/p PTCA w/ stent 10/2010 10/31/2010    Asthma     mild    Diabetes mellitus, type 2 ([facility] 8/24/2012    GERD (gastroesophageal reflux disease)     hiatal hernia    H/O recurrent Sinusitis 3/16/2011    Heart failure (HCC)     Hiatal Hernia w/ GERD (gastroesophageal reflux disease) 3/16/2011    Hypercholesterolemia     Hypertension 3/16/2011    Ill-defined condition     pacemaker put in last July 2015 because of CHF    Impaired fasting glucose 4/18/2011    Iron deficiency anemia 3/16/2011    Multinodular thyroid 3/16/2011    Palpitations, PVC's 10/13/2011    Perennial allergic rhinitis 3/16/2011    Pityriasis rosea 2012    Post-menopausal     LMP 46y. o, no HRT    Postmenopause, LMP 38 yo, No HRT 3/16/2011    Reflux esophagitis 3/16/2011    Tobacco user     Vitamin D deficiency 10/13/2011       PAST SURGICAL HISTORY:  Past Surgical History:   Procedure Laterality Date    COLONOSCOPY N/A 2017    COLONOSCOPY performed by Tereza Nunez MD at [facility] ENDOSCOPY    EGD  2009    hiatal hernia, esophagitis; Dr Renald Gosselin, COLON, DIAGNOSTIC  2009    HX GYN      FNA bilateral benign     HX LITHOTRIPSY  2012    failed    KIDNEY STONE (CALCULI) EVAL  2012    scope w/ kidney stone extractions, multiple; Dr Michael Zuleta, [facility]    [place] COLONOSCOPY W/BIOPSY SINGLE/MULTIPLE  2009    benign polyp, recheck in 5 years, Dr Maru Marinelli      cardiac stent     PTCA W/ STENT, INITIAL  10/2010    Dr Zion Eden:  Family History   Problem Relation Age of Onset    Hypertension Mother     High Cholesterol Mother     Thyroid Disease Mother     Heart Disease Mother         chf    Kidney Disease Mother     Headache Mother     Heart Disease Father     Heart Attack Father 36         MI age 36    High Cholesterol Father     Hypertension Sister     Thyroid Disease Sister     Hypertension Sister     Arthritis-rheumatoid Sister     Diabetes Sister     Thyroid Disease Sister     Heart Attack Brother         massive MI at age 46, survived    Thyroid Disease Other         niece, Neha Garcia    High Cholesterol Brother        SOCIAL HISTORY:  Social History     Socioeconomic History    Marital status:      Spouse name: vijay shea to give infor     Number of children: 0   Occupational History    Occupation: ; 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    Occupation:      Employer: SELF EMPLOYED   Tobacco Use    Smoking status: Former     Packs/day: 0.25     Years: 30.00     Pack years: 7.50     Types: Cigarettes     Quit date: 2013     Years since quittin.4    Smokeless tobacco: Never    Tobacco comments:     had quit 10/2010 then restarted ~ 3/2012;  about 5 cigarettes a day; on and off since age 28 yo x ~ 30 yrs   Substance and Sexual Activity    Alcohol use: No     Comment: none    Drug use: No    Sexual activity: Yes     Partners: Male     Birth control/protection: None     Comment:  Sherita Bull    Other Topics Concern    Caffeine Concern No     Comment: down to 1 Coke a day    Weight Concern Yes     Comment: stable now, had gained some wt over the past yr; was in low 150's previously; wants to lose 10 lbs    Special Diet Yes     Comment: heart healthy diet (white meats, salads, veggies, fruits)    Exercise Yes     Comment: exercise walking tape x 45 minutes qod   Social History Narrative    New to [facility] as of 2009, referred by nisyed Rose; pervious PCP Dr Isabel Fair:  No flowsheet data found. ALLERGY:  Allergies   Allergen Reactions    Ace Inhibitors Cough     ?10/2011    Doxycycline Nausea Only    Seafood [Shellfish Containing Products] Hives, Shortness of Breath and Nausea and Vomiting        CURRENT MEDICATIONS:    Current Outpatient Medications:     ferrous sulfate (IRON) 325 mg (65 mg iron) EC tablet, Take 1 Tablet by mouth daily (with breakfast). , Disp: 90 Tablet, Rfl: 3    amLODIPine (NORVASC) 5 mg tablet, Take 0.5 Tablets by mouth daily. Indications: high blood pressure, Disp: 30 Tablet, Rfl: 1    zolpidem (AMBIEN) 10 mg tablet, Take 1 Tablet by mouth nightly as needed for Sleep. Max Daily Amount: 10 mg., Disp: 30 Tablet, Rfl: 3    famotidine (PEPCID) 40 mg tablet, Take 1 Tablet by mouth nightly., Disp: 90 Tablet, Rfl: 3    montelukast (SINGULAIR) 10 mg tablet, TAKE 1 TAB BY MOUTH DAILY.  INDICATIONS: ALLERGIC RHINITIS, Disp: 30 Tablet, Rfl: 11    azelastine (ASTELIN) 137 mcg (0.1 %) nasal spray, azelastine 137 mcg (0.1 %) nasal spray aerosol  USE 1 SPRAY IN EACH NOSTRIL TWICE A DAY 90, Disp: , Rfl: sucralfate (CARAFATE) 1 gram tablet, sucralfate 1 gram tablet  TAKE 1 TAB BY MOUTH BEFORE MEALS AND AT BEDTIME, Disp: , Rfl:     potassium chloride (KLOR-CON) 20 mEq pack, potassium chloride 20 mEq oral packet  TAKE 1 PACKET(S) BY MOUTH DAILY, Disp: , Rfl:     co-enzyme Q-10 (CO Q-10) 100 mg capsule, Take 200 mg by mouth daily. , Disp: , Rfl:     bumetanide (BUMEX) 1 mg tablet, Take 1 mg by mouth. Take 1 tab every other day, Disp: , Rfl:     rosuvastatin (CRESTOR) 20 mg tablet, Take 20 mg by mouth nightly. Indications: high cholesterol, Disp: , Rfl:     sacubitriL-valsartan (Entresto) 24-26 mg tablet, Take 1 Tab by mouth two (2) times a day. Indications: chronic heart failure, Disp: , Rfl:     levocetirizine (XYZAL) 5 mg tablet, Take 5 mg by mouth daily. , Disp: , Rfl:     EPINEPHrine (EPIPEN) 0.3 mg/0.3 mL injection, 0.3 mL by IntraMUSCular route once as needed for up to 1 dose., Disp: 2 Syringe, Rfl: 1    carvedilol (COREG) 25 mg tablet, Take 1 Tab by mouth two (2) times a day., Disp: 60 Tab, Rfl: 11    albuterol (PROAIR HFA) 90 mcg/actuation inhaler, Take 2 Puffs by inhalation every six (6) hours as needed for Wheezing., Disp: 1 Inhaler, Rfl: 1    cholecalciferol (VITAMIN D3) (2,000 UNITS /50 MCG) cap capsule, Take 2,000 Units by mouth daily. , Disp: , Rfl:     Thank you for your referral and allowing me to participate in this patient's care.     Sherine Melissa NP  DNP, RN, [facility]-[address], [address]  Phone: [phone] TEAM:  Patient Care Team:  Lee Boudreaux MD as PCP - General (Family Medicine)  Yoni Miles MD as PCP - [address] [place] [address] Provider  Maryann Macias RN as Nurse Navigator  Mlalika Boucher MD as Physician (Cardiovascular Disease Physician)  Sloane Barnhart MD ([address] [place] [address] Vascular Surgery)     On this date 1/19/2023, I have spent a total time of  35 minutes personally reviewing new vitals, test results, notes from recent visits, face to face encounter/physical exam of patient with counseling, writing orders, performing medical decision making, and documenting.

## 2023-01-20 ENCOUNTER — TELEPHONE (OUTPATIENT)
Dept: CARDIOLOGY CLINIC | Age: 63
End: 2023-01-20

## 2023-01-20 NOTE — TELEPHONE ENCOUNTER
----- Message from John Hampton RN sent at 1/19/2023  3:27 PM EST -----  Call for BP and HR    Patient called with BP readings today:am 160/82 prior to medications, 2 hours later (including taking first dose of amlodipine): 153/79. She will call in one week with readings after taking amlodipine for one week.

## 2023-01-27 ENCOUNTER — TELEPHONE (OUTPATIENT)
Dept: CARDIOLOGY CLINIC | Age: 63
End: 2023-01-27

## 2023-01-27 NOTE — TELEPHONE ENCOUNTER
Patient called to report BP and HR since starting amlodipine 2.5 mg every day. She states she has not been taking her BP in am prior to medication, but BP has been 125/71, HR 68 after taking medication-has been consistent. She denies dizziness, shortness of breath or ankle swelling. She will continue to take this, as well as her other medications, she will call for any questions or problems.

## 2023-01-30 ENCOUNTER — TELEPHONE (OUTPATIENT)
Dept: CARDIOLOGY CLINIC | Age: 63
End: 2023-01-30

## 2023-01-30 NOTE — TELEPHONE ENCOUNTER
----- Message from Randy Rubio NP sent at 1/30/2023 10:42 AM EST -----  Uric acid is high. Will send prescription for allopurinol. Called patient using two patient identifiers. Advised patient on above information per KENNETH Barron Np. Notified her that medication prescription has been sent to pharmacy. Patient stated understanding and had no further questions.  Mounika Schwartz RN

## 2023-05-03 NOTE — ANCILLARY DISCHARGE INSTRUCTIONS
Rash on his neck and back. Small clusters of bumps on neck. First time eating cereal today.   Temperature 98.1. Saw Pediatrician yesterday and was dx with eczema. Prescribed steroid cream and about to put it on for the first time today. Also instructed to use a more moisturizing cream for skin. Also, advised to see PCP within three days. Informed to watch for signs of allergic reaction. Verbalized understanding.    Reason for Disposition   Eczema has been diagnosed   Many small smooth bumps are present    Additional Information   Negative: [1] Sudden onset of rash (within last 2 hours) AND [2] difficulty with breathing or swallowing   Negative: Has fainted or too weak to stand   Negative: [1] Purple or blood-colored spots or dots AND [2] fever   Negative: Difficult to awaken or to keep awake  (Exception: child needs normal sleep)   Negative: Sounds like a life-threatening emergency to the triager   Negative: Sounds like a life-threatening emergency to the triager   Negative: [1] Age < 12 weeks AND [2] fever 100.4 F (38.0 C) or higher rectally   Negative: Child sounds very sick or weak to the triager   Negative: [1] Fever AND [2] looks infected (spreading redness, pus, soft oozing scabs)   Negative: [1] Looks infected AND [2] large red area (> 2 in. or 5 cm)   Negative: Many small blisters or punched-out ulcers occur   Negative: [1] Looks infected (spreading redness, pus, soft oozing scabs) AND [2] no fever   Negative: Localized rash is very painful to touch   Negative: [1] Fever AND [2] unexplained   Negative: [1] Widespread SEVERE itching (constant and interferes with sleep) AND [2] persists after taking oral Benadryl for over 24 hours plus steroid cream   Negative: [1] Question about eczema treatment program (e.g., wet dressings) AND [2] triager unable to answer   Negative: [1] Caller requesting a prescription refill AND [2] triager unable to authorize per unit policy   Negative: [1] Widespread  [facility]  [address] [address]  Phone: [phone]  Fax: [phone]    Discharge Summary  2-15    Patient name: Moshe Retana  : 1960  Provider#: [id number]  Referral source: Naz Cates, *      Medical/Treatment Diagnosis: Left shoulder pain [M25.512]     Prior Hospitalization: see medical history     Comorbidities: HD, DM, Pacemaker, HTN, Asthma  Prior Level of Function: working full time  Medications: Verified on Patient Summary List    Start of Care: 19      Onset Date:2017   Visits from Start of Care: 5     Missed Visits: 5  Reporting Period : 17 to 17    Progress towards goals / Updated goals:  Short Term Goals: To be accomplished in 3-4 weeks: 1. Pt will be independent with HEP MET  2. Patient will have full passive shoulder ROM MET      Long Term Goals: To be accomplished in 6-8 weeks:  1.)Pt will be able to Reach above shoulder level without increase of pain MET  2) Pt will be able to work on the computer without increased shoulder pain MET  3) Pt will have increased FOTO score to 72.      ASSESSMENT/SUMMARY OF CARE: The pt was seen for 5 sessions at [facility] and made good progress with decreasing pain levels at work and improvement in functional ROM of her left shoulder. She failed to show to her last 2 remaining appointments and will be discharged at this time.       RECOMMENDATIONS:  [x]Discontinue therapy: []Patient has reached or is progressing toward set goals      [x]Patient is non-compliant or has abdicated      []Due to lack of appreciable progress towards set goals    Daryl Ricci 2017 11:40 AM SEVERE itching (constant and interferes with sleep) AND [2] treatment inadequate   Negative: [1] Localized SEVERE itching AND [2] persists after 2 days of steroid cream   Negative: [1] Age < 6 months AND [2] eczema difficult to control    Protocols used:  RASH OR REDNESS - WIDESPREAD-P-,  ECZEMA FOLLOW-UP CALL-P-AH       Comment: Explained to pt that condition occurs in moist environments due to yeast. Advised patient to mix Triamcinolone 0.1% topical cream with Lotrimin and apply BID until clear to affected area. Detail Level: Simple Render Risk Assessment In Note?: no

## 2023-05-12 ENCOUNTER — OFFICE VISIT (OUTPATIENT)
Age: 63
End: 2023-05-12
Payer: COMMERCIAL

## 2023-05-12 VITALS
RESPIRATION RATE: 16 BRPM | SYSTOLIC BLOOD PRESSURE: 124 MMHG | HEART RATE: 78 BPM | WEIGHT: 170 LBS | TEMPERATURE: 98.3 F | OXYGEN SATURATION: 98 % | BODY MASS INDEX: 31.28 KG/M2 | HEIGHT: 62 IN | DIASTOLIC BLOOD PRESSURE: 77 MMHG

## 2023-05-12 DIAGNOSIS — N18.4 CONTROLLED TYPE 2 DIABETES MELLITUS WITH STAGE 4 CHRONIC KIDNEY DISEASE, WITHOUT LONG-TERM CURRENT USE OF INSULIN (HCC): Primary | ICD-10-CM

## 2023-05-12 DIAGNOSIS — E11.22 CONTROLLED TYPE 2 DIABETES MELLITUS WITH STAGE 4 CHRONIC KIDNEY DISEASE, WITHOUT LONG-TERM CURRENT USE OF INSULIN (HCC): Primary | ICD-10-CM

## 2023-05-12 LAB — HBA1C MFR BLD: 6.3 %

## 2023-05-12 PROCEDURE — 83036 HEMOGLOBIN GLYCOSYLATED A1C: CPT | Performed by: STUDENT IN AN ORGANIZED HEALTH CARE EDUCATION/TRAINING PROGRAM

## 2023-05-12 PROCEDURE — 99213 OFFICE O/P EST LOW 20 MIN: CPT | Performed by: STUDENT IN AN ORGANIZED HEALTH CARE EDUCATION/TRAINING PROGRAM

## 2023-05-12 PROCEDURE — 3074F SYST BP LT 130 MM HG: CPT | Performed by: STUDENT IN AN ORGANIZED HEALTH CARE EDUCATION/TRAINING PROGRAM

## 2023-05-12 PROCEDURE — 3078F DIAST BP <80 MM HG: CPT | Performed by: STUDENT IN AN ORGANIZED HEALTH CARE EDUCATION/TRAINING PROGRAM

## 2023-05-12 SDOH — ECONOMIC STABILITY: FOOD INSECURITY: WITHIN THE PAST 12 MONTHS, THE FOOD YOU BOUGHT JUST DIDN'T LAST AND YOU DIDN'T HAVE MONEY TO GET MORE.: NEVER TRUE

## 2023-05-12 SDOH — ECONOMIC STABILITY: INCOME INSECURITY: HOW HARD IS IT FOR YOU TO PAY FOR THE VERY BASICS LIKE FOOD, HOUSING, MEDICAL CARE, AND HEATING?: NOT HARD AT ALL

## 2023-05-12 SDOH — ECONOMIC STABILITY: HOUSING INSECURITY
IN THE LAST 12 MONTHS, WAS THERE A TIME WHEN YOU DID NOT HAVE A STEADY PLACE TO SLEEP OR SLEPT IN A SHELTER (INCLUDING NOW)?: NO

## 2023-05-12 SDOH — ECONOMIC STABILITY: FOOD INSECURITY: WITHIN THE PAST 12 MONTHS, YOU WORRIED THAT YOUR FOOD WOULD RUN OUT BEFORE YOU GOT MONEY TO BUY MORE.: NEVER TRUE

## 2023-05-12 NOTE — PROGRESS NOTES
Name and Date of Birth Verified    Pharmacy Verified    Chief Complaint   Patient presents with    Follow-up     6 Months 11/4/2022 Diabetes     Order for Mammogram    1. \"Have you been to the ER, urgent care clinic since your last visit? Hospitalized since your last visit? \"     Yes  Urgent Care  3/2023  Sinus Infection    2. \"Have you seen or consulted any other health care providers outside of the [address] [place] [address] since your last visit? \"     Yes  Allergist  Routine    3. For patients aged 39-70: Has the patient had a colonoscopy / FIT/ Cologuard? Yes 2017 due 2027 results in [facility]      If the patient is female:    4. For patients aged 41-77: Has the patient had a mammogram within the past 2 years? No      5. For patients aged 21-65: Has the patient had a pap smear?  2022 Yes     Health Maintenance Due   Topic Date Due    HIV screen  Never done    Cervical cancer screen  Never done    Shingles vaccine (1 of 2) Never done    Diabetic retinal exam  03/01/2015    Breast cancer screen  05/31/2019    Diabetic foot exam  05/04/2023    Diabetic Alb to Cr ratio (uACR) test  05/04/2023    Lipids  05/31/2023
Perennial allergic rhinitis 3/16/2011    Pityriasis rosea 5/30/2012    Post-menopausal     LMP 46y. o, no HRT    Postmenopause 3/16/2011    Reflux esophagitis 3/16/2011    Sinusitis 3/16/2011    Tobacco user     Vitamin D deficiency 10/13/2011       Depression screening:  No flowsheet data found. Review of systems:   A comprehensive review of systems was negative except for that written in the History of Present Illness. Physical Exam  /77 (Site: Left Upper Arm, Position: Sitting, Cuff Size: Medium Adult)   Pulse 78   Temp 98.3 °F (36.8 °C) (Oral)   Resp 16   Ht 5' 2\" (1.575 m)   Wt 170 lb (77.1 kg)   SpO2 98%   BMI 31.09 kg/m²     General: Alert and oriented, in no acute distress. Well nourished. LUNGS: Respirations unlabored; clear to auscultation bilaterally. CARDIOVASCULAR: Regular, rate, and rhythm without murmurs, gallops or rubs. EXT: No edema. Neurovascularlly intact. Normal gait. Neurological: Alert and oriented X 3, normal strength and tone. Normal symmetric reflexes. Normal coordination and gait       Assessment/Plan   Diagnosis Orders   1. Controlled type 2 diabetes mellitus with stage 4 chronic kidney disease, without long-term current use of insulin ([facility])  AMB POC HEMOGLOBIN A1C    Microalbumin / Creatinine Urine Ratio        1. Controlled type 2 diabetes mellitus with stage 4 chronic kidney disease, without long-term current use of insulin ([facility]  Lab Results   Component Value Date/Time    FHZ5BUOC 6.3 05/12/2023 03:29 PM     Diet controlled  - Encouraged to work on lifestyle modifications including diet and exercise. - Continue home monitoring. Glucose goals; Fasting (), preprandial (<140), 2-hr post-prandial (<180)    - Microalbumin / Creatinine Urine Ratio; Future  - Follow up with Nephro for CKD IV. Follow up: 6 months or sooner      We discussed the expected course, resolution and complications of the diagnosis(es) in detail.   Medication risks, benefits,

## 2023-05-13 LAB
CREAT UR-MCNC: 51.9 MG/DL
MICROALBUMIN UR-MCNC: <0.5 MG/DL
MICROALBUMIN/CREAT UR-RTO: <10 MG/G (ref 0–30)

## 2023-05-26 ENCOUNTER — OFFICE VISIT (OUTPATIENT)
Age: 63
End: 2023-05-26
Payer: COMMERCIAL

## 2023-05-26 VITALS
HEIGHT: 62 IN | DIASTOLIC BLOOD PRESSURE: 64 MMHG | SYSTOLIC BLOOD PRESSURE: 118 MMHG | BODY MASS INDEX: 31.28 KG/M2 | WEIGHT: 170 LBS

## 2023-05-26 DIAGNOSIS — Z01.419 ENCOUNTER FOR GYNECOLOGICAL EXAMINATION WITHOUT ABNORMAL FINDING: Primary | ICD-10-CM

## 2023-05-26 DIAGNOSIS — B37.9 YEAST INFECTION: ICD-10-CM

## 2023-05-26 DIAGNOSIS — R21 RASH: ICD-10-CM

## 2023-05-26 PROCEDURE — 3078F DIAST BP <80 MM HG: CPT | Performed by: OBSTETRICS & GYNECOLOGY

## 2023-05-26 PROCEDURE — 99386 PREV VISIT NEW AGE 40-64: CPT | Performed by: OBSTETRICS & GYNECOLOGY

## 2023-05-26 PROCEDURE — 3074F SYST BP LT 130 MM HG: CPT | Performed by: OBSTETRICS & GYNECOLOGY

## 2023-05-26 RX ORDER — CLOTRIMAZOLE AND BETAMETHASONE DIPROPIONATE 10; .64 MG/G; MG/G
CREAM TOPICAL
Qty: 15 G | Refills: 0 | Status: SHIPPED | OUTPATIENT
Start: 2023-05-26

## 2023-05-26 NOTE — PROGRESS NOTES
Exam    /64   Ht 5' 2\" (1.575 m)   Wt 170 lb (77.1 kg)   BMI 31.09 kg/m²     Constitutional  Appearance: well-nourished, well developed, alert, in no acute distress    HENT  Head and Face: appears normal    Neck  Inspection/Palpation: normal appearance, no masses or tenderness  Lymph Nodes: no lymphadenopathy present  Thyroid: gland size normal, nontender, no nodules or masses present on palpation    Chest  Respiratory Effort: non-labored breathing  Auscultation: CTAB, normal breath sounds    Cardiovascular  Heart:   Auscultation: regular rate and rhythm without murmur  Extremities: no peripheral edema    Breasts   Inspection of Breasts: breasts symmetrical, no skin changes, no discharge present, nipple appearance normal, no skin retraction present, 2 cm benign appearing nevus on the right breast- upper outer quadrant  Palpation of Breasts and Axillae: no masses present on palpation, no breast tenderness  Axillary Lymph Nodes: no lymphadenopathy present    Gastrointestinal  Abdominal Examination: abdomen non-tender to palpation, normal bowel sounds, no masses present  Liver and spleen: no hepatomegaly present, spleen not palpable  Hernias: no hernias identified    Genitourinary  External Genitalia: normal appearance for age, no discharge present, no tenderness present, no inflammatory lesions present, no masses present, atrophy of UG mucosa  Groin: Right groin with crusted over lesions consistent with likely yeast infection, eczema  Vagina: normal vaginal vault without central or paravaginal defects, no discharge present, no inflammatory lesions present, no masses present  Bladder: non-tender to palpation  Urethra: appears normal  Cervix: normal, atrophic  Uterus: normal size, shape and consistency, small, mobile  Adnexa: no adnexal tenderness present, no adnexal masses present  Perineum: perineum within normal limits, no evidence of trauma, no rashes or skin lesions present    Skin  General Inspection: no

## 2023-05-30 LAB
A VAGINAE DNA VAG QL NAA+PROBE: NORMAL SCORE
BVAB2 DNA VAG QL NAA+PROBE: NORMAL SCORE
C ALBICANS DNA VAG QL NAA+PROBE: NEGATIVE
C GLABRATA DNA VAG QL NAA+PROBE: NEGATIVE
MEGA1 DNA VAG QL NAA+PROBE: NORMAL SCORE
T VAGINALIS DNA VAG QL NAA+PROBE: NEGATIVE

## 2023-05-31 DIAGNOSIS — J01.00 ACUTE MAXILLARY SINUSITIS, UNSPECIFIED: ICD-10-CM

## 2023-05-31 RX ORDER — MONTELUKAST SODIUM 10 MG/1
TABLET ORAL
Qty: 90 TABLET | Refills: 3 | Status: SHIPPED | OUTPATIENT
Start: 2023-05-31

## 2023-06-02 ENCOUNTER — TELEPHONE (OUTPATIENT)
Age: 63
End: 2023-06-02

## 2023-06-02 NOTE — TELEPHONE ENCOUNTER
Patient returning call, name and  verified. Patient given below results      Hi Tariq,     The swab of your groin did not grow yeast, but I would continue to use the clobetasol. Hopefully this does improve symptoms. Please keep me posted! ...    Written by Niki Mclean MD on 2023 10:37 AM EDT View Full Comments      Patient verbalized understanding

## 2023-06-03 LAB
CYTOLOGIST CVX/VAG CYTO: NORMAL
CYTOLOGY CVX/VAG DOC CYTO: NORMAL
CYTOLOGY CVX/VAG DOC THIN PREP: NORMAL
DX ICD CODE: NORMAL
HPV GENOTYPE REFLEX: NORMAL
HPV I/H RISK 4 DNA CVX QL PROBE+SIG AMP: NEGATIVE
Lab: NORMAL
OTHER STN SPEC: NORMAL
STAT OF ADQ CVX/VAG CYTO-IMP: NORMAL

## 2023-06-19 ENCOUNTER — APPOINTMENT (OUTPATIENT)
Facility: HOSPITAL | Age: 63
End: 2023-06-19
Payer: COMMERCIAL

## 2023-06-19 ENCOUNTER — HOSPITAL ENCOUNTER (EMERGENCY)
Facility: HOSPITAL | Age: 63
Discharge: HOME | End: 2023-06-20
Attending: EMERGENCY MEDICINE
Payer: COMMERCIAL

## 2023-06-19 DIAGNOSIS — I50.22 CHRONIC SYSTOLIC CONGESTIVE HEART FAILURE (HCC): ICD-10-CM

## 2023-06-19 DIAGNOSIS — R07.9 CHEST PAIN, UNSPECIFIED TYPE: Primary | ICD-10-CM

## 2023-06-19 DIAGNOSIS — N18.9 CHRONIC KIDNEY DISEASE, UNSPECIFIED CKD STAGE: ICD-10-CM

## 2023-06-19 DIAGNOSIS — R10.84 GENERALIZED ABDOMINAL PAIN: ICD-10-CM

## 2023-06-19 LAB
BASOPHILS # BLD: 0 K/UL (ref 0–0.1)
BASOPHILS NFR BLD: 0 % (ref 0–1)
COMMENT:: NORMAL
DIFFERENTIAL METHOD BLD: ABNORMAL
EOSINOPHIL # BLD: 0.3 K/UL (ref 0–0.4)
EOSINOPHIL NFR BLD: 3 % (ref 0–7)
ERYTHROCYTE [DISTWIDTH] IN BLOOD BY AUTOMATED COUNT: 14.3 % (ref 11.5–14.5)
HCT VFR BLD AUTO: 31.6 % (ref 35–47)
HGB BLD-MCNC: 9.6 G/DL (ref 11.5–16)
IMM GRANULOCYTES # BLD AUTO: 0 K/UL (ref 0–0.04)
IMM GRANULOCYTES NFR BLD AUTO: 0 % (ref 0–0.5)
LYMPHOCYTES # BLD: 2.9 K/UL (ref 0.8–3.5)
LYMPHOCYTES NFR BLD: 32 % (ref 12–49)
MCH RBC QN AUTO: 28.2 PG (ref 26–34)
MCHC RBC AUTO-ENTMCNC: 30.4 G/DL (ref 30–36.5)
MCV RBC AUTO: 92.9 FL (ref 80–99)
MONOCYTES # BLD: 0.7 K/UL (ref 0–1)
MONOCYTES NFR BLD: 8 % (ref 5–13)
NEUTS SEG # BLD: 5.1 K/UL (ref 1.8–8)
NEUTS SEG NFR BLD: 57 % (ref 32–75)
NRBC # BLD: 0 K/UL (ref 0–0.01)
NRBC BLD-RTO: 0 PER 100 WBC
PLATELET # BLD AUTO: 243 K/UL (ref 150–400)
PMV BLD AUTO: 9.7 FL (ref 8.9–12.9)
RBC # BLD AUTO: 3.4 M/UL (ref 3.8–5.2)
SPECIMEN HOLD: NORMAL
WBC # BLD AUTO: 9 K/UL (ref 3.6–11)

## 2023-06-19 PROCEDURE — 84484 ASSAY OF TROPONIN QUANT: CPT

## 2023-06-19 PROCEDURE — 85025 COMPLETE CBC W/AUTO DIFF WBC: CPT

## 2023-06-19 PROCEDURE — 71046 X-RAY EXAM CHEST 2 VIEWS: CPT

## 2023-06-19 PROCEDURE — 93005 ELECTROCARDIOGRAM TRACING: CPT | Performed by: STUDENT IN AN ORGANIZED HEALTH CARE EDUCATION/TRAINING PROGRAM

## 2023-06-19 PROCEDURE — 99285 EMERGENCY DEPT VISIT HI MDM: CPT

## 2023-06-19 PROCEDURE — 83880 ASSAY OF NATRIURETIC PEPTIDE: CPT

## 2023-06-19 PROCEDURE — 83690 ASSAY OF LIPASE: CPT

## 2023-06-19 PROCEDURE — 6370000000 HC RX 637 (ALT 250 FOR IP): Performed by: EMERGENCY MEDICINE

## 2023-06-19 PROCEDURE — 80053 COMPREHEN METABOLIC PANEL: CPT

## 2023-06-19 PROCEDURE — 74176 CT ABD & PELVIS W/O CONTRAST: CPT

## 2023-06-19 RX ADMIN — Medication 40 ML: at 23:41

## 2023-06-19 ASSESSMENT — PAIN DESCRIPTION - DIRECTION
RADIATING_TOWARDS: LEFT JAW
RADIATING_TOWARDS: LEFT JAW

## 2023-06-19 ASSESSMENT — PAIN DESCRIPTION - DESCRIPTORS
DESCRIPTORS: ACHING;DISCOMFORT;NAGGING;THROBBING
DESCRIPTORS: ACHING;CRAMPING;DISCOMFORT;DULL

## 2023-06-19 ASSESSMENT — PAIN DESCRIPTION - FREQUENCY: FREQUENCY: CONTINUOUS

## 2023-06-19 ASSESSMENT — ENCOUNTER SYMPTOMS
NAUSEA: 0
EYE DISCHARGE: 0
EYE PAIN: 0
SORE THROAT: 0
ABDOMINAL PAIN: 1
FACIAL SWELLING: 0
SHORTNESS OF BREATH: 0
BACK PAIN: 0
DIARRHEA: 0
VOMITING: 0
CHEST TIGHTNESS: 0
COUGH: 0

## 2023-06-19 ASSESSMENT — PAIN SCALES - GENERAL
PAINLEVEL_OUTOF10: 7
PAINLEVEL_OUTOF10: 6

## 2023-06-19 ASSESSMENT — PAIN - FUNCTIONAL ASSESSMENT: PAIN_FUNCTIONAL_ASSESSMENT: 0-10

## 2023-06-19 ASSESSMENT — PAIN DESCRIPTION - ORIENTATION
ORIENTATION: MID
ORIENTATION: LEFT

## 2023-06-19 ASSESSMENT — PAIN DESCRIPTION - LOCATION
LOCATION: CHEST;ABDOMEN
LOCATION: CHEST;ABDOMEN

## 2023-06-19 ASSESSMENT — PAIN DESCRIPTION - PAIN TYPE: TYPE: ACUTE PAIN

## 2023-06-20 VITALS
HEIGHT: 62 IN | RESPIRATION RATE: 21 BRPM | HEART RATE: 67 BPM | OXYGEN SATURATION: 99 % | WEIGHT: 164 LBS | TEMPERATURE: 98.1 F | DIASTOLIC BLOOD PRESSURE: 72 MMHG | BODY MASS INDEX: 30.18 KG/M2 | SYSTOLIC BLOOD PRESSURE: 142 MMHG

## 2023-06-20 LAB
ALBUMIN SERPL-MCNC: 3.8 G/DL (ref 3.5–5)
ALBUMIN/GLOB SERPL: 1 (ref 1.1–2.2)
ALP SERPL-CCNC: 89 U/L (ref 45–117)
ALT SERPL-CCNC: 22 U/L (ref 12–78)
ANION GAP SERPL CALC-SCNC: 10 MMOL/L (ref 5–15)
AST SERPL-CCNC: 17 U/L (ref 15–37)
BILIRUB SERPL-MCNC: 0.2 MG/DL (ref 0.2–1)
BUN SERPL-MCNC: 39 MG/DL (ref 6–20)
BUN/CREAT SERPL: 15 (ref 12–20)
CALCIUM SERPL-MCNC: 9.5 MG/DL (ref 8.5–10.1)
CHLORIDE SERPL-SCNC: 110 MMOL/L (ref 97–108)
CO2 SERPL-SCNC: 21 MMOL/L (ref 21–32)
CREAT SERPL-MCNC: 2.58 MG/DL (ref 0.55–1.02)
EKG ATRIAL RATE: 79 BPM
EKG DIAGNOSIS: NORMAL
EKG P AXIS: 66 DEGREES
EKG P-R INTERVAL: 174 MS
EKG Q-T INTERVAL: 398 MS
EKG QRS DURATION: 88 MS
EKG QTC CALCULATION (BAZETT): 456 MS
EKG R AXIS: 18 DEGREES
EKG T AXIS: 26 DEGREES
EKG VENTRICULAR RATE: 79 BPM
GLOBULIN SER CALC-MCNC: 3.8 G/DL (ref 2–4)
GLUCOSE SERPL-MCNC: 176 MG/DL (ref 65–100)
LIPASE SERPL-CCNC: 174 U/L (ref 73–393)
NT PRO BNP: 1323 PG/ML
POTASSIUM SERPL-SCNC: 3.8 MMOL/L (ref 3.5–5.1)
PROT SERPL-MCNC: 7.6 G/DL (ref 6.4–8.2)
SODIUM SERPL-SCNC: 141 MMOL/L (ref 136–145)
TROPONIN I SERPL HS-MCNC: 27 NG/L (ref 0–37)

## 2023-06-20 PROCEDURE — 93010 ELECTROCARDIOGRAM REPORT: CPT | Performed by: SPECIALIST

## 2023-06-20 ASSESSMENT — PAIN DESCRIPTION - LOCATION: LOCATION: ABDOMEN;CHEST

## 2023-06-20 ASSESSMENT — PAIN SCALES - GENERAL: PAINLEVEL_OUTOF10: 1

## 2023-06-20 NOTE — ED PROVIDER NOTES
[facility] [place] [facility] [address]      Pt Name: Angelica Cardenas  MRN: [id number]  [place] 1960  Date of evaluation: 6/19/2023  Provider: Melissa Brown MD    CHIEF COMPLAINT       Chief Complaint   Patient presents with    Abdominal Cramping    Chest Pain         HISTORY OF PRESENT ILLNESS   (Location/Symptom, Timing/Onset, Context/Setting, Quality, Duration, Modifying Factors, Severity)  Note limiting factors. 73-year-old female with chest and abdominal pain for 2 days. She states has a history of GE reflux and now has central chest pain might be related to that with a lot of belching. She also has significant coronary artery disease with 1 previous stent and an AICD/pacemaker implant. No shortness of breath no diaphoresis    The history is provided by the patient. Chest Pain  Pain location:  Substernal area  Pain quality: burning    Pain radiates to:  L jaw  Pain severity:  Mild  Onset quality:  Gradual  Duration:  2 days  Timing:  Constant  Progression:  Waxing and waning  Chronicity:  Recurrent  Context: not breathing, not drug use, not eating, not lifting, not movement, not raising an arm and not at rest    Relieved by:  Nothing  Worsened by:  Nothing  Ineffective treatments:  None tried  Associated symptoms: abdominal pain    Associated symptoms: no AICD problem, no altered mental status, no anorexia, no anxiety, no back pain, no claudication, no cough, no diaphoresis, no dizziness, no fatigue, no fever, no headache, no lower extremity edema, no nausea, no numbness, no palpitations, no PND, no shortness of breath, no syncope and no vomiting        Review of External Medical Records:     Nursing Notes were reviewed. REVIEW OF SYSTEMS    (2-9 systems for level 4, 10 or more for level 5)     Review of Systems   Constitutional:  Negative for activity change, appetite change, chills, diaphoresis, fatigue and fever.    HENT:  Negative for congestion, ear pain, facial

## 2023-06-20 NOTE — ED TRIAGE NOTES
Pt reports substernal CP w/ radiation to left jaw and upper epigastirc pain w/ belching not relieved by PPI meds x 2 days. Pt associates nausea and \"labored breathing\". PMH of PTCA  w/ 1 stent placement in 2010 and AICD placed 6-7 years ago.   Pts cardiologist is Dr Radha Crowder last seen 3 months ago for routine checkup-

## 2023-06-20 NOTE — ED NOTES
I have reviewed discharge instructions. Opportunity for questions & clarifications was provided. All questions & concerns were addressed. The patient verbalized understanding. She left ambulatory w/ her significant other in stable condition, no acute distress noted.        Aria Marinelli RN  06/20/23 [id number]

## 2023-08-12 DIAGNOSIS — G47.00 INSOMNIA, UNSPECIFIED: ICD-10-CM

## 2023-08-14 RX ORDER — ZOLPIDEM TARTRATE 10 MG/1
TABLET ORAL
Qty: 30 TABLET | Refills: 3 | Status: SHIPPED | OUTPATIENT
Start: 2023-08-14 | End: 2023-12-12

## 2023-08-14 NOTE — TELEPHONE ENCOUNTER
Last appointment: 5/12/23  Next appointment: 11/17/23  Previous refill encounter(s): 4/13/23 #30 with 3 refills    Requested Prescriptions     Pending Prescriptions Disp Refills    zolpidem (AMBIEN) 10 MG tablet [Pharmacy Med Name: ZOLPIDEM TARTRATE 10 MG TABLET] 30 tablet 3     Sig: TAKE 1 TABLET BY MOUTH NIGHTLY AS NEEDED FOR SLEEP. MAX DAILY AMOUNT: 10 MG. For Pharmacy Admin Tracking Only    Program: Medication Refill  CPA in place:    Recommendation Provided To:    Intervention Detail: New Rx: 1, reason: Patient Preference  Intervention Accepted By:   Hazel Mata Closed?:    Time Spent (min): 5

## 2023-08-16 ENCOUNTER — TELEPHONE (OUTPATIENT)
Age: 63
End: 2023-08-16

## 2023-08-16 NOTE — TELEPHONE ENCOUNTER
Patient's 08.22.23 appointment with Dyana Salazar rescheduled to 09.18.23 with Dr. Barron Siemens (Patient asked for her. and said she does not mind the wait). Thank you.

## 2023-08-21 ENCOUNTER — OFFICE VISIT (OUTPATIENT)
Age: 63
End: 2023-08-21
Payer: COMMERCIAL

## 2023-08-21 VITALS
HEART RATE: 72 BPM | DIASTOLIC BLOOD PRESSURE: 90 MMHG | WEIGHT: 170 LBS | HEIGHT: 62 IN | OXYGEN SATURATION: 99 % | TEMPERATURE: 98.5 F | SYSTOLIC BLOOD PRESSURE: 162 MMHG | RESPIRATION RATE: 16 BRPM | BODY MASS INDEX: 31.28 KG/M2

## 2023-08-21 DIAGNOSIS — M79.604 RIGHT LEG PAIN: ICD-10-CM

## 2023-08-21 DIAGNOSIS — D50.8 OTHER IRON DEFICIENCY ANEMIAS: Primary | ICD-10-CM

## 2023-08-21 DIAGNOSIS — I50.22 CHRONIC SYSTOLIC (CONGESTIVE) HEART FAILURE (HCC): ICD-10-CM

## 2023-08-21 DIAGNOSIS — E55.9 VITAMIN D DEFICIENCY, UNSPECIFIED: ICD-10-CM

## 2023-08-21 LAB
DISTANCE WALKED: NORMAL
SPO2: NORMAL

## 2023-08-21 PROCEDURE — 94618 PULMONARY STRESS TESTING: CPT | Performed by: INTERNAL MEDICINE

## 2023-08-21 PROCEDURE — 3080F DIAST BP >= 90 MM HG: CPT | Performed by: INTERNAL MEDICINE

## 2023-08-21 PROCEDURE — 3077F SYST BP >= 140 MM HG: CPT | Performed by: INTERNAL MEDICINE

## 2023-08-21 PROCEDURE — 99214 OFFICE O/P EST MOD 30 MIN: CPT | Performed by: INTERNAL MEDICINE

## 2023-08-21 RX ORDER — ASPIRIN 81 MG/1
TABLET ORAL
COMMUNITY
Start: 2022-07-12

## 2023-08-21 RX ORDER — SODIUM BICARBONATE 650 MG/1
TABLET ORAL
COMMUNITY

## 2023-08-21 ASSESSMENT — PATIENT HEALTH QUESTIONNAIRE - PHQ9
2. FEELING DOWN, DEPRESSED OR HOPELESS: 0
SUM OF ALL RESPONSES TO PHQ QUESTIONS 1-9: 0
SUM OF ALL RESPONSES TO PHQ QUESTIONS 1-9: 0
SUM OF ALL RESPONSES TO PHQ9 QUESTIONS 1 & 2: 0
1. LITTLE INTEREST OR PLEASURE IN DOING THINGS: 0
SUM OF ALL RESPONSES TO PHQ QUESTIONS 1-9: 0
SUM OF ALL RESPONSES TO PHQ QUESTIONS 1-9: 0

## 2023-08-21 NOTE — PATIENT INSTRUCTIONS
Medication changes:     No medication changes      Please record blood pressure daily before medication and two hours after medication, please record weight daily upon waking and after using the bathroom. Keep a written records of your weights and blood pressure and bring to your next appointment. If you have a weight gain of 3 or more pounds overnight OR 5 or more pounds in one week, or if your blood pressure begins to consistently run below 90/60 and/or you begin to experience dizziness or lightheadedness please contact our office at [phone] option 2. Testing Ordered:   6 minute walk completed in clinic     Lab work has been ordered to be completed within 1 week. Please present to a [facility] location of your choice with the orders provided to have lab work done. Our office will notify you of any abnormal results requiring changes to your current plan of care. Other Recommendations:     Please contact [facility] to schedule ankle brachial index testing [phone]     [facility] will also reach out to schedule right heart cath     Please call [phone] option 2 to schedule follow up with Dr. Ulysses Steve once right heart cath is scheduled. We would like to see you within 2 weeks to discuss results. Ensure your drinking an adequate amount of water with a goal of 6-8 eight ounce glasses (1.5-2 liters) of fluid daily. Your urine should be clear and light yellow straw colored. If your blood pressure begins to consistently run below 90/60 and/or you begin to experience dizziness or lightheadedness, please contact the [facility] at [phone]. Follow up after right heart cath with Dr. Crissy Glynn  with [facility]        Thank you for allowing us the privilege of being a part of your healthcare team! Please do not hesitate to contact our office at [phone] with any questions or concerns.

## 2023-08-21 NOTE — PROGRESS NOTES
ALLERGIC RHINITIS, Disp: 90 tablet, Rfl: 3    albuterol sulfate HFA (PROVENTIL;VENTOLIN;PROAIR) 108 (90 Base) MCG/ACT inhaler, Inhale 2 puffs into the lungs every 6 hours as needed, Disp: , Rfl:     allopurinol (ZYLOPRIM) 100 MG tablet, Take 1 tablet by mouth daily, Disp: , Rfl:     amLODIPine (NORVASC) 5 MG tablet, Take 0.5 tablets by mouth daily, Disp: , Rfl:     azelastine (ASTELIN) 0.1 % nasal spray, azelastine 137 mcg (0.1 %) nasal spray aerosol  USE 1 SPRAY IN EACH NOSTRIL TWICE A DAY 90, Disp: , Rfl:     bumetanide (BUMEX) 1 MG tablet, Take 1 tablet by mouth every other day, Disp: , Rfl:     carvedilol (COREG) 25 MG tablet, Take 1 tablet by mouth 2 times daily, Disp: , Rfl:     Cholecalciferol 50 MCG (2000 UT) CAPS, Take 1 capsule by mouth daily, Disp: , Rfl:     coenzyme Q10 100 MG CAPS capsule, Take 2 capsules by mouth daily, Disp: , Rfl:     EPINEPHrine (EPIPEN) 0.3 MG/0.3ML SOAJ injection, Inject 0.3 mLs into the muscle once as needed, Disp: , Rfl:     famotidine (PEPCID) 40 MG tablet, Take 1 tablet by mouth, Disp: , Rfl:     ferrous sulfate (FE TABS 325) 325 (65 Fe) MG EC tablet, Take 1 tablet by mouth daily (with breakfast), Disp: , Rfl:     levocetirizine (XYZAL) 5 MG tablet, Take 1 tablet by mouth daily, Disp: , Rfl:     potassium chloride (KLOR-CON) 20 MEQ packet, Take 20 mEq by mouth daily as needed Takes as needed as directed by nephrologist, Disp: , Rfl:     rosuvastatin (CRESTOR) 20 MG tablet, Take 1 tablet by mouth, Disp: , Rfl:     sacubitril-valsartan (ENTRESTO) 24-26 MG per tablet, Take 1 tablet by mouth 2 times daily, Disp: , Rfl:     sucralfate (CARAFATE) 1 GM tablet, sucralfate 1 gram tablet  TAKE 1 TAB BY MOUTH BEFORE MEALS AND AT BEDTIME, Disp: , Rfl:     clotrimazole-betamethasone (LOTRISONE) 1-0.05 % cream, Apply topically 2 times daily.  (Patient not taking: Reported on 8/21/2023), Disp: 15 g, Rfl: 0    Thank you for your referral and allowing me to participate in this patient's

## 2023-08-22 LAB
25(OH)D3+25(OH)D2 SERPL-MCNC: 38.3 NG/ML (ref 30–100)
ALBUMIN SERPL-MCNC: 4.7 G/DL (ref 3.8–4.9)
ALBUMIN/GLOB SERPL: 2 {RATIO} (ref 1.2–2.2)
ALP SERPL-CCNC: 101 IU/L (ref 44–121)
ALT SERPL-CCNC: 13 IU/L (ref 0–32)
AST SERPL-CCNC: 18 IU/L (ref 0–40)
BASOPHILS # BLD AUTO: 0 X10E3/UL (ref 0–0.2)
BASOPHILS NFR BLD AUTO: 1 %
BILIRUB SERPL-MCNC: 0.2 MG/DL (ref 0–1.2)
BNP SERPL-MCNC: 140 PG/ML (ref 0–100)
BUN SERPL-MCNC: 32 MG/DL (ref 8–27)
BUN/CREAT SERPL: 15 (ref 12–28)
CALCIUM SERPL-MCNC: 10.2 MG/DL (ref 8.7–10.3)
CHLORIDE SERPL-SCNC: 106 MMOL/L (ref 96–106)
CO2 SERPL-SCNC: 20 MMOL/L (ref 20–29)
CREAT SERPL-MCNC: 2.12 MG/DL (ref 0.57–1)
EGFRCR SERPLBLD CKD-EPI 2021: 26 ML/MIN/1.73
EOSINOPHIL # BLD AUTO: 0.2 X10E3/UL (ref 0–0.4)
EOSINOPHIL NFR BLD AUTO: 2 %
ERYTHROCYTE [DISTWIDTH] IN BLOOD BY AUTOMATED COUNT: 13.1 % (ref 11.7–15.4)
FERRITIN SERPL-MCNC: 248 NG/ML (ref 15–150)
GLOBULIN SER CALC-MCNC: 2.4 G/DL (ref 1.5–4.5)
GLUCOSE SERPL-MCNC: 114 MG/DL (ref 70–99)
HCT VFR BLD AUTO: 33.5 % (ref 34–46.6)
HGB BLD-MCNC: 10.6 G/DL (ref 11.1–15.9)
IMM GRANULOCYTES # BLD AUTO: 0 X10E3/UL (ref 0–0.1)
IMM GRANULOCYTES NFR BLD AUTO: 0 %
IRON SATN MFR SERPL: 19 % (ref 15–55)
IRON SERPL-MCNC: 52 UG/DL (ref 27–159)
LYMPHOCYTES # BLD AUTO: 2 X10E3/UL (ref 0.7–3.1)
LYMPHOCYTES NFR BLD AUTO: 30 %
MCH RBC QN AUTO: 28.9 PG (ref 26.6–33)
MCHC RBC AUTO-ENTMCNC: 31.6 G/DL (ref 31.5–35.7)
MCV RBC AUTO: 91 FL (ref 79–97)
MONOCYTES # BLD AUTO: 0.6 X10E3/UL (ref 0.1–0.9)
MONOCYTES NFR BLD AUTO: 8 %
NEUTROPHILS # BLD AUTO: 3.9 X10E3/UL (ref 1.4–7)
NEUTROPHILS NFR BLD AUTO: 59 %
PLATELET # BLD AUTO: 241 X10E3/UL (ref 150–450)
POTASSIUM SERPL-SCNC: 4.1 MMOL/L (ref 3.5–5.2)
PROT SERPL-MCNC: 7.1 G/DL (ref 6–8.5)
PTH-INTACT SERPL-MCNC: 88 PG/ML (ref 15–65)
RBC # BLD AUTO: 3.67 X10E6/UL (ref 3.77–5.28)
SODIUM SERPL-SCNC: 144 MMOL/L (ref 134–144)
TIBC SERPL-MCNC: 268 UG/DL (ref 250–450)
TROPONIN T SERPL HS-MCNC: 26 NG/L (ref 0–14)
UIBC SERPL-MCNC: 216 UG/DL (ref 131–425)
URATE SERPL-MCNC: 5.7 MG/DL (ref 3–7.2)
WBC # BLD AUTO: 6.6 X10E3/UL (ref 3.4–10.8)

## 2023-08-23 ENCOUNTER — TELEPHONE (OUTPATIENT)
Age: 63
End: 2023-08-23

## 2023-08-23 NOTE — TELEPHONE ENCOUNTER
BEBETO and right heart cath orders faxed with fax confirmation on 8/21/23.       8/24/23     Called [facility] and left a message requesting call back about scheduling for right heart cath with Dr. Akua Kern. Received call from [facility] that patient is scheduled for 9/19/23 with Dr. Akua Kern for right heart cath.  Message sent to schedulers to schedule patients follow up appt

## 2023-09-15 ENCOUNTER — TELEPHONE (OUTPATIENT)
Age: 63
End: 2023-09-15

## 2023-09-15 NOTE — TELEPHONE ENCOUNTER
Patient called to reschedule her 09.21.23 appointment with Dr. Katarina Meyer because she has a rescheduled procedure, and she is supposed to see Dr. Katarina Meyer after the procedure. Patient rescheduled.

## 2023-09-26 ENCOUNTER — APPOINTMENT (OUTPATIENT)
Facility: HOSPITAL | Age: 63
DRG: 291 | End: 2023-09-26
Payer: COMMERCIAL

## 2023-09-26 ENCOUNTER — HOSPITAL ENCOUNTER (INPATIENT)
Facility: HOSPITAL | Age: 63
LOS: 1 days | Discharge: HOME | DRG: 291 | End: 2023-09-28
Attending: EMERGENCY MEDICINE | Admitting: STUDENT IN AN ORGANIZED HEALTH CARE EDUCATION/TRAINING PROGRAM
Payer: COMMERCIAL

## 2023-09-26 DIAGNOSIS — R60.9 PERIPHERAL EDEMA: ICD-10-CM

## 2023-09-26 DIAGNOSIS — I50.22 CHRONIC SYSTOLIC HEART FAILURE (HCC): ICD-10-CM

## 2023-09-26 DIAGNOSIS — R06.02 SHORTNESS OF BREATH: ICD-10-CM

## 2023-09-26 DIAGNOSIS — I50.9 ACUTE ON CHRONIC CONGESTIVE HEART FAILURE, UNSPECIFIED HEART FAILURE TYPE (HCC): Primary | ICD-10-CM

## 2023-09-26 DIAGNOSIS — R79.89 ELEVATED TROPONIN: ICD-10-CM

## 2023-09-26 LAB
ALBUMIN SERPL-MCNC: 3.4 G/DL (ref 3.5–5)
ALBUMIN/GLOB SERPL: 1 (ref 1.1–2.2)
ALP SERPL-CCNC: 82 U/L (ref 45–117)
ALT SERPL-CCNC: 35 U/L (ref 12–78)
ANION GAP SERPL CALC-SCNC: 6 MMOL/L (ref 5–15)
AST SERPL-CCNC: 25 U/L (ref 15–37)
BASOPHILS # BLD: 0 K/UL (ref 0–0.1)
BASOPHILS NFR BLD: 0 % (ref 0–1)
BILIRUB SERPL-MCNC: 0.2 MG/DL (ref 0.2–1)
BUN SERPL-MCNC: 45 MG/DL (ref 6–20)
BUN/CREAT SERPL: 17 (ref 12–20)
CALCIUM SERPL-MCNC: 8.8 MG/DL (ref 8.5–10.1)
CHLORIDE SERPL-SCNC: 110 MMOL/L (ref 97–108)
CO2 SERPL-SCNC: 24 MMOL/L (ref 21–32)
COMMENT:: NORMAL
CREAT SERPL-MCNC: 2.63 MG/DL (ref 0.55–1.02)
DIFFERENTIAL METHOD BLD: ABNORMAL
EOSINOPHIL # BLD: 0.1 K/UL (ref 0–0.4)
EOSINOPHIL NFR BLD: 1 % (ref 0–7)
ERYTHROCYTE [DISTWIDTH] IN BLOOD BY AUTOMATED COUNT: 15.6 % (ref 11.5–14.5)
GLOBULIN SER CALC-MCNC: 3.5 G/DL (ref 2–4)
GLUCOSE SERPL-MCNC: 234 MG/DL (ref 65–100)
HCT VFR BLD AUTO: 29.6 % (ref 35–47)
HGB BLD-MCNC: 9.3 G/DL (ref 11.5–16)
IMM GRANULOCYTES # BLD AUTO: 0.1 K/UL (ref 0–0.04)
IMM GRANULOCYTES NFR BLD AUTO: 1 % (ref 0–0.5)
LYMPHOCYTES # BLD: 2.4 K/UL (ref 0.8–3.5)
LYMPHOCYTES NFR BLD: 22 % (ref 12–49)
MCH RBC QN AUTO: 28.4 PG (ref 26–34)
MCHC RBC AUTO-ENTMCNC: 31.4 G/DL (ref 30–36.5)
MCV RBC AUTO: 90.5 FL (ref 80–99)
MONOCYTES # BLD: 0.9 K/UL (ref 0–1)
MONOCYTES NFR BLD: 9 % (ref 5–13)
NEUTS SEG # BLD: 7.3 K/UL (ref 1.8–8)
NEUTS SEG NFR BLD: 67 % (ref 32–75)
NRBC # BLD: 0 K/UL (ref 0–0.01)
NRBC BLD-RTO: 0 PER 100 WBC
NT PRO BNP: 2673 PG/ML
PLATELET # BLD AUTO: 172 K/UL (ref 150–400)
PMV BLD AUTO: 9.6 FL (ref 8.9–12.9)
POTASSIUM SERPL-SCNC: 3.3 MMOL/L (ref 3.5–5.1)
PROT SERPL-MCNC: 6.9 G/DL (ref 6.4–8.2)
RBC # BLD AUTO: 3.27 M/UL (ref 3.8–5.2)
SODIUM SERPL-SCNC: 140 MMOL/L (ref 136–145)
SPECIMEN HOLD: NORMAL
TROPONIN I SERPL HS-MCNC: 120 NG/L (ref 0–51)
WBC # BLD AUTO: 10.8 K/UL (ref 3.6–11)

## 2023-09-26 PROCEDURE — 80053 COMPREHEN METABOLIC PANEL: CPT

## 2023-09-26 PROCEDURE — 84484 ASSAY OF TROPONIN QUANT: CPT

## 2023-09-26 PROCEDURE — 36415 COLL VENOUS BLD VENIPUNCTURE: CPT

## 2023-09-26 PROCEDURE — 71046 X-RAY EXAM CHEST 2 VIEWS: CPT

## 2023-09-26 PROCEDURE — 83880 ASSAY OF NATRIURETIC PEPTIDE: CPT

## 2023-09-26 PROCEDURE — 85025 COMPLETE CBC W/AUTO DIFF WBC: CPT

## 2023-09-26 PROCEDURE — 99285 EMERGENCY DEPT VISIT HI MDM: CPT

## 2023-09-26 PROCEDURE — 93005 ELECTROCARDIOGRAM TRACING: CPT | Performed by: EMERGENCY MEDICINE

## 2023-09-27 ENCOUNTER — APPOINTMENT (OUTPATIENT)
Facility: HOSPITAL | Age: 63
DRG: 291 | End: 2023-09-27
Attending: STUDENT IN AN ORGANIZED HEALTH CARE EDUCATION/TRAINING PROGRAM
Payer: COMMERCIAL

## 2023-09-27 PROBLEM — I50.30 DIASTOLIC HEART FAILURE OF UNKNOWN ETIOLOGY (HCC): Status: ACTIVE | Noted: 2023-09-27

## 2023-09-27 LAB
ANION GAP SERPL CALC-SCNC: 4 MMOL/L (ref 5–15)
BUN SERPL-MCNC: 42 MG/DL (ref 6–20)
BUN/CREAT SERPL: 19 (ref 12–20)
CALCIUM SERPL-MCNC: 9.1 MG/DL (ref 8.5–10.1)
CHLORIDE SERPL-SCNC: 114 MMOL/L (ref 97–108)
CO2 SERPL-SCNC: 26 MMOL/L (ref 21–32)
CREAT SERPL-MCNC: 2.27 MG/DL (ref 0.55–1.02)
EKG ATRIAL RATE: 87 BPM
EKG DIAGNOSIS: NORMAL
EKG P AXIS: 60 DEGREES
EKG P-R INTERVAL: 160 MS
EKG Q-T INTERVAL: 372 MS
EKG QRS DURATION: 84 MS
EKG QTC CALCULATION (BAZETT): 447 MS
EKG R AXIS: 17 DEGREES
EKG T AXIS: 57 DEGREES
EKG VENTRICULAR RATE: 87 BPM
GLUCOSE SERPL-MCNC: 130 MG/DL (ref 65–100)
POTASSIUM SERPL-SCNC: 3.7 MMOL/L (ref 3.5–5.1)
SODIUM SERPL-SCNC: 144 MMOL/L (ref 136–145)
TROPONIN I SERPL HS-MCNC: 103 NG/L (ref 0–51)
TROPONIN I SERPL HS-MCNC: 95 NG/L (ref 0–51)

## 2023-09-27 PROCEDURE — 96374 THER/PROPH/DIAG INJ IV PUSH: CPT

## 2023-09-27 PROCEDURE — 2500000003 HC RX 250 WO HCPCS: Performed by: EMERGENCY MEDICINE

## 2023-09-27 PROCEDURE — 6370000000 HC RX 637 (ALT 250 FOR IP): Performed by: STUDENT IN AN ORGANIZED HEALTH CARE EDUCATION/TRAINING PROGRAM

## 2023-09-27 PROCEDURE — 6370000000 HC RX 637 (ALT 250 FOR IP): Performed by: NURSE PRACTITIONER

## 2023-09-27 PROCEDURE — 2580000003 HC RX 258: Performed by: STUDENT IN AN ORGANIZED HEALTH CARE EDUCATION/TRAINING PROGRAM

## 2023-09-27 PROCEDURE — 2060000000 HC ICU INTERMEDIATE R&B

## 2023-09-27 PROCEDURE — 6360000004 HC RX CONTRAST MEDICATION: Performed by: FAMILY MEDICINE

## 2023-09-27 PROCEDURE — 84484 ASSAY OF TROPONIN QUANT: CPT

## 2023-09-27 PROCEDURE — 99254 IP/OBS CNSLTJ NEW/EST MOD 60: CPT | Performed by: NURSE PRACTITIONER

## 2023-09-27 PROCEDURE — 6360000002 HC RX W HCPCS: Performed by: STUDENT IN AN ORGANIZED HEALTH CARE EDUCATION/TRAINING PROGRAM

## 2023-09-27 PROCEDURE — C8929 TTE W OR WO FOL WCON,DOPPLER: HCPCS

## 2023-09-27 PROCEDURE — 80048 BASIC METABOLIC PNL TOTAL CA: CPT

## 2023-09-27 PROCEDURE — 2500000003 HC RX 250 WO HCPCS: Performed by: STUDENT IN AN ORGANIZED HEALTH CARE EDUCATION/TRAINING PROGRAM

## 2023-09-27 PROCEDURE — 36415 COLL VENOUS BLD VENIPUNCTURE: CPT

## 2023-09-27 RX ORDER — BUMETANIDE 0.25 MG/ML
1 INJECTION INTRAMUSCULAR; INTRAVENOUS ONCE
Status: COMPLETED | OUTPATIENT
Start: 2023-09-27 | End: 2023-09-27

## 2023-09-27 RX ORDER — HYDRALAZINE HYDROCHLORIDE 25 MG/1
25 TABLET, FILM COATED ORAL EVERY 8 HOURS SCHEDULED
Status: DISCONTINUED | OUTPATIENT
Start: 2023-09-27 | End: 2023-09-28 | Stop reason: HOSPADM

## 2023-09-27 RX ORDER — POTASSIUM CHLORIDE 750 MG/1
40 TABLET, FILM COATED, EXTENDED RELEASE ORAL EVERY 4 HOURS
Status: COMPLETED | OUTPATIENT
Start: 2023-09-27 | End: 2023-09-27

## 2023-09-27 RX ORDER — HEPARIN SODIUM 5000 [USP'U]/ML
5000 INJECTION, SOLUTION INTRAVENOUS; SUBCUTANEOUS EVERY 8 HOURS SCHEDULED
Status: DISCONTINUED | OUTPATIENT
Start: 2023-09-27 | End: 2023-09-28 | Stop reason: HOSPADM

## 2023-09-27 RX ORDER — CARVEDILOL 12.5 MG/1
25 TABLET ORAL 2 TIMES DAILY
Status: DISCONTINUED | OUTPATIENT
Start: 2023-09-27 | End: 2023-09-28 | Stop reason: HOSPADM

## 2023-09-27 RX ORDER — POLYETHYLENE GLYCOL 3350 17 G/17G
17 POWDER, FOR SOLUTION ORAL DAILY PRN
Status: DISCONTINUED | OUTPATIENT
Start: 2023-09-27 | End: 2023-09-28 | Stop reason: HOSPADM

## 2023-09-27 RX ORDER — FERROUS SULFATE 325(65) MG
325 TABLET ORAL
Status: DISCONTINUED | OUTPATIENT
Start: 2023-09-27 | End: 2023-09-28 | Stop reason: HOSPADM

## 2023-09-27 RX ORDER — FAMOTIDINE 20 MG/1
40 TABLET, FILM COATED ORAL DAILY
Status: DISCONTINUED | OUTPATIENT
Start: 2023-09-27 | End: 2023-09-27

## 2023-09-27 RX ORDER — BUMETANIDE 0.25 MG/ML
2 INJECTION INTRAMUSCULAR; INTRAVENOUS 2 TIMES DAILY
Status: DISCONTINUED | OUTPATIENT
Start: 2023-09-27 | End: 2023-09-28

## 2023-09-27 RX ORDER — ALLOPURINOL 100 MG/1
100 TABLET ORAL DAILY
Status: DISCONTINUED | OUTPATIENT
Start: 2023-09-27 | End: 2023-09-28 | Stop reason: HOSPADM

## 2023-09-27 RX ORDER — SODIUM CHLORIDE 0.9 % (FLUSH) 0.9 %
5-40 SYRINGE (ML) INJECTION EVERY 12 HOURS SCHEDULED
Status: DISCONTINUED | OUTPATIENT
Start: 2023-09-27 | End: 2023-09-28 | Stop reason: HOSPADM

## 2023-09-27 RX ORDER — ROSUVASTATIN CALCIUM 10 MG/1
20 TABLET, COATED ORAL NIGHTLY
Status: DISCONTINUED | OUTPATIENT
Start: 2023-09-27 | End: 2023-09-28 | Stop reason: HOSPADM

## 2023-09-27 RX ORDER — ZOLPIDEM TARTRATE 5 MG/1
5 TABLET ORAL NIGHTLY PRN
Status: DISCONTINUED | OUTPATIENT
Start: 2023-09-27 | End: 2023-09-28 | Stop reason: HOSPADM

## 2023-09-27 RX ORDER — FAMOTIDINE 20 MG/1
20 TABLET, FILM COATED ORAL DAILY
Status: DISCONTINUED | OUTPATIENT
Start: 2023-09-28 | End: 2023-09-28 | Stop reason: HOSPADM

## 2023-09-27 RX ORDER — MONTELUKAST SODIUM 10 MG/1
10 TABLET ORAL NIGHTLY
Status: DISCONTINUED | OUTPATIENT
Start: 2023-09-27 | End: 2023-09-28 | Stop reason: HOSPADM

## 2023-09-27 RX ORDER — ONDANSETRON 4 MG/1
4 TABLET, ORALLY DISINTEGRATING ORAL EVERY 8 HOURS PRN
Status: DISCONTINUED | OUTPATIENT
Start: 2023-09-27 | End: 2023-09-28 | Stop reason: HOSPADM

## 2023-09-27 RX ORDER — ASPIRIN 81 MG/1
81 TABLET ORAL DAILY
Status: DISCONTINUED | OUTPATIENT
Start: 2023-09-27 | End: 2023-09-28 | Stop reason: HOSPADM

## 2023-09-27 RX ORDER — ACETAMINOPHEN 650 MG/1
650 SUPPOSITORY RECTAL EVERY 6 HOURS PRN
Status: DISCONTINUED | OUTPATIENT
Start: 2023-09-27 | End: 2023-09-28 | Stop reason: HOSPADM

## 2023-09-27 RX ORDER — ONDANSETRON 2 MG/ML
4 INJECTION INTRAMUSCULAR; INTRAVENOUS EVERY 6 HOURS PRN
Status: DISCONTINUED | OUTPATIENT
Start: 2023-09-27 | End: 2023-09-28 | Stop reason: HOSPADM

## 2023-09-27 RX ORDER — SODIUM CHLORIDE 0.9 % (FLUSH) 0.9 %
5-40 SYRINGE (ML) INJECTION PRN
Status: DISCONTINUED | OUTPATIENT
Start: 2023-09-27 | End: 2023-09-28 | Stop reason: HOSPADM

## 2023-09-27 RX ORDER — SODIUM CHLORIDE 9 MG/ML
INJECTION, SOLUTION INTRAVENOUS PRN
Status: DISCONTINUED | OUTPATIENT
Start: 2023-09-27 | End: 2023-09-28 | Stop reason: HOSPADM

## 2023-09-27 RX ORDER — AMLODIPINE BESYLATE 5 MG/1
2.5 TABLET ORAL DAILY
Status: DISCONTINUED | OUTPATIENT
Start: 2023-09-27 | End: 2023-09-28 | Stop reason: HOSPADM

## 2023-09-27 RX ORDER — ACETAMINOPHEN 325 MG/1
650 TABLET ORAL EVERY 6 HOURS PRN
Status: DISCONTINUED | OUTPATIENT
Start: 2023-09-27 | End: 2023-09-28 | Stop reason: HOSPADM

## 2023-09-27 RX ADMIN — HYDRALAZINE HYDROCHLORIDE 25 MG: 25 TABLET ORAL at 20:10

## 2023-09-27 RX ADMIN — BUMETANIDE 2 MG: 0.25 INJECTION INTRAMUSCULAR; INTRAVENOUS at 09:49

## 2023-09-27 RX ADMIN — FAMOTIDINE 40 MG: 20 TABLET ORAL at 08:50

## 2023-09-27 RX ADMIN — MONTELUKAST 10 MG: 10 TABLET, FILM COATED ORAL at 20:10

## 2023-09-27 RX ADMIN — FERROUS SULFATE TAB 325 MG (65 MG ELEMENTAL FE) 325 MG: 325 (65 FE) TAB at 08:51

## 2023-09-27 RX ADMIN — SODIUM CHLORIDE, PRESERVATIVE FREE 10 ML: 5 INJECTION INTRAVENOUS at 20:10

## 2023-09-27 RX ADMIN — BUMETANIDE 2 MG: 0.25 INJECTION INTRAMUSCULAR; INTRAVENOUS at 20:10

## 2023-09-27 RX ADMIN — POTASSIUM CHLORIDE 40 MEQ: 750 TABLET, FILM COATED, EXTENDED RELEASE ORAL at 02:09

## 2023-09-27 RX ADMIN — ALLOPURINOL 100 MG: 100 TABLET ORAL at 08:51

## 2023-09-27 RX ADMIN — HEPARIN SODIUM 5000 UNITS: 5000 INJECTION INTRAVENOUS; SUBCUTANEOUS at 14:21

## 2023-09-27 RX ADMIN — ROSUVASTATIN CALCIUM 20 MG: 10 TABLET, COATED ORAL at 02:15

## 2023-09-27 RX ADMIN — MONTELUKAST 10 MG: 10 TABLET, FILM COATED ORAL at 02:15

## 2023-09-27 RX ADMIN — ASPIRIN 81 MG: 81 TABLET, COATED ORAL at 08:50

## 2023-09-27 RX ADMIN — BUMETANIDE 1 MG: 0.25 INJECTION INTRAMUSCULAR; INTRAVENOUS at 00:16

## 2023-09-27 RX ADMIN — AMLODIPINE BESYLATE 2.5 MG: 5 TABLET ORAL at 08:50

## 2023-09-27 RX ADMIN — CARVEDILOL 25 MG: 12.5 TABLET, FILM COATED ORAL at 20:10

## 2023-09-27 RX ADMIN — HEPARIN SODIUM 5000 UNITS: 5000 INJECTION INTRAVENOUS; SUBCUTANEOUS at 20:10

## 2023-09-27 RX ADMIN — ROSUVASTATIN CALCIUM 20 MG: 10 TABLET, COATED ORAL at 20:10

## 2023-09-27 RX ADMIN — CARVEDILOL 25 MG: 12.5 TABLET, FILM COATED ORAL at 08:51

## 2023-09-27 RX ADMIN — PERFLUTREN 1.5 ML: 6.52 INJECTION, SUSPENSION INTRAVENOUS at 16:12

## 2023-09-27 RX ADMIN — HEPARIN SODIUM 5000 UNITS: 5000 INJECTION INTRAVENOUS; SUBCUTANEOUS at 02:08

## 2023-09-27 RX ADMIN — BUMETANIDE 1 MG: 0.25 INJECTION INTRAMUSCULAR; INTRAVENOUS at 02:08

## 2023-09-27 RX ADMIN — POTASSIUM CHLORIDE 40 MEQ: 750 TABLET, FILM COATED, EXTENDED RELEASE ORAL at 06:32

## 2023-09-27 NOTE — ED NOTES
RN to bedside. Labs obtained and medications administered per MD order. Pt updated on plan of care. No needs at this time.        Yoni Nicole RN  09/27/23 9615

## 2023-09-27 NOTE — NURSE NAVIGATOR
based on the guidelines: 2022 AHA/ACC/HFSA Guideline for the Management of Heart Failure: A Report of the [facility] on Clinical Practice Guidelines. Circulation 2022; [id number]. and 2017 AHA/ACC/HRS guideline for management of patients with ventricular arrhythmias and the prevention of sudden cardiac death: A Report of the [facility] of Cardiology/American Heart Association Task Force on Clinical Practice Guidelines and the Heart Rhythm Society.  Heart Rhythm, Vol 15, No 10, October 2018 *Class of Recommendation: Class 1 (strong), Class 2a (moderate), Class 2b (weak), Class 3 (not recommended, potentially harmful)

## 2023-09-27 NOTE — CONSULTS
[address] in [place], [place]  Inpatient Progress Note      Patient name: Mack Zuniga  Patient : 1960  Patient MRN: [id number]  Consulting MD: Verna Martin MD  Date of service: 23    REASON FOR REFERRAL:  Management of chronic systolic heart failure    PLAN OF CARE - ATTENDING ATTESTATION     Briefly Juan F Pham is a 62 y/o female with asthma and HFpEF 40-45% due to mixed ischemic and non-ischemic cardiomyopathy, stage C, NYHA class IIIA (6MW 304 meters) symptoms on GDMT; difficult to dose diuretics due to CKD stage 3 with Cr 2-2.6  RHC last year showed normal right sided and elevated wedge 20 with normal cardiac output indicating diastolic dysfunction; study was done at weight 156lbs (dry weight)   Patient is more symptomatic today from class II to IIIA, weight gained 14lbs from estimated dry weight by RHC and Cr > 2.2 - will schedule RHC to determine dry weight > uncertain if this is volume overload, worsening of renal function, lung function or weight gain  Continue IV diuresis and nephrology consultation    Patient was seen and examined by me. I reviewed the note and data. I have discussed and agree with the plan as noted in the ANP note beneath with the following corrections: none. Time of visit: 15 minutes     Kimberley Rojas MD PhD  Owen Parker       HPI    62 y/o F pt with pmh of HFmrEF (31-55%) with diastolic dysfunction, DM2, HTN, CKD III, HLD, CAD s/p PCI presented [facility] for SOB. States she self increased her home dose of Bumex without any improvement in her symptoms. Advanced Heart failure consulted for management of CHF exacerbation. Upon my assessment, pt tachypneic but able to talk in full sentences, HDS, SPO2 99% on RA, HR 70-80s.     RECOMMENDATIONS:  -Continue current medical therapy for heart failure  -Continue current dose of beta-blocker (Coreg 25mg)  -Hold Entresto in the

## 2023-09-27 NOTE — H&P
History & Physical    Primary Care Provider: Lily Monzon MD  Source of Information: Patient and chart review    History of Presenting Illness:   Juan F Pham is a 63 y.o. female with hx of obesity, dm ii, htn, ckd iii, dyslipidemia, chf, cad s/p pci, who presents to hospital with complaints of sob. Had been in his usual state of health until 3 days ago when she noted recent orthopnea and lower extremity edema. States she self increased her home dose of Bumex without any improvement in her symptoms. The patient denies any fever, chills, chest or abdominal pain, nausea, vomiting, cough, congestion, recent illness, palpitations, or dysuria. Remarkable vitals on ER Presentation: bp to 171/79  Labs Remarkable for: hgb 9.3, trop 120, cr 2.63, bnp 2673  ER Images: cxr: no acute process  ER Rx: bumex 1mg     Review of Systems:  Pertinent items are noted in the History of Present Illness. Past Medical History:   Diagnosis Date    Asthma     mild    Diabetes mellitus, type 2 ([address]) 8/24/2012    GERD (gastroesophageal reflux disease) 3/16/2011    GERD (gastroesophageal reflux disease)     hiatal hernia    Heart failure ([address])     Hypercholesterolemia     Hypertension 3/16/2011    Ill-defined condition     pacemaker put in last July 2015 because of CHF    Impaired fasting glucose 4/18/2011    Iron deficiency anemia 3/16/2011    Multinodular thyroid 3/16/2011    Myocardial infarction, anterior, acute, initial episode ([address]) 10/31/2010    Palpitations 10/13/2011    Perennial allergic rhinitis 3/16/2011    Pityriasis rosea 5/30/2012    Post-menopausal     LMP 46y. o, no HRT    Postmenopause 3/16/2011    Reflux esophagitis 3/16/2011    Sinusitis 3/16/2011    Tobacco user     Vitamin D deficiency 10/13/2011      Past Surgical History:   Procedure Laterality Date    COLONOSCOPY N/A 4/19/2017    COLONOSCOPY performed by Aníbal Redmond MD at [facility] ENDOSCOPY    COLONOSCOPY  4/2009    COLONOSCOPY W/BIOPSY

## 2023-09-27 NOTE — ED NOTES
RN to bedside, pt assisted to bedside commode. Pt has no other needs at this time.   Call bell within reach     Jono Urias RN  09/27/23 7875

## 2023-09-27 NOTE — ED PROVIDER NOTES
[facility]      Pt Name: Albert Morgan  MRN: [id number]  [address] [facility] [place] 1960  Date of evaluation: 9/26/2023  Provider: Anabell Rosario       Chief Complaint   Patient presents with    Shortness of Breath         HISTORY OF PRESENT ILLNESS   (Location/Symptom, Timing/Onset, Context/Setting, Quality, Duration, Modifying Factors, Severity)  Note limiting factors. 63-year-old female comes in for dyspnea. Patient has a history of congestive heart failure. She states over the last several days has been having worsening exertional dyspnea. She is sleeping with 2 pillows behind her. Walking up the stairs would make her extremely short of breath and is walking on the [place] but make her short of breath as well. She notes that she feels like her legs have been little bit more swollen than recent. She is been taking her medications as prescribed. She denies any chest pain fevers chills or other complaints            Review of External Medical Records:     Nursing Notes were reviewed. REVIEW OF SYSTEMS    (2-9 systems for level 4, 10 or more for level 5)     Review of Systems    Except as noted above the remainder of the review of systems was reviewed and negative.        PAST MEDICAL HISTORY     Past Medical History:   Diagnosis Date    Asthma     mild    Diabetes mellitus, type 2 ([address]) 8/24/2012    GERD (gastroesophageal reflux disease) 3/16/2011    GERD (gastroesophageal reflux disease)     hiatal hernia    Heart failure ([address])     Hypercholesterolemia     Hypertension 3/16/2011    Ill-defined condition     pacemaker put in last July 2015 because of CHF    Impaired fasting glucose 4/18/2011    Iron deficiency anemia 3/16/2011    Multinodular thyroid 3/16/2011    Myocardial infarction, anterior, acute, initial episode ([address]) 10/31/2010    Palpitations 10/13/2011    Perennial allergic rhinitis 3/16/2011    Pityriasis rosea 5/30/2012

## 2023-09-27 NOTE — ED NOTES
RN to bedside. Pt updated on plan of care and medicated per MD order. Pt has no needs at this time.        José Miguel Brand RN  09/27/23 9189

## 2023-09-28 VITALS
DIASTOLIC BLOOD PRESSURE: 71 MMHG | SYSTOLIC BLOOD PRESSURE: 117 MMHG | HEIGHT: 62 IN | HEART RATE: 77 BPM | RESPIRATION RATE: 18 BRPM | TEMPERATURE: 98.1 F | OXYGEN SATURATION: 95 % | WEIGHT: 174.6 LBS | BODY MASS INDEX: 32.13 KG/M2

## 2023-09-28 LAB
25(OH)D3 SERPL-MCNC: 36.6 NG/ML (ref 30–100)
ALBUMIN SERPL-MCNC: 3.6 G/DL (ref 3.5–5)
ALBUMIN/GLOB SERPL: 1.2 (ref 1.1–2.2)
ALP SERPL-CCNC: 78 U/L (ref 45–117)
ALT SERPL-CCNC: 30 U/L (ref 12–78)
ANION GAP SERPL CALC-SCNC: 7 MMOL/L (ref 5–15)
APPEARANCE UR: CLEAR
AST SERPL-CCNC: 20 U/L (ref 15–37)
BACTERIA URNS QL MICRO: ABNORMAL /HPF
BILIRUB SERPL-MCNC: 0.4 MG/DL (ref 0.2–1)
BILIRUB UR QL: NEGATIVE
BUN SERPL-MCNC: 48 MG/DL (ref 6–20)
BUN/CREAT SERPL: 19 (ref 12–20)
CALCIUM SERPL-MCNC: 9.4 MG/DL (ref 8.5–10.1)
CHLORIDE SERPL-SCNC: 111 MMOL/L (ref 97–108)
CHOLEST SERPL-MCNC: 146 MG/DL
CO2 SERPL-SCNC: 25 MMOL/L (ref 21–32)
COLOR UR: ABNORMAL
COMMENT:: NORMAL
CREAT SERPL-MCNC: 2.56 MG/DL (ref 0.55–1.02)
ECHO AO ROOT DIAM: 2.8 CM
ECHO AO ROOT INDEX: 1.56 CM/M2
ECHO AV AREA PEAK VELOCITY: 2.8 CM2
ECHO AV AREA/BSA PEAK VELOCITY: 1.6 CM2/M2
ECHO AV PEAK GRADIENT: 8 MMHG
ECHO AV PEAK VELOCITY: 1.4 M/S
ECHO AV VELOCITY RATIO: 0.79
ECHO BSA: 1.86 M2
ECHO EST RA PRESSURE: 3 MMHG
ECHO LA DIAMETER INDEX: 1.78 CM/M2
ECHO LA DIAMETER: 3.2 CM
ECHO LA TO AORTIC ROOT RATIO: 1.14
ECHO LV E' LATERAL VELOCITY: 3 CM/S
ECHO LV E' SEPTAL VELOCITY: 3 CM/S
ECHO LV FRACTIONAL SHORTENING: 35 % (ref 28–44)
ECHO LV INTERNAL DIMENSION DIASTOLE INDEX: 2.89 CM/M2
ECHO LV INTERNAL DIMENSION DIASTOLIC: 5.2 CM (ref 3.9–5.3)
ECHO LV INTERNAL DIMENSION SYSTOLIC INDEX: 1.89 CM/M2
ECHO LV INTERNAL DIMENSION SYSTOLIC: 3.4 CM
ECHO LV IVSD: 0.9 CM (ref 0.6–0.9)
ECHO LV MASS 2D: 169 G (ref 67–162)
ECHO LV MASS INDEX 2D: 93.9 G/M2 (ref 43–95)
ECHO LV POSTERIOR WALL DIASTOLIC: 0.9 CM (ref 0.6–0.9)
ECHO LV RELATIVE WALL THICKNESS RATIO: 0.35
ECHO LVOT AREA: 3.5 CM2
ECHO LVOT DIAM: 2.1 CM
ECHO LVOT PEAK GRADIENT: 5 MMHG
ECHO LVOT PEAK VELOCITY: 1.1 M/S
ECHO MV A VELOCITY: 1.08 M/S
ECHO MV E VELOCITY: 0.52 M/S
ECHO MV E/A RATIO: 0.48
ECHO MV E/E' LATERAL: 17.33
ECHO MV E/E' RATIO (AVERAGED): 17.33
ECHO MV E/E' SEPTAL: 17.33
ECHO MV REGURGITANT PEAK GRADIENT: 25 MMHG
ECHO MV REGURGITANT PEAK VELOCITY: 2.5 M/S
ECHO PV MAX VELOCITY: 1.1 M/S
ECHO PV PEAK GRADIENT: 5 MMHG
ECHO RIGHT VENTRICULAR SYSTOLIC PRESSURE (RVSP): 24 MMHG
ECHO RV FREE WALL PEAK S': 18 CM/S
ECHO RV TAPSE: 2.3 CM (ref 1.7–?)
ECHO TV REGURGITANT MAX VELOCITY: 2.28 M/S
ECHO TV REGURGITANT PEAK GRADIENT: 21 MMHG
EPITH CASTS URNS QL MICRO: ABNORMAL /LPF
FERRITIN SERPL-MCNC: 312 NG/ML (ref 26–388)
GLOBULIN SER CALC-MCNC: 3 G/DL (ref 2–4)
GLUCOSE SERPL-MCNC: 128 MG/DL (ref 65–100)
GLUCOSE UR STRIP.AUTO-MCNC: NEGATIVE MG/DL
HDLC SERPL-MCNC: 56 MG/DL
HDLC SERPL: 2.6 (ref 0–5)
HGB UR QL STRIP: NEGATIVE
IRON SATN MFR SERPL: 18 % (ref 20–50)
IRON SERPL-MCNC: 44 UG/DL (ref 35–150)
KETONES UR QL STRIP.AUTO: NEGATIVE MG/DL
LDLC SERPL CALC-MCNC: 66.4 MG/DL (ref 0–100)
LEUKOCYTE ESTERASE UR QL STRIP.AUTO: NEGATIVE
NITRITE UR QL STRIP.AUTO: NEGATIVE
NT PRO BNP: 1964 PG/ML
PH UR STRIP: 5.5 (ref 5–8)
POTASSIUM SERPL-SCNC: 4 MMOL/L (ref 3.5–5.1)
PROT SERPL-MCNC: 6.6 G/DL (ref 6.4–8.2)
PROT UR STRIP-MCNC: NEGATIVE MG/DL
RBC #/AREA URNS HPF: ABNORMAL /HPF (ref 0–5)
SODIUM SERPL-SCNC: 143 MMOL/L (ref 136–145)
SP GR UR REFRACTOMETRY: 1.01 (ref 1–1.03)
SPECIMEN HOLD: NORMAL
TIBC SERPL-MCNC: 249 UG/DL (ref 250–450)
TRIGL SERPL-MCNC: 118 MG/DL
TROPONIN I SERPL HS-MCNC: 79 NG/L (ref 0–51)
TSH SERPL DL<=0.05 MIU/L-ACNC: 1.41 UIU/ML (ref 0.36–3.74)
URATE SERPL-MCNC: 7.1 MG/DL (ref 2.6–6)
URINE CULTURE IF INDICATED: ABNORMAL
UROBILINOGEN UR QL STRIP.AUTO: 0.2 EU/DL (ref 0.2–1)
VLDLC SERPL CALC-MCNC: 23.6 MG/DL
WBC URNS QL MICRO: ABNORMAL /HPF (ref 0–4)

## 2023-09-28 PROCEDURE — 80061 LIPID PANEL: CPT

## 2023-09-28 PROCEDURE — 84550 ASSAY OF BLOOD/URIC ACID: CPT

## 2023-09-28 PROCEDURE — 83880 ASSAY OF NATRIURETIC PEPTIDE: CPT

## 2023-09-28 PROCEDURE — 80053 COMPREHEN METABOLIC PANEL: CPT

## 2023-09-28 PROCEDURE — 84443 ASSAY THYROID STIM HORMONE: CPT

## 2023-09-28 PROCEDURE — 2500000003 HC RX 250 WO HCPCS: Performed by: STUDENT IN AN ORGANIZED HEALTH CARE EDUCATION/TRAINING PROGRAM

## 2023-09-28 PROCEDURE — 99232 SBSQ HOSP IP/OBS MODERATE 35: CPT | Performed by: NURSE PRACTITIONER

## 2023-09-28 PROCEDURE — 83550 IRON BINDING TEST: CPT

## 2023-09-28 PROCEDURE — 6360000002 HC RX W HCPCS: Performed by: NURSE PRACTITIONER

## 2023-09-28 PROCEDURE — 94621 CARDIOPULM EXERCISE TESTING: CPT

## 2023-09-28 PROCEDURE — 82728 ASSAY OF FERRITIN: CPT

## 2023-09-28 PROCEDURE — 6370000000 HC RX 637 (ALT 250 FOR IP): Performed by: NURSE PRACTITIONER

## 2023-09-28 PROCEDURE — 2580000003 HC RX 258: Performed by: STUDENT IN AN ORGANIZED HEALTH CARE EDUCATION/TRAINING PROGRAM

## 2023-09-28 PROCEDURE — 83540 ASSAY OF IRON: CPT

## 2023-09-28 PROCEDURE — 82306 VITAMIN D 25 HYDROXY: CPT

## 2023-09-28 PROCEDURE — 6370000000 HC RX 637 (ALT 250 FOR IP): Performed by: STUDENT IN AN ORGANIZED HEALTH CARE EDUCATION/TRAINING PROGRAM

## 2023-09-28 PROCEDURE — 94618 PULMONARY STRESS TESTING: CPT

## 2023-09-28 PROCEDURE — 36415 COLL VENOUS BLD VENIPUNCTURE: CPT

## 2023-09-28 PROCEDURE — 84484 ASSAY OF TROPONIN QUANT: CPT

## 2023-09-28 PROCEDURE — 97165 OT EVAL LOW COMPLEX 30 MIN: CPT

## 2023-09-28 PROCEDURE — 6360000002 HC RX W HCPCS: Performed by: STUDENT IN AN ORGANIZED HEALTH CARE EDUCATION/TRAINING PROGRAM

## 2023-09-28 PROCEDURE — 97530 THERAPEUTIC ACTIVITIES: CPT

## 2023-09-28 PROCEDURE — 81001 URINALYSIS AUTO W/SCOPE: CPT

## 2023-09-28 RX ORDER — BUMETANIDE 1 MG/1
1 TABLET ORAL DAILY
Qty: 30 TABLET | Refills: 3 | Status: SHIPPED | OUTPATIENT
Start: 2023-09-29

## 2023-09-28 RX ORDER — ISOSORBIDE DINITRATE 20 MG/1
20 TABLET ORAL EVERY 8 HOURS
Qty: 90 TABLET | Refills: 3 | Status: SHIPPED | OUTPATIENT
Start: 2023-09-28

## 2023-09-28 RX ORDER — BUMETANIDE 1 MG/1
1 TABLET ORAL DAILY
Status: DISCONTINUED | OUTPATIENT
Start: 2023-09-29 | End: 2023-09-28 | Stop reason: HOSPADM

## 2023-09-28 RX ORDER — HYDRALAZINE HYDROCHLORIDE 25 MG/1
25 TABLET, FILM COATED ORAL EVERY 8 HOURS SCHEDULED
Qty: 90 TABLET | Refills: 3 | Status: SHIPPED | OUTPATIENT
Start: 2023-09-28

## 2023-09-28 RX ORDER — ISOSORBIDE DINITRATE 20 MG/1
20 TABLET ORAL EVERY 8 HOURS
Status: DISCONTINUED | OUTPATIENT
Start: 2023-09-28 | End: 2023-09-28 | Stop reason: HOSPADM

## 2023-09-28 RX ORDER — BUMETANIDE 1 MG/1
2 TABLET ORAL EVERY 12 HOURS SCHEDULED
Status: DISCONTINUED | OUTPATIENT
Start: 2023-09-28 | End: 2023-09-28

## 2023-09-28 RX ADMIN — FERROUS SULFATE TAB 325 MG (65 MG ELEMENTAL FE) 325 MG: 325 (65 FE) TAB at 09:09

## 2023-09-28 RX ADMIN — ALLOPURINOL 100 MG: 100 TABLET ORAL at 09:09

## 2023-09-28 RX ADMIN — HEPARIN SODIUM 5000 UNITS: 5000 INJECTION INTRAVENOUS; SUBCUTANEOUS at 04:30

## 2023-09-28 RX ADMIN — SODIUM CHLORIDE, PRESERVATIVE FREE 10 ML: 5 INJECTION INTRAVENOUS at 09:10

## 2023-09-28 RX ADMIN — ISOSORBIDE DINITRATE 20 MG: 20 TABLET ORAL at 14:04

## 2023-09-28 RX ADMIN — AMLODIPINE BESYLATE 2.5 MG: 5 TABLET ORAL at 09:09

## 2023-09-28 RX ADMIN — IRON SUCROSE 200 MG: 20 INJECTION, SOLUTION INTRAVENOUS at 11:03

## 2023-09-28 RX ADMIN — BUMETANIDE 2 MG: 0.25 INJECTION INTRAMUSCULAR; INTRAVENOUS at 09:10

## 2023-09-28 RX ADMIN — FAMOTIDINE 20 MG: 20 TABLET ORAL at 09:09

## 2023-09-28 RX ADMIN — HYDRALAZINE HYDROCHLORIDE 25 MG: 25 TABLET ORAL at 04:31

## 2023-09-28 RX ADMIN — CARVEDILOL 25 MG: 12.5 TABLET, FILM COATED ORAL at 09:09

## 2023-09-28 RX ADMIN — ASPIRIN 81 MG: 81 TABLET, COATED ORAL at 09:09

## 2023-09-28 ASSESSMENT — 6 MINUTE WALK TEST (6MWT)
O2 SATURATION: 98
HEART RATE: 94
HEART RATE: 87
BORG DYSPNEA SCALE SCORE: 0
O2 SATURATION: 98
HEART RATE: 92
BORG FATIGUE SCALE SCORE: 0
ADDTIONAL O2 FLOW RATE (L/MIN): NO
OXYGEN DEVICE: ROOM AIR
BORG FATIGUE SCALE SCORE: 0
ANY PROBLEMS WHILE EXERCISING?: NO
DID PATIENT STOP OR PAUSE BEFORE 6 MINUTES?: NO
O2 SATURATION: 97
BORG FATIGUE SCALE SCORE: 0
BORG DYSPNEA SCALE SCORE: 0
BORG DYSPNEA SCALE SCORE: 0
O2 FLOW RATE (L/MIN): 0
O2 SATURATION: 98

## 2023-09-28 NOTE — DISCHARGE SUMMARY
Discharge Summary       PATIENT ID: Petar Smith  MRN: [id number]   YOB: 1960    DATE OF ADMISSION: 9/26/2023 11:24 PM    DATE OF DISCHARGE: 9/28/2023   PRIMARY CARE PROVIDER: Richard Tillman MD     ATTENDING PHYSICIAN: Halle Hunt MD   DISCHARGING PROVIDER: Halle Hunt MD    To contact this individual call [phone] and ask the  to page. If unavailable ask to be transferred the Adult Hospitalist Department. CONSULTATIONS: IP CONSULT TO ADVANCED HEART FAILURE    PROCEDURES/SURGERIES: * No surgery found *    ADMITTING [address] [facility], [address] COURSE:   Petar Smith is a 63 y.o. female with hx of obesity, dm ii, htn, ckd iii, dyslipidemia, chf, cad s/p pci, who presents to hospital with complaints of sob. Had been in his usual state of health until 3 days ago when she noted recent orthopnea and lower extremity edema. States she self increased her home dose of Bumex without any improvement in her symptoms. The patient denies any fever, chills, chest or abdominal pain, nausea, vomiting, cough, congestion, recent illness, palpitations, or dysuria.      Remarkable vitals on ER Presentation: bp to 171/79  Labs Remarkable for: hgb 9.3, trop 120, cr 2.63, bnp 2673  ER Images: cxr: no acute process  ER Rx: bumex 1mg        DISCHARGE DIAGNOSES / PLAN:      Acute on chronic systolic/diastolic congestive heart failure  NYHA IIIa  Cardiologist consulted  Known to have HFpEF 40-45% due to mixed ischemic and non-ischemic cardiomyopathy, stage C, NYHA class IIIA (6MW 304 meters) symptoms   Continue  GDMT per cards   -hold entresto now due to CKD 3 ,-Hold spironolactone and SGLT2i due to GFR <30  difficult to dose diuretics due to CKD stage 3 with Cr 2-2.6   -continue coreg, Bumex 2mg iv bid, Starte hydrlazine, and isordil   --daily weight   --fluid restriction 48-64 oz/day  -passed 6 min walk test with a distance of 335 meters on RA, pt is cleared for discharge from a heart failure

## 2023-09-28 NOTE — DISCHARGE INSTRUCTIONS
Independent/assisted living    Hospice   x Other:     CDMP Checked:   Yes x     PROBLEM LIST Updated:  Yes x       Signed:   Wilder Mendoza MD  9/28/2023  4:24 PM

## 2023-09-28 NOTE — PLAN OF CARE
Problem: Discharge Planning  Goal: Discharge to home or other facility with appropriate resources  Outcome: Progressing  Flowsheets (Taken 9/28/2023 0001 by Cayetano Murry RN)  Discharge to home or other facility with appropriate resources: Identify barriers to discharge with patient and caregiver     Problem: Safety - Adult  Goal: Free from fall injury  Outcome: Progressing     Problem: Chronic Conditions and Co-morbidities  Goal: Patient's chronic conditions and co-morbidity symptoms are monitored and maintained or improved  Outcome: Progressing [] [facility]) - [facility]  Therapy  [address]  [phone]  F: [phone] [x] [address]  [facility]\[address]   [address]  P: [phone]  F: [phone] [] [address]  Therapy  [address]  P: [phone]  F: [phone] [] [address]  P: [phone]  F: [phone] [] [address]  [address] [facility]   [address]   [place]: [phone]  F: [phone]        Physical Therapy Plan of Care    Date:  10/12/2022  Patient: Lauren Brewer  : 1953  MRN: [id number]  Physician: Everardo De Oliveira DO                                Insurance: medicare/BC/  Medical Diagnosis: cervical radiculopathy                 Rehab Codes: M54.13; M25.60; M79.601; R29.3; M62.521; R20.2  Onset Date: 22                 Next 's appt. : 23    Assessment:  Assessment:  the patient is a pleasant 81year old male who is retired about 3 years and presents with R upper trapezius, scapular pain with referred symptoms into the entire R UE. The patient was found to have a significantly guarded posture but without spasms, just a increased tone in holding his shoulders in an elevated position. Also, he has a significant forward head and increased kyphosis. The patient was found to have a significant reduction in cervical movement, especially with retraction. He has mild R biceps and ER weakness but no distal weakness. He is otherwise very strong.  Patient would benefit from skilled physical therapy services in order to: improve postural awareness, teach proper body mechanics, receive manual therapy for the reduction of symptoms in the R scapula and UE and improved tone of the upper quadrant mm and tolerate flexibility exercises to address the tightness present. Problems:    [x] ? Pain: 2-4/10 cervical/UT/scapula with referred symptoms to R UE  [x] ? ROM: tolerate flexibility ex and be able to do retraction of c-spine (I) with at least 60% of normal movement to decrease stress to c-spine. [x] ? Strength: upper back/scapular mm  [x] ? Function: has to modify or take breaks to perform   [x] Other: increased mm tone due to chronic sub-maximal contraction      STG: (to be met in 6 treatments)  ? Pain: below 4/10 max with infrequent R UE symptoms  ? ROM: tolerate flexibility ex and complete them without outside assist  ? Strength: tolerate postural exercises to address weakness  ? Function: sleep restored to baseline  Patient to be independent with home exercise program as demonstrated by performance with correct form without cues. LTG: (to be met in 12  treatments)  Nearly eliminate all UE symptoms.  Have pain in R scapula/cervical region be no more than 2/10  Pain below 3/10 max with minimal to 0 UE symptoms  Assume proper posture and body mechanics correctly and independently  Normalize biceps strength on R to indicate improved mm/nn function  Decreased amount of chronic increase in tone mm as above mentioned        Patient goals: \"to fix it\"     Rehab Potential:  [x] Good  [] Fair  [] Poor    Suggested Professional Referral:  [x] No  [] Yes:  Barriers to Goal Achievement:  [] No  [x] Yes: chronic postural compensation  Domestic Concerns:  [x] No  [] Yes:     Treatment Plan:  [x] Therapeutic Exercise   77376               [x] Therapeutic Activity  54108   [x] Electrical Stimulation Attended  [id number]   [x] Manual Therapy  84482   [x] Instruction in HEP         [x] Cold/hotpack           Frequency:  2 x/week for 12 visits    Electronically signed by: Anju Parnell PT        Physician Signature:________________________________Date:__________________  By signing above or cosigning this note, I have reviewed this plan of care and certify a need for medically

## 2023-10-02 RX ORDER — SODIUM CHLORIDE 9 MG/ML
INJECTION, SOLUTION INTRAVENOUS CONTINUOUS
Status: CANCELLED | OUTPATIENT
Start: 2023-10-02

## 2023-10-03 ENCOUNTER — HOSPITAL ENCOUNTER (OUTPATIENT)
Facility: HOSPITAL | Age: 63
Discharge: HOME | End: 2023-10-03
Attending: INTERNAL MEDICINE | Admitting: INTERNAL MEDICINE
Payer: COMMERCIAL

## 2023-10-03 VITALS
TEMPERATURE: 98.6 F | HEART RATE: 81 BPM | RESPIRATION RATE: 18 BRPM | SYSTOLIC BLOOD PRESSURE: 150 MMHG | OXYGEN SATURATION: 98 % | DIASTOLIC BLOOD PRESSURE: 75 MMHG

## 2023-10-03 DIAGNOSIS — I50.9 CONGESTIVE HEART FAILURE, UNSPECIFIED HF CHRONICITY, UNSPECIFIED HEART FAILURE TYPE (HCC): ICD-10-CM

## 2023-10-03 LAB
GLUCOSE BLD STRIP.AUTO-MCNC: 140 MG/DL (ref 65–117)
SERVICE CMNT-IMP: ABNORMAL

## 2023-10-03 PROCEDURE — 93451 RIGHT HEART CATH: CPT | Performed by: INTERNAL MEDICINE

## 2023-10-03 PROCEDURE — C1894 INTRO/SHEATH, NON-LASER: HCPCS | Performed by: INTERNAL MEDICINE

## 2023-10-03 PROCEDURE — 76937 US GUIDE VASCULAR ACCESS: CPT | Performed by: INTERNAL MEDICINE

## 2023-10-03 PROCEDURE — C1713 ANCHOR/SCREW BN/BN,TIS/BN: HCPCS | Performed by: INTERNAL MEDICINE

## 2023-10-03 PROCEDURE — 2500000003 HC RX 250 WO HCPCS: Performed by: INTERNAL MEDICINE

## 2023-10-03 PROCEDURE — 82962 GLUCOSE BLOOD TEST: CPT

## 2023-10-03 PROCEDURE — 2709999900 HC NON-CHARGEABLE SUPPLY: Performed by: INTERNAL MEDICINE

## 2023-10-03 PROCEDURE — 2720000010 HC SURG SUPPLY STERILE: Performed by: INTERNAL MEDICINE

## 2023-10-03 RX ORDER — LIDOCAINE HYDROCHLORIDE 10 MG/ML
INJECTION, SOLUTION INFILTRATION; PERINEURAL PRN
Status: DISCONTINUED | OUTPATIENT
Start: 2023-10-03 | End: 2023-10-03 | Stop reason: HOSPADM

## 2023-10-03 NOTE — PROGRESS NOTES
1200  Cardiac Cath Lab Recovery Arrival Note:      Uriel [place] arrived to Cardiac Cath Lab, Recovery Area. Staff introduced to patient. Patient identifiers verified with NAME and DATE OF BIRTH. Procedure verified with patient. Consent forms reviewed and signed by patient or authorized representative and verified. Allergies verified. Patient and family oriented to department. Patient and family informed of procedure and plan of care. Questions answered with review. Patient prepped for procedure, per orders from physician, prior to arrival.    Patient on cardiac monitor, non-invasive blood pressure, SPO2 monitor. On room air. Patient is A&Ox 4. Patient reports no complaints. Patient in stretcher, in low position, with side rails up, call bell within reach, patient instructed to call if assistance as needed. Patient prep in: [facility] Recovery Area, [place] FT 4. Patient family has pager # Abbey Byrd () [phone]  Family in: parking lot.    Prep by: Anurag Jernigan

## 2023-10-03 NOTE — PROGRESS NOTES
CATH LAB to RECOVERY ROOM REPORT    Procedure: [facility]    MD: Lasha Landers MD    The procedure was diagnostic only. Verbal Report given to Recovery Nurse on patient being transferred to Cardiac Cath Lab  for routine post-op. Patient stable upon transfer to . Vitals, mental status, MAR, procedural summary discussed with recovery RN. Medication given during procedure:    Contrast:0mL                          Heparin:0units    Lidocaine 1% only        Sheaths:    .    Right internal jugular vein sheath pulled at 125 pm, secured with a band-aid.

## 2023-10-03 NOTE — H&P
Outpatient Cath Lab History and Physical     10/2/2023  Patient: Kristian Zuniga 63 y.o. female   Chief Complaint: []   Angina   []   Dyspnea   [x]       History of Present Illness: (for details see office notes)  64 yo F with NICM, here for RHC to assess hemodynamics. History:(for details see office notes)  Past Medical History:   Diagnosis Date    Asthma     mild    Diabetes mellitus, type 2 ([address]) 2012    GERD (gastroesophageal reflux disease) 3/16/2011    GERD (gastroesophageal reflux disease)     hiatal hernia    Heart failure ([address])     Hypercholesterolemia     Hypertension 3/16/2011    Ill-defined condition     pacemaker put in last 2015 because of CHF    Impaired fasting glucose 2011    Iron deficiency anemia 3/16/2011    Multinodular thyroid 3/16/2011    Myocardial infarction, anterior, acute, initial episode ([address]) 10/31/2010    Palpitations 10/13/2011    Perennial allergic rhinitis 3/16/2011    Pityriasis rosea 2012    Post-menopausal     LMP 46y. o, no HRT    Postmenopause 3/16/2011    Reflux esophagitis 3/16/2011    Sinusitis 3/16/2011    Tobacco user     Vitamin D deficiency 10/13/2011       Review of Systems  Except as noted in HPI, patient denies recent fever or chills, nausea, vomiting, diarrhea, hemoptysis, hematemesis, dysuria, myalgias, focal neurologic symptoms, ecchymosis, angioedema, odynophagia, dysphagia, sore throat, earache,rash, melena, hematochezia, depression, GERD, cold intolerance, petechia, bleeding gums, or significant weight loss. Pertinent items are noted in HPI.       Family History   Problem Relation Age of Onset    High Cholesterol Brother     Thyroid Disease Other         niece, Juliann Melisa    Heart Attack Brother         massive MI at age 46, survived    Thyroid Disease Sister     Diabetes Sister     Rheum Arthritis Sister     Hypertension Sister     Hypertension Sister     High Cholesterol Father     Heart Attack Father 36         MI age

## 2023-10-03 NOTE — PROGRESS NOTES
Cardiac Cath Lab Procedure Area Arrival Note:    Gemma [place]les[place] arrived to Cardiac Cath Lab, Procedure Area. Patient identifiers verified with NAME and DATE OF BIRTH. Procedure verified with patient. Consent forms verified. Allergies verified. Patient informed of procedure and plan of care. Questions answered with review. Patient voiced understanding of procedure and plan of care. Patient on cardiac monitor, non-invasive blood pressure, SPO2 monitor. On RA . IV of NS on pump at 50 ml/hr. Patient status doing well without problems. Patient is A&Ox 3. Patient reports no c/o pain or SOB. Patient medicated during procedure with orders obtained and verified by Dr. Frederic Faith. Refer to patients Cardiac Cath Lab PROCEDURE REPORT for vital signs, assessment, status, and response during procedure, printed at end of case. Printed report on chart or scanned into chart.

## 2023-10-06 RX ORDER — BUMETANIDE 1 MG/1
1 TABLET ORAL EVERY OTHER DAY
Qty: 90 TABLET | Refills: 3 | OUTPATIENT
Start: 2023-10-06

## 2023-10-06 NOTE — TELEPHONE ENCOUNTER
Bumex was sent on 9/29/23 for #30 with 3 refills      For Pharmacy Admin Tracking Only    Program: Medication Refill  CPA in place:    Recommendation Provided To:    Intervention Detail: New Rx: 1, reason: Patient Preference  Intervention Accepted By:   Manohar Antunez Closed?:    Time Spent (min): 5

## 2023-10-09 RX ORDER — AMLODIPINE BESYLATE 5 MG/1
TABLET ORAL
Qty: 45 TABLET | Refills: 1 | Status: SHIPPED | OUTPATIENT
Start: 2023-10-09

## 2023-10-12 ENCOUNTER — TELEPHONE (OUTPATIENT)
Age: 63
End: 2023-10-12

## 2023-10-17 ENCOUNTER — OFFICE VISIT (OUTPATIENT)
Age: 63
End: 2023-10-17
Payer: COMMERCIAL

## 2023-10-17 VITALS
WEIGHT: 171 LBS | DIASTOLIC BLOOD PRESSURE: 82 MMHG | HEIGHT: 62 IN | RESPIRATION RATE: 20 BRPM | BODY MASS INDEX: 31.47 KG/M2 | HEART RATE: 88 BPM | OXYGEN SATURATION: 97 % | TEMPERATURE: 98.6 F | SYSTOLIC BLOOD PRESSURE: 170 MMHG

## 2023-10-17 DIAGNOSIS — Z23 NEEDS FLU SHOT: Primary | ICD-10-CM

## 2023-10-17 PROCEDURE — 90471 IMMUNIZATION ADMIN: CPT | Performed by: INTERNAL MEDICINE

## 2023-10-17 PROCEDURE — 90674 CCIIV4 VAC NO PRSV 0.5 ML IM: CPT | Performed by: INTERNAL MEDICINE

## 2023-10-17 PROCEDURE — 99214 OFFICE O/P EST MOD 30 MIN: CPT | Performed by: INTERNAL MEDICINE

## 2023-10-17 PROCEDURE — 3077F SYST BP >= 140 MM HG: CPT | Performed by: INTERNAL MEDICINE

## 2023-10-17 PROCEDURE — 3079F DIAST BP 80-89 MM HG: CPT | Performed by: INTERNAL MEDICINE

## 2023-10-17 ASSESSMENT — PATIENT HEALTH QUESTIONNAIRE - PHQ9
SUM OF ALL RESPONSES TO PHQ QUESTIONS 1-9: 0
2. FEELING DOWN, DEPRESSED OR HOPELESS: 0
SUM OF ALL RESPONSES TO PHQ9 QUESTIONS 1 & 2: 0
SUM OF ALL RESPONSES TO PHQ QUESTIONS 1-9: 0
1. LITTLE INTEREST OR PLEASURE IN DOING THINGS: 0
SUM OF ALL RESPONSES TO PHQ QUESTIONS 1-9: 0
SUM OF ALL RESPONSES TO PHQ QUESTIONS 1-9: 0

## 2023-10-17 NOTE — PATIENT INSTRUCTIONS
Medication changes:    No medication changes     Call next Monday with blood pressure logs [phone] option 2    Please take this to your pharmacy to notify them of the change in medications. Testing Ordered:    none    Other Recommendations:      Please record blood pressure daily before medication and two hours after medication, please record weight daily upon waking/after using the bathroom. Keep a written records of your weights and blood pressure and bring to your next appointment. If you have a weight gain of 3 or more pounds overnight OR 5 or more pounds in one week, new/worsened shortness of breath or swelling, or if your blood pressure begins to consistently run below 90/60 and/or you begin to experience dizziness or lightheadedness please contact our office at [phone] option 2. Ensure your drinking an adequate amount of water with a goal of 6-8 eight ounce glasses (1.5-2 liters) of fluid daily. Your urine should be clear and light yellow straw colored. We recommend that you stay up to date with flu, covid, and pneumonia vaccines     Follow up 8 weeks with NP  with [address]    Thank you for allowing us the privilege of being a part of your healthcare team! Please do not hesitate to contact our office at [phone] option 2 with any questions or concerns.

## 2023-10-17 NOTE — PROGRESS NOTES
Go Ahmadi is a 63 y.o. female who presents for routine immunizations. He denies any symptoms , reactions or allergies that would exclude them from being immunized today. Risks and adverse reactions were discussed and the VIS was given to them. All questions were addressed. He was observed for 15 min post injection. There were no reactions observed.     Mariza Gu
(PROVENTIL;VENTOLIN;PROAIR) 108 (90 Base) MCG/ACT inhaler, Inhale 2 puffs into the lungs every 6 hours as needed, Disp: , Rfl:     allopurinol (ZYLOPRIM) 100 MG tablet, Take 1 tablet by mouth daily, Disp: , Rfl:     azelastine (ASTELIN) 0.1 % nasal spray, azelastine 137 mcg (0.1 %) nasal spray aerosol  USE 1 SPRAY IN EACH NOSTRIL TWICE A DAY 90, Disp: , Rfl:     carvedilol (COREG) 25 MG tablet, Take 1 tablet by mouth 2 times daily, Disp: , Rfl:     Cholecalciferol 50 MCG (2000 UT) CAPS, Take 1 capsule by mouth daily, Disp: , Rfl:     coenzyme Q10 100 MG CAPS capsule, Take 2 capsules by mouth daily, Disp: , Rfl:     EPINEPHrine (EPIPEN) 0.3 MG/0.3ML SOAJ injection, Inject 0.3 mLs into the muscle once as needed, Disp: , Rfl:     famotidine (PEPCID) 40 MG tablet, Take 1 tablet by mouth, Disp: , Rfl:     ferrous sulfate (FE TABS 325) 325 (65 Fe) MG EC tablet, Take 1 tablet by mouth daily (with breakfast), Disp: , Rfl:     levocetirizine (XYZAL) 5 MG tablet, Take 1 tablet by mouth daily, Disp: , Rfl:     potassium chloride (KLOR-CON) 20 MEQ packet, Take 20 mEq by mouth daily as needed Takes as needed as directed by nephrologist, Disp: , Rfl:     rosuvastatin (CRESTOR) 20 MG tablet, Take 1 tablet by mouth, Disp: , Rfl:     sucralfate (CARAFATE) 1 GM tablet, sucralfate 1 gram tablet  TAKE 1 TAB BY MOUTH BEFORE MEALS AND AT BEDTIME, Disp: , Rfl:     Thank you for your referral and allowing me to participate in this patient's care.     Marti Walls MD  [address] [place] [address] Drive  [address] [facility], [address]  Phone: [phone]      PATIENT CARE TEAM:  Patient Care Team:  Becca Paul MD as PCP - General  Mark Epps, Osiris Sargent MD as PCP - Empaneled Provider  Vishal Hayes MD as Physician  Marshal Hope RN as Care Transitions Nurse  Marshal Hope RN     Total visit time: 40 minutes  (> 50% spent face-to-face counseling)

## 2023-10-25 DIAGNOSIS — K21.9 GASTRO-ESOPHAGEAL REFLUX DISEASE WITHOUT ESOPHAGITIS: ICD-10-CM

## 2023-10-25 RX ORDER — FAMOTIDINE 40 MG/1
40 TABLET, FILM COATED ORAL
Qty: 30 TABLET | Refills: 0 | Status: SHIPPED | OUTPATIENT
Start: 2023-10-25

## 2023-10-25 NOTE — TELEPHONE ENCOUNTER
Last appointment: 5/12/23  Next appointment: 1/9/24  Previous refill encounter(s): 11/4/22 #90 with 3 refills    Requested Prescriptions     Pending Prescriptions Disp Refills    famotidine (PEPCID) 40 MG tablet [Pharmacy Med Name: FAMOTIDINE 40 MG TABLET] 30 tablet 0     Sig: TAKE 1 TABLET BY MOUTH NIGHTLY         For Pharmacy Admin Tracking Only    Program: Medication Refill  CPA in place:    Recommendation Provided To:    Intervention Detail: New Rx: 1, reason: Patient Preference  Intervention Accepted By:   Dari Hdez Closed?:    Time Spent (min): 5 Island Pedicle Flap-Requiring Vessel Identification Text: The defect edges were debeveled with a #15 scalpel blade.  Given the location of the defect, shape of the defect and the proximity to free margins an island pedicle advancement flap was deemed most appropriate.  Using a sterile surgical marker, an appropriate advancement flap was drawn, based on the axial vessel mentioned above, incorporating the defect, outlining the appropriate donor tissue and placing the expected incisions within the relaxed skin tension lines where possible.    The area thus outlined was incised deep to adipose tissue with a #15 scalpel blade.  The skin margins were undermined to an appropriate distance in all directions around the primary defect and laterally outward around the island pedicle utilizing iris scissors.  There was minimal undermining beneath the pedicle flap.

## 2023-11-07 ENCOUNTER — CARE COORDINATION (OUTPATIENT)
Facility: CLINIC | Age: 63
End: 2023-11-07

## 2023-11-07 DIAGNOSIS — E11.22 TYPE 2 DIABETES MELLITUS WITH CHRONIC KIDNEY DISEASE, WITHOUT LONG-TERM CURRENT USE OF INSULIN, UNSPECIFIED CKD STAGE (HCC): ICD-10-CM

## 2023-11-07 DIAGNOSIS — I10 ESSENTIAL HYPERTENSION: Primary | ICD-10-CM

## 2023-11-07 DIAGNOSIS — I50.22 CHRONIC SYSTOLIC HEART FAILURE (HCC): ICD-10-CM

## 2023-11-07 DIAGNOSIS — J45.20 MILD INTERMITTENT ASTHMA WITHOUT COMPLICATION: ICD-10-CM

## 2023-11-07 NOTE — CARE COORDINATION
Remote Patient Kit Ordering Note      Date/Time:  11/7/2023 2:13 PM      [] CCSS confirmed patient shipping address  [] Patient will receive package over the next 1-3 business days. Someone 21 years or older must be present to sign for UPS delivery. [] HRS will contact patient within 24 hours, an [address] [place] [address] will call the patient directly: If the patient does not answer, HRS will follow up with the clinical team notifying them about the unsuccessful attempt to contact the patient. HRS will make three call attempts to the patient. Provide patient with [facility] Virtual install number is: [phone]. [x] ACM will contact patient once equipment is active to welcome them to the program.                                                         [] Hours of RPM monitoring - Monday-Friday 2039-5921; encourage patient to get vitals entered by [facility] each day to have the alert addressed same day. [x][facility] patient. ACM made aware the RPM kit has been ordered. [facility] notified [facility] via  of RPM equipment order.

## 2023-11-07 NOTE — PROGRESS NOTES
Remote Patient Monitoring Treatment Plan    Received request from ACM/HEIDY Fleming RN  to order remote patient monitoring for in home monitoring of CHF, Diabetes, HTN, and Asthma and order completed. Patient will be monitoring blood pressure   glucose  pulse ox   weight  survey questions. Patient will engage in Remote Patient Monitoring each day to develop the skills necessary for self management. RPM Care Team Responsibilities:   Alerts will be reviewed daily and addressed within 2-4 hours during operational hours (Monday -Friday 9 am-4 pm)  Alert response and intervention documented in patient medical record  Alert response escalated to PCP per protocol and documented in patient medical record  Patient monitored over approximately  days  Discharge from program based on self-management readiness    See care coordination encounters for additional details.

## 2023-11-20 ENCOUNTER — CARE COORDINATION (OUTPATIENT)
Dept: CARE COORDINATION | Age: 63
End: 2023-11-20

## 2023-11-20 NOTE — CARE COORDINATION
Remote Alert Monitoring Note  Rpm alert to be reviewed by the provider   red alert   weight (increase of 5 lbs x 7 days )   Additional needs to be addressed by N/A: No      Date/Time:  2023 9:01 AM  [facility] Verified patients name and  as identifiers. Background: Pt enrolled in RPM r/t CHF, DM, HTN, Asthma. Refer to 911 immediately if:  Patient unresponsive or unable to provide history  Change in cognition or sudden confusion  Patient unable to respond in complete sentences  Intense chest pain/tightness  Any concern for any clinical emergency  Red Alert: Provider response time of 1 hr required for any red alert requiring intervention  Yellow Alert: Provider response time of 3hr required for any escalated yellow alert  Weight Scale Triage  Was your weight obtained upon rising/waking today? yes   Was your weight obtained after voiding and/or use of the bathroom today? yes   Did you weigh yourself in the same amount of clothing today, compared to how you typically do? yes   Was the scale bumped or moved prior to today's weight? no   Is your scale on a flat/hard surface? yes   Did you obtain your weight with shoes on? no   If yes, is this something you normally do during your daily weights? NA   Were you standing up straight on the scale today? yes   Were you leaning on anything while obtaining your weight today? no   Clinical Interventions: Reviewed and followed up on alerts and treatments-Spoke with pt who is in no apparent distress. Denies any SOB/dyspnea, new/increasing edema. Pt verbalizes taking Bumex 1 mg daily with good results. Reviewed initial weights of 171.6 lbs, 118.4 lbs and pt stated she had scale on carpet. She has moved scale to a hard tile surface and will leave it in this spot. Will removed initial weights from RPM metrics. Plan/Follow Up:  Will continue to review, monitor and address alerts with follow up based on

## 2023-11-24 ENCOUNTER — CARE COORDINATION (OUTPATIENT)
Dept: CARE COORDINATION | Age: 63
End: 2023-11-24

## 2023-11-24 NOTE — CARE COORDINATION
Remote Alert Monitoring Note  Rpm alert to be reviewed by the provider   red alert   weight (increase of 5 lbs x 7 days)   Additional needs to be addressed by N/A: No      Date/Time:  2023 10:45 AM  [facility]. Verified patients name and  as identifiers. Background: Pt enrolled in RPM r/t CHF, DM, HTN, Asthma. Refer to 911 immediately if:  Patient unresponsive or unable to provide history  Change in cognition or sudden confusion  Patient unable to respond in complete sentences  Intense chest pain/tightness  Any concern for any clinical emergency  Red Alert: Provider response time of 1 hr required for any red alert requiring intervention  Yellow Alert: Provider response time of 3hr required for any escalated yellow alert  Weight Scale Triage  Was your weight obtained upon rising/waking today? yes   Was your weight obtained after voiding and/or use of the bathroom today? yes   Did you weigh yourself in the same amount of clothing today, compared to how you typically do? yes   Was the scale bumped or moved prior to today's weight? no   Is your scale on a flat/hard surface? yes   Did you obtain your weight with shoes on? no   If yes, is this something you normally do during your daily weights? NA   Were you standing up straight on the scale today? yes   Were you leaning on anything while obtaining your weight today? no   Clinical Interventions: Reviewed and followed up on alerts and treatments-Spoke with pt who is in no apparent distress. Denies any SOB/dyspnea, new/increasing edema. Pt verbalizes taking Bumex 1 mg daily as ordered with good result. Reviewed second weight entered on 23 of 127.6 lbs and pt states she had company and they had used her RPM scale. Inaccurate weight removed from RPM metrics at this time. Weight is currently stable.   Noted that RPM tablet showing not connected and nurse reached out to tech support to request

## 2023-11-26 DIAGNOSIS — K21.9 GASTRO-ESOPHAGEAL REFLUX DISEASE WITHOUT ESOPHAGITIS: ICD-10-CM

## 2023-11-28 NOTE — TELEPHONE ENCOUNTER
Last appointment: 5/12/23  Next appointment: 1/9/24  Previous refill encounter(s): 10/25/23 #30    Requested Prescriptions     Pending Prescriptions Disp Refills    famotidine (PEPCID) 40 MG tablet [Pharmacy Med Name: FAMOTIDINE 40 MG TABLET] 90 tablet 1     Sig: TAKE 1 TABLET BY MOUTH EVERY DAY AT [address] [place] [address] Only    Program: Medication Refill  CPA in place:    Recommendation Provided To:    Intervention Detail: New Rx: 1, reason: Patient Preference  Intervention Accepted By:   Chuck Monterroso Closed?:    Time Spent (min): 5

## 2023-11-29 RX ORDER — FAMOTIDINE 40 MG/1
40 TABLET, FILM COATED ORAL NIGHTLY
Qty: 90 TABLET | Refills: 1 | Status: SHIPPED | OUTPATIENT
Start: 2023-11-29

## 2023-12-05 ENCOUNTER — CARE COORDINATION (OUTPATIENT)
Dept: CARE COORDINATION | Age: 63
End: 2023-12-05

## 2023-12-05 NOTE — CARE COORDINATION
Remote Patient Monitoring Note  Date/Time:  2023 2:50 PM  [facility] yellow alert received for no BP x 2 days. Verified patients name and  as identifiers. Background: Pt enrolled in RPM r/t CHF, DM, HTN, Asthma. Clinical Interventions: Reviewed and followed up on alerts and treatments-Spoke with pt who states the day just got away from her. She is not at home at this time, but will update metrics when she returns. Noted tablet showing \"not connected\" and this nurse reached out to tech support to request tablet update and reset to assist with connectivity. Plan/Follow Up: Will continue to review, monitor and address alerts with follow up based on severity of symptoms and risk factors.

## 2023-12-08 ENCOUNTER — TELEPHONE (OUTPATIENT)
Age: 63
End: 2023-12-08

## 2023-12-15 ENCOUNTER — OFFICE VISIT (OUTPATIENT)
Age: 63
End: 2023-12-15
Payer: COMMERCIAL

## 2023-12-15 VITALS
SYSTOLIC BLOOD PRESSURE: 118 MMHG | DIASTOLIC BLOOD PRESSURE: 72 MMHG | BODY MASS INDEX: 30.59 KG/M2 | WEIGHT: 166.2 LBS | TEMPERATURE: 98.2 F | OXYGEN SATURATION: 98 % | HEIGHT: 62 IN | HEART RATE: 82 BPM

## 2023-12-15 DIAGNOSIS — R53.83 FATIGUE, UNSPECIFIED TYPE: ICD-10-CM

## 2023-12-15 DIAGNOSIS — I50.22 CHRONIC SYSTOLIC CONGESTIVE HEART FAILURE (HCC): Primary | ICD-10-CM

## 2023-12-15 DIAGNOSIS — G47.00 INSOMNIA, UNSPECIFIED: ICD-10-CM

## 2023-12-15 PROCEDURE — 3074F SYST BP LT 130 MM HG: CPT | Performed by: NURSE PRACTITIONER

## 2023-12-15 PROCEDURE — 3078F DIAST BP <80 MM HG: CPT | Performed by: NURSE PRACTITIONER

## 2023-12-15 PROCEDURE — 99214 OFFICE O/P EST MOD 30 MIN: CPT | Performed by: NURSE PRACTITIONER

## 2023-12-15 RX ORDER — ISOSORBIDE DINITRATE 10 MG/1
10 TABLET ORAL 3 TIMES DAILY
Qty: 90 TABLET | Refills: 1 | Status: SHIPPED | OUTPATIENT
Start: 2023-12-15

## 2023-12-15 ASSESSMENT — PATIENT HEALTH QUESTIONNAIRE - PHQ9
SUM OF ALL RESPONSES TO PHQ9 QUESTIONS 1 & 2: 0
SUM OF ALL RESPONSES TO PHQ QUESTIONS 1-9: 0
1. LITTLE INTEREST OR PLEASURE IN DOING THINGS: 0
SUM OF ALL RESPONSES TO PHQ QUESTIONS 1-9: 0
SUM OF ALL RESPONSES TO PHQ QUESTIONS 1-9: 0
2. FEELING DOWN, DEPRESSED OR HOPELESS: 0
SUM OF ALL RESPONSES TO PHQ QUESTIONS 1-9: 0

## 2023-12-15 NOTE — PATIENT INSTRUCTIONS
Medication changes:    Decrease isosorbide dinitrate 10mg three times a day     Please take this to your pharmacy to notify them of the change in medications. Testing Ordered: Other Recommendations:     Please call and schedule your appointment with Sleep Medicine. You can call one of the numbers listed below. [facility].[place], [address]   Tel.  [phone]   Fax. [facility], [address] [facility]   Tel.  [phone]   Fax. [phone]  [facility], [address] [facility]   Tel.  [phone]   Fax. [phone]          Please record blood pressure daily before medication and two hours after medication, please record weight daily upon waking/after using the bathroom. Keep a written records of your weights and blood pressure and bring to your next appointment. If you have a weight gain of 3 or more pounds overnight OR 5 or more pounds in one week, new/worsened shortness of breath or swelling, or if your blood pressure begins to consistently run below 90/60 and/or you begin to experience dizziness or lightheadedness please contact our office at [phone] option 2. Ensure your drinking an adequate amount of water with a goal of 6-8 eight ounce glasses (1.5-2 liters) of fluid daily. Your urine should be clear and light yellow straw colored. Follow up 6 months with NP with [facility]    Thank you for allowing us the privilege of being a part of your healthcare team! Please do not hesitate to contact our office at [phone] option 2 with any questions or concerns.

## 2023-12-15 NOTE — PROGRESS NOTES
[address] in [address] Note    Patient name: Elly Zuniga  Patient : 1960  Patient MRN: [id number]  Date of service: 12/15/23    Primary care physician: Shadi Brasher MD  Primary general cardiologist: Dr. Simran Son     Primary F cardiologist: Elie Arias MD    Chief Complaint   Patient presents with    Congestive Heart Failure    Follow-up       PLAN OF CARE:  67 y/o female with asthma and HFpEF 40-45% due to mixed ischemic and non-ischemic cardiomyopathy, stage C, NYHA class IIIA (6MW 304 meters) symptoms on GDMT  RHC this year showed normal right sided and elevated wedge 17 with normal cardiac output confirming diastolic dysfunction; stress test was not done in the cath labs; these are results at weight 174lbs and Cr 2.6 indicating patient gained weight and kidney function slightly worsened > her entresto was discontinued in hopes for improving renal function  Dyspnea is multifactorial, based on RHC predominantly due to lung disease +/- some component of HFpEF > patient will have pulmonary evaluation now for asthma with pulmonologist and allergologist  Chronic fatigue is unlikely related to heart failure; will check sleep study      INTERVAL HISTORY:  -VSS  -labs : creat 2.56; K 4.0; pBNP 1964; Vit D 36.6  -weight down 5lbs  -Kasandra Enrike is feeling very well. No SOB or LOPEZ unless she does over 30 minutes of activity. Rare swelling. She does have a little fatigue and she feels like she's lost some of her get up and go since switching from entresto to hydral/isordil.            PLAN:  Continue current medical therapy for heart failure  Keep diary of blood pressures at home this week > patient is hypertensive in clinic and states she has white coat hypertension  Continue norvasc 2.5mg daily  Continue coreg 25mg BID  Continue to hold entresto due to eGFR < 30  Continue hydralazine 25mg

## 2023-12-17 RX ORDER — ZOLPIDEM TARTRATE 10 MG/1
TABLET ORAL
Qty: 90 TABLET | Refills: 0 | Status: SHIPPED | OUTPATIENT
Start: 2023-12-17 | End: 2024-04-15

## 2024-01-09 ENCOUNTER — OFFICE VISIT (OUTPATIENT)
Age: 64
End: 2024-01-09
Payer: COMMERCIAL

## 2024-01-09 ENCOUNTER — CARE COORDINATION (OUTPATIENT)
Dept: CARE COORDINATION | Age: 64
End: 2024-01-09

## 2024-01-09 VITALS
HEART RATE: 83 BPM | DIASTOLIC BLOOD PRESSURE: 80 MMHG | RESPIRATION RATE: 16 BRPM | SYSTOLIC BLOOD PRESSURE: 129 MMHG | TEMPERATURE: 98.5 F | OXYGEN SATURATION: 98 % | BODY MASS INDEX: 30.67 KG/M2 | WEIGHT: 166.67 LBS | HEIGHT: 62 IN

## 2024-01-09 DIAGNOSIS — E11.22 CONTROLLED TYPE 2 DIABETES MELLITUS WITH STAGE 4 CHRONIC KIDNEY DISEASE, WITHOUT LONG-TERM CURRENT USE OF INSULIN (HCC): Primary | ICD-10-CM

## 2024-01-09 DIAGNOSIS — N18.4 CONTROLLED TYPE 2 DIABETES MELLITUS WITH STAGE 4 CHRONIC KIDNEY DISEASE, WITHOUT LONG-TERM CURRENT USE OF INSULIN (HCC): Primary | ICD-10-CM

## 2024-01-09 LAB — HBA1C MFR BLD: 6.4 %

## 2024-01-09 PROCEDURE — 3074F SYST BP LT 130 MM HG: CPT | Performed by: STUDENT IN AN ORGANIZED HEALTH CARE EDUCATION/TRAINING PROGRAM

## 2024-01-09 PROCEDURE — 83036 HEMOGLOBIN GLYCOSYLATED A1C: CPT | Performed by: STUDENT IN AN ORGANIZED HEALTH CARE EDUCATION/TRAINING PROGRAM

## 2024-01-09 PROCEDURE — 3079F DIAST BP 80-89 MM HG: CPT | Performed by: STUDENT IN AN ORGANIZED HEALTH CARE EDUCATION/TRAINING PROGRAM

## 2024-01-09 PROCEDURE — 99213 OFFICE O/P EST LOW 20 MIN: CPT | Performed by: STUDENT IN AN ORGANIZED HEALTH CARE EDUCATION/TRAINING PROGRAM

## 2024-01-09 NOTE — PROGRESS NOTES
[facility]  [address] [place] [address] [phone]    C/C: Diabetes  HPI:    Tariq Zuniga is a 61 y.o.  BLACK/  female who presents to clinic today for evaluation of the issues listed above.     Subjective;      Pt have a PMHx significant for DM, CKD, MI s/p LAD stent (2011), CHF s/p ICD (2015), HTN and HLD.    DM with CKD IV:  Last A1C was <7 per patient.  Diet controlled.  Has overall been well controlled.  Also has CKD IV, follows with Dr. Hernández.  Renal flow ultrasound was negative per patient.    CHF s/p ICD and h/o MI s/p stent:  Had another episode of CHF exacerbation last year.  Follows with Dr. Kohler with [facility] and the Advance [facility] team at [facility].  On Isordil, amlodipine, hydralazine and bumex.   Taken off entresto.      CKD IV:  Follows with Dr. Hernández, last visit a month ago. States that Cr 2.3.    Pt denies any  fever, chill, chest pain, or sob.    Mammogram done recently (03/2023) - states that it was negative.    Other Health Habits and social history:  Smoking history: former smoker  Alcohol history: socially   , no children.       Allergies - reviewed:   Allergies   Allergen Reactions    Shellfish Allergy Hives, Nausea And Vomiting and Shortness Of Breath    Ace Inhibitors Cough     ?10/2011    Doxycycline Nausea Only       Past Medical History - reviewed:  Past Medical History:   Diagnosis Date    Asthma     mild    Diabetes mellitus, type 2 (HCC) 8/24/2012    GERD (gastroesophageal reflux disease) 3/16/2011    GERD (gastroesophageal reflux disease)     hiatal hernia    Heart failure (HCC)     Hypercholesterolemia     Hypertension 3/16/2011    Ill-defined condition     pacemaker put in last July 2015 because of CHF    Impaired fasting glucose 4/18/2011    Iron deficiency anemia 3/16/2011    Multinodular thyroid 3/16/2011    Myocardial infarction, anterior, acute, initial episode (HCC)

## 2024-01-09 NOTE — CARE COORDINATION
01/09/24 9:48 AM Patient is currently enrolled in Remote Patient Monitoring for CHF, Diabetes, HTN, and Asthma.  Pt has a scheduled office visit today at 10:20 AM.  RPM metrics added for review.

## 2024-01-09 NOTE — PROGRESS NOTES
Chief Complaint   Patient presents with    Follow-up     2023 Diabetes   Last A1C 6.3%     Patient stated not taking any medications for diabetes at this time.    \"Have you been to the ER, urgent care clinic since your last visit?  Hospitalized since your last visit?\"      Yes  10/3/2023 (2 hours)  [facility]  Cardiac Catheterization     “Have you seen or consulted any other health care providers outside of [facility] since your last visit?”      Yes  12/15/2023  [facility]  Chronic systolic congestive heart failure        Have you had a mammogram?”     Yes [facility]  2023          Vitals:    24 1045   BP: 129/80   Pulse: 83   Resp: 16   Temp: 98.5 °F (36.9 °C)   SpO2: 98%     Health Maintenance Due   Topic Date Due    HIV screen  Never done    Shingles vaccine (1 of 2) Never done    Diabetic retinal exam  2015    Breast cancer screen  2019    Respiratory Syncytial Virus (RSV) Pregnant or age 60 yrs+ (1 - 1-dose 60+ series) Never done    Diabetic foot exam  2023    COVID-19 Vaccine ( season) 2023      The patient, Tariq Zuniga, identity was verified by name and , pharmacy verified  Labs:Yes  Fasting:Yes

## 2024-01-10 RX ORDER — ISOSORBIDE DINITRATE 10 MG/1
10 TABLET ORAL 3 TIMES DAILY
Qty: 270 TABLET | Refills: 0 | Status: SHIPPED | OUTPATIENT
Start: 2024-01-10

## 2024-01-10 NOTE — TELEPHONE ENCOUNTER
Requested Prescriptions     Signed Prescriptions Disp Refills    isosorbide dinitrate (ISORDIL) 10 MG tablet 270 tablet 0     Sig: Take 1 tablet by mouth 3 times daily     Authorizing Provider: SHERRY ALFRED     Ordering User: DALIA MOSS

## 2024-01-12 RX ORDER — FERROUS SULFATE 325(65) MG
1 TABLET ORAL
Qty: 90 TABLET | Refills: 2 | Status: SHIPPED | OUTPATIENT
Start: 2024-01-12

## 2024-01-12 NOTE — TELEPHONE ENCOUNTER
Requested Prescriptions     Pending Prescriptions Disp Refills    ferrous sulfate (IRON 325) 325 (65 Fe) MG tablet [Pharmacy Med Name: FERROUS SULFATE 325 MG TABLET] 90 tablet 2     Sig: TAKE 1 TABLET BY MOUTH EVERY DAY WITH BREAKFAST

## 2024-01-19 VITALS
WEIGHT: 164.6 LBS | DIASTOLIC BLOOD PRESSURE: 72 MMHG | HEART RATE: 81 BPM | SYSTOLIC BLOOD PRESSURE: 142 MMHG | OXYGEN SATURATION: 97 % | BODY MASS INDEX: 30.11 KG/M2

## 2024-01-22 ENCOUNTER — TELEPHONE (OUTPATIENT)
Age: 64
End: 2024-01-22

## 2024-01-22 ENCOUNTER — APPOINTMENT (OUTPATIENT)
Facility: HOSPITAL | Age: 64
End: 2024-01-22
Payer: COMMERCIAL

## 2024-01-22 ENCOUNTER — HOSPITAL ENCOUNTER (EMERGENCY)
Facility: HOSPITAL | Age: 64
Discharge: HOME | End: 2024-01-22
Attending: STUDENT IN AN ORGANIZED HEALTH CARE EDUCATION/TRAINING PROGRAM
Payer: COMMERCIAL

## 2024-01-22 VITALS
DIASTOLIC BLOOD PRESSURE: 90 MMHG | HEART RATE: 79 BPM | OXYGEN SATURATION: 99 % | RESPIRATION RATE: 18 BRPM | BODY MASS INDEX: 30.75 KG/M2 | SYSTOLIC BLOOD PRESSURE: 115 MMHG | TEMPERATURE: 98.3 F | WEIGHT: 167.11 LBS | HEIGHT: 62 IN

## 2024-01-22 DIAGNOSIS — N17.9 AKI (ACUTE KIDNEY INJURY) (HCC): ICD-10-CM

## 2024-01-22 DIAGNOSIS — R55 SYNCOPE AND COLLAPSE: Primary | ICD-10-CM

## 2024-01-22 LAB
ALBUMIN SERPL-MCNC: 3.8 G/DL (ref 3.5–5)
ALBUMIN/GLOB SERPL: 0.9 (ref 1.1–2.2)
ALP SERPL-CCNC: 90 U/L (ref 45–117)
ALT SERPL-CCNC: 35 U/L (ref 12–78)
ANION GAP SERPL CALC-SCNC: 12 MMOL/L (ref 5–15)
AST SERPL-CCNC: 25 U/L (ref 15–37)
BASOPHILS # BLD: 0 K/UL (ref 0–0.1)
BASOPHILS NFR BLD: 0 % (ref 0–1)
BILIRUB SERPL-MCNC: 0.2 MG/DL (ref 0.2–1)
BUN SERPL-MCNC: 46 MG/DL (ref 6–20)
BUN/CREAT SERPL: 14 (ref 12–20)
CALCIUM SERPL-MCNC: 9.4 MG/DL (ref 8.5–10.1)
CHLORIDE SERPL-SCNC: 105 MMOL/L (ref 97–108)
CO2 SERPL-SCNC: 23 MMOL/L (ref 21–32)
CREAT SERPL-MCNC: 3.18 MG/DL (ref 0.55–1.02)
DIFFERENTIAL METHOD BLD: ABNORMAL
EOSINOPHIL # BLD: 0.1 K/UL (ref 0–0.4)
EOSINOPHIL NFR BLD: 1 % (ref 0–7)
ERYTHROCYTE [DISTWIDTH] IN BLOOD BY AUTOMATED COUNT: 15.3 % (ref 11.5–14.5)
GLOBULIN SER CALC-MCNC: 4.1 G/DL (ref 2–4)
GLUCOSE SERPL-MCNC: 137 MG/DL (ref 65–100)
HCT VFR BLD AUTO: 32 % (ref 35–47)
HGB BLD-MCNC: 10.1 G/DL (ref 11.5–16)
IMM GRANULOCYTES # BLD AUTO: 0 K/UL (ref 0–0.04)
IMM GRANULOCYTES NFR BLD AUTO: 0 % (ref 0–0.5)
LYMPHOCYTES # BLD: 2.7 K/UL (ref 0.8–3.5)
LYMPHOCYTES NFR BLD: 28 % (ref 12–49)
MAGNESIUM SERPL-MCNC: 1.9 MG/DL (ref 1.6–2.4)
MCH RBC QN AUTO: 28 PG (ref 26–34)
MCHC RBC AUTO-ENTMCNC: 31.6 G/DL (ref 30–36.5)
MCV RBC AUTO: 88.6 FL (ref 80–99)
MONOCYTES # BLD: 0.8 K/UL (ref 0–1)
MONOCYTES NFR BLD: 8 % (ref 5–13)
NEUTS SEG # BLD: 6.2 K/UL (ref 1.8–8)
NEUTS SEG NFR BLD: 63 % (ref 32–75)
NRBC # BLD: 0 K/UL (ref 0–0.01)
NRBC BLD-RTO: 0 PER 100 WBC
NT PRO BNP: 829 PG/ML (ref 0–125)
PLATELET # BLD AUTO: 215 K/UL (ref 150–400)
PMV BLD AUTO: 8.4 FL (ref 8.9–12.9)
POTASSIUM SERPL-SCNC: 4 MMOL/L (ref 3.5–5.1)
PROT SERPL-MCNC: 7.9 G/DL (ref 6.4–8.2)
RBC # BLD AUTO: 3.61 M/UL (ref 3.8–5.2)
SODIUM SERPL-SCNC: 140 MMOL/L (ref 136–145)
TROPONIN I SERPL HS-MCNC: 41 NG/L (ref 0–51)
WBC # BLD AUTO: 9.9 K/UL (ref 3.6–11)

## 2024-01-22 PROCEDURE — 84484 ASSAY OF TROPONIN QUANT: CPT

## 2024-01-22 PROCEDURE — 80053 COMPREHEN METABOLIC PANEL: CPT

## 2024-01-22 PROCEDURE — 83735 ASSAY OF MAGNESIUM: CPT

## 2024-01-22 PROCEDURE — 70450 CT HEAD/BRAIN W/O DYE: CPT

## 2024-01-22 PROCEDURE — 83880 ASSAY OF NATRIURETIC PEPTIDE: CPT

## 2024-01-22 PROCEDURE — 36415 COLL VENOUS BLD VENIPUNCTURE: CPT

## 2024-01-22 PROCEDURE — 85025 COMPLETE CBC W/AUTO DIFF WBC: CPT

## 2024-01-22 PROCEDURE — 93005 ELECTROCARDIOGRAM TRACING: CPT | Performed by: STUDENT IN AN ORGANIZED HEALTH CARE EDUCATION/TRAINING PROGRAM

## 2024-01-22 PROCEDURE — 99284 EMERGENCY DEPT VISIT MOD MDM: CPT

## 2024-01-22 RX ORDER — ALLOPURINOL 100 MG/1
100 TABLET ORAL DAILY
Qty: 90 TABLET | Refills: 1 | Status: SHIPPED | OUTPATIENT
Start: 2024-01-22

## 2024-01-22 RX ORDER — ASPIRIN 325 MG
325 TABLET ORAL DAILY
COMMUNITY

## 2024-01-22 RX ORDER — BUDESONIDE AND FORMOTEROL FUMARATE DIHYDRATE 160; 4.5 UG/1; UG/1
2 AEROSOL RESPIRATORY (INHALATION) 2 TIMES DAILY PRN
COMMUNITY
Start: 2024-01-14

## 2024-01-22 RX ORDER — FLUTICASONE FUROATE AND VILANTEROL 200; 25 UG/1; UG/1
1 POWDER RESPIRATORY (INHALATION) DAILY
COMMUNITY
Start: 2024-01-19

## 2024-01-22 ASSESSMENT — LIFESTYLE VARIABLES
HOW MANY STANDARD DRINKS CONTAINING ALCOHOL DO YOU HAVE ON A TYPICAL DAY: PATIENT DOES NOT DRINK
HOW OFTEN DO YOU HAVE A DRINK CONTAINING ALCOHOL: NEVER

## 2024-01-22 ASSESSMENT — PAIN - FUNCTIONAL ASSESSMENT: PAIN_FUNCTIONAL_ASSESSMENT: NONE - DENIES PAIN

## 2024-01-22 NOTE — TELEPHONE ENCOUNTER
Requested Prescriptions     Pending Prescriptions Disp Refills    allopurinol (ZYLOPRIM) 100 MG tablet [Pharmacy Med Name: ALLOPURINOL 100 MG TABLET] 90 tablet 1     Sig: TAKE 1 TABLET BY MOUTH EVERY DAY

## 2024-01-22 NOTE — ED PROVIDER NOTES
zolpidem (AMBIEN) 10 MG tablet TAKE 1 TABLET BY MOUTH NIGHTLY AS NEEDED FOR SLEEP. MAX DAILY AMOUNT: 10 MG., Disp-90 tablet, R-0Normal      famotidine (PEPCID) 40 MG tablet TAKE 1 TABLET BY MOUTH EVERY DAY AT NIGHT, Disp-90 tablet, R-1Normal      amLODIPine (NORVASC) 5 MG tablet TAKE 0.5 TABLETS BY MOUTH DAILY. INDICATIONS: HIGH BLOOD PRESSURE, Disp-45 tablet, R-1Normal      hydrALAZINE (APRESOLINE) 25 MG tablet Take 1 tablet by mouth every 8 hours, Disp-90 tablet, R-3Normal      bumetanide (BUMEX) 1 MG tablet Take 1 tablet by mouth daily, Disp-30 tablet, R-3Normal      sodium bicarbonate 650 MG tablet sodium bicarbonate 650 mg tablet   Take 1 tablet twice a day by oral route for 30 days.Historical Med      montelukast (SINGULAIR) 10 MG tablet TAKE 1 TABLET BY MOUTH EVERY DAY FOR ALLERGIC RHINITIS, Disp-90 tablet, R-3Normal      albuterol sulfate HFA (PROVENTIL;VENTOLIN;PROAIR) 108 (90 Base) MCG/ACT inhaler Inhale 2 puffs into the lungs every 6 hours as neededHistorical Med      azelastine (ASTELIN) 0.1 % nasal spray azelastine 137 mcg (0.1 %) nasal spray aerosol   USE 1 SPRAY IN EACH NOSTRIL TWICE A DAY 90Historical Med      carvedilol (COREG) 25 MG tablet Take 1 tablet by mouth 2 times dailyHistorical Med      Cholecalciferol 50 MCG (2000 UT) CAPS Take 1 capsule by mouth dailyHistorical Med      coenzyme Q10 100 MG CAPS capsule Take 2 capsules by mouth dailyHistorical Med      EPINEPHrine (EPIPEN) 0.3 MG/0.3ML SOAJ injection Inject 0.3 mLs into the muscle once as neededHistorical Med      levocetirizine (XYZAL) 5 MG tablet Take 1 tablet by mouth dailyHistorical Med      potassium chloride (KLOR-CON) 20 MEQ packet Take 20 mEq by mouth daily as needed Takes as needed as directed by nephrologistHistorical Med      rosuvastatin (CRESTOR) 20 MG tablet Take 1 tablet by mouthHistorical Med      sucralfate (CARAFATE) 1 GM tablet sucralfate 1 gram tablet   TAKE 1 TAB BY MOUTH BEFORE MEALS AND AT BEDTIMEHistorical Med

## 2024-01-22 NOTE — DISCHARGE INSTRUCTIONS
As discussed your testing in the emergency department did not show evidence of a heart attack, significant heart failure or other emergency.  You were shown to have worsened kidney function.  Sometimes this is a result of being dehydrated.  You were offered admission to the hospital for further management of your kidney dysfunction.  However you elected to be discharged, you need to follow-up very closely within the next 3 days with your primary doctor to have this number rechecked.  Return to the emergency department if you have any issues, changing or worsening symptoms or other concerns.

## 2024-01-22 NOTE — ED TRIAGE NOTES
Patient reports that she had diarrhea last night and felt dizzy and then passed out around 2300 yesterday. Pt states that some of her medications cause diarrhea and that she felt bad, but thought she had time to sit down. Abrasion to left side of face when her glasses broke when she fell. Last Tetanus shot > 5 years. Pt relates that her Advanced Heart Failure doctor sent her to the ER for a CT.

## 2024-01-22 NOTE — TELEPHONE ENCOUNTER
Patient called, states she became very dizzy last night, felt hot, was having diarrhea.  She passed out and fell and hit head, cracked her glasses.  She did not take her BP until this am, was 127/70.  She states this has happened before and she has had loss of consciousness.   I advised she present to ED for evaluation due to loss of consciousness and hitting head.  Will make follow up in AHF after ED visit if indicated.  She states understanding and will go now.

## 2024-01-23 LAB
EKG ATRIAL RATE: 71 BPM
EKG DIAGNOSIS: NORMAL
EKG P AXIS: 40 DEGREES
EKG P-R INTERVAL: 154 MS
EKG Q-T INTERVAL: 434 MS
EKG QRS DURATION: 84 MS
EKG QTC CALCULATION (BAZETT): 471 MS
EKG R AXIS: -16 DEGREES
EKG T AXIS: 0 DEGREES
EKG VENTRICULAR RATE: 71 BPM

## 2024-01-23 PROCEDURE — 93010 ELECTROCARDIOGRAM REPORT: CPT | Performed by: INTERNAL MEDICINE

## 2024-01-30 RX ORDER — HYDRALAZINE HYDROCHLORIDE 25 MG/1
25 TABLET, FILM COATED ORAL EVERY 8 HOURS SCHEDULED
Qty: 90 TABLET | Refills: 3 | Status: SHIPPED | OUTPATIENT
Start: 2024-01-30

## 2024-01-30 NOTE — TELEPHONE ENCOUNTER
Requested Prescriptions     Signed Prescriptions Disp Refills    hydrALAZINE (APRESOLINE) 25 MG tablet 90 tablet 3     Sig: Take 1 tablet by mouth every 8 hours     Authorizing Provider: SHERRY ALFRED     Ordering User: KENAN BEASLEY       Called patient using two patient identifiers. Confirmed patients pharmacy and notified her that refill has been sent. She stated understanding and had no further questions.

## 2024-02-01 ENCOUNTER — TELEMEDICINE (OUTPATIENT)
Age: 64
End: 2024-02-01
Payer: COMMERCIAL

## 2024-02-01 DIAGNOSIS — I10 ESSENTIAL HYPERTENSION: ICD-10-CM

## 2024-02-01 DIAGNOSIS — N18.30 TYPE 2 DIABETES MELLITUS WITH STAGE 3 CHRONIC KIDNEY DISEASE, WITHOUT LONG-TERM CURRENT USE OF INSULIN, UNSPECIFIED WHETHER STAGE 3A OR 3B CKD (HCC): ICD-10-CM

## 2024-02-01 DIAGNOSIS — E11.22 TYPE 2 DIABETES MELLITUS WITH STAGE 3 CHRONIC KIDNEY DISEASE, WITHOUT LONG-TERM CURRENT USE OF INSULIN, UNSPECIFIED WHETHER STAGE 3A OR 3B CKD (HCC): ICD-10-CM

## 2024-02-01 DIAGNOSIS — I42.9 CARDIOMYOPATHY, UNSPECIFIED TYPE (HCC): ICD-10-CM

## 2024-02-01 DIAGNOSIS — G47.33 OBSTRUCTIVE SLEEP APNEA (ADULT) (PEDIATRIC): Primary | ICD-10-CM

## 2024-02-01 PROCEDURE — 99244 OFF/OP CNSLTJ NEW/EST MOD 40: CPT | Performed by: INTERNAL MEDICINE

## 2024-02-01 ASSESSMENT — SLEEP AND FATIGUE QUESTIONNAIRES
DO YOU HAVE DIFFICULTY VISITING YOUR FAMILY OR FRIENDS IN THEIR HOME BECAUSE YOU BECOME SLEEPY OR TIRED: NO
HOW LIKELY ARE YOU TO NOD OFF OR FALL ASLEEP WHEN YOU ARE A PASSENGER IN A CAR FOR AN HOUR WITHOUT A BREAK: 2
DO YOU HAVE PROBLEMS WITH FREQUENT AWAKENINGS AT NIGHT: YES
HOW LIKELY ARE YOU TO NOD OFF OR FALL ASLEEP WHILE WATCHING TV: 2
HOW LIKELY ARE YOU TO NOD OFF OR FALL ASLEEP WHILE SITTING AND READING: 2
DO YOU GET TOO LITTLE SLEEP AT NIGHT: YES
HOW LIKELY ARE YOU TO NOD OFF OR FALL ASLEEP WHILE SITTING AND READING: MODERATE CHANCE OF DOZING
HOW LIKELY ARE YOU TO NOD OFF OR FALL ASLEEP IN A CAR, WHILE STOPPED FOR A FEW MINUTES IN TRAFFIC: SLIGHT CHANCE OF DOZING
HOW LIKELY ARE YOU TO NOD OFF OR FALL ASLEEP WHILE SITTING INACTIVE IN A PUBLIC PLACE: SLIGHT CHANCE OF DOZING
HOW LIKELY ARE YOU TO NOD OFF OR FALL ASLEEP WHILE SITTING AND TALKING TO SOMEONE: 1
HOW LIKELY ARE YOU TO NOD OFF OR FALL ASLEEP WHILE SITTING AND TALKING TO SOMEONE: SLIGHT CHANCE OF DOZING
DO YOU HAVE DIFFICULTY WATCHING A MOVIE OR VIDEO BECAUSE YOU BECOME SLEEPY OR TIRED: NO
SELECT ANY OF THE FOLLOWING BEHAVIORS OBSERVED WHILE PATIENT ASLEEP: LIGHT SNORING;PAUSES IN BREATHING
SELECT ANY OF THE FOLLOWING BEHAVIORS OBSERVED WHILE YOU ARE ASLEEP: LIGHT SNORING
HOW LIKELY ARE YOU TO NOD OFF OR FALL ASLEEP WHILE WATCHING TV: MODERATE CHANCE OF DOZING
DO YOU WORK SHIFTS: NO
AVERAGE NUMBER OF SLEEP HOURS: 6
HOW MANY NAPS DO YOU TAKE PER WEEK: 2
HOW LIKELY ARE YOU TO NOD OFF OR FALL ASLEEP WHILE SITTING INACTIVE IN A PUBLIC PLACE: 1
DO YOU HAVE DIFFICULTY BEING AS ACTIVE AS YOU WANT TO BE IN THE MORNING BECAUSE YOU ARE SLEEPY OR TIRED: YES, LITTLE
DO YOU HAVE DIFFICULTY BEING AS ACTIVE AS YOU WANT TO BE IN THE EVENING BECAUSE YOU ARE SLEEPY OR TIRED: YES, LITTLE
DO YOU HAVE DIFFICULTY OPERATING A MOTOR VEHICLE FOR SHORT DISTANCES (LESS THAN 100 MILES) BECAUSE YOU BECOME SLEEPY: NO
NUMBER OF TIMES YOU WAKE PER NIGHT: 2
DO YOU HAVE DIFFICULTY OPERATING A MOTOR VEHICLE FOR LONG DISTANCES (GREATER THAN 100 MILES) BECAUSE YOU BECOME SLEEPY: NO
HOW LIKELY ARE YOU TO NOD OFF OR FALL ASLEEP WHEN YOU ARE A PASSENGER IN A CAR FOR AN HOUR WITHOUT A BREAK: MODERATE CHANCE OF DOZING
DO YOU HAVE DIFFICULTY CONCENTRATING ON THE THINGS YOU DO BECAUSE YOU ARE SLEEPY OR TIRED: NO
HOW LIKELY ARE YOU TO NOD OFF OR FALL ASLEEP WHILE SITTING QUIETLY AFTER LUNCH WITHOUT ALCOHOL: 1
ARE YOU BOTHERED BY WAKING UP TOO EARLY AND NOT BEING ABLE TO GET BACK TO SLEEP: YES
DO YOU TAKE NAPS: YES
HOW LONG DO YOU NAP: 15 TO 30 MINUTES
HOW LIKELY ARE YOU TO NOD OFF OR FALL ASLEEP WHILE LYING DOWN TO REST IN THE AFTERNOON WHEN CIRCUMSTANCES PERMIT: 3
HOW LIKELY ARE YOU TO NOD OFF OR FALL ASLEEP WHILE LYING DOWN TO REST IN THE AFTERNOON WHEN CIRCUMSTANCES PERMIT: HIGH CHANCE OF DOZING
HOW LIKELY ARE YOU TO NOD OFF OR FALL ASLEEP WHILE SITTING QUIETLY AFTER LUNCH WITHOUT ALCOHOL: SLIGHT CHANCE OF DOZING
DO YOU GENERALLY HAVE DIFFICULTY REMEMBERING THINGS BECAUSE YOU ARE SLEEPY OR TIRED: NO
SELECT ANY OF THE FOLLOWING BEHAVIORS OBSERVED WHILE YOU ARE ASLEEP: PAUSES IN BREATHING
DO YOU GET TOO LITTLE SLEEP AT NIGHT: DOES
HOW LIKELY ARE YOU TO NOD OFF OR FALL ASLEEP IN A CAR, WHILE STOPPED FOR A FEW MINUTES IN TRAFFIC: 1
ESS TOTAL SCORE: 13
ARE YOU BOTHERED BY WAKING UP TOO EARLY AND NOT BEING ABLE TO GET BACK TO SLEEP: IS
AVERAGE NUMBER OF SLEEP HOURS: 6
HAS YOUR RELATIONSHIP WITH FAMILY, FRIENDS OR WORK COLLEAGUES BEEN AFFECTED BECAUSE YOU ARE SLEEPY OR TIRED: NO
HAS YOUR MOOD BEEN AFFECTED BECAUSE YOU ARE SLEEPY OR TIRED: YES, LITTLE
WHAT TIME DO YOU USUALLY WAKE UP: 06:30
FOSQ SCORE: 18.5

## 2024-02-01 NOTE — PATIENT INSTRUCTIONS
[address]  [place] [address]  Tel.  [phone]  Fax. [phone] [address]  [place] [address]  Tel.  [phone]  Fax. [phone] [address] [place] [address]  Tel.  [phone]  Fax. [phone]     Sleep Apnea: After Your Visit  Your Care Instructions  Sleep apnea occurs when you frequently stop breathing for 10 seconds or longer during sleep. It can be mild to severe, based on the number of times per hour that you stop breathing or have slowed breathing. Blocked or narrowed airways in your nose, mouth, or throat can cause sleep apnea. Your airway can become blocked when your throat muscles and tongue relax during sleep.  Sleep apnea is common, occurring in 1 out of 20 individuals.  Individuals having any of the following characteristics should be evaluated and treated right away due to high risk and detrimental consequences from untreated sleep apnea:  Obesity  Congestive Heart failure  Atrial Fibrillation  Uncontrolled Hypertension  Type II Diabetes  Night-time Arrhythmias  Stroke  Pulmonary Hypertension  High-risk Driving Populations (pilots, truck drivers, etc.)  Patients Considering Weight-loss Surgery    How do you know you have sleep apnea?  You probably have sleep apnea if you answer 'yes' to 3 or more of the following questions:  S - Have you been told that you Snore?   T - Are you often Tired during the day?  O - Has anyone Observed you stop breathing while sleeping?  P- Do you have (or are being treated for) high blood Pressure?    B - Are you obese (Body Mass Index > 35)?  A - Is your Age 50 years old or older?  N - Is your Neck size greater than 16 inches?  G - Are you male Gender?  A sleep physician can prescribe a breathing device that prevents tissues in the throat from blocking your airway. Or your doctor may recommend using a dental device (oral breathing device) to help keep your airway open. In some cases, surgery may

## 2024-02-01 NOTE — PROGRESS NOTES
treatment plan as outlined today during the consultation with me. All of her questions were addressed.         Thank you for allowing us to participate in your patient's medical care.  We'll keep you updated on these investigations.        Services were provided through a video synchronous discussion virtually to substitute for in-person clinic visit.    Georgina Jones MD    Electronically signed by    Georgina Jones MD  Diplomate in Sleep Medicine  [facility]

## 2024-02-20 ENCOUNTER — HOSPITAL ENCOUNTER (OUTPATIENT)
Facility: HOSPITAL | Age: 64
Discharge: HOME | End: 2024-02-23
Payer: COMMERCIAL

## 2024-02-20 ENCOUNTER — TELEPHONE (OUTPATIENT)
Facility: HOSPITAL | Age: 64
End: 2024-02-20

## 2024-02-20 VITALS
TEMPERATURE: 97.6 F | BODY MASS INDEX: 30.18 KG/M2 | SYSTOLIC BLOOD PRESSURE: 168 MMHG | HEIGHT: 62 IN | WEIGHT: 164 LBS | OXYGEN SATURATION: 95 % | HEART RATE: 90 BPM | DIASTOLIC BLOOD PRESSURE: 69 MMHG

## 2024-02-20 DIAGNOSIS — I42.9 CARDIOMYOPATHY, UNSPECIFIED TYPE (HCC): ICD-10-CM

## 2024-02-20 DIAGNOSIS — G47.33 OBSTRUCTIVE SLEEP APNEA (ADULT) (PEDIATRIC): ICD-10-CM

## 2024-02-20 DIAGNOSIS — G47.33 OBSTRUCTIVE SLEEP APNEA (ADULT) (PEDIATRIC): Primary | ICD-10-CM

## 2024-02-20 PROCEDURE — 95810 POLYSOM 6/> YRS 4/> PARAM: CPT | Performed by: INTERNAL MEDICINE

## 2024-02-21 NOTE — PROGRESS NOTES
[address]  [place] [address]  Tel.  [phone]  Fax. [phone] [address]  [place] [address]  Tel.  [phone]  Fax. [phone] [address] [place] [address]  Tel.  [phone]  Fax. [phone]     Sleep Study Technical Notes    Disclaimer:  all notes have not been confirmed by interpreting physician      PRE-Test:  Tariq Zuniga (: 1960) arrived on time. Vitals taken: temperature (97.6)  BP (168/69) SpO2 (95%) HR (90). She was shown directly to the room. A heater was provided to take the chill out of the air in the bedroom.  Paperwork completed. She is here for a PSG study.  Procedure explained, questions answered and she expressed understanding. Electrodes were applied without incident. The sleep number mattress was softened for the patient's comfort. A warm blanket from the blanket warmer was provided. Once settled in bed, the study was started.    Acquisition Notes:  Lights off: 9:37pm  Sleep onset: 10:42pm  Respiratory events: H,O  Snoring:  Moderate   Other comments:   The patient read in her book before attempting sleep; put book down at 10:31pm  Respiratory events observed and were exacerbated in REM  Lowest event related oxygen desaturation in REM was 69%  The tech was unable to split; patient did not meet the PSG AHI requirement prior to the cut off time.    POST Test:  Patient awoke on her own. Electrodes were removed. Post Sleep Questionnaire completed.   Patient stated she felt okay to drive. Equipment and room cleaned per infection control policy.    Patient post sleep comments:  How long did you sleep: 6 hours  How long did it take you to fall asleep: 20 minutes  How many times did you wake up: 2 or 3 times  Difficulty returning to sleep: No  How did you sleep compared to usual: same       Please comment on your Experience:   \"It was fine. Staff was very professional and kind.  I didn't think I would be able to

## 2024-02-23 NOTE — TELEPHONE ENCOUNTER
Results of sleep study in [employer] to convey results to patient    Attended polysomnogram showed an AHI of 40/hour and lowest oxygen saturation was 69%. This is consistent with severe sleep apnea.     Based on these results, PAP therapy would be beneficial.   Based on these results, PAP would be beneficial. I recommend that she return to the sleep center for an in lab PAP titration where will carefully find the optimal pressure to control the apnea comfortably. Mask-fitting and PAP education will be done at that time as well. Once settings determined based on the PAP titration, I will order a PAP device for her home use and will see her in follow-up after she has used the PAP for 6 weeks or so.

## 2024-03-11 DIAGNOSIS — G47.00 INSOMNIA, UNSPECIFIED: ICD-10-CM

## 2024-03-12 RX ORDER — ZOLPIDEM TARTRATE 10 MG/1
TABLET ORAL
Qty: 90 TABLET | Refills: 0 | Status: SHIPPED | OUTPATIENT
Start: 2024-03-12 | End: 2024-06-10

## 2024-03-12 NOTE — TELEPHONE ENCOUNTER
Last appointment: 1/9/24  Next appointment: 7/12/24  Previous refill encounter(s): 12/17/23 #90    Requested Prescriptions     Pending Prescriptions Disp Refills    zolpidem (AMBIEN) 10 MG tablet 90 tablet 0     Sig: TAKE 1 TABLET BY MOUTH NIGHTLY AS NEEDED FOR SLEEP. MAX DAILY AMOUNT: 10 MG.         For Pharmacy Admin Tracking Only    Program: Medication Refill  CPA in place:    Recommendation Provided To:   Intervention Detail: New Rx: 1, reason: Patient Preference  Intervention Accepted By:   Gap Closed?:    Time Spent (min): 5

## 2024-04-08 RX ORDER — AMLODIPINE BESYLATE 5 MG/1
TABLET ORAL
Qty: 45 TABLET | Refills: 1 | Status: SHIPPED | OUTPATIENT
Start: 2024-04-08

## 2024-04-08 NOTE — TELEPHONE ENCOUNTER
Requested Prescriptions     Pending Prescriptions Disp Refills    amLODIPine (NORVASC) 5 MG tablet [Pharmacy Med Name: AMLODIPINE BESYLATE 5 MG TAB] 45 tablet 1     Sig: TAKE 1/2 TABLETS BY MOUTH DAILY. INDICATIONS: HIGH BLOOD PRESSURE

## 2024-04-09 ENCOUNTER — CARE COORDINATION (OUTPATIENT)
Facility: CLINIC | Age: 64
End: 2024-04-09

## 2024-04-09 ENCOUNTER — TELEPHONE (OUTPATIENT)
Age: 64
End: 2024-04-09

## 2024-04-09 DIAGNOSIS — E11.22 TYPE 2 DIABETES MELLITUS WITH STAGE 4 CHRONIC KIDNEY DISEASE, WITHOUT LONG-TERM CURRENT USE OF INSULIN (HCC): ICD-10-CM

## 2024-04-09 DIAGNOSIS — I10 ESSENTIAL HYPERTENSION: Primary | ICD-10-CM

## 2024-04-09 DIAGNOSIS — I50.22 CHRONIC SYSTOLIC HEART FAILURE (HCC): ICD-10-CM

## 2024-04-09 DIAGNOSIS — N18.4 TYPE 2 DIABETES MELLITUS WITH STAGE 4 CHRONIC KIDNEY DISEASE, WITHOUT LONG-TERM CURRENT USE OF INSULIN (HCC): ICD-10-CM

## 2024-04-09 DIAGNOSIS — J45.20 MILD INTERMITTENT ASTHMA WITHOUT COMPLICATION: ICD-10-CM

## 2024-04-09 NOTE — PROGRESS NOTES
Remote Patient Order Discontinued    Received request from Johanna Heard RN   to discontinue order for remote patient monitoring of CHF, Diabetes, HTN, and Asthma and order completed.

## 2024-04-09 NOTE — CARE COORDINATION
Tariq Zuniga  4/9/24    Care Coordination  placed call to patient to arrange RPM kit  through [employer].     CCSS spoke to Patient reviewed with Patient how to pack equipment in original packing. Patient aware [employer] will  equipment in 2-4 days.   All questions and concerns answered.

## 2024-04-09 NOTE — TELEPHONE ENCOUNTER
Called to offer sooner sleep study appointment. Patient declined, going out of town. Unavailable until 4/15.

## 2024-04-25 RX ORDER — ISOSORBIDE DINITRATE 10 MG/1
10 TABLET ORAL 3 TIMES DAILY
Qty: 270 TABLET | Refills: 0 | Status: SHIPPED | OUTPATIENT
Start: 2024-04-25

## 2024-04-25 NOTE — TELEPHONE ENCOUNTER
Requested Prescriptions     Pending Prescriptions Disp Refills    isosorbide dinitrate (ISORDIL) 10 MG tablet 270 tablet 0     Sig: Take 1 tablet by mouth 3 times daily      Last appt 12/2023  Next appt 6/2024

## 2024-04-29 ENCOUNTER — ANTI-COAG VISIT (OUTPATIENT)
Age: 64
End: 2024-04-29

## 2024-04-29 RX ORDER — HYDRALAZINE HYDROCHLORIDE 25 MG/1
25 TABLET, FILM COATED ORAL EVERY 8 HOURS SCHEDULED
Qty: 90 TABLET | Refills: 2 | Status: SHIPPED | OUTPATIENT
Start: 2024-04-29

## 2024-04-29 NOTE — TELEPHONE ENCOUNTER
Requested Prescriptions     Pending Prescriptions Disp Refills    hydrALAZINE (APRESOLINE) 25 MG tablet 90 tablet 2     Sig: Take 1 tablet by mouth every 8 hours      Last appt 12/2023  Next appt 6/2024

## 2024-05-08 ENCOUNTER — HOSPITAL ENCOUNTER (OUTPATIENT)
Facility: HOSPITAL | Age: 64
Discharge: HOME | End: 2024-05-11
Payer: COMMERCIAL

## 2024-05-08 DIAGNOSIS — G47.33 OBSTRUCTIVE SLEEP APNEA (ADULT) (PEDIATRIC): ICD-10-CM

## 2024-05-08 PROCEDURE — 95811 POLYSOM 6/>YRS CPAP 4/> PARM: CPT | Performed by: INTERNAL MEDICINE

## 2024-05-09 VITALS
TEMPERATURE: 98.4 F | HEART RATE: 90 BPM | HEIGHT: 62 IN | DIASTOLIC BLOOD PRESSURE: 70 MMHG | BODY MASS INDEX: 30.18 KG/M2 | SYSTOLIC BLOOD PRESSURE: 137 MMHG | WEIGHT: 164 LBS | OXYGEN SATURATION: 96 %

## 2024-05-22 ENCOUNTER — CLINICAL DOCUMENTATION (OUTPATIENT)
Age: 64
End: 2024-05-22

## 2024-05-22 DIAGNOSIS — G47.33 OBSTRUCTIVE SLEEP APNEA (ADULT) (PEDIATRIC): Primary | ICD-10-CM

## 2024-05-22 NOTE — PROGRESS NOTES
Results of sleep study in [employer] tech to convey results to patient    PAP titration was performed to optimize airflow settings to control her apnea/respiratory events. It was found that a setting of 11 cmH20 adequately controlled her respiratory events. Oxygen saturation was maintained at or above 90% at this setting.         I will order a PAP device for her home use. It will start a little lower than the final pressure setting in the lab ,for her comfort,and will adjust up during the night. she should be seen in the sleep center after she has been on PAP for 4-6 weeks. We will assess how she is doing on PAP therapy and make any further adjustments (if needed).     Patient should call the office the day she gets set up with new PAP device so we can schedule her for an adherence/compliance visit within 31-90 days of obtaining a new device.     PAP order attached.  Staff to kindly order PAP and call patient and let them know which [employer] company they should be hearing from.    (patient will need a first adherence visit after 4-6 weeks on therapy)

## 2024-05-26 DIAGNOSIS — J01.00 ACUTE MAXILLARY SINUSITIS, UNSPECIFIED: ICD-10-CM

## 2024-05-26 DIAGNOSIS — K21.9 GASTRO-ESOPHAGEAL REFLUX DISEASE WITHOUT ESOPHAGITIS: ICD-10-CM

## 2024-05-28 ENCOUNTER — CLINICAL DOCUMENTATION (OUTPATIENT)
Age: 64
End: 2024-05-28

## 2024-05-28 RX ORDER — ALLOPURINOL 100 MG/1
100 TABLET ORAL DAILY
Qty: 90 TABLET | Refills: 1 | Status: SHIPPED | OUTPATIENT
Start: 2024-05-28

## 2024-05-29 RX ORDER — MONTELUKAST SODIUM 10 MG/1
TABLET ORAL
Qty: 90 TABLET | Refills: 3 | Status: SHIPPED | OUTPATIENT
Start: 2024-05-29

## 2024-05-29 RX ORDER — FAMOTIDINE 40 MG/1
40 TABLET, FILM COATED ORAL NIGHTLY
Qty: 90 TABLET | Refills: 1 | Status: SHIPPED | OUTPATIENT
Start: 2024-05-29

## 2024-05-29 NOTE — TELEPHONE ENCOUNTER
Last appointment: 1/9/24  Next appointment: 7/12/24  Previous refill encounter(s): 5/31/23 Singulair, 11/29/23 Pepcid #90 with 1 refill    Requested Prescriptions     Pending Prescriptions Disp Refills    montelukast (SINGULAIR) 10 MG tablet [Pharmacy Med Name: MONTELUKAST SOD 10 MG TABLET] 90 tablet 3     Sig: TAKE 1 TABLET BY MOUTH EVERY DAY FOR ALLERGIC RHINITIS    famotidine (PEPCID) 40 MG tablet [Pharmacy Med Name: FAMOTIDINE 40 MG TABLET] 90 tablet 1     Sig: TAKE 1 TABLET BY MOUTH EVERY DAY AT NIGHT         For Pharmacy Admin Tracking Only    Program: Medication Refill  CPA in place:    Recommendation Provided To:   Intervention Detail: New Rx: 2, reason: Patient Preference  Intervention Accepted By:   Gap Closed?:    Time Spent (min): 5

## 2024-06-06 ENCOUNTER — TELEPHONE (OUTPATIENT)
Age: 64
End: 2024-06-06

## 2024-06-06 NOTE — TELEPHONE ENCOUNTER
Telephone Call RE:  Appointment reminder     Outcome:     [x] Patient confirmed appointment   [] Patient rescheduled appointment for    [] Unable to reach  [] Left message              [] Other:       Notified of parking pass.

## 2024-06-11 ENCOUNTER — OFFICE VISIT (OUTPATIENT)
Age: 64
End: 2024-06-11
Payer: COMMERCIAL

## 2024-06-11 VITALS
RESPIRATION RATE: 20 BRPM | BODY MASS INDEX: 32.2 KG/M2 | DIASTOLIC BLOOD PRESSURE: 78 MMHG | OXYGEN SATURATION: 97 % | HEIGHT: 62 IN | TEMPERATURE: 97.9 F | SYSTOLIC BLOOD PRESSURE: 136 MMHG | HEART RATE: 88 BPM | WEIGHT: 175 LBS

## 2024-06-11 DIAGNOSIS — I50.42 CHRONIC COMBINED SYSTOLIC AND DIASTOLIC CHF (CONGESTIVE HEART FAILURE) (HCC): ICD-10-CM

## 2024-06-11 DIAGNOSIS — I50.22 CHRONIC SYSTOLIC HEART FAILURE (HCC): Primary | ICD-10-CM

## 2024-06-11 PROCEDURE — 3075F SYST BP GE 130 - 139MM HG: CPT | Performed by: NURSE PRACTITIONER

## 2024-06-11 PROCEDURE — 3078F DIAST BP <80 MM HG: CPT | Performed by: NURSE PRACTITIONER

## 2024-06-11 PROCEDURE — 99214 OFFICE O/P EST MOD 30 MIN: CPT | Performed by: NURSE PRACTITIONER

## 2024-06-11 RX ORDER — BUMETANIDE 1 MG/1
1 TABLET ORAL DAILY
Qty: 30 TABLET | Refills: 5 | Status: SHIPPED | OUTPATIENT
Start: 2024-06-11

## 2024-06-11 RX ORDER — HYDRALAZINE HYDROCHLORIDE 50 MG/1
50 TABLET, FILM COATED ORAL EVERY 8 HOURS SCHEDULED
Qty: 90 TABLET | Refills: 2 | Status: SHIPPED | OUTPATIENT
Start: 2024-06-11

## 2024-06-11 ASSESSMENT — ENCOUNTER SYMPTOMS
ABDOMINAL DISTENTION: 1
SORE THROAT: 0
EYE PAIN: 0
SHORTNESS OF BREATH: 1
CHEST TIGHTNESS: 0
COUGH: 0
BLOOD IN STOOL: 0
ABDOMINAL PAIN: 0

## 2024-06-11 ASSESSMENT — PATIENT HEALTH QUESTIONNAIRE - PHQ9
SUM OF ALL RESPONSES TO PHQ QUESTIONS 1-9: 0
SUM OF ALL RESPONSES TO PHQ QUESTIONS 1-9: 0
2. FEELING DOWN, DEPRESSED OR HOPELESS: NOT AT ALL
SUM OF ALL RESPONSES TO PHQ9 QUESTIONS 1 & 2: 0
SUM OF ALL RESPONSES TO PHQ QUESTIONS 1-9: 0
1. LITTLE INTEREST OR PLEASURE IN DOING THINGS: NOT AT ALL
SUM OF ALL RESPONSES TO PHQ QUESTIONS 1-9: 0

## 2024-06-11 NOTE — PROGRESS NOTES
years old)  Leg claudication  R>L     CARDIAC IMAGING:    Echo (9/28/23)    Left Ventricle: Moderately reduced left ventricular systolic function with a visually estimated EF of 40 - 45%. Left ventricle size is normal. Normal wall thickness. Moderate global hypokinesis present. Abnormal diastolic function.    Aortic Valve: Trileaflet valve.    Pericardium: Moderate (1-2 cm) pericardial effusion present. No indication of cardiac tamponade.    Contrast used: Definity.    Echo (3/3/23) LVEF 40-45%    Echo (6/25/20) LVEF 40-45%        Encounter Date: 06/19/23   EKG 12 Lead   Result Value     Ventricular Rate 79     Atrial Rate 79     P-R Interval 174     QRS Duration 88     Q-T Interval 398     QTc Calculation (Bazett) 456     P Axis 66     R Axis 18     T Axis 26     Diagnosis         Normal sinus rhythm  Nonspecific T wave abnormality  Abnormal ECG  When compared with ECG of 10-APR-2022 15:53,  T wave inversion no longer evident in Inferior leads  Confirmed by ASHIA Lindquist Massimo ([id number]) on 6/20/2023 5:01:20 PM         EKG (4/2/22) NSR 77bpm, non-specific ST-T changes  LHC (12/2015) open stent, no progressive CAD  NST (10/11/2021) Fixed defect in the atnerolateral wall is compatible with infarct. No evidence of myocardium at ischemic risk. Left ventricular dilation. LVEF 46%. Global hypokinesia and anterolateral akinesia.     HEMODYNAMICS:  RHC (10/3/23)  Systolic (mmHg) Diastolic (mmHg) Mean (mmHg) A Wave (mmHg) V Wave (mmHg) EDP (mmHg) HR (bpm)    AO Pressures 150    72    103      0    64      PA Pressures 37    18    24      0    69      PCW Pressures    22    20           17    22    18     70         16    22    20     73      RA Pressures    17    12           13       67      RV Pressures 35    10       15    68        Cardiac Output Phase: Baseline     TDCO (L/min) TDCI (L/min/m2) Renetta C.O. (L/min) Renetta C.I. (L/min/m2) Renetta HR (bpm)   Cardiac Output Results 5.35    2.97    7.01    3.89        5.35

## 2024-06-11 NOTE — PATIENT INSTRUCTIONS
Medication changes:    Increase Hydralazine to 50mg three times a day     Contact [facility] in 1 week (by MyChart -preferable- or telephone)  with your daily weights, blood pressures and any symptoms. If you leave a voicemail, please report this information in detail so we can follow up appropriately and efficiently. Be reassured, we will call you back. Phone number is [phone] option 2    Please take this to your pharmacy to notify them of the change in medications.     Testing Ordered:    Please call [facility] to schedule echo to be done in September [phone]     Other Recommendations:      Please record blood pressure and heart rate daily before medication and two hours after medication, please record weight daily upon waking/after using the bathroom. Keep a written records of your weights and blood pressure and bring to your next appointment. If you have a weight gain of 3 or more pounds overnight OR 5 or more pounds in one week, new/worsened shortness of breath or swelling, or if your blood pressure begins to consistently run below 90/60 and/or you begin to experience dizziness or lightheadedness please contact our office at [phone] option 2.    Ensure your drinking an adequate amount of water with a goal of 6-8 eight ounce glasses (1.5-2 liters) of fluid daily. Your urine should be clear and light yellow straw colored.     Follow up December with NP  with [facility]    Thank you for allowing us the privilege of being a part of your healthcare team! Please do not hesitate to contact our office at [phone] option 2 with any questions or concerns.     We are restarting a monthly heart failure support group, this will be the last Wednesday of every month from 5-6pm at [facility]. If you would like to attend you will need to RSVP to [email or web address]

## 2024-06-16 DIAGNOSIS — G47.00 INSOMNIA, UNSPECIFIED: ICD-10-CM

## 2024-06-17 RX ORDER — ZOLPIDEM TARTRATE 10 MG/1
TABLET ORAL
Qty: 90 TABLET | Refills: 0 | Status: SHIPPED | OUTPATIENT
Start: 2024-06-17 | End: 2024-09-15

## 2024-06-17 NOTE — TELEPHONE ENCOUNTER
Last appointment: 1/9/24  Next appointment: 7/12/24  Previous refill encounter(s): 3/12/24 #90    Requested Prescriptions     Pending Prescriptions Disp Refills    zolpidem (AMBIEN) 10 MG tablet [Pharmacy Med Name: ZOLPIDEM TARTRATE 10 MG TABLET] 90 tablet 0     Sig: TAKE 1 TABLET BY MOUTH NIGHTLY AS NEEDED FOR SLEEP. MAX DAILY AMOUNT: 10 MG.         For Pharmacy Admin Tracking Only    Program: Medication Refill  CPA in place:    Recommendation Provided To:   Intervention Detail: New Rx: 1, reason: Patient Preference  Intervention Accepted By:   Gap Closed?:    Time Spent (min): 5

## 2024-06-28 ENCOUNTER — OFFICE VISIT (OUTPATIENT)
Age: 64
End: 2024-06-28
Payer: COMMERCIAL

## 2024-06-28 VITALS
SYSTOLIC BLOOD PRESSURE: 140 MMHG | BODY MASS INDEX: 32.57 KG/M2 | DIASTOLIC BLOOD PRESSURE: 84 MMHG | WEIGHT: 177 LBS | HEIGHT: 62 IN

## 2024-06-28 DIAGNOSIS — Z12.31 ENCOUNTER FOR SCREENING MAMMOGRAM FOR MALIGNANT NEOPLASM OF BREAST: ICD-10-CM

## 2024-06-28 DIAGNOSIS — Z01.419 ENCOUNTER FOR GYNECOLOGICAL EXAMINATION WITHOUT ABNORMAL FINDING: Primary | ICD-10-CM

## 2024-06-28 PROCEDURE — 3079F DIAST BP 80-89 MM HG: CPT | Performed by: OBSTETRICS & GYNECOLOGY

## 2024-06-28 PROCEDURE — 3077F SYST BP >= 140 MM HG: CPT | Performed by: OBSTETRICS & GYNECOLOGY

## 2024-06-28 PROCEDURE — 99396 PREV VISIT EST AGE 40-64: CPT | Performed by: OBSTETRICS & GYNECOLOGY

## 2024-06-28 NOTE — PROGRESS NOTES
Chief Complaint   Patient presents with    Annual Exam       Ob/Gyn Hx:  G0  LMP - No LMP recorded. Patient is postmenopausal.  Menses - post menopausal -Age-45   Contraception - post menopausal status.  Hx of STI - No    SA - Yes, male    Health maintenance:  Last Pap: 05/26/2023- NILM, HPV negative.  Last Mammogram: 2023- normal per patient.  Last Bone Density: N/A  Last colonoscopy: 2018-normal per patient     1. Have you been to the ER, urgent care clinic, or hospitalized since your last visit?yes-     2. Have you seen or consulted any other health care providers outside of the [facility] since your last visit? No    She declines  a chaperone during the gynecologic exam today.      Corinne Hair MA  
every 6 hours as needed      azelastine (ASTELIN) 0.1 % nasal spray azelastine 137 mcg (0.1 %) nasal spray aerosol   USE 1 SPRAY IN EACH NOSTRIL TWICE A DAY 90      carvedilol (COREG) 25 MG tablet Take 1 tablet by mouth 2 times daily      Cholecalciferol 50 MCG (2000 UT) CAPS Take 1 capsule by mouth daily      coenzyme Q10 100 MG CAPS capsule Take 2 capsules by mouth daily      EPINEPHrine (EPIPEN) 0.3 MG/0.3ML SOAJ injection Inject 0.3 mLs into the muscle once as needed      levocetirizine (XYZAL) 5 MG tablet Take 1 tablet by mouth daily      potassium chloride (KLOR-CON) 20 MEQ packet Take 20 mEq by mouth daily as needed Takes as needed as directed by nephrologist      rosuvastatin (CRESTOR) 20 MG tablet Take 1 tablet by mouth      sucralfate (CARAFATE) 1 GM tablet sucralfate 1 gram tablet   TAKE 1 TAB BY MOUTH BEFORE MEALS AND AT BEDTIME       No current facility-administered medications for this visit.       Allergies   Allergen Reactions    Shellfish Allergy Hives, Nausea And Vomiting and Shortness Of Breath    Ace Inhibitors Cough     ?10/2011    Doxycycline Nausea Only       Review of Systems - History obtained from the patient, negative except as noted in the HPI  Constitutional: negative for weight loss, fever, night sweats  HEENT: negative for hearing loss, earache, congestion, snoring, sorethroat  CV: negative for chest pain, palpitations, edema  Resp: negative for cough, shortness of breath, wheezing  GI: negative for change in bowel habits, abdominal pain, black or bloody stools  : negative for frequency, dysuria, hematuria, vaginal discharge  MSK: negative for back pain, joint pain, muscle pain  Breast: negative for breast lumps, nipple discharge, galactorrhea  Skin :negative for itching, rash, hives  Neuro: negative for dizziness, headache, confusion, weakness  Psych: negative for anxiety, depression, change in mood  Heme/lymph: negative for bleeding, bruising, pallor    Physical Exam    Ht 1.575 m

## 2024-07-12 ENCOUNTER — OFFICE VISIT (OUTPATIENT)
Age: 64
End: 2024-07-12
Payer: COMMERCIAL

## 2024-07-12 VITALS
BODY MASS INDEX: 32.7 KG/M2 | OXYGEN SATURATION: 98 % | HEIGHT: 62 IN | RESPIRATION RATE: 16 BRPM | HEART RATE: 79 BPM | WEIGHT: 177.69 LBS | SYSTOLIC BLOOD PRESSURE: 139 MMHG | DIASTOLIC BLOOD PRESSURE: 79 MMHG | TEMPERATURE: 97.3 F

## 2024-07-12 DIAGNOSIS — N18.4 TYPE 2 DIABETES MELLITUS WITH STAGE 4 CHRONIC KIDNEY DISEASE, WITHOUT LONG-TERM CURRENT USE OF INSULIN (HCC): Primary | ICD-10-CM

## 2024-07-12 DIAGNOSIS — E11.22 TYPE 2 DIABETES MELLITUS WITH STAGE 4 CHRONIC KIDNEY DISEASE, WITHOUT LONG-TERM CURRENT USE OF INSULIN (HCC): Primary | ICD-10-CM

## 2024-07-12 DIAGNOSIS — J20.9 ACUTE BRONCHITIS, UNSPECIFIED ORGANISM: ICD-10-CM

## 2024-07-12 DIAGNOSIS — E11.22 TYPE 2 DIABETES MELLITUS WITH STAGE 4 CHRONIC KIDNEY DISEASE, WITHOUT LONG-TERM CURRENT USE OF INSULIN (HCC): ICD-10-CM

## 2024-07-12 DIAGNOSIS — N18.4 TYPE 2 DIABETES MELLITUS WITH STAGE 4 CHRONIC KIDNEY DISEASE, WITHOUT LONG-TERM CURRENT USE OF INSULIN (HCC): ICD-10-CM

## 2024-07-12 DIAGNOSIS — N18.4 CKD (CHRONIC KIDNEY DISEASE) STAGE 4, GFR 15-29 ML/MIN (HCC): ICD-10-CM

## 2024-07-12 PROBLEM — N18.30 STAGE 3 CHRONIC KIDNEY DISEASE (HCC): Status: RESOLVED | Noted: 2020-07-31 | Resolved: 2024-07-12

## 2024-07-12 LAB
CREAT UR-MCNC: 117 MG/DL
HBA1C MFR BLD: 7.8 %
MICROALBUMIN UR-MCNC: 1.62 MG/DL
MICROALBUMIN/CREAT UR-RTO: 14 MG/G (ref 0–30)

## 2024-07-12 PROCEDURE — 99214 OFFICE O/P EST MOD 30 MIN: CPT | Performed by: STUDENT IN AN ORGANIZED HEALTH CARE EDUCATION/TRAINING PROGRAM

## 2024-07-12 PROCEDURE — 83036 HEMOGLOBIN GLYCOSYLATED A1C: CPT | Performed by: STUDENT IN AN ORGANIZED HEALTH CARE EDUCATION/TRAINING PROGRAM

## 2024-07-12 PROCEDURE — 3075F SYST BP GE 130 - 139MM HG: CPT | Performed by: STUDENT IN AN ORGANIZED HEALTH CARE EDUCATION/TRAINING PROGRAM

## 2024-07-12 PROCEDURE — 3078F DIAST BP <80 MM HG: CPT | Performed by: STUDENT IN AN ORGANIZED HEALTH CARE EDUCATION/TRAINING PROGRAM

## 2024-07-12 RX ORDER — GLIPIZIDE 5 MG/1
TABLET ORAL
Qty: 45 TABLET | Refills: 0 | Status: SHIPPED | OUTPATIENT
Start: 2024-07-12

## 2024-07-12 RX ORDER — AZITHROMYCIN 250 MG/1
TABLET, FILM COATED ORAL
Qty: 6 TABLET | Refills: 0 | Status: SHIPPED | OUTPATIENT
Start: 2024-07-12 | End: 2024-07-22

## 2024-07-12 RX ORDER — GLIMEPIRIDE 1 MG/1
TABLET ORAL
Refills: 0 | OUTPATIENT
Start: 2024-07-12

## 2024-07-12 RX ORDER — GLIPIZIDE 2.5 MG/1
2.5 TABLET, EXTENDED RELEASE ORAL DAILY
Qty: 30 TABLET | Refills: 3 | Status: SHIPPED | OUTPATIENT
Start: 2024-07-12 | End: 2024-07-12 | Stop reason: ALTCHOICE

## 2024-07-12 NOTE — ASSESSMENT & PLAN NOTE
Monitored by specialist- no acute findings meriting change in the plan  Continue adequate hydration and avoid nephrotoxic agents.

## 2024-07-12 NOTE — ASSESSMENT & PLAN NOTE
Not well controlled. Starting medications  Lab Results   Component Value Date/Time    MFF2LXRP 7.8 07/12/2024 10:09 AM     - Encouraged to work on lifestyle modifications including diet and exercise.  - Continue home monitoring. Glucose goals; Fasting (), preprandial (<140), 2-hr post-prandial (<180)

## 2024-07-12 NOTE — PROGRESS NOTES
Chief Complaint   Patient presents with    Follow-up     6 Months 2024 Diabetes Last A1C 6.4%     C-Pap machine at night 2024 [facility] Sleep Study    \"Have you been to the ER, urgent care clinic since your last visit?  Hospitalized since your last visit?\"    NO    “Have you seen or consulted any other health care providers outside of [facility] since your last visit?”    NO             Vitals:    24 1009   BP: 139/79   Pulse: 79   Resp: 16   Temp: 97.3 °F (36.3 °C)   SpO2: 98%     Health Maintenance Due   Topic Date Due    HIV screen  Never done    Shingles vaccine (1 of 2) Never done    Diabetic retinal exam  2015    Breast cancer screen  2019    Respiratory Syncytial Virus (RSV) Pregnant or age 60 yrs+ (1 - 1-dose 60+ series) Never done    Diabetic foot exam  2023    COVID-19 Vaccine ( season) 2023    Diabetic Alb to Cr ratio (uACR) test  2024      The patient, Tariq Zuniga, identity was verified by name and , pharmacy verified  Labs:Yes  Fasting:Yes

## 2024-07-23 RX ORDER — ISOSORBIDE DINITRATE 10 MG/1
10 TABLET ORAL 3 TIMES DAILY
Qty: 270 TABLET | Refills: 1 | Status: SHIPPED | OUTPATIENT
Start: 2024-07-23

## 2024-07-23 NOTE — TELEPHONE ENCOUNTER
Requested Prescriptions     Pending Prescriptions Disp Refills    isosorbide dinitrate (ISORDIL) 10 MG tablet [Pharmacy Med Name: ISOSORBIDE DINITRATE 10 MG TAB] 270 tablet 1     Sig: TAKE 1 TABLET BY MOUTH THREE TIMES A DAY

## 2024-07-29 RX ORDER — HYDRALAZINE HYDROCHLORIDE 50 MG/1
50 TABLET, FILM COATED ORAL 3 TIMES DAILY
Qty: 90 TABLET | Refills: 4 | Status: SHIPPED | OUTPATIENT
Start: 2024-07-29

## 2024-07-29 NOTE — TELEPHONE ENCOUNTER
Requested Prescriptions     Signed Prescriptions Disp Refills    hydrALAZINE (APRESOLINE) 50 MG tablet 90 tablet 4     Sig: Take 1 tablet by mouth 3 times daily     Authorizing Provider: SHERRY ALFRED     Ordering User: DALIA MOSS      Last appt June 2024  Next appt Dec 2024

## 2024-08-21 ENCOUNTER — TELEPHONE (OUTPATIENT)
Age: 64
End: 2024-08-21

## 2024-08-21 RX ORDER — HYDRALAZINE HYDROCHLORIDE 50 MG/1
TABLET, FILM COATED ORAL
Qty: 90 TABLET | Refills: 0
Start: 2024-08-21

## 2024-08-21 RX ORDER — ISOSORBIDE DINITRATE 10 MG/1
TABLET ORAL
Qty: 270 TABLET | Refills: 0
Start: 2024-08-21

## 2024-08-21 RX ORDER — AMLODIPINE BESYLATE 5 MG/1
5 TABLET ORAL DAILY
Qty: 30 TABLET | Refills: 0
Start: 2024-08-21

## 2024-08-21 NOTE — TELEPHONE ENCOUNTER
@0902 - spoke with Ms. Jean Zuniga to inform her of the below recommendations from SHU Hidalgo NP. She verbalized understanding to take hydralazine 50 mg, 0.5 tab TID; Isorbide 10 mg, 0.5 tab TID and amlodipine 5 mg, tablet daily.  Discussed taking BP before meds and 2 hours later, note symptoms of fatigue and to contact [facility] with BP logs on Monday 8/26. She verbalized understanding.        8/21/24  2:22 PM  That is tough.   Her hydralazine/isordil is part of her HF treatment, and she cannot be on entresto any longer.    Let's try to decrease her hydralazine back to 25mg TID and her isordil to 5mg TID and see if that helps at all.  I also want to increase her amlodipine to a full tablet of 5mg to counteract the increase in BP.  Please have her call back with logs on Monday.      8/21 @1586 - spoke with Tariq Pena Nadia to clarify when she started having fatigue. She said that the fatigue began when Entresto was discontinued and isordil/hydralazine was started. She said that the fatigue interferes with her ability to work, finding herself \"nodding off in front of the computer\". She said the fatigue is unrelated to increased activity level as her Right leg angiography was at the end of July 2024.    I told her I would relay this information to SHU Hidalgo NP and call her back.      ----- Message from GILMA Santizo NP sent at 8/20/2024  1:42 PM EDT -----  Regarding: RE: Phone message  It sounds like she's having some increased fatigue when she's working, that she attributes to the isordil.    She also takes the hydralazine TID, which I increased at her last visit.   Can you ask her if she noticed the increased fatigue after the increase in hydralazine, or if she noticed the increased fatigue once she started doing more activity since her leg stenting?   Previously her leg pain was her main limiting factor, so it was hard to know her true HF symptoms.  ----- Message -----  From: Brenda Herndon

## 2024-08-26 ENCOUNTER — TELEPHONE (OUTPATIENT)
Age: 64
End: 2024-08-26

## 2024-08-26 NOTE — TELEPHONE ENCOUNTER
----- Message from RN Brenda PALOMARES RN sent at 8/21/2024  3:33 PM EDT -----  Patient to submit BP log x4 days after reducing dose of hydralazine and isordil and increasing amlodipine on 8/21. She's to note if symptom of fatigue is improved. (Diamond)    8/26 @ 1057 - spoke with Ms. Jean Zuniga regarding her BP and symptoms since making above medication changes. She reports that she's had indigestion since Thursday with chest and abdominal pain, so severe she called EMS. She said she believes the symptoms are related to recent adjustments to when she can take her reflux medications since her angioplasty. Symptoms have improved over the past couple of days. She has not been able evaluate improvement in fatigue as she has not been sleeping well.    BP below before meds and 2 hours later:  8/22 147/80  145/75  8/23 159/80  158/80  8/24 156/79  155/80  8/25 150/76  142/78  8/26 139/73  128/74    Informed her this information would be passed along to the provider and I would call her back.  ---  Diamond Hidalgo APRN - NP   to Me       8/27/24  9:58 AM  Ok.  It sounds like she's had a rough few days.  Let's have her check back in with us on Friday or Monday to see how she's feeling and how her logs are.    @1003 - spoke with the patient regarding the above recommendation. She reported feeling better and her abdominal issues are resolving. We will speak on 9/3 for updates on BPs and symptoms

## 2024-09-03 ENCOUNTER — TELEPHONE (OUTPATIENT)
Age: 64
End: 2024-09-03

## 2024-09-03 NOTE — TELEPHONE ENCOUNTER
Spoke with Ms. Jean Zuniga. She did not have her log information at hand at the time. She said she would send it by [employer] this afternoon.

## 2024-09-15 DIAGNOSIS — G47.00 INSOMNIA, UNSPECIFIED: ICD-10-CM

## 2024-09-16 RX ORDER — AMLODIPINE BESYLATE 5 MG/1
TABLET ORAL
Qty: 45 TABLET | Refills: 0 | Status: SHIPPED | OUTPATIENT
Start: 2024-09-16

## 2024-09-17 DIAGNOSIS — F51.01 PRIMARY INSOMNIA: Primary | ICD-10-CM

## 2024-09-17 RX ORDER — ZOLPIDEM TARTRATE 10 MG/1
10 TABLET ORAL NIGHTLY PRN
Qty: 90 TABLET | Refills: 0 | Status: SHIPPED | OUTPATIENT
Start: 2024-09-17 | End: 2024-12-16

## 2024-09-17 NOTE — TELEPHONE ENCOUNTER
Last appointment: 7/12/24  Next appointment: 10/15/24  Previous refill encounter(s): 6/17/24 #90        For Pharmacy Admin Tracking Only    Program: Medication Refill  CPA in place:    Recommendation Provided To:   Intervention Detail: New Rx: 1, reason: Patient Preference  Intervention Accepted By:   Gap Closed?:    Time Spent (min): 5

## 2024-09-18 RX ORDER — ZOLPIDEM TARTRATE 10 MG/1
TABLET ORAL
Qty: 90 TABLET | Refills: 0 | OUTPATIENT
Start: 2024-09-18 | End: 2024-12-16

## 2024-09-30 ENCOUNTER — TELEPHONE (OUTPATIENT)
Age: 64
End: 2024-09-30

## 2024-09-30 NOTE — TELEPHONE ENCOUNTER
Pt has a Dec appt but wanted to be seen earlier since she's experiencing symptoms.  She did not feel it necessary to speak with a nurse now.  I have scheduled her with Dr. Hernández tomorrow, 10/1/24 at 11:30 a.m.

## 2024-10-01 ENCOUNTER — OFFICE VISIT (OUTPATIENT)
Age: 64
End: 2024-10-01
Payer: COMMERCIAL

## 2024-10-01 VITALS
DIASTOLIC BLOOD PRESSURE: 88 MMHG | SYSTOLIC BLOOD PRESSURE: 172 MMHG | RESPIRATION RATE: 18 BRPM | HEART RATE: 80 BPM | TEMPERATURE: 98.3 F | BODY MASS INDEX: 32.34 KG/M2 | WEIGHT: 176.8 LBS | OXYGEN SATURATION: 98 %

## 2024-10-01 DIAGNOSIS — I42.9 CARDIOMYOPATHY, UNSPECIFIED TYPE (HCC): ICD-10-CM

## 2024-10-01 DIAGNOSIS — I50.43 ACUTE ON CHRONIC COMBINED SYSTOLIC AND DIASTOLIC CONGESTIVE HEART FAILURE (HCC): Primary | ICD-10-CM

## 2024-10-01 PROCEDURE — 3077F SYST BP >= 140 MM HG: CPT | Performed by: INTERNAL MEDICINE

## 2024-10-01 PROCEDURE — 99214 OFFICE O/P EST MOD 30 MIN: CPT | Performed by: INTERNAL MEDICINE

## 2024-10-01 PROCEDURE — 3079F DIAST BP 80-89 MM HG: CPT | Performed by: INTERNAL MEDICINE

## 2024-10-01 RX ORDER — BUMETANIDE 1 MG/1
1 TABLET ORAL 2 TIMES DAILY
Qty: 30 TABLET | Refills: 5 | Status: SHIPPED | OUTPATIENT
Start: 2024-10-01

## 2024-10-01 RX ORDER — RIVAROXABAN 2.5 MG/1
2.5 TABLET, FILM COATED ORAL 2 TIMES DAILY
COMMUNITY

## 2024-10-01 RX ORDER — HYDRALAZINE HYDROCHLORIDE 50 MG/1
50 TABLET, FILM COATED ORAL 3 TIMES DAILY
Qty: 90 TABLET | Refills: 0 | Status: SHIPPED
Start: 2024-10-01

## 2024-10-01 RX ORDER — ISOSORBIDE DINITRATE 10 MG/1
20 TABLET ORAL 3 TIMES DAILY
Qty: 270 TABLET | Refills: 0 | Status: SHIPPED
Start: 2024-10-01

## 2024-10-01 RX ORDER — AMLODIPINE BESYLATE 5 MG/1
5 TABLET ORAL DAILY
Qty: 90 TABLET | Refills: 0 | Status: SHIPPED | OUTPATIENT
Start: 2024-10-01

## 2024-10-01 RX ORDER — HYDRALAZINE HYDROCHLORIDE 50 MG/1
TABLET, FILM COATED ORAL
Qty: 90 TABLET | Refills: 0 | Status: SHIPPED
Start: 2024-10-01 | End: 2024-10-01 | Stop reason: DRUGHIGH

## 2024-10-01 RX ORDER — ASPIRIN 81 MG/1
81 TABLET ORAL DAILY
Qty: 90 TABLET | Refills: 0 | Status: SHIPPED | OUTPATIENT
Start: 2024-10-01

## 2024-10-01 NOTE — PROGRESS NOTES
10/3/2023  3:22 PM         REVIEW OF SYSTEMS:  General: Denies fever.  Ear, nose and throat: Denies difficulty hearing, sinus problems, nosebleeds  Cardiovascular: see above in the interval history  Respiratory: Denies cough, wheezing, sputum production, hemoptysis.  Gastrointestinal: Denies heartburn, constipation, diarrhea, abdominal pain, nausea, blood in stool  Kidney and bladder: Denies painful urination, frequent urination  Musculoskeletal: Denies joint pain, muscle weakness  Skin and hair: Denies change in existing skin lesions    PHYSICAL EXAM:  BP (!) 172/88   Pulse 80   Temp 98.3 °F (36.8 °C)   Resp 18   Wt 80.2 kg (176 lb 12.8 oz)   SpO2 98%   BMI 32.34 kg/m²       Physical Exam  Constitutional:       Appearance: She is obese.   HENT:      Head: Normocephalic.      Nose: Nose normal.      Mouth/Throat:      Mouth: Mucous membranes are moist.   Eyes:      Conjunctiva/sclera: Conjunctivae normal.   Cardiovascular:      Rate and Rhythm: Normal rate and regular rhythm.      Pulses: Normal pulses.      Heart sounds: Normal heart sounds.   Pulmonary:      Effort: Pulmonary effort is normal.      Breath sounds: Normal breath sounds.   Musculoskeletal:         General: Normal range of motion.      Cervical back: Normal range of motion.   Skin:     General: Skin is warm.   Neurological:      General: No focal deficit present.      Mental Status: She is alert and oriented to person, place, and time.   Psychiatric:         Mood and Affect: Mood normal.               PAST MEDICAL HISTORY:  Past Medical History:   Diagnosis Date    Asthma     mild    CHF (congestive heart failure) (HCC)     Diabetes mellitus, type 2 (HCC) 08/24/2012    GERD (gastroesophageal reflux disease) 03/16/2011    GERD (gastroesophageal reflux disease)     hiatal hernia    Heart disease 2015    Heart failure (HCC)     Hypercholesterolemia     Hypertension 03/16/2011    Ill-defined condition     pacemaker put in last July 2015 because of

## 2024-10-01 NOTE — PATIENT INSTRUCTIONS
Dear Tariq,    Thank you for visiting today and for your unwavering commitment to improving your health. Our discussion about your current health concerns was crucial for adjusting your treatment plan to better suit your needs.    Following our consultation, here are the key instructions and recommendations for your care plan:    - Medications:    - Adjust Isosorbide dosage to 20 mg three times a day.    - Increase Bumex to 1 mg twice a day, to be taken at 6 AM and noon.    - Reduce Aspirin to 81 mg daily, aligning with recommendations for patients on Xarelto.    - Consider the reintroduction of Entresto, pending further consultation with your nephrologist.    - Tests and Monitoring:    - Closely monitor your blood pressure, weights and symptoms. Please call or send a [employer] chart message in 1 week with an update of these.    - Alert me if you experience a weight gain of more than 3 pounds in a day or 5 pounds in a week.    - A blood test has been ordered to be completed after a week after medication changes as above; please ensure this is carried out.    -Please schedule echo in next couple weeks by calling [phone] (this can be scheduled at [facility])    - Lifestyle and Activity:    - Reduce salt and fluid intake to better manage your blood pressure and fluid retention.    - Follow-Up Care:    - Attempt to secure an earlier appointment with Dr. Davian Hernández than currently scheduled.     - Ensure your December appointment with Dr. Kohler is confirmed, along with the scheduling of your echocardiogram.      - General Advice:    - Continue monitoring for any symptoms such as shortness of breath, chest pain, or unusual swelling, and report these promptly.    Your health and well-being remain our top priority. Please do not hesitate to reach out if you have any further questions or concerns. I look forward to our next appointment.    Sincerely,    Dr. Angelo Hernández MD  Cardiology

## 2024-10-07 DIAGNOSIS — E11.65 TYPE 2 DIABETES MELLITUS WITH HYPERGLYCEMIA, UNSPECIFIED WHETHER LONG TERM INSULIN USE (HCC): Primary | ICD-10-CM

## 2024-10-08 DIAGNOSIS — I42.9 CARDIOMYOPATHY, UNSPECIFIED TYPE (HCC): ICD-10-CM

## 2024-10-08 DIAGNOSIS — I50.43 ACUTE ON CHRONIC COMBINED SYSTOLIC AND DIASTOLIC CONGESTIVE HEART FAILURE (HCC): ICD-10-CM

## 2024-10-08 RX ORDER — GLIPIZIDE 2.5 MG/1
2.5 TABLET, EXTENDED RELEASE ORAL DAILY
Qty: 90 TABLET | Refills: 3 | Status: SHIPPED | OUTPATIENT
Start: 2024-10-08

## 2024-10-08 NOTE — TELEPHONE ENCOUNTER
Last appointment: 7/12/24  Next appointment: 10/15/24  Previous refill encounter(s): 7/12/24 5mg #45    Requested Prescriptions     Pending Prescriptions Disp Refills    glipiZIDE (GLUCOTROL XL) 2.5 MG extended release tablet [Pharmacy Med Name: GLIPIZIDE ER 2.5 MG TABLET] 90 tablet 3     Sig: TAKE 1 TABLET BY MOUTH EVERY DAY         For Pharmacy Admin Tracking Only    Program: Medication Refill  CPA in place:    Recommendation Provided To:   Intervention Detail: New Rx: 1, reason: Patient Preference  Intervention Accepted By:   Gap Closed?:    Time Spent (min): 5

## 2024-10-10 LAB
BASOPHILS # BLD AUTO: 0 X10E3/UL (ref 0–0.2)
BASOPHILS NFR BLD AUTO: 1 %
BUN SERPL-MCNC: 40 MG/DL (ref 8–27)
BUN/CREAT SERPL: 12 (ref 12–28)
CALCIUM SERPL-MCNC: 9.7 MG/DL (ref 8.7–10.3)
CHLORIDE SERPL-SCNC: 105 MMOL/L (ref 96–106)
CO2 SERPL-SCNC: 22 MMOL/L (ref 20–29)
CREAT SERPL-MCNC: 3.21 MG/DL (ref 0.57–1)
EGFRCR SERPLBLD CKD-EPI 2021: 16 ML/MIN/1.73
EOSINOPHIL # BLD AUTO: 0.1 X10E3/UL (ref 0–0.4)
EOSINOPHIL NFR BLD AUTO: 1 %
ERYTHROCYTE [DISTWIDTH] IN BLOOD BY AUTOMATED COUNT: 13 % (ref 11.7–15.4)
GLUCOSE SERPL-MCNC: 187 MG/DL (ref 70–99)
HCT VFR BLD AUTO: 29.6 % (ref 34–46.6)
HGB BLD-MCNC: 9.1 G/DL (ref 11.1–15.9)
IMM GRANULOCYTES # BLD AUTO: 0 X10E3/UL (ref 0–0.1)
IMM GRANULOCYTES NFR BLD AUTO: 0 %
LYMPHOCYTES # BLD AUTO: 2.4 X10E3/UL (ref 0.7–3.1)
LYMPHOCYTES NFR BLD AUTO: 28 %
MCH RBC QN AUTO: 28.3 PG (ref 26.6–33)
MCHC RBC AUTO-ENTMCNC: 30.7 G/DL (ref 31.5–35.7)
MCV RBC AUTO: 92 FL (ref 79–97)
MONOCYTES # BLD AUTO: 0.8 X10E3/UL (ref 0.1–0.9)
MONOCYTES NFR BLD AUTO: 9 %
NEUTROPHILS # BLD AUTO: 5.2 X10E3/UL (ref 1.4–7)
NEUTROPHILS NFR BLD AUTO: 61 %
NT-PROBNP SERPL-MCNC: 903 PG/ML (ref 0–287)
PLATELET # BLD AUTO: 267 X10E3/UL (ref 150–450)
POTASSIUM SERPL-SCNC: 4 MMOL/L (ref 3.5–5.2)
RBC # BLD AUTO: 3.21 X10E6/UL (ref 3.77–5.28)
SODIUM SERPL-SCNC: 143 MMOL/L (ref 134–144)
WBC # BLD AUTO: 8.5 X10E3/UL (ref 3.4–10.8)

## 2024-10-10 NOTE — RESULT ENCOUNTER NOTE
proBNP mildly elevated much better compared to old numbers continue same treatment  Creatinine remains around 3.2 mg/dL due to chronic kidney disease  Hemoglobin low 9.1 mg/dL due to chronic kidney disease  At present I would continue current dose of Bumex 1 mg p.o. twice daily also need to see kidney specialist

## 2024-10-14 ENCOUNTER — TELEPHONE (OUTPATIENT)
Age: 64
End: 2024-10-14

## 2024-10-14 NOTE — TELEPHONE ENCOUNTER
----- Message from Dr. Angelo Hernández MD sent at 10/10/2024  5:29 PM EDT -----  proBNP mildly elevated much better compared to old numbers continue same treatment  Creatinine remains around 3.2 mg/dL due to chronic kidney disease  Hemoglobin low 9.1 mg/dL due to chronic kidney disease  At present I would continue current dose of Bumex 1 mg p.o. twice daily also need to see kidney specialist  ----  10/14 @1030 - spoke with Tariq Santiago regarding the above lab results and recommendations from Dr. Hernández. She verbalized understanding that there are no med changes. She states that she sees nephrologist Abhay Hernández  -she inquired about restarting Entresto. She said Dr. ANTIONETTE Hernández was going to discuss with nephrology. She states concern that she has gained weight since being taken off Entresto.

## 2024-10-15 ENCOUNTER — OFFICE VISIT (OUTPATIENT)
Age: 64
End: 2024-10-15
Payer: COMMERCIAL

## 2024-10-15 VITALS
HEIGHT: 62 IN | DIASTOLIC BLOOD PRESSURE: 71 MMHG | TEMPERATURE: 97.5 F | RESPIRATION RATE: 16 BRPM | WEIGHT: 178.13 LBS | BODY MASS INDEX: 32.78 KG/M2 | SYSTOLIC BLOOD PRESSURE: 130 MMHG | OXYGEN SATURATION: 98 % | HEART RATE: 70 BPM

## 2024-10-15 DIAGNOSIS — E11.22 TYPE 2 DM WITH CKD STAGE 4 AND HYPERTENSION (HCC): Primary | ICD-10-CM

## 2024-10-15 DIAGNOSIS — N18.4 TYPE 2 DM WITH CKD STAGE 4 AND HYPERTENSION (HCC): Primary | ICD-10-CM

## 2024-10-15 DIAGNOSIS — I12.9 TYPE 2 DM WITH CKD STAGE 4 AND HYPERTENSION (HCC): Primary | ICD-10-CM

## 2024-10-15 PROBLEM — E11.65 TYPE 2 DIABETES MELLITUS WITH HYPERGLYCEMIA (HCC): Status: ACTIVE | Noted: 2024-10-15

## 2024-10-15 PROBLEM — E11.65 TYPE 2 DIABETES MELLITUS WITH HYPERGLYCEMIA (HCC): Status: RESOLVED | Noted: 2024-10-15 | Resolved: 2024-10-15

## 2024-10-15 LAB — HBA1C MFR BLD: 7 %

## 2024-10-15 PROCEDURE — 83036 HEMOGLOBIN GLYCOSYLATED A1C: CPT | Performed by: STUDENT IN AN ORGANIZED HEALTH CARE EDUCATION/TRAINING PROGRAM

## 2024-10-15 PROCEDURE — 3078F DIAST BP <80 MM HG: CPT | Performed by: STUDENT IN AN ORGANIZED HEALTH CARE EDUCATION/TRAINING PROGRAM

## 2024-10-15 PROCEDURE — 99213 OFFICE O/P EST LOW 20 MIN: CPT | Performed by: STUDENT IN AN ORGANIZED HEALTH CARE EDUCATION/TRAINING PROGRAM

## 2024-10-15 PROCEDURE — 3075F SYST BP GE 130 - 139MM HG: CPT | Performed by: STUDENT IN AN ORGANIZED HEALTH CARE EDUCATION/TRAINING PROGRAM

## 2024-10-15 NOTE — PROGRESS NOTES
Chief Complaint   Patient presents with    3 Month Follow-Up     2024 Diabetes Last A1C 7.8%     Fasting for labs, [facility]    \"Have you been to the ER, urgent care clinic since your last visit?  Hospitalized since your last visit?\"    NO    “Have you seen or consulted any other health care providers outside of [facility] since your last visit?”    NO    Have you had a mammogram?”     Yes       Vitals:    10/15/24 0935   BP: 130/71   Pulse: 70   Resp: 16   Temp: 97.5 °F (36.4 °C)   SpO2: 98%      Health Maintenance Due   Topic Date Due    HIV screen  Never done    Shingles vaccine (1 of 2) Never done    Diabetic retinal exam  2015    Breast cancer screen  2019    Respiratory Syncytial Virus (RSV) Pregnant or age 60 yrs+ (1 - 1-dose 60+ series) Never done    Diabetic foot exam  2023    Lipids  2024        The patient, Tariq Zuniga, identity was verified by name and .

## 2024-10-15 NOTE — ASSESSMENT & PLAN NOTE
Chronic, at goal (stable), continue current treatment plan, medication adherence emphasized, and lifestyle modifications recommended.  Follow up with Dr. Hernández (Nephrology for worsening kidney function).     Lab Results   Component Value Date/Time    KCL4PCTD 7.0 10/15/2024 09:40 AM     - Encouraged to work on lifestyle modifications including diet and exercise.  - Continue home monitoring. Glucose goals; Fasting (100-130), preprandial (<140),  -Discussed hypoglycemic protocol and steps to take should this happen.

## 2024-10-15 NOTE — PROGRESS NOTES
[facility]  [address] [place] [address] [phone]    HPI:    Tariq Zuniga is a 61 y.o.  BLACK/  female who presents to clinic today for evaluation of the issues listed above.     Subjective;    Pt have a PMHx significant for DM, Chronic GERD/gas, CKD, MI s/p LAD stent (2011), CHF s/p ICD (2015), HTN and HLD.    DM with CKD IV:  Last A1C was <7 per patient.  Previously diet controlled but then started on Glipizide 2.5mg daily because A1C went up to 7.8% at last visit.    Also has CKD IV, follows with Dr. Hernández (Nephro). Last saw Dr. Hernández about 2 months ago (5/2024), Cr 2.34 with GFR 23 (reviewed on patient's portal).  Renal flow ultrasound was negative per patient.    CHF s/p ICD and h/o MI s/p stent:  Had another episode of CHF exacerbation last year.  Has another angioplasty on 08/2024.  Follows with Dr. Kohler with [facility] and the [facility] team at [facility].  On Isordil, amlodipine, hydralazine and bumex.   Taken off entresto due to worsening kidney function per notes.    Chronic GERD:  Managed by GI.    Pt denies any  fever, chill, chest pain, or sob.    Mammogram done recently (03/2023) - states that it was negative.    Other Health Habits and social history:  Smoking history: former smoker  Alcohol history: socially   , no children.     Other providers:  -Cardiology  -Nephrology    Allergies - reviewed:   Allergies   Allergen Reactions    Shellfish Allergy Hives, Nausea And Vomiting and Shortness Of Breath    Ace Inhibitors Cough     ?10/2011    Doxycycline Nausea Only       Past Medical History - reviewed:  Past Medical History:   Diagnosis Date    Asthma     mild    CHF (congestive heart failure) (HCC)     Diabetes mellitus, type 2 (HCC) 08/24/2012    GERD (gastroesophageal reflux disease) 03/16/2011    GERD (gastroesophageal reflux disease)     hiatal hernia    Heart disease 2015    Heart

## 2024-10-17 RX ORDER — FERROUS SULFATE 325(65) MG
1 TABLET ORAL
Qty: 90 TABLET | Refills: 0 | Status: SHIPPED | OUTPATIENT
Start: 2024-10-17

## 2024-10-17 NOTE — TELEPHONE ENCOUNTER
Requested Prescriptions     Pending Prescriptions Disp Refills    ferrous sulfate (IRON 325) 325 (65 Fe) MG tablet [Pharmacy Med Name: FERROUS SULFATE 325 MG TABLET] 90 tablet 0     Sig: TAKE 1 TABLET BY MOUTH EVERY DAY WITH BREAKFAST       Last appt 10/1  Next appt 12/17

## 2024-10-22 ENCOUNTER — PATIENT MESSAGE (OUTPATIENT)
Age: 64
End: 2024-10-22

## 2024-10-22 RX ORDER — METOLAZONE 2.5 MG/1
TABLET ORAL
Qty: 5 TABLET | Refills: 0 | Status: SHIPPED | OUTPATIENT
Start: 2024-10-22

## 2024-10-22 NOTE — TELEPHONE ENCOUNTER
Requested Prescriptions     Signed Prescriptions Disp Refills    metOLazone (ZAROXOLYN) 2.5 MG tablet 5 tablet 0     Sig: Take 1 tablet as needed only as directed by [facility]     Authorizing Provider: TRAVIS PIZANO     Ordering User: DALIA MOSS      See my chart encounter for more details regarding above prescription.

## 2024-10-25 ENCOUNTER — PATIENT MESSAGE (OUTPATIENT)
Age: 64
End: 2024-10-25

## 2024-10-25 DIAGNOSIS — I50.43 ACUTE ON CHRONIC COMBINED SYSTOLIC AND DIASTOLIC CONGESTIVE HEART FAILURE (HCC): Primary | ICD-10-CM

## 2024-10-29 NOTE — TELEPHONE ENCOUNTER
BMP order placed and faxed to lab [facility] across from [facility] per patient request.

## 2024-11-01 DIAGNOSIS — I50.43 ACUTE ON CHRONIC COMBINED SYSTOLIC AND DIASTOLIC CONGESTIVE HEART FAILURE (HCC): ICD-10-CM

## 2024-11-02 LAB
BUN SERPL-MCNC: 58 MG/DL (ref 8–27)
BUN/CREAT SERPL: 18 (ref 12–28)
CALCIUM SERPL-MCNC: 10 MG/DL (ref 8.7–10.3)
CHLORIDE SERPL-SCNC: 100 MMOL/L (ref 96–106)
CO2 SERPL-SCNC: 25 MMOL/L (ref 20–29)
CREAT SERPL-MCNC: 3.21 MG/DL (ref 0.57–1)
EGFRCR SERPLBLD CKD-EPI 2021: 16 ML/MIN/1.73
GLUCOSE SERPL-MCNC: 157 MG/DL (ref 70–99)
POTASSIUM SERPL-SCNC: 4 MMOL/L (ref 3.5–5.2)
SODIUM SERPL-SCNC: 141 MMOL/L (ref 134–144)

## 2024-11-04 ENCOUNTER — TELEPHONE (OUTPATIENT)
Age: 64
End: 2024-11-04

## 2024-11-04 RX ORDER — AMLODIPINE BESYLATE 5 MG/1
7.5 TABLET ORAL DAILY
Qty: 135 TABLET | Refills: 0 | Status: SHIPPED | OUTPATIENT
Start: 2024-11-04

## 2024-11-04 RX ORDER — BUMETANIDE 2 MG/1
2 TABLET ORAL 2 TIMES DAILY
Qty: 180 TABLET | Refills: 0 | Status: SHIPPED | OUTPATIENT
Start: 2024-11-04

## 2024-11-04 NOTE — TELEPHONE ENCOUNTER
Requested Prescriptions     Pending Prescriptions Disp Refills    amLODIPine (NORVASC) 5 MG tablet 135 tablet 0     Sig: Take 1.5 tablets by mouth daily    bumetanide (BUMEX) 2 MG tablet 180 tablet 0     Sig: Take 1 tablet by mouth 2 times daily      Doses increased via my chart messages, updated pharmacy on new dosing.    Next appt 12/17

## 2024-11-04 NOTE — TELEPHONE ENCOUNTER
----- Message from Dr. Angelo Hernández MD sent at 11/4/2024  1:00 PM EST -----  Creatinine around baseline 3.2 mg/dL continue same treatment follow-up with Dr. ISMAEL Hernández    @0412 - spoke with Tariq Zuniga regarding the above lab results. She verbalized understanding that there are no medication changes and will continue Bumex 2 mg BID.

## 2024-12-09 RX ORDER — AMLODIPINE BESYLATE 5 MG/1
TABLET ORAL
Qty: 45 TABLET | OUTPATIENT
Start: 2024-12-09

## 2024-12-13 ENCOUNTER — TELEPHONE (OUTPATIENT)
Age: 64
End: 2024-12-13

## 2024-12-13 NOTE — TELEPHONE ENCOUNTER
Telephone Call RE:  Appointment reminder     Outcome:     [x] Patient confirmed appointment   [] Patient rescheduled appointment for    [] Unable to reach  [] Left message              [] Other:       Covered all appt details.

## 2024-12-14 DIAGNOSIS — F51.01 PRIMARY INSOMNIA: ICD-10-CM

## 2024-12-15 DIAGNOSIS — K21.9 GASTRO-ESOPHAGEAL REFLUX DISEASE WITHOUT ESOPHAGITIS: ICD-10-CM

## 2024-12-16 DIAGNOSIS — K21.9 GASTRO-ESOPHAGEAL REFLUX DISEASE WITHOUT ESOPHAGITIS: ICD-10-CM

## 2024-12-16 DIAGNOSIS — F51.01 PRIMARY INSOMNIA: ICD-10-CM

## 2024-12-16 RX ORDER — FAMOTIDINE 40 MG/1
40 TABLET, FILM COATED ORAL NIGHTLY
Qty: 90 TABLET | Refills: 1 | Status: SHIPPED | OUTPATIENT
Start: 2024-12-16

## 2024-12-16 RX ORDER — ZOLPIDEM TARTRATE 10 MG/1
10 TABLET ORAL NIGHTLY PRN
Qty: 90 TABLET | Refills: 1 | Status: SHIPPED | OUTPATIENT
Start: 2024-12-16 | End: 2025-06-14

## 2024-12-16 NOTE — TELEPHONE ENCOUNTER
Last appointment: 10/15/24  Next appointment: 1/17/25  Previous refill encounter(s): 9/17/24 #90    Requested Prescriptions     Pending Prescriptions Disp Refills    zolpidem (AMBIEN) 10 MG tablet [Pharmacy Med Name: ZOLPIDEM TARTRATE 10 MG TABLET] 90 tablet 1     Sig: Take 1 tablet by mouth nightly as needed for Sleep for up to 180 days. Max Daily Amount: 10 mg         For Pharmacy Admin Tracking Only    Program: Medication Refill  CPA in place:    Recommendation Provided To:   Intervention Detail: New Rx: 1, reason: Patient Preference  Intervention Accepted By:   Gap Closed?:    Time Spent (min): 5

## 2024-12-16 NOTE — TELEPHONE ENCOUNTER
Last appointment: 10/15/24  Next appointment: 1/17/25  Previous refill encounter(s): 5/29/24 #90 with 1 refill    Requested Prescriptions     Pending Prescriptions Disp Refills    famotidine (PEPCID) 40 MG tablet [Pharmacy Med Name: FAMOTIDINE 40 MG TABLET] 90 tablet 1     Sig: TAKE 1 TABLET BY MOUTH EVERY DAY AT NIGHT         For Pharmacy Admin Tracking Only    Program: Medication Refill  CPA in place:    Recommendation Provided To:   Intervention Detail: New Rx: 1, reason: Patient Preference  Intervention Accepted By:   Gap Closed?:    Time Spent (min): 5

## 2024-12-17 ENCOUNTER — OFFICE VISIT (OUTPATIENT)
Age: 64
End: 2024-12-17
Payer: COMMERCIAL

## 2024-12-17 VITALS
OXYGEN SATURATION: 97 % | DIASTOLIC BLOOD PRESSURE: 72 MMHG | BODY MASS INDEX: 31.36 KG/M2 | HEART RATE: 75 BPM | SYSTOLIC BLOOD PRESSURE: 140 MMHG | HEIGHT: 62 IN | TEMPERATURE: 98.2 F | WEIGHT: 170.4 LBS | RESPIRATION RATE: 18 BRPM

## 2024-12-17 DIAGNOSIS — I50.22 CHRONIC SYSTOLIC HEART FAILURE (HCC): Primary | ICD-10-CM

## 2024-12-17 PROCEDURE — 99214 OFFICE O/P EST MOD 30 MIN: CPT | Performed by: NURSE PRACTITIONER

## 2024-12-17 PROCEDURE — 3078F DIAST BP <80 MM HG: CPT | Performed by: NURSE PRACTITIONER

## 2024-12-17 PROCEDURE — 3077F SYST BP >= 140 MM HG: CPT | Performed by: NURSE PRACTITIONER

## 2024-12-17 RX ORDER — ALLOPURINOL 100 MG/1
100 TABLET ORAL DAILY
Qty: 90 TABLET | Refills: 1 | Status: SHIPPED | OUTPATIENT
Start: 2024-12-17

## 2024-12-17 RX ORDER — ZOLPIDEM TARTRATE 10 MG/1
10 TABLET ORAL NIGHTLY PRN
Qty: 90 TABLET | Refills: 1 | OUTPATIENT
Start: 2024-12-17 | End: 2025-06-15

## 2024-12-17 RX ORDER — FAMOTIDINE 40 MG/1
40 TABLET, FILM COATED ORAL NIGHTLY
Qty: 90 TABLET | Refills: 1 | OUTPATIENT
Start: 2024-12-17

## 2024-12-17 RX ORDER — ZOLPIDEM TARTRATE 10 MG/1
10 TABLET ORAL NIGHTLY PRN
Qty: 90 TABLET | Refills: 0 | OUTPATIENT
Start: 2024-12-17 | End: 2025-03-17

## 2024-12-17 RX ORDER — AMLODIPINE BESYLATE 10 MG/1
10 TABLET ORAL DAILY
Qty: 90 TABLET | Refills: 1 | Status: SHIPPED | OUTPATIENT
Start: 2024-12-17

## 2024-12-17 ASSESSMENT — ENCOUNTER SYMPTOMS
CHEST TIGHTNESS: 0
BLOOD IN STOOL: 0
SHORTNESS OF BREATH: 0
SORE THROAT: 0
ABDOMINAL DISTENTION: 1
ABDOMINAL PAIN: 0
COUGH: 0
EYE PAIN: 0

## 2024-12-17 ASSESSMENT — PATIENT HEALTH QUESTIONNAIRE - PHQ9
SUM OF ALL RESPONSES TO PHQ QUESTIONS 1-9: 0
2. FEELING DOWN, DEPRESSED OR HOPELESS: NOT AT ALL
1. LITTLE INTEREST OR PLEASURE IN DOING THINGS: NOT AT ALL
SUM OF ALL RESPONSES TO PHQ9 QUESTIONS 1 & 2: 0

## 2024-12-17 NOTE — PATIENT INSTRUCTIONS
Medication changes:    -INCREASE your amlodipine to 10mg daily.  You may take two of your current 5mg tablets until you use them up, and then you may start the new 10mg tablet once daily.        Please take this to your pharmacy to notify them of the change in medications.       Testing Ordered:    -please try again to schedule your ECHO with [facility].  Please notify us with any difficulties with getting this scheduled.          Other Recommendations:      - Record blood pressure and heart rate daily before medication and two hours after medication  - Record weight daily upon waking/after using the bathroom.   - Keep a written records of your weights and blood pressure and bring to your next appointment.   - Call the clinic at if you have a weight gain of 3 or more pounds overnight OR 5 or more pounds in one week, new/worsened shortness of breath or swelling, or if your blood pressure begins to consistently run below 90/60 and/or you begin to experience dizziness or lightheadedness. Our office phone number [phone] option 2.  - Ensure you are drinking an adequate amount of water with a goal of 6-8 eight ounce glasses (1.5-2 liters) of fluid daily. Your urine should be clear and light yellow straw colored.       Follow up in 6 months with [facility]    Thank you for allowing us the privilege of being a part of your healthcare team! Please do not hesitate to contact our office at [phone] option 2 with any questions or concerns.     We are restarting a monthly heart failure support group, this will be the last Wednesday of every month from 5-6pm at [facility]. If you would like to attend you will need to RSVP to [email or web address]

## 2024-12-17 NOTE — PROGRESS NOTES
ADVANCED HEART FAILURE CENTER  [facility] in [place], [place]  Heart Failure Outpatient Clinic Note    Patient name: Tariq Zuniga  Patient : 1960  Patient MRN: [id number]  Date of service: 24    Primary care physician: Coleman Crzu MD  Primary general cardiologist: Dr. Kohler     Primary F cardiologist: Dr. Angelo Hernández    Chief Complaint   Patient presents with    Congestive Heart Failure    Palpitations     Broke foot, thinks pain meds are causing       PLAN OF CARE:  63 y/o female with asthma and HFpEF 40-45% due to mixed ischemic and non-ischemic cardiomyopathy, stage C, NYHA class IIIA (6MW 304 meters) symptoms on GDMT  RHC this year showed normal right sided and elevated wedge 17 with normal cardiac output confirming diastolic dysfunction; stress test was not done in the cath labs; these are results at weight 174lbs and Cr 2.6 indicating patient gained weight and kidney function slightly worsened > her entresto was discontinued in hopes for improving renal function  Dyspnea is multifactorial, based on RHC predominantly due to lung disease +/- some component of HFpEF > patient will have pulmonary evaluation now for asthma with pulmonologist and allergologist  Chronic fatigue is unlikely related to heart failure; + [facility] just started CPAP.  PAD symptoms limiting her a lot.       INTERVAL HISTORY:  -BP mildly elevated 142/78  -labs :  K 4.0; creat 3.21;   -renal labs creat 2.6 (yesterday per patient)  -weight down 6lbs  -Tariq Zuniga is here for heart failure follow up.  Fell on last step and broke 3 bones in her R foot.  Will have scopes in early January  History of Present Illness    She reports a general sense of well-being, with no complaints of shortness of breath during today's visit or while in a supine position. She underwent an angioplasty procedure on her right leg in 2024, which has significantly alleviated her discomfort during ambulation.

## 2025-01-15 SDOH — ECONOMIC STABILITY: FOOD INSECURITY: WITHIN THE PAST 12 MONTHS, YOU WORRIED THAT YOUR FOOD WOULD RUN OUT BEFORE YOU GOT MONEY TO BUY MORE.: NEVER TRUE

## 2025-01-15 SDOH — ECONOMIC STABILITY: FOOD INSECURITY: WITHIN THE PAST 12 MONTHS, THE FOOD YOU BOUGHT JUST DIDN'T LAST AND YOU DIDN'T HAVE MONEY TO GET MORE.: NEVER TRUE

## 2025-01-15 SDOH — ECONOMIC STABILITY: INCOME INSECURITY: IN THE LAST 12 MONTHS, WAS THERE A TIME WHEN YOU WERE NOT ABLE TO PAY THE MORTGAGE OR RENT ON TIME?: NO

## 2025-01-15 SDOH — ECONOMIC STABILITY: TRANSPORTATION INSECURITY
IN THE PAST 12 MONTHS, HAS LACK OF TRANSPORTATION KEPT YOU FROM MEETINGS, WORK, OR FROM GETTING THINGS NEEDED FOR DAILY LIVING?: NO

## 2025-01-15 SDOH — ECONOMIC STABILITY: TRANSPORTATION INSECURITY
IN THE PAST 12 MONTHS, HAS THE LACK OF TRANSPORTATION KEPT YOU FROM MEDICAL APPOINTMENTS OR FROM GETTING MEDICATIONS?: NO

## 2025-01-17 ENCOUNTER — OFFICE VISIT (OUTPATIENT)
Age: 65
End: 2025-01-17
Payer: COMMERCIAL

## 2025-01-17 VITALS
WEIGHT: 169.75 LBS | TEMPERATURE: 97.6 F | BODY MASS INDEX: 31.24 KG/M2 | RESPIRATION RATE: 16 BRPM | OXYGEN SATURATION: 97 % | SYSTOLIC BLOOD PRESSURE: 121 MMHG | HEIGHT: 62 IN | HEART RATE: 76 BPM | DIASTOLIC BLOOD PRESSURE: 57 MMHG

## 2025-01-17 DIAGNOSIS — T78.40XA ALLERGIC REACTION, INITIAL ENCOUNTER: ICD-10-CM

## 2025-01-17 DIAGNOSIS — I12.9 TYPE 2 DM WITH CKD STAGE 4 AND HYPERTENSION (HCC): Primary | ICD-10-CM

## 2025-01-17 DIAGNOSIS — E11.22 TYPE 2 DM WITH CKD STAGE 4 AND HYPERTENSION (HCC): Primary | ICD-10-CM

## 2025-01-17 DIAGNOSIS — I50.43 ACUTE ON CHRONIC COMBINED SYSTOLIC AND DIASTOLIC CONGESTIVE HEART FAILURE (HCC): ICD-10-CM

## 2025-01-17 DIAGNOSIS — N18.4 TYPE 2 DM WITH CKD STAGE 4 AND HYPERTENSION (HCC): Primary | ICD-10-CM

## 2025-01-17 LAB — HBA1C MFR BLD: 6.6 %

## 2025-01-17 PROCEDURE — 99213 OFFICE O/P EST LOW 20 MIN: CPT | Performed by: STUDENT IN AN ORGANIZED HEALTH CARE EDUCATION/TRAINING PROGRAM

## 2025-01-17 PROCEDURE — 83036 HEMOGLOBIN GLYCOSYLATED A1C: CPT | Performed by: STUDENT IN AN ORGANIZED HEALTH CARE EDUCATION/TRAINING PROGRAM

## 2025-01-17 PROCEDURE — 3078F DIAST BP <80 MM HG: CPT | Performed by: STUDENT IN AN ORGANIZED HEALTH CARE EDUCATION/TRAINING PROGRAM

## 2025-01-17 PROCEDURE — 3074F SYST BP LT 130 MM HG: CPT | Performed by: STUDENT IN AN ORGANIZED HEALTH CARE EDUCATION/TRAINING PROGRAM

## 2025-01-17 RX ORDER — EPINEPHRINE 0.3 MG/.3ML
0.3 INJECTION SUBCUTANEOUS
Qty: 0.3 ML | Refills: 0 | Status: CANCELLED | OUTPATIENT
Start: 2025-01-17 | End: 2025-01-17

## 2025-01-17 RX ORDER — EPINEPHRINE 0.3 MG/.3ML
0.3 INJECTION SUBCUTANEOUS
Qty: 1 EACH | Refills: 0 | Status: SHIPPED | OUTPATIENT
Start: 2025-01-17 | End: 2025-01-17

## 2025-01-17 ASSESSMENT — PATIENT HEALTH QUESTIONNAIRE - PHQ9
SUM OF ALL RESPONSES TO PHQ9 QUESTIONS 1 & 2: 0
SUM OF ALL RESPONSES TO PHQ QUESTIONS 1-9: 0
SUM OF ALL RESPONSES TO PHQ QUESTIONS 1-9: 0
2. FEELING DOWN, DEPRESSED OR HOPELESS: NOT AT ALL
SUM OF ALL RESPONSES TO PHQ QUESTIONS 1-9: 0
1. LITTLE INTEREST OR PLEASURE IN DOING THINGS: NOT AT ALL
SUM OF ALL RESPONSES TO PHQ QUESTIONS 1-9: 0

## 2025-01-17 NOTE — PROGRESS NOTES
Chief Complaint   Patient presents with    Follow-up     10/15/2024 Diabetes Last A1C 7.0%     \"Have you been to the ER, urgent care clinic since your last visit?  Hospitalized since your last visit?\"      Yes  [facility]   2024  Closed displaced fracture of second metatarsal bone of right foot,     “Have you seen or consulted any other health care providers outside of [facility] since your last visit?”      Yes  [facility]  1/15/2025  Closed displaced fracture of second metatarsal bone of right foot,        Have you had a mammogram?”     Yes   Mammogram      Vitals:    25 0938   BP: (!) 121/57   Pulse: 76   Resp: 16   Temp: 97.6 °F (36.4 °C)   TempSrc: Temporal   SpO2: 97%   Weight: 77 kg (169 lb 12.1 oz)   Height: 1.575 m (5' 2\")      Health Maintenance Due   Topic Date Due    HIV screen  Never done    Shingles vaccine (1 of 2) Never done    Diabetic retinal exam  2015    Breast cancer screen  2019    Respiratory Syncytial Virus (RSV) Pregnant or age 60 yrs+ (1 - Risk 60-74 years 1-dose series) Never done    Diabetic foot exam  2023    Lipids  2024      The patient, Tariq Zuniga, identity was verified by name and , pharmacy verified  Labs:Yes  Fasting:Yes for labs, [facility]         
hernia    Heart disease 2015    Heart failure (HCC)     Hypercholesterolemia     Hypertension 03/16/2011    Ill-defined condition     pacemaker put in last July 2015 because of CHF    Impaired fasting glucose 04/18/2011    Iron deficiency anemia 03/16/2011    Multinodular thyroid 03/16/2011    Myocardial infarction, anterior, acute, initial episode (HCC) 10/31/2010    Myocardial infarction, anterior, acute, initial episode (HCC) 10/31/2010    Dr [facility] Amos at [facility]     Palpitations 10/13/2011    Perennial allergic rhinitis 03/16/2011    Pityriasis rosea 05/30/2012    Post-menopausal     LMP 46y.o, no HRT    Postmenopause 03/16/2011    Reflux esophagitis 03/16/2011    Sinusitis 03/16/2011    Tobacco user     Vitamin D deficiency 10/13/2011       Depression screening:  PHQ-9 Total Score: 0 (1/17/2025  9:41 AM)        Review of systems:   A comprehensive review of systems was negative except for that written in the History of Present Illness.       Physical Exam  BP (!) 121/57   Pulse 76   Temp 97.6 °F (36.4 °C) (Temporal)   Resp 16   Ht 1.575 m (5' 2\")   Wt 77 kg (169 lb 12.1 oz)   SpO2 97%   BMI 31.05 kg/m²     General: Alert and oriented, in no acute distress. Well nourished.   LUNGS: Respirations unlabored; clear to auscultation bilaterally.  CARDIOVASCULAR: Regular, rate, and rhythm without murmurs, gallops or rubs.   EXT: No edema. Neurovascularlly intact. Normal gait.  Neurological: Alert and oriented X 3, normal strength and tone. Normal symmetric reflexes. Normal coordination and gait       Assessment/Plan    1. 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  Lab Results   Component Value Date/Time    RUK6DLLE 6.6 01/17/2025 09:34 AM     - Encouraged to work on lifestyle modifications including diet and exercise.  -Discussed hypoglycemic protocol

## 2025-01-17 NOTE — ASSESSMENT & PLAN NOTE
Chronic, at goal (stable), continue current treatment plan, medication adherence emphasized, and lifestyle modifications recommended.  Lab Results   Component Value Date/Time    LIX8HZLH 6.6 01/17/2025 09:34 AM     - Encouraged to work on lifestyle modifications including diet and exercise.  -Discussed hypoglycemic protocol and steps to take should this happen; When sugar is less than 70 mg/dl; eat 15 grams of fast acting carbohydrates (3-4 glucose tabs, or 1/2 cup soda or juice) and recheck in 15-30 minutes. Repeat if still less than 70mg/dl

## 2025-01-20 RX ORDER — FERROUS SULFATE 325(65) MG
1 TABLET ORAL
Qty: 90 TABLET | Refills: 1 | Status: SHIPPED | OUTPATIENT
Start: 2025-01-20

## 2025-01-20 NOTE — TELEPHONE ENCOUNTER
Requested Prescriptions     Pending Prescriptions Disp Refills    ferrous sulfate (IRON 325) 325 (65 Fe) MG tablet [Pharmacy Med Name: FERROUS SULFATE 325 MG TABLET] 90 tablet 1     Sig: TAKE 1 TABLET BY MOUTH EVERY DAY WITH BREAKFAST     Last visit 10/1  Next visit 6/12/25

## 2025-01-29 ENCOUNTER — TELEPHONE (OUTPATIENT)
Age: 65
End: 2025-01-29

## 2025-01-29 RX ORDER — BUMETANIDE 2 MG/1
2 TABLET ORAL 2 TIMES DAILY
Qty: 180 TABLET | Refills: 1 | Status: SHIPPED | OUTPATIENT
Start: 2025-01-29

## 2025-01-29 RX ORDER — ISOSORBIDE DINITRATE 10 MG/1
10 TABLET ORAL 3 TIMES DAILY
Qty: 270 TABLET | Refills: 1 | OUTPATIENT
Start: 2025-01-29

## 2025-01-29 RX ORDER — ISOSORBIDE DINITRATE 10 MG/1
20 TABLET ORAL 3 TIMES DAILY
Qty: 540 TABLET | Refills: 1 | Status: SHIPPED | OUTPATIENT
Start: 2025-01-29

## 2025-01-29 RX ORDER — BUMETANIDE 2 MG/1
2 TABLET ORAL 2 TIMES DAILY
Qty: 180 TABLET | Refills: 0 | OUTPATIENT
Start: 2025-01-29

## 2025-01-29 NOTE — TELEPHONE ENCOUNTER
Spoke with patient using two patient identifiers. Asked patient what current dose of bumex is she taking. Patient states that she has been taking bumex 2mg twice a day per nephrology. She states that her weight has been stable at 170lbs. She reports that Dr. Kohler adjusted medicaitons at beginning of the month because she was having more swelling in her ankles. She states that he decreased amlodipine to 5mg daily and increased hydralazine to 100mg TID. She states that swelling has improved since then.       Faxed S request for recent records.     Requested Prescriptions     Signed Prescriptions Disp Refills    bumetanide (BUMEX) 2 MG tablet 180 tablet 1     Sig: Take 1 tablet by mouth 2 times daily     Authorizing Provider: SHERRY ALFRED     Ordering User: KENAN BELLE    isosorbide dinitrate (ISORDIL) 10 MG tablet 540 tablet 1     Sig: Take 2 tablets by mouth 3 times daily     Authorizing Provider: SHERRY ALFRED     Ordering User: KENAN BELLE

## 2025-02-03 DIAGNOSIS — E11.65 TYPE 2 DIABETES MELLITUS WITH HYPERGLYCEMIA, UNSPECIFIED WHETHER LONG TERM INSULIN USE (HCC): ICD-10-CM

## 2025-02-03 RX ORDER — GLIPIZIDE 2.5 MG/1
2.5 TABLET, EXTENDED RELEASE ORAL DAILY
Qty: 90 TABLET | Refills: 3 | OUTPATIENT
Start: 2025-02-03

## 2025-02-03 NOTE — TELEPHONE ENCOUNTER
Glucotrol was sent on 10/8/24 for #90 with 3 refills    For Pharmacy Admin Tracking Only    Program: Medication Refill  CPA in place:    Recommendation Provided To:   Intervention Detail: Discontinued Rx: 1, reason: Duplicate Therapy  Intervention Accepted By:   Gap Closed?:    Time Spent (min): 5

## 2025-02-04 RX ORDER — GLIPIZIDE 2.5 MG/1
2.5 TABLET, EXTENDED RELEASE ORAL DAILY
Qty: 90 TABLET | Refills: 3 | OUTPATIENT
Start: 2025-02-04

## 2025-04-28 RX ORDER — AMLODIPINE BESYLATE 5 MG/1
7.5 TABLET ORAL DAILY
Qty: 135 TABLET | Refills: 0 | OUTPATIENT
Start: 2025-04-28

## 2025-04-28 NOTE — TELEPHONE ENCOUNTER
Requested Prescriptions     Refused Prescriptions Disp Refills    amLODIPine (NORVASC) 5 MG tablet [Pharmacy Med Name: AMLODIPINE BESYLATE 5 MG TAB] 135 tablet 0     Sig: TAKE 1 AND 1/2 TABLETS BY MOUTH DAILY     Refused By: DALIA MOSS     Reason for Refusal: Medication dose changed      Canceled old rx, request pharmacy fill the new 10mg dose

## 2025-05-17 ENCOUNTER — APPOINTMENT (OUTPATIENT)
Facility: HOSPITAL | Age: 65
End: 2025-05-17
Payer: COMMERCIAL

## 2025-05-17 ENCOUNTER — HOSPITAL ENCOUNTER (EMERGENCY)
Facility: HOSPITAL | Age: 65
Discharge: HOME | End: 2025-05-17
Attending: EMERGENCY MEDICINE
Payer: COMMERCIAL

## 2025-05-17 VITALS
RESPIRATION RATE: 24 BRPM | SYSTOLIC BLOOD PRESSURE: 162 MMHG | HEIGHT: 62 IN | WEIGHT: 170.19 LBS | TEMPERATURE: 97.8 F | BODY MASS INDEX: 31.32 KG/M2 | DIASTOLIC BLOOD PRESSURE: 64 MMHG | OXYGEN SATURATION: 100 % | HEART RATE: 81 BPM

## 2025-05-17 DIAGNOSIS — B08.5 HERPANGINA: Primary | ICD-10-CM

## 2025-05-17 DIAGNOSIS — R53.1 GENERAL WEAKNESS: ICD-10-CM

## 2025-05-17 LAB
ALBUMIN SERPL-MCNC: 4 G/DL (ref 3.5–5)
ALBUMIN/GLOB SERPL: 1 (ref 1.1–2.2)
ALP SERPL-CCNC: 113 U/L (ref 45–117)
ALT SERPL-CCNC: 31 U/L (ref 12–78)
ANION GAP SERPL CALC-SCNC: 5 MMOL/L (ref 2–12)
APPEARANCE UR: CLEAR
AST SERPL-CCNC: 22 U/L (ref 15–37)
BACTERIA URNS QL MICRO: NEGATIVE /HPF
BASOPHILS # BLD: 0.1 K/UL (ref 0–0.1)
BASOPHILS NFR BLD: 1 % (ref 0–1)
BILIRUB SERPL-MCNC: 0.2 MG/DL (ref 0.2–1)
BILIRUB UR QL: NEGATIVE
BUN SERPL-MCNC: 52 MG/DL (ref 6–20)
BUN/CREAT SERPL: 15 (ref 12–20)
CALCIUM SERPL-MCNC: 10 MG/DL (ref 8.5–10.1)
CHLORIDE SERPL-SCNC: 109 MMOL/L (ref 97–108)
CO2 SERPL-SCNC: 25 MMOL/L (ref 21–32)
COLOR UR: NORMAL
COMMENT:: NORMAL
CREAT SERPL-MCNC: 3.36 MG/DL (ref 0.55–1.02)
DIFFERENTIAL METHOD BLD: ABNORMAL
EKG ATRIAL RATE: 77 BPM
EKG DIAGNOSIS: NORMAL
EKG P AXIS: 68 DEGREES
EKG P-R INTERVAL: 178 MS
EKG Q-T INTERVAL: 412 MS
EKG QRS DURATION: 86 MS
EKG QTC CALCULATION (BAZETT): 466 MS
EKG R AXIS: 50 DEGREES
EKG T AXIS: 13 DEGREES
EKG VENTRICULAR RATE: 77 BPM
EOSINOPHIL # BLD: 0.2 K/UL (ref 0–0.4)
EOSINOPHIL NFR BLD: 2 % (ref 0–7)
EPITH CASTS URNS QL MICRO: NORMAL /LPF
ERYTHROCYTE [DISTWIDTH] IN BLOOD BY AUTOMATED COUNT: 14.9 % (ref 11.5–14.5)
GLOBULIN SER CALC-MCNC: 4.1 G/DL (ref 2–4)
GLUCOSE SERPL-MCNC: 109 MG/DL (ref 65–100)
GLUCOSE UR STRIP.AUTO-MCNC: NEGATIVE MG/DL
HCT VFR BLD AUTO: 29.3 % (ref 35–47)
HGB BLD-MCNC: 9.4 G/DL (ref 11.5–16)
HGB UR QL STRIP: NEGATIVE
HYALINE CASTS URNS QL MICRO: NORMAL /LPF (ref 0–5)
IMM GRANULOCYTES # BLD AUTO: 0 K/UL
IMM GRANULOCYTES NFR BLD AUTO: 0 %
KETONES UR QL STRIP.AUTO: NEGATIVE MG/DL
LEUKOCYTE ESTERASE UR QL STRIP.AUTO: NEGATIVE
LYMPHOCYTES # BLD: 2.94 K/UL (ref 0.8–3.5)
LYMPHOCYTES NFR BLD: 30 % (ref 12–49)
MCH RBC QN AUTO: 28.5 PG (ref 26–34)
MCHC RBC AUTO-ENTMCNC: 32.1 G/DL (ref 30–36.5)
MCV RBC AUTO: 88.8 FL (ref 80–99)
MONOCYTES # BLD: 0.59 K/UL (ref 0–1)
MONOCYTES NFR BLD: 6 % (ref 5–13)
NEUTS SEG # BLD: 5.97 K/UL (ref 1.8–8)
NEUTS SEG NFR BLD: 61 % (ref 32–75)
NITRITE UR QL STRIP.AUTO: NEGATIVE
NRBC # BLD: 0 K/UL (ref 0–0.01)
NRBC BLD-RTO: 0 PER 100 WBC
NT PRO BNP: 813 PG/ML
PH UR STRIP: 5.5 (ref 5–8)
PLATELET # BLD AUTO: 219 K/UL (ref 150–400)
PMV BLD AUTO: 8.9 FL (ref 8.9–12.9)
POTASSIUM SERPL-SCNC: 3.6 MMOL/L (ref 3.5–5.1)
PROT SERPL-MCNC: 8.1 G/DL (ref 6.4–8.2)
PROT UR STRIP-MCNC: NEGATIVE MG/DL
RBC # BLD AUTO: 3.3 M/UL (ref 3.8–5.2)
RBC #/AREA URNS HPF: NORMAL /HPF (ref 0–5)
RBC MORPH BLD: ABNORMAL
RBC MORPH BLD: ABNORMAL
SODIUM SERPL-SCNC: 139 MMOL/L (ref 136–145)
SP GR UR REFRACTOMETRY: 1.01 (ref 1–1.03)
SPECIMEN HOLD: NORMAL
SPECIMEN HOLD: NORMAL
TROPONIN I SERPL HS-MCNC: 68 NG/L (ref 0–51)
UROBILINOGEN UR QL STRIP.AUTO: 0.2 EU/DL (ref 0.2–1)
WBC # BLD AUTO: 9.8 K/UL (ref 3.6–11)
WBC URNS QL MICRO: NORMAL /HPF (ref 0–4)

## 2025-05-17 PROCEDURE — 80053 COMPREHEN METABOLIC PANEL: CPT

## 2025-05-17 PROCEDURE — 93005 ELECTROCARDIOGRAM TRACING: CPT | Performed by: PHYSICIAN ASSISTANT

## 2025-05-17 PROCEDURE — 99285 EMERGENCY DEPT VISIT HI MDM: CPT

## 2025-05-17 PROCEDURE — 81001 URINALYSIS AUTO W/SCOPE: CPT

## 2025-05-17 PROCEDURE — 84484 ASSAY OF TROPONIN QUANT: CPT

## 2025-05-17 PROCEDURE — 83880 ASSAY OF NATRIURETIC PEPTIDE: CPT

## 2025-05-17 PROCEDURE — 71046 X-RAY EXAM CHEST 2 VIEWS: CPT

## 2025-05-17 PROCEDURE — 85025 COMPLETE CBC W/AUTO DIFF WBC: CPT

## 2025-05-17 ASSESSMENT — LIFESTYLE VARIABLES
HOW OFTEN DO YOU HAVE A DRINK CONTAINING ALCOHOL: NEVER
HOW MANY STANDARD DRINKS CONTAINING ALCOHOL DO YOU HAVE ON A TYPICAL DAY: PATIENT DOES NOT DRINK

## 2025-05-17 ASSESSMENT — PAIN SCALES - GENERAL: PAINLEVEL_OUTOF10: 0

## 2025-05-17 ASSESSMENT — PAIN - FUNCTIONAL ASSESSMENT
PAIN_FUNCTIONAL_ASSESSMENT: ACTIVITIES ARE NOT PREVENTED
PAIN_FUNCTIONAL_ASSESSMENT: 0-10

## 2025-05-17 NOTE — ED NOTES
7:51 PM  I have evaluated the patient as the Provider in Rapid Medical Evaluation (RME). I have reviewed her vital signs and the triage nurse assessment. I have talked with the patient and any available family and advised that I am the provider in triage and have ordered the appropriate study to initiate their work up based on the clinical presentation during my assessment. I have advised that the patient will be accommodated in the Main ED as soon as possible. I have also requested to contact the triage nurse or myself immediately if the patient experiences any changes in their condition during this brief waiting period.    Hx CHF, CKD (3 and 4) - notes fatigue, generalized pruritus, urinary frequency.,  No SOB or fever.  No edema.  Denies CP.      LORRAINE Kumar Lindsay H, PA  05/17/25 1952

## 2025-05-17 NOTE — ED TRIAGE NOTES
Pt ambulatory to triage w/ c/o feeling fatigued and \"itching \" n lower extremities for a week.  Pt states she has CKD 3-4 but not currently on dialysis.  Pt denies CP/ SOB.  Pt also states she has had frequent urination as well.  BS running in 140's  
Yes

## 2025-05-18 NOTE — ED PROVIDER NOTES
[facility] EMERGENCY DEPARTMENT  EMERGENCY DEPARTMENT ENCOUNTER      Pt Name: Tariq Zuniga  MRN: [id number]  Birthdate 1960  Date of evaluation: 5/17/2025  Provider: Ruth Bell DO    CHIEF COMPLAINT       Chief Complaint   Patient presents with    Fatigue    Urticaria         HISTORY OF PRESENT ILLNESS   (Location/Symptom, Timing/Onset, Context/Setting, Quality, Duration, Modifying Factors, Severity)  Note limiting factors.   HPI      Review of External Medical Records:     Nursing Notes were reviewed.    REVIEW OF SYSTEMS    (2-9 systems for level 4, 10 or more for level 5)     Review of Systems    Except as noted above the remainder of the review of systems was reviewed and negative.       PAST MEDICAL HISTORY     Past Medical History:   Diagnosis Date    Asthma     mild    CHF (congestive heart failure) (HCC)     Diabetes mellitus, type 2 (HCC) 08/24/2012    GERD (gastroesophageal reflux disease) 03/16/2011    GERD (gastroesophageal reflux disease)     hiatal hernia    Heart disease 2015    Heart failure (HCC)     Hypercholesterolemia     Hypertension 03/16/2011    Ill-defined condition     pacemaker put in last July 2015 because of CHF    Impaired fasting glucose 04/18/2011    Iron deficiency anemia 03/16/2011    Multinodular thyroid 03/16/2011    Myocardial infarction, anterior, acute, initial episode (HCC) 10/31/2010    Myocardial infarction, anterior, acute, initial episode (HCC) 10/31/2010    Dr Jesus Amos at [facility]     Palpitations 10/13/2011    Perennial allergic rhinitis 03/16/2011    Pityriasis rosea 05/30/2012    Post-menopausal     LMP 46y.o, no HRT    Postmenopause 03/16/2011    Reflux esophagitis 03/16/2011    Sinusitis 03/16/2011    Tobacco user     Vitamin D deficiency 10/13/2011         SURGICAL HISTORY       Past Surgical History:   Procedure Laterality Date    ANGIOPLASTY      CARDIAC PROCEDURE N/A 10/03/2023    Right heart cath performed by Ran Kohler MD at [facility]

## 2025-05-19 LAB
EKG ATRIAL RATE: 77 BPM
EKG DIAGNOSIS: NORMAL
EKG P AXIS: 68 DEGREES
EKG P-R INTERVAL: 178 MS
EKG Q-T INTERVAL: 412 MS
EKG QRS DURATION: 86 MS
EKG QTC CALCULATION (BAZETT): 466 MS
EKG R AXIS: 50 DEGREES
EKG T AXIS: 13 DEGREES
EKG VENTRICULAR RATE: 77 BPM

## 2025-05-22 ENCOUNTER — OFFICE VISIT (OUTPATIENT)
Age: 65
End: 2025-05-22
Payer: COMMERCIAL

## 2025-05-22 VITALS
DIASTOLIC BLOOD PRESSURE: 56 MMHG | RESPIRATION RATE: 20 BRPM | HEIGHT: 62 IN | SYSTOLIC BLOOD PRESSURE: 119 MMHG | WEIGHT: 171.74 LBS | HEART RATE: 72 BPM | OXYGEN SATURATION: 99 % | TEMPERATURE: 97.7 F | BODY MASS INDEX: 31.6 KG/M2

## 2025-05-22 DIAGNOSIS — I12.9 TYPE 2 DM WITH CKD STAGE 4 AND HYPERTENSION (HCC): Primary | ICD-10-CM

## 2025-05-22 DIAGNOSIS — R53.82 CHRONIC FATIGUE: ICD-10-CM

## 2025-05-22 DIAGNOSIS — E78.2 MIXED HYPERLIPIDEMIA: ICD-10-CM

## 2025-05-22 DIAGNOSIS — E55.9 VITAMIN D DEFICIENCY: ICD-10-CM

## 2025-05-22 DIAGNOSIS — E11.22 TYPE 2 DM WITH CKD STAGE 4 AND HYPERTENSION (HCC): Primary | ICD-10-CM

## 2025-05-22 DIAGNOSIS — N18.4 TYPE 2 DM WITH CKD STAGE 4 AND HYPERTENSION (HCC): Primary | ICD-10-CM

## 2025-05-22 LAB
25(OH)D3 SERPL-MCNC: 47.4 NG/ML (ref 30–100)
CHOLEST SERPL-MCNC: 136 MG/DL
HBA1C MFR BLD: 7.1 %
HDLC SERPL-MCNC: 45 MG/DL
HDLC SERPL: 3 (ref 0–5)
LDLC SERPL CALC-MCNC: 64.2 MG/DL (ref 0–100)
TRIGL SERPL-MCNC: 134 MG/DL
TSH SERPL DL<=0.05 MIU/L-ACNC: 1.28 UIU/ML (ref 0.36–3.74)
VLDLC SERPL CALC-MCNC: 26.8 MG/DL

## 2025-05-22 PROCEDURE — 3078F DIAST BP <80 MM HG: CPT | Performed by: STUDENT IN AN ORGANIZED HEALTH CARE EDUCATION/TRAINING PROGRAM

## 2025-05-22 PROCEDURE — 3051F HG A1C>EQUAL 7.0%<8.0%: CPT | Performed by: STUDENT IN AN ORGANIZED HEALTH CARE EDUCATION/TRAINING PROGRAM

## 2025-05-22 PROCEDURE — 3074F SYST BP LT 130 MM HG: CPT | Performed by: STUDENT IN AN ORGANIZED HEALTH CARE EDUCATION/TRAINING PROGRAM

## 2025-05-22 PROCEDURE — 99214 OFFICE O/P EST MOD 30 MIN: CPT | Performed by: STUDENT IN AN ORGANIZED HEALTH CARE EDUCATION/TRAINING PROGRAM

## 2025-05-22 PROCEDURE — 83036 HEMOGLOBIN GLYCOSYLATED A1C: CPT | Performed by: STUDENT IN AN ORGANIZED HEALTH CARE EDUCATION/TRAINING PROGRAM

## 2025-05-22 ASSESSMENT — PATIENT HEALTH QUESTIONNAIRE - PHQ9
1. LITTLE INTEREST OR PLEASURE IN DOING THINGS: NOT AT ALL
SUM OF ALL RESPONSES TO PHQ QUESTIONS 1-9: 0
2. FEELING DOWN, DEPRESSED OR HOPELESS: NOT AT ALL

## 2025-05-22 NOTE — ASSESSMENT & PLAN NOTE
Unclear etiology. Will get basic labs. Pt encouraged to follow up with nephro given kidney function. Also follows with cardiologist.

## 2025-05-22 NOTE — ASSESSMENT & PLAN NOTE
A1C of 7.1%.  Due to hypoglycemic episodes, will continue to hold Glipizide for now.  If needed, will consider as needed fast acting insulin.  Lab Results   Component Value Date/Time    OVD8XALL 7.1 05/22/2025 09:46 AM     - Encouraged to work on lifestyle modifications including diet and exercise.

## 2025-05-22 NOTE — PROGRESS NOTES
Chief Complaint   Patient presents with    Follow-up     ER 2025 [facility] \"Fatigue · Urticaria\"       \"Have you been to the ER, urgent care clinic since your last visit?  Hospitalized since your last visit?\"    YES - When: approximately 5  weeks ago.  Where and Why: 2025 [facility] \"Fatigue · Urticaria\".    “Have you seen or consulted any other health care providers outside of [facility] since your last visit?”    NO    Have you had a mammogram?”   NO    Date of last Mammogram: 2017             Click Here for Release of Records Request     No results found for this visit on 25.   Vitals:    25 0949   BP: (!) 119/56   Pulse: 72   Resp: 20   Temp: 97.7 °F (36.5 °C)   TempSrc: Temporal   SpO2: 99%   Weight: 77.9 kg (171 lb 11.8 oz)   Height: 1.575 m (5' 2\")      Health Maintenance Due   Topic Date Due    HIV screen  Never done    Shingles vaccine (1 of 2) Never done    Diabetic retinal exam  2015    Breast cancer screen  2019    Respiratory Syncytial Virus (RSV) Pregnant or age 60 yrs+ (1 - Risk 60-74 years 1-dose series) Never done    Diabetic foot exam  2023    Lipids  2024        The patient, Tariq Zuniga, identity was verified by name and .   
failure) (HCC)     Diabetes mellitus, type 2 ([facility]) 08/24/2012    GERD (gastroesophageal reflux disease) 03/16/2011    GERD (gastroesophageal reflux disease)     hiatal hernia    Heart disease 2015    Heart failure (HCC)     Hypercholesterolemia     Hypertension 03/16/2011    Ill-defined condition     pacemaker put in last July 2015 because of CHF    Impaired fasting glucose 04/18/2011    Iron deficiency anemia 03/16/2011    Multinodular thyroid 03/16/2011    Myocardial infarction, anterior, acute, initial episode (HCC) 10/31/2010    Myocardial infarction, anterior, acute, initial episode (HCC) 10/31/2010    Dr Jesus Amos at [facility]     Palpitations 10/13/2011    Perennial allergic rhinitis 03/16/2011    Pityriasis rosea 05/30/2012    Post-menopausal     LMP 46y.o, no HRT    Postmenopause 03/16/2011    Reflux esophagitis 03/16/2011    Sinusitis 03/16/2011    Tobacco user     Vitamin D deficiency 10/13/2011       Depression screening:  PHQ-9 Total Score: 0 (5/22/2025  9:49 AM)        Review of systems:   A comprehensive review of systems was negative except for that written in the History of Present Illness.       Physical Exam  BP (!) 119/56   Pulse 72   Temp 97.7 °F (36.5 °C) (Temporal)   Resp 20   Ht 1.575 m (5' 2\")   Wt 77.9 kg (171 lb 11.8 oz)   SpO2 99%   BMI 31.41 kg/m²     General: Alert and oriented, in no acute distress. Well nourished.   LUNGS: Respirations unlabored; clear to auscultation bilaterally.  CARDIOVASCULAR: Regular, rate, and rhythm without murmurs, gallops or rubs.   EXT: No edema. Neurovascularlly intact. Normal gait.  Neurological: Alert and oriented X 3, normal strength and tone. Normal symmetric reflexes. Normal coordination and gait       Assessment/Plan    1. Type 2 DM with CKD stage 4 and hypertension ([facility])  Assessment & Plan:  A1C of 7.1%.  Due to hypoglycemic episodes, will continue to hold Glipizide for now.  If needed, will consider as needed fast acting insulin.  Lab Results

## 2025-05-28 ENCOUNTER — OFFICE VISIT (OUTPATIENT)
Age: 65
End: 2025-05-28

## 2025-05-28 VITALS
TEMPERATURE: 98.5 F | OXYGEN SATURATION: 100 % | DIASTOLIC BLOOD PRESSURE: 69 MMHG | HEART RATE: 77 BPM | BODY MASS INDEX: 31.31 KG/M2 | RESPIRATION RATE: 17 BRPM | WEIGHT: 171.2 LBS | SYSTOLIC BLOOD PRESSURE: 140 MMHG

## 2025-05-28 DIAGNOSIS — J30.2 SEASONAL ALLERGIC RHINITIS, UNSPECIFIED TRIGGER: Primary | ICD-10-CM

## 2025-05-28 DIAGNOSIS — J02.9 SORE THROAT: ICD-10-CM

## 2025-05-28 RX ORDER — GUAIFENESIN 600 MG/1
600 TABLET, EXTENDED RELEASE ORAL 2 TIMES DAILY
Qty: 30 TABLET | Refills: 0 | Status: SHIPPED | OUTPATIENT
Start: 2025-05-28 | End: 2025-06-12

## 2025-05-28 RX ORDER — FLUTICASONE PROPIONATE 50 MCG
1 SPRAY, SUSPENSION (ML) NASAL DAILY
Qty: 16 G | Refills: 0 | Status: SHIPPED | OUTPATIENT
Start: 2025-05-28

## 2025-05-28 NOTE — PATIENT INSTRUCTIONS
Exam and history consistent with sore throat likely secondary to allergic rhinitis.  Flonase 1 squirt each nostril, once a day  Continue Azelastine as prescribed   Mucinex for symptomatic relief and decongestion  Zyrtec, Xyzal, Allegra, or Claritin for control of allergies  Simple foods like chicken noodle soup, smoothies, hot tea with lemon and honey may also help  Steam inhalation, humidifier, warm compresses  Increase oral fluids to maintain hydration  Avoid smoking and minimize contact with environmental irritants    Please follow up with your primary care provider if your symptoms last more than 10 days or worsen.    Please go immediately to the Emergency Department if you develop:  Fever higher than 102F (38.9C), sudden and severe pain in the face and head, trouble seeing or seeing double, trouble thinking clearly, swelling or redness around one or both eyes or a stiff neck

## 2025-05-28 NOTE — PROGRESS NOTES
2025   Tariq Zuniga (: 1960) is a 64 y.o. female, New patient, here for evaluation of the following chief complaint(s):  Sinusitis, Cough, and Pharyngitis (Pt present today C/c Sinus issues, cough.(2025) sore throat  On set (2025))     ASSESSMENT/PLAN:  Below is the assessment and plan developed based on review of pertinent history, physical exam, labs, studies, and medications.  Assessment & Plan  Seasonal allergic rhinitis, unspecified trigger       Orders:    fluticasone (FLONASE) 50 MCG/ACT nasal spray; 1 spray by Each Nostril route daily    guaiFENesin (MUCINEX) 600 MG extended release tablet; Take 1 tablet by mouth 2 times daily for 15 days  Exam and history consistent with sore throat likely secondary to allergic rhinitis.  Flonase 1 squirt each nostril, once a day  Continue Azelastine as prescribed   Mucinex for symptomatic relief and decongestion  Zyrtec, Xyzal, Allegra, or Claritin for control of allergies  Simple foods like chicken noodle soup, smoothies, hot tea with lemon and honey may also help  Steam inhalation, humidifier, warm compresses  Increase oral fluids to maintain hydration  Avoid smoking and minimize contact with environmental irritants    Please follow up with your primary care provider if your symptoms last more than 10 days or worsen.    Please go immediately to the Emergency Department if you develop:  Fever higher than 102F (38.9C), sudden and severe pain in the face and head, trouble seeing or seeing double, trouble thinking clearly, swelling or redness around one or both eyes or a stiff neck    Sore throat                  Handout given with care instructions  2. OTC for symptom management. Increase fluid intake, ensure adequate nutritional intake.  3. Follow up with PCP as needed.  4. Go to ED with development of any acute symptoms.     Follow up:  No follow-ups on file.  Follow up immediately for any new, worsening or changes or if symptoms are

## 2025-05-29 ENCOUNTER — RESULTS FOLLOW-UP (OUTPATIENT)
Age: 65
End: 2025-05-29

## 2025-05-30 RX ORDER — BUMETANIDE 2 MG/1
2 TABLET ORAL 2 TIMES DAILY
Qty: 180 TABLET | Refills: 1 | Status: CANCELLED | OUTPATIENT
Start: 2025-05-30

## 2025-05-30 RX ORDER — BUMETANIDE 1 MG/1
1 TABLET ORAL 2 TIMES DAILY
Qty: 90 TABLET | Refills: 0 | Status: SHIPPED | OUTPATIENT
Start: 2025-05-30

## 2025-05-30 NOTE — TELEPHONE ENCOUNTER
Called patient to clarify bumex dosing as there was a discrepancy.    Patient states she just saw nephrology specialists Dr Hernández today, who reviewed labs drawn at ER last week, they have reduced patients bumex to 1mg daily. She will have lab redraw in 2 weeks with nephrology to follow up on kidney function.    Rn sent request to neph. Specialist requesting copy of office note.

## 2025-06-04 DIAGNOSIS — K21.9 GASTRO-ESOPHAGEAL REFLUX DISEASE WITHOUT ESOPHAGITIS: ICD-10-CM

## 2025-06-04 RX ORDER — AMLODIPINE BESYLATE 5 MG/1
5 TABLET ORAL DAILY
Qty: 90 TABLET | Refills: 0 | OUTPATIENT
Start: 2025-06-04

## 2025-06-04 RX ORDER — ALLOPURINOL 100 MG/1
100 TABLET ORAL DAILY
Qty: 90 TABLET | Refills: 1 | Status: SHIPPED | OUTPATIENT
Start: 2025-06-04

## 2025-06-05 RX ORDER — FAMOTIDINE 40 MG/1
40 TABLET, FILM COATED ORAL NIGHTLY
Qty: 90 TABLET | Refills: 1 | Status: SHIPPED | OUTPATIENT
Start: 2025-06-05

## 2025-06-05 NOTE — TELEPHONE ENCOUNTER
Last appointment: 5/22/25  Next appointment: 8/26/25  Previous refill encounter(s): 12/16/24 #90 with 1 refill    Requested Prescriptions     Pending Prescriptions Disp Refills    famotidine (PEPCID) 40 MG tablet [Pharmacy Med Name: FAMOTIDINE 40 MG TABLET] 90 tablet 1     Sig: TAKE 1 TABLET BY MOUTH EVERY DAY AT NIGHT         For Pharmacy Admin Tracking Only    Program: Medication Refill  CPA in place:    Recommendation Provided To:   Intervention Detail: New Rx: 1, reason: Patient Preference  Intervention Accepted By:   Gap Closed?:    Time Spent (min): 5

## 2025-06-06 ENCOUNTER — OFFICE VISIT (OUTPATIENT)
Age: 65
End: 2025-06-06
Payer: COMMERCIAL

## 2025-06-06 VITALS
WEIGHT: 169 LBS | BODY MASS INDEX: 31.1 KG/M2 | HEIGHT: 62 IN | HEART RATE: 80 BPM | RESPIRATION RATE: 18 BRPM | SYSTOLIC BLOOD PRESSURE: 130 MMHG | DIASTOLIC BLOOD PRESSURE: 66 MMHG | TEMPERATURE: 98.6 F | OXYGEN SATURATION: 98 %

## 2025-06-06 DIAGNOSIS — I50.22 CHRONIC SYSTOLIC HEART FAILURE (HCC): Primary | ICD-10-CM

## 2025-06-06 DIAGNOSIS — R53.83 OTHER FATIGUE: ICD-10-CM

## 2025-06-06 PROCEDURE — 99214 OFFICE O/P EST MOD 30 MIN: CPT | Performed by: NURSE PRACTITIONER

## 2025-06-06 PROCEDURE — 3075F SYST BP GE 130 - 139MM HG: CPT | Performed by: NURSE PRACTITIONER

## 2025-06-06 PROCEDURE — 3078F DIAST BP <80 MM HG: CPT | Performed by: NURSE PRACTITIONER

## 2025-06-06 RX ORDER — BUMETANIDE 1 MG/1
0.5 TABLET ORAL DAILY
Qty: 90 TABLET | Refills: 0
Start: 2025-06-06

## 2025-06-06 RX ORDER — HYDRALAZINE HYDROCHLORIDE 100 MG/1
100 TABLET, FILM COATED ORAL 3 TIMES DAILY
Qty: 90 TABLET | Refills: 2
Start: 2025-06-06

## 2025-06-06 RX ORDER — FERROUS SULFATE 325(65) MG
1 TABLET ORAL 2 TIMES DAILY
Qty: 60 TABLET | Refills: 2
Start: 2025-06-06

## 2025-06-06 RX ORDER — AMLODIPINE BESYLATE 5 MG/1
5 TABLET ORAL DAILY
Qty: 30 TABLET | Refills: 3
Start: 2025-06-06

## 2025-06-06 ASSESSMENT — PATIENT HEALTH QUESTIONNAIRE - PHQ9
SUM OF ALL RESPONSES TO PHQ QUESTIONS 1-9: 0
2. FEELING DOWN, DEPRESSED OR HOPELESS: NOT AT ALL
1. LITTLE INTEREST OR PLEASURE IN DOING THINGS: NOT AT ALL
SUM OF ALL RESPONSES TO PHQ QUESTIONS 1-9: 0

## 2025-06-06 ASSESSMENT — ENCOUNTER SYMPTOMS
BLOOD IN STOOL: 0
EYE PAIN: 0
SORE THROAT: 0
ABDOMINAL PAIN: 0
CHEST TIGHTNESS: 0
SHORTNESS OF BREATH: 0
COUGH: 1
ABDOMINAL DISTENTION: 0

## 2025-06-06 NOTE — PATIENT INSTRUCTIONS
Medication changes:    -DECREASE your bumex to a 1/2 tablet daily, which is 0.5mg daily.    -I have updated your amlodipine and hydralazine to reflect how you are currently taking them.        Please take this to your pharmacy to notify them of the change in medications.         Testing Ordered:    -I will request your lab work from your kidney doctor          Referrals:  -if your fatigue doesn't improve with the bumex changes, please consider reaching out to your CPAP provider to see if you need setting changes.        Other Recommendations:      - Record blood pressure and heart rate daily before medication and two hours after medication  - Record weight daily upon waking/after using the bathroom.   - Keep a written records of your weights and blood pressure and bring to your next appointment.   - Call the clinic at if you have a weight gain of 3 or more pounds overnight OR 5 or more pounds in one week, new/worsened shortness of breath or swelling, or if your blood pressure begins to consistently run below 90/60 and/or you begin to experience dizziness or lightheadedness. Our office phone number [phone] option 2.  - Ensure you are drinking an adequate amount of water with a goal of 6-8 eight ounce glasses (1.5-2 liters) of fluid daily. Your urine should be clear and light yellow straw colored.       Follow up 4 in months with [facility]    Thank you for allowing us the privilege of being a part of your healthcare team! Please do not hesitate to contact our office at [phone] option 2 with any questions or concerns.     We are restarting a monthly heart failure support group, this will be the last Wednesday of every month from 5-6pm at [facility]. If you would like to attend you will need to RSVP to [email or web address]

## 2025-06-06 NOTE — PROGRESS NOTES
[facility]  [facility] in [place], [place]  Heart Failure Outpatient Clinic Note    Patient name: Tariq Zuniga  Patient : 1960  Patient MRN: [id number]  Date of service: 25    Primary care physician: Coleman Cruz MD  Primary general cardiologist: Dr. Kohler     Primary F cardiologist: Dr. Angelo Hernández    Chief Complaint   Patient presents with    Shortness of Breath     W/ exertion    Edema     Ankles    Congestive Heart Failure    Follow-up       PLAN OF CARE:  65 y/o female with asthma and HFpEF 45-50% due to mixed ischemic and non-ischemic cardiomyopathy, stage C, NYHA class IIIA (6MW 304 meters) symptoms on GDMT  RHC  showed normal right sided and elevated wedge 17 with normal cardiac output confirming diastolic dysfunction; stress test was not done in the cath labs; these are results at weight 174lbs and Cr 2.6 indicating patient gained weight and kidney function slightly worsened > her entresto was discontinued in hopes for improving renal function  Dyspnea is multifactorial, based on RHC predominantly due to lung disease +/- some component of HFpEF > patient will have pulmonary evaluation now for asthma with pulmonologist and allergologist  Chronic fatigue is unlikely related to heart failure; + [facility] just started CPAP.  PAD symptoms limiting her a lot.       INTERVAL HISTORY:  -VSS  -labs :  K 3.6; creat 3.36; pBNP 813  -weight similar to December   -ECHO 24:  EF 45-50%; LAE; grade 1 dd; small to moderate pericardial effusion without signs of tamponade.   -Tariq Zuniga is here for heart failure follow up. She is feeling very fatigued.  Wants to fall asleep at work.  She's been working with nephrology and her PCP on this.   Nephrology thinks possibly that it's due to being too dry. She's also been dealing with some congestion/cold/sinus symptoms.  Thrush from her inhaler.   Dry, itchy skin.  Sometimes feels like she can't

## 2025-06-15 DIAGNOSIS — F51.01 PRIMARY INSOMNIA: ICD-10-CM

## 2025-06-16 RX ORDER — ZOLPIDEM TARTRATE 10 MG/1
10 TABLET ORAL NIGHTLY PRN
Qty: 90 TABLET | Refills: 1 | Status: SHIPPED | OUTPATIENT
Start: 2025-06-16 | End: 2025-12-13

## 2025-06-16 NOTE — TELEPHONE ENCOUNTER
Patient calling as well for refill of zolpidem.      Stated tried to order via mychart- but could not.  Pharmacy has reached out- patient stated is out.  Check .  Thanks, Suly    Last appointment: 5/22/25 MD Cruz  Next appointment: 8/26/25 Nancy  Previous refill encounter(s): 12/16/24 90 + 1    Requested Prescriptions     Pending Prescriptions Disp Refills    zolpidem (AMBIEN) 10 MG tablet [Pharmacy Med Name: ZOLPIDEM TARTRATE 10 MG TABLET] 90 tablet 1     Sig: Take 1 tablet by mouth nightly as needed for Sleep for up to 180 days. Max Daily Amount: 10 mg     For Pharmacy Admin Tracking Only    Program: Medication Refill  CPA in place:    Recommendation Provided To:   Intervention Detail: New Rx: 1, reason: Patient Preference  Intervention Accepted By:   Gap Closed?:    Time Spent (min): 5

## 2025-06-17 ENCOUNTER — TELEPHONE (OUTPATIENT)
Age: 65
End: 2025-06-17

## 2025-06-17 NOTE — TELEPHONE ENCOUNTER
----- Message from ROME WARREN RN sent at 6/6/2025  8:58 AM EDT -----  Labs with Dr Abhay Hernández on June 13th request records    Called to request lab results    Results received. Given to NP for review.

## 2025-06-30 PROBLEM — D63.1 ANEMIA OF CHRONIC RENAL FAILURE: Status: ACTIVE | Noted: 2025-06-30

## 2025-06-30 PROBLEM — N18.9 ANEMIA OF CHRONIC RENAL FAILURE: Status: ACTIVE | Noted: 2025-06-30

## 2025-07-16 ENCOUNTER — HOSPITAL ENCOUNTER (OUTPATIENT)
Facility: HOSPITAL | Age: 65
Setting detail: INFUSION SERIES
End: 2025-07-16

## 2025-07-16 RX ORDER — FERROUS SULFATE 325(65) MG
1 TABLET ORAL
Qty: 90 TABLET | Refills: 1 | Status: SHIPPED | OUTPATIENT
Start: 2025-07-16

## 2025-07-16 NOTE — TELEPHONE ENCOUNTER
Requested Prescriptions     Pending Prescriptions Disp Refills    ferrous sulfate (IRON 325) 325 (65 Fe) MG tablet [Pharmacy Med Name: FERROUS SULFATE 325 MG TABLET] 90 tablet 1     Sig: TAKE 1 TABLET BY MOUTH EVERY DAY WITH BREAKFAST      Last appt 6/6  Next appt TBD October (4 months)

## 2025-07-19 DIAGNOSIS — J30.2 SEASONAL ALLERGIC RHINITIS, UNSPECIFIED TRIGGER: ICD-10-CM

## 2025-07-23 RX ORDER — FLUTICASONE PROPIONATE 50 MCG
SPRAY, SUSPENSION (ML) NASAL
Qty: 24 ML | Refills: 1 | OUTPATIENT
Start: 2025-07-23

## 2025-08-06 DIAGNOSIS — J01.00 ACUTE MAXILLARY SINUSITIS, UNSPECIFIED: ICD-10-CM

## 2025-08-07 RX ORDER — MONTELUKAST SODIUM 10 MG/1
TABLET ORAL
Qty: 90 TABLET | Refills: 3 | Status: SHIPPED | OUTPATIENT
Start: 2025-08-07

## 2025-08-26 ENCOUNTER — OFFICE VISIT (OUTPATIENT)
Age: 65
End: 2025-08-26
Payer: COMMERCIAL

## 2025-08-26 VITALS
DIASTOLIC BLOOD PRESSURE: 66 MMHG | HEIGHT: 62 IN | TEMPERATURE: 96.9 F | SYSTOLIC BLOOD PRESSURE: 134 MMHG | WEIGHT: 164.9 LBS | OXYGEN SATURATION: 97 % | RESPIRATION RATE: 16 BRPM | HEART RATE: 73 BPM | BODY MASS INDEX: 30.35 KG/M2

## 2025-08-26 DIAGNOSIS — I12.9 TYPE 2 DM WITH CKD STAGE 4 AND HYPERTENSION (HCC): Primary | ICD-10-CM

## 2025-08-26 DIAGNOSIS — E11.22 TYPE 2 DM WITH CKD STAGE 4 AND HYPERTENSION (HCC): Primary | ICD-10-CM

## 2025-08-26 DIAGNOSIS — N18.4 TYPE 2 DM WITH CKD STAGE 4 AND HYPERTENSION (HCC): Primary | ICD-10-CM

## 2025-08-26 LAB — HBA1C MFR BLD: 6.3 %

## 2025-08-26 PROCEDURE — 99213 OFFICE O/P EST LOW 20 MIN: CPT | Performed by: STUDENT IN AN ORGANIZED HEALTH CARE EDUCATION/TRAINING PROGRAM

## 2025-08-26 PROCEDURE — 3075F SYST BP GE 130 - 139MM HG: CPT | Performed by: STUDENT IN AN ORGANIZED HEALTH CARE EDUCATION/TRAINING PROGRAM

## 2025-08-26 PROCEDURE — 3044F HG A1C LEVEL LT 7.0%: CPT | Performed by: STUDENT IN AN ORGANIZED HEALTH CARE EDUCATION/TRAINING PROGRAM

## 2025-08-26 PROCEDURE — 3078F DIAST BP <80 MM HG: CPT | Performed by: STUDENT IN AN ORGANIZED HEALTH CARE EDUCATION/TRAINING PROGRAM

## 2025-08-26 PROCEDURE — 83036 HEMOGLOBIN GLYCOSYLATED A1C: CPT | Performed by: STUDENT IN AN ORGANIZED HEALTH CARE EDUCATION/TRAINING PROGRAM

## 2025-08-26 RX ORDER — PANTOPRAZOLE SODIUM 40 MG/1
40 TABLET, DELAYED RELEASE ORAL DAILY
COMMUNITY
Start: 2025-08-20

## 2025-08-26 RX ORDER — ALBUTEROL SULFATE 0.83 MG/ML
2.5 SOLUTION RESPIRATORY (INHALATION) EVERY 4 HOURS PRN
COMMUNITY

## (undated) DEVICE — QUILTED PREMIUM COMFORT UNDERPAD,EXTRA HEAVY: Brand: WINGS

## (undated) DEVICE — Z DISCONTINUED USE 2751540 TUBING IRRIG L10IN DISP PMP ENDOGATOR

## (undated) DEVICE — FORCEPS BX L240CM JAW DIA2.8MM L CAP W/ NDL MIC MESH TOOTH

## (undated) DEVICE — KIT MFLD ISOLATN NACL CNTRST PRT TBNG SPIK W/ PRSS TRNSDUC

## (undated) DEVICE — CONNECTOR TBNG AUX H2O JET DISP FOR OLY 160/180 SER

## (undated) DEVICE — SOLIDIFIER FLUID 3000 CC ABSORB

## (undated) DEVICE — WASTE KIT - ST MARY: Brand: MEDLINE INDUSTRIES, INC.

## (undated) DEVICE — BASIN EMSIS 16OZ GRAPHITE PLAS KID SHP MOLD GRAD FOR ORAL

## (undated) DEVICE — ANGIOGRAPHY KIT

## (undated) DEVICE — PRESSURE MONITORING SET: Brand: TRUWAVE

## (undated) DEVICE — NEEDLE HYPO 18GA L1.5IN PNK S STL HUB POLYPR SHLD REG BVL

## (undated) DEVICE — SPLINT WR VELC FOAM NEUT POS DISP FOR RAD ART ACC SFT STRP

## (undated) DEVICE — SYRINGE 50ML E/T

## (undated) DEVICE — CANN NASAL O2 CAPNOGRAPHY AD -- FILTERLINE

## (undated) DEVICE — AIRLIFE™ U/CONNECT-IT OXYGEN TUBING 7 FEET (2.1 M) CRUSH-RESISTANT OXYGEN TUBING, VINYL TIPPED: Brand: AIRLIFE™

## (undated) DEVICE — BITE BLK ENDOSCP AD 54FR GRN POLYETH ENDOSCP W STRP SLD

## (undated) DEVICE — SPECIAL PROCEDURE DRAPE 32" X 34": Brand: SPECIAL PROCEDURE DRAPE

## (undated) DEVICE — 3M™ TEGADERM™ TRANSPARENT FILM DRESSING FRAME STYLE, 1626W, 4 IN X 4-3/4 IN (10 CM X 12 CM), 50/CT 4CT/CASE: Brand: 3M™ TEGADERM™

## (undated) DEVICE — 1200 GUARD II KIT W/5MM TUBE W/O VAC TUBE: Brand: GUARDIAN

## (undated) DEVICE — CONTAINER SPEC 20 ML LID NEUT BUFF FORMALIN 10 % POLYPR STS

## (undated) DEVICE — Device

## (undated) DEVICE — PACK PROCEDURE SURG HRT CATH

## (undated) DEVICE — SET EXTN TBNG L BOR 4 W STPCOCK ST 32IN PRIMING VOL 6ML

## (undated) DEVICE — SNARE ENDOSCP M L240CM W27MM SHTH DIA2.4MM CHN 2.8MM OVL

## (undated) DEVICE — ENDO CARRY-ON PROCEDURE KIT INCLUDES ENZYMATIC SPONGE, GAUZE, BIOHAZARD LABEL, TRAY, LUBRICANT, DIRTY SCOPE LABEL, WATER LABEL, TRAY, DRAWSTRING PAD, AND DEFENDO 4-PIECE KIT.: Brand: ENDO CARRY-ON PROCEDURE KIT

## (undated) DEVICE — KIT IV STRT W CHLORAPREP PD 1ML

## (undated) DEVICE — CATH IV AUTOGRD BC BLU 22GA 25 -- INSYTE

## (undated) DEVICE — KENDALL RADIOLUCENT FOAM MONITORING ELECTRODE -RECTANGULAR SHAPE: Brand: KENDALL

## (undated) DEVICE — BAG BELONG PT PERS CLEAR HANDL

## (undated) DEVICE — KIT MED IMAG CNTRST AGNT W/ IOPAMIDOL REUSE

## (undated) DEVICE — PROVE COVER: Brand: UNBRANDED

## (undated) DEVICE — SET ADMIN 16ML TBNG L100IN 2 Y INJ SITE IV PIGGY BK DISP

## (undated) DEVICE — NEONATAL-ADULT SPO2 SENSOR: Brand: NELLCOR

## (undated) DEVICE — KIT HND CTRL 3 W STPCOCK ROT END 54IN PREM HI PRSS TBNG AT

## (undated) DEVICE — BAG SPEC BIOHZD LF 2MIL 6X10IN -- CONVERT TO ITEM 357326

## (undated) DEVICE — BW-412T DISP COMBO CLEANING BRUSH: Brand: SINGLE USE COMBINATION CLEANING BRUSH

## (undated) DEVICE — GLIDESHEATH SLENDER STAINLESS STEEL KIT: Brand: GLIDESHEATH SLENDER

## (undated) DEVICE — CO-SET DELIVERY SYSTEM FOR 123 ROOM TEMPATURE INJECTATE: Brand: CO-SET+

## (undated) DEVICE — TRAP SURG QUAD PARABOLA SLOT DSGN SFTY SCRN TRAPEASE

## (undated) DEVICE — 6FR THERMODILUTION BALLOON CATHETER SWAN (ARROW)

## (undated) DEVICE — CATH THERMDILUT 4 LUM SWAN 6FRX110CM

## (undated) DEVICE — SYRINGE MED 20ML STD CLR PLAS LUERLOCK TIP N CTRL DISP